# Patient Record
Sex: FEMALE | Race: WHITE | Employment: UNEMPLOYED | URBAN - METROPOLITAN AREA
[De-identification: names, ages, dates, MRNs, and addresses within clinical notes are randomized per-mention and may not be internally consistent; named-entity substitution may affect disease eponyms.]

---

## 2020-01-22 PROBLEM — M50.223 HERNIATED NUCLEUS PULPOSUS, C6-7: Chronic | Status: ACTIVE | Noted: 2020-01-22

## 2020-01-22 PROBLEM — M50.222 HERNIATED NUCLEUS PULPOSUS, C5-6: Chronic | Status: ACTIVE | Noted: 2020-01-22

## 2020-01-22 PROBLEM — M51.34 DEGENERATION OF THORACIC INTERVERTEBRAL DISC: Chronic | Status: ACTIVE | Noted: 2020-01-22

## 2020-01-22 PROBLEM — M50.322 DEGENERATION OF INTERVERTEBRAL DISC AT C5-C6 LEVEL: Chronic | Status: ACTIVE | Noted: 2020-01-22

## 2021-06-15 ENCOUNTER — OFFICE VISIT (OUTPATIENT)
Dept: SPORTS MEDICINE | Age: 66
End: 2021-06-15
Payer: COMMERCIAL

## 2021-06-15 VITALS
HEIGHT: 63 IN | SYSTOLIC BLOOD PRESSURE: 136 MMHG | WEIGHT: 240 LBS | TEMPERATURE: 97.8 F | BODY MASS INDEX: 42.52 KG/M2 | DIASTOLIC BLOOD PRESSURE: 80 MMHG

## 2021-06-15 DIAGNOSIS — M51.34 DEGENERATION, INTERVERTEBRAL DISC, THORACIC: Primary | ICD-10-CM

## 2021-06-15 PROCEDURE — 99214 OFFICE O/P EST MOD 30 MIN: CPT | Performed by: FAMILY MEDICINE

## 2021-06-15 RX ORDER — LIDOCAINE 50 MG/G
1 PATCH TOPICAL DAILY
Qty: 30 PATCH | Refills: 0 | Status: SHIPPED | OUTPATIENT
Start: 2021-06-15 | End: 2021-07-15

## 2021-06-15 ASSESSMENT — ENCOUNTER SYMPTOMS
CONSTIPATION: 0
DIARRHEA: 0
SHORTNESS OF BREATH: 0
BACK PAIN: 0
NAUSEA: 0

## 2021-06-15 NOTE — PROGRESS NOTES
[facility]) Physicians  Neurosurgery and Pain [facility]  [address], [address] [facility] , [address] [facility], [place]: [phone]  F: [phone]      Enoch Carlos  ([birth year])    6/15/2021    Subjective:     Enoch Carlos is 63 y.o. female who complains today of:    Chief Complaint   Patient presents with    Follow-up     FOLLOW UP VISIT FOR NECK PAIN / [facility]    Neck Pain     DEGENERATION OF C5-C6 INTERVERTERBRAL [address]       HPI     Patient states that the injections helped only temporarily she still having significant pain in the right side not going over to the left and pains are getting worse  Allergies:  Latex; 2,4-d dimethylamine (amisol); Mcdonald-2 inhibitors; Iodides; Nalidixic acid; Nsaids;  Shellfish allergy; and Statins    Past Medical History:   Diagnosis Date    Cancer ([facility] [address]     SKIN    Chronic pain     COPD [facility]chronic obstructive [facility] disease) ([facility]     Depression     Depression     Diabetes mellitus ([facility])     Hypertension     Neuropathy     Osteoarthritis     Sleep apnea     Urinary incontinence      Past Surgical History:   Procedure Laterality Date    JOINT REPLACEMENT      SPINE SURGERY       Family History   Problem Relation Age of Onset    Arthritis Mother     Hypertension Mother     Arthritis Father      Social History     Socioeconomic History    Marital status:      Spouse name: Not on file    Number of children: Not on file    Years of education: Not on file    Highest education level: Not on file   Occupational History    Not on file   Tobacco Use    Smoking status: Former Smoker     Packs/day: 1.00     Years: 15.00     Pack years: 15.00     Types: Cigarettes     Quit date: 6/15/2011     Years since quitting: 10.0    Smokeless tobacco: Never Used   Substance and Sexual Activity    Alcohol use: Never    Drug use: Never    Sexual activity: Not on file   Other Topics Concern    Not on file   Social History Narrative    Not on file     Social Determinants of Health     Financial Resource Strain:     Difficulty of Paying Living Expenses:    Food Insecurity:     Worried About Running Out of Food in the Last Year:     [address] in the Last Year:    Transportation Needs:     Lack of Transportation (Medical):  Lack of Transportation (Non-Medical):    Physical Activity:     Days of Exercise per Week:     Minutes of Exercise per Session:    Stress:     Feeling of Stress :    Social Connections:     Frequency of Communication with Friends and Family:     Frequency of Social Gatherings with Friends and Family:     Attends [employer] Services:     Active Member of Clubs or Organizations:     Attends Club or Organization Meetings:     Marital Status:    Intimate Partner Violence:     Fear of Current or Ex-Partner:     Emotionally Abused:     Physically Abused:     Sexually Abused:        Current Outpatient Medications on File Prior to Visit   Medication Sig Dispense Refill    tiZANidine (ZANAFLEX) 4 MG tablet Take 1 tablet by mouth every 6 hours as needed (pain) 60 tablet 0    etodolac (LODINE) 400 MG tablet Take 1 tablet by mouth 2 times daily 60 tablet 2    venlafaxine (EFFEXOR XR) 150 MG extended release capsule Take 1 capsule by mouth daily 30 capsule 2    bumetanide (BUMEX) 1 MG tablet Take 1 mg by mouth daily      dilTIAZem (CARDIZEM CD) 240 MG extended release capsule       gabapentin (NEURONTIN) 300 MG capsule Take by mouth.       HYDROcodone-acetaminophen (VICODIN) 5-300 MG TABS       insulin glargine (LANTUS) 100 UNIT/ML injection vial Inject 33 Units into the skin nightly      insulin glargine (LANTUS;BASAGLAR) 100 UNIT/ML injection pen Inject into the skin      insulin lispro, 1 Unit Dial, 100 UNIT/ML SOPN Inject into the skin      losartan (COZAAR) 50 MG tablet Take 50 mg by mouth daily      losartan (COZAAR) 50 MG tablet       mupirocin (BACTROBAN) 2 % cream APPLY 1 APPLICATION TO AFFECTED AREA

## 2021-06-22 ENCOUNTER — TELEPHONE (OUTPATIENT)
Dept: SPORTS MEDICINE | Age: 66
End: 2021-06-22

## 2021-06-23 ENCOUNTER — TELEPHONE (OUTPATIENT)
Dept: SPORTS MEDICINE | Age: 66
End: 2021-06-23

## 2021-06-23 NOTE — TELEPHONE ENCOUNTER
Received order from [facility] in [place] today. Per [facility], patient does not want to use [address] [place] [address] for MRI Thoracic, she wants to use insurance. Auth request submitted online [email or web address]), auth pending.      Case #[id number]

## 2021-06-24 NOTE — TELEPHONE ENCOUNTER
MRI Thoracic w/out contrast order along w/auth letter faxed to [facility] ([phone]). They will call patient to schedule.        No precert required,  case #[id number]

## 2021-06-29 ENCOUNTER — TELEPHONE (OUTPATIENT)
Dept: PAIN MANAGEMENT | Age: 66
End: 2021-06-29

## 2021-07-02 ENCOUNTER — OFFICE VISIT (OUTPATIENT)
Dept: SPORTS MEDICINE | Age: 66
End: 2021-07-02
Payer: COMMERCIAL

## 2021-07-02 VITALS
SYSTOLIC BLOOD PRESSURE: 136 MMHG | BODY MASS INDEX: 42.52 KG/M2 | HEIGHT: 63 IN | DIASTOLIC BLOOD PRESSURE: 80 MMHG | WEIGHT: 240 LBS | TEMPERATURE: 98.1 F

## 2021-07-02 DIAGNOSIS — M51.34 DEGENERATION OF THORACIC INTERVERTEBRAL DISC: Primary | ICD-10-CM

## 2021-07-02 PROCEDURE — 99213 OFFICE O/P EST LOW 20 MIN: CPT | Performed by: FAMILY MEDICINE

## 2021-07-02 ASSESSMENT — ENCOUNTER SYMPTOMS
BACK PAIN: 0
SHORTNESS OF BREATH: 0
CONSTIPATION: 0
NAUSEA: 0
DIARRHEA: 0

## 2021-07-02 NOTE — PROGRESS NOTES
[facility]) Physicians  Neurosurgery and Pain [facility]  [address] [facility], [address] [facility] , [address] [facility], [place]: [phone]  F: [phone]      Nicole Santillan  ([birth year])    7/2/2021    Subjective:     Nicole Santillan is 63 y.o. female who complains today of:    Chief Complaint   Patient presents with    Follow-up     FOLLOW UP FOR UPPER AND MIDDLE BACK PAIN / COULD NOT GET MRI    Back Pain     THORACIC PAIN       HPI     Patient comes in stating that she was unable to complete the MRI because they are putting a heavy object on top of her stomach and she could not breathe well she is having issues with Covid syndrome where she is a difficulty breathing and needs oxygen and they can provide that for her  Allergies:  Latex; 2,4-d dimethylamine (amisol); Mcdonald-2 inhibitors; Iodides; Nalidixic acid; Nsaids;  Shellfish allergy; and Statins    Past Medical History:   Diagnosis Date    Cancer ([facility] [address]     SKIN    Chronic pain     COPD (chronic obstructive pulmonary disease) (HCC)     Depression     Depression     Diabetes mellitus (HCC)     Hypertension     Neuropathy     Osteoarthritis     Sleep apnea     Urinary incontinence      Past Surgical History:   Procedure Laterality Date    JOINT REPLACEMENT      SPINE SURGERY       Family History   Problem Relation Age of Onset    Arthritis Mother     Hypertension Mother     Arthritis Father      Social History     Socioeconomic History    Marital status:      Spouse name: Not on file    Number of children: Not on file    Years of education: Not on file    Highest education level: Not on file   Occupational History    Not on file   Tobacco Use    Smoking status: Former Smoker     Packs/day: 1.00     Years: 15.00     Pack years: 15.00     Types: Cigarettes     Quit date: 6/15/2011     Years since quitting: 10.0    Smokeless tobacco: Never Used   Substance and Sexual Activity    Alcohol use: Never    Drug use: Never    Sexual activity: Not on file   Other Topics Concern    Not on file   Social History Narrative    Not on file     Social Determinants of Health     Financial Resource Strain:     Difficulty of Paying Living Expenses:    Food Insecurity:     Worried About Running Out of Food in the Last Year:     [address] in the Last Year:    Transportation Needs:     Lack of Transportation (Medical):  Lack of Transportation (Non-Medical):    Physical Activity:     Days of Exercise per Week:     Minutes of Exercise per Session:    Stress:     Feeling of Stress :    Social Connections:     Frequency of Communication with Friends and Family:     Frequency of Social Gatherings with Friends and Family:     Attends Quaker Services:     Active Member of Clubs or Organizations:     Attends Club or Organization Meetings:     Marital Status:    Intimate Partner Violence:     Fear of Current or Ex-Partner:     Emotionally Abused:     Physically Abused:     Sexually Abused:        Current Outpatient Medications on File Prior to Visit   Medication Sig Dispense Refill    lidocaine (LIDODERM) 5 % Place 1 patch onto the skin daily 12 hours on, 12 hours off. 30 patch 0    tiZANidine (ZANAFLEX) 4 MG tablet Take 1 tablet by mouth every 6 hours as needed (pain) 60 tablet 0    etodolac (LODINE) 400 MG tablet Take 1 tablet by mouth 2 times daily 60 tablet 2    venlafaxine (EFFEXOR XR) 150 MG extended release capsule Take 1 capsule by mouth daily 30 capsule 2    bumetanide (BUMEX) 1 MG tablet Take 1 mg by mouth daily      dilTIAZem (CARDIZEM CD) 240 MG extended release capsule       gabapentin (NEURONTIN) 300 MG capsule Take by mouth.       HYDROcodone-acetaminophen (VICODIN) 5-300 MG TABS       insulin glargine (LANTUS) 100 UNIT/ML injection vial Inject 33 Units into the skin nightly      insulin glargine (LANTUS;BASAGLAR) 100 UNIT/ML injection pen Inject into the skin      insulin lispro, 1 Unit Dial, 100 UNIT/ML SOPN Inject into the skin      losartan (COZAAR) 50 MG tablet Take 50 mg by mouth daily      losartan (COZAAR) 50 MG tablet       mupirocin (BACTROBAN) 2 % cream APPLY 1 APPLICATION TO AFFECTED AREA THREE TIMES A DAY FOR 10 DAYS      SITagliptin (JANUVIA) 100 MG tablet       lidocaine (LIDODERM) 5 % Place 1 patch onto the skin daily 30 patch 1    omeprazole (PRILOSEC) 40 MG delayed release capsule Take 1 capsule by mouth daily 30 capsule 1     No current facility-administered medications on file prior to visit. Review of Systems   Constitutional: Negative for fever. HENT: Negative for hearing loss. Respiratory: Negative for shortness of breath. Gastrointestinal: Negative for constipation, diarrhea and nausea. Genitourinary: Negative for difficulty urinating. Musculoskeletal: Negative for back pain and neck pain. Skin: Negative for rash. Neurological: Negative for headaches. Hematological: Does not bruise/bleed easily. Psychiatric/Behavioral: Negative for sleep disturbance. Objective:     Vitals:  /80 (Position: Sitting)   Temp 98.1 °F (36.7 °C) (Temporal)   Ht 5' 3\" (1.6 m)   Wt 240 lb (108.9 kg)   BMI 42.51 kg/m² Pain Score:   5      Physical Exam    Ortho Exam   Examination of the thoracic spine reveals the neurovascular muscular status to be intact patient is tenderness again in the mid thoracic region with radiation bilaterally more so along the right lower ribs    Assessment:      Diagnosis Orders   1. Degeneration of thoracic intervertebral disc         Plan:   Patient states that she is located a possible MRI which is getting more comfortable and allow her to breathe better so she can have it done however and have her get the MRI done there if for some reason she has an issue there to we will probably request a CT scan and drop the MRI for now       No orders of the defined types were placed in this encounter.       No orders of the defined types were placed in this encounter. Follow up:  Return for mri results.     JAVI SEYMOUR, DO

## 2021-07-13 NOTE — TELEPHONE ENCOUNTER
I was not aware of messages below as I received the order with notation of \"BWC\". I submitted the C9 request for auth to [address] [place] [address] and MRI was approved. I faxed the order to [facility] for scheduling. I will scan the auth into [employer] in case patient ends up using [facility] to pay for MRI.

## 2021-08-03 ENCOUNTER — PATIENT MESSAGE (OUTPATIENT)
Dept: SPORTS MEDICINE | Age: 66
End: 2021-08-03

## 2021-08-04 NOTE — TELEPHONE ENCOUNTER
From: Priyanka Head  To: Priti Atkinson DO  Sent: 8/3/2021 6:25 PM EDT  Subject: Non-Urgent Medical Question    DR Cali Mckeon to look at time for tomorrows appointment and found out it was today- As you know in 20 plus years I have not missed an appointment with you. I was coming to get the MRI of my back reviewed. .. I will remake my appointment -Very sorry for the mistake. Machelle Gates .(Not convinced I was the mistake maker. I remember stating that was my mothers bday- [birth year] 4th).      Priyanka Head

## 2021-08-10 ENCOUNTER — OFFICE VISIT (OUTPATIENT)
Dept: SPORTS MEDICINE | Age: 66
End: 2021-08-10
Payer: COMMERCIAL

## 2021-08-10 VITALS — TEMPERATURE: 97 F | WEIGHT: 245 LBS | HEIGHT: 63 IN | BODY MASS INDEX: 43.41 KG/M2

## 2021-08-10 DIAGNOSIS — M51.34 DEGENERATION OF THORACIC INTERVERTEBRAL DISC: Primary | ICD-10-CM

## 2021-08-10 PROCEDURE — 99213 OFFICE O/P EST LOW 20 MIN: CPT | Performed by: FAMILY MEDICINE

## 2021-08-10 ASSESSMENT — ENCOUNTER SYMPTOMS
CONSTIPATION: 0
BACK PAIN: 0
SHORTNESS OF BREATH: 0
DIARRHEA: 0
NAUSEA: 0

## 2021-08-10 NOTE — PROGRESS NOTES
[facility]) Physicians  Neurosurgery and Pain [facility]  [address], [address] [facility] , [address] [facility], [place]: [phone]  F: [phone]      Ricarda Castro  ([birth year])    8/10/2021    Subjective:     Ricarda Castro is 63 y.o. female who complains today of:    Chief Complaint   Patient presents with    Follow-up    Back Pain       HPI     Patient states that her back is still bothering her having pain going around her ribs she did get her MRI done  Allergies:  Latex; 2,4-d dimethylamine (amisol); Mcdonald-2 inhibitors; Iodides; Nalidixic acid; Nsaids;  Shellfish allergy; and Statins    Past Medical History:   Diagnosis Date    Cancer ([facility] [address]     SKIN    Chronic pain     COPD (chronic obstructive pulmonary disease) (HCC)     Depression     Depression     Diabetes mellitus (HCC)     Hypertension     Neuropathy     Osteoarthritis     Sleep apnea     Urinary incontinence      Past Surgical History:   Procedure Laterality Date    JOINT REPLACEMENT      SPINE SURGERY       Family History   Problem Relation Age of Onset    Arthritis Mother     Hypertension Mother     Arthritis Father      Social History     Socioeconomic History    Marital status:      Spouse name: Not on file    Number of children: Not on file    Years of education: Not on file    Highest education level: Not on file   Occupational History    Not on file   Tobacco Use    Smoking status: Former Smoker     Packs/day: 1.00     Years: 15.00     Pack years: 15.00     Types: Cigarettes     Quit date: 6/15/2011     Years since quitting: 10.1    Smokeless tobacco: Never Used   Substance and Sexual Activity    Alcohol use: Never    Drug use: Never    Sexual activity: Not on file   Other Topics Concern    Not on file   Social History Narrative    Not on file     Social Determinants of Health     Financial Resource Strain:     Difficulty of Paying Living Expenses:    Food Insecurity:     Worried About [address] in the Last Year:    [address] in the Last Year:    Transportation Needs:     Lack of Transportation (Medical):  Lack of Transportation (Non-Medical):    Physical Activity:     Days of Exercise per Week:     Minutes of Exercise per Session:    Stress:     Feeling of Stress :    Social Connections:     Frequency of Communication with Friends and Family:     Frequency of Social Gatherings with Friends and Family:     Attends Protestant Services:     Active Member of Clubs or Organizations:     Attends Club or Organization Meetings:     Marital Status:    Intimate Partner Violence:     Fear of Current or Ex-Partner:     Emotionally Abused:     Physically Abused:     Sexually Abused:        Current Outpatient Medications on File Prior to Visit   Medication Sig Dispense Refill    tiZANidine (ZANAFLEX) 4 MG tablet Take 1 tablet by mouth every 6 hours as needed (pain) 60 tablet 0    etodolac (LODINE) 400 MG tablet Take 1 tablet by mouth 2 times daily 60 tablet 2    venlafaxine (EFFEXOR XR) 150 MG extended release capsule Take 1 capsule by mouth daily 30 capsule 2    bumetanide (BUMEX) 1 MG tablet Take 1 mg by mouth daily      dilTIAZem (CARDIZEM CD) 240 MG extended release capsule       gabapentin (NEURONTIN) 300 MG capsule Take by mouth.       HYDROcodone-acetaminophen (VICODIN) 5-300 MG TABS       insulin glargine (LANTUS) 100 UNIT/ML injection vial Inject 33 Units into the skin nightly      insulin glargine (LANTUS;BASAGLAR) 100 UNIT/ML injection pen Inject into the skin      insulin lispro, 1 Unit Dial, 100 UNIT/ML SOPN Inject into the skin      losartan (COZAAR) 50 MG tablet Take 50 mg by mouth daily      losartan (COZAAR) 50 MG tablet       mupirocin (BACTROBAN) 2 % cream APPLY 1 APPLICATION TO AFFECTED AREA THREE TIMES A DAY FOR 10 DAYS      SITagliptin (JANUVIA) 100 MG tablet       lidocaine (LIDODERM) 5 % Place 1 patch onto the skin daily 30 patch 1    omeprazole (PRILOSEC) 40 MG delayed release capsule Take 1 capsule by mouth daily 30 capsule 1     No current facility-administered medications on file prior to visit. Review of Systems   Constitutional: Negative for fever. HENT: Negative for hearing loss. Respiratory: Negative for shortness of breath. Gastrointestinal: Negative for constipation, diarrhea and nausea. Genitourinary: Negative for difficulty urinating. Musculoskeletal: Negative for back pain and neck pain. Skin: Negative for rash. Neurological: Negative for headaches. Hematological: Does not bruise/bleed easily. Psychiatric/Behavioral: Negative for sleep disturbance. Objective:     Vitals:  Temp 97 °F (36.1 °C) (Infrared)   Ht 5' 3\" (1.6 m)   Wt 245 lb (111.1 kg)   BMI 43.40 kg/m²        Physical Exam  Vitals reviewed. Constitutional:       Appearance: Normal appearance. Skin:     General: Skin is warm and dry. Neurological:      Mental Status: She is alert. Ortho Exam   Examination of the thoracic spine reveals tenderness mostly in the lower thoracic region there is no radiating pain with rotation tenderness is noted mostly in the area on rotation the neurovascular muscular status is intact    Reviewed the recent MRI of thoracic spine  Assessment:      Diagnosis Orders   1. Degeneration of thoracic intervertebral disc  Samantha Mccartney MD, Pain Management, [place]       Plan: At this point I want to send the patient to Dr. Duncan Bautista for pain management to include blocks depending on his evaluation and consideration she is to return to me as needed       No orders of the defined types were placed in this encounter.       Orders Placed This Encounter   Procedures   Samantha Mccartney MD, Pain Management, [place]     Referral Priority:   Routine     Referral Type:   Eval and Treat     Referral Reason:   Specialty Services Required     Referred to Provider:   Priyanka Roca MD     Requested Specialty:   Physical Medicine and Rehab     Number of Visits Requested:   1         Follow up:  Return if symptoms worsen or fail to improve or after blocks.     JAVI SEYMOUR, DO

## 2021-08-18 DIAGNOSIS — M51.34 DEGENERATION, INTERVERTEBRAL DISC, THORACIC: ICD-10-CM

## 2021-09-08 ENCOUNTER — TELEPHONE (OUTPATIENT)
Dept: SPORTS MEDICINE | Age: 66
End: 2021-09-08

## 2021-09-13 DIAGNOSIS — M51.34 DEGENERATION OF THORACIC INTERVERTEBRAL DISC: Primary | ICD-10-CM

## 2021-09-13 RX ORDER — ETODOLAC 400 MG/1
400 TABLET, FILM COATED ORAL 2 TIMES DAILY
Qty: 60 TABLET | Refills: 2 | Status: SHIPPED | OUTPATIENT
Start: 2021-09-13 | End: 2021-12-02 | Stop reason: SDUPTHER

## 2021-09-16 ENCOUNTER — PATIENT MESSAGE (OUTPATIENT)
Dept: SPORTS MEDICINE | Age: 66
End: 2021-09-16

## 2021-09-17 NOTE — TELEPHONE ENCOUNTER
There are muscles that extend from the neck to the back and it may be a reflexive action  Use ice if it does not ever go away probably need to be seen

## 2021-09-17 NOTE — TELEPHONE ENCOUNTER
From: Wendy Peña  To: Fatoumata DO Dionte  Sent: 9/16/2021 8:04 PM EDT  Subject: Non-Urgent Medical Question    DR MOISE,     I checked with my [facility]  about the claim for lumbar spine. He stated it has been too long to try to get any treatment under that claim. To utilize insurance for any x rays etc. for the lumbar spine. Meanwhile had a very very painful massage at [facility] -1st could not lay flat on the table-was in agony to relax enough to be flat. 2. When the massager was using his elbow in the crook of my neck , the shooting pain radiated from neck to low left side of back(where spasms happen and pain is now)  3. The massage moved down back, to areas my pain has focused, it didn't stimulate the pain. A pulling forearm motion down my back stretching my back helped me feel straight. 2days later I felt good. That's over though. Heddy  Heddy  Thoughts?

## 2021-09-30 ENCOUNTER — OFFICE VISIT (OUTPATIENT)
Dept: SPORTS MEDICINE | Age: 66
End: 2021-09-30
Payer: COMMERCIAL

## 2021-09-30 VITALS — WEIGHT: 249 LBS | BODY MASS INDEX: 44.12 KG/M2 | HEIGHT: 63 IN | TEMPERATURE: 97.6 F

## 2021-09-30 DIAGNOSIS — M50.222 HERNIATED NUCLEUS PULPOSUS, C5-6: ICD-10-CM

## 2021-09-30 DIAGNOSIS — M50.322 DEGENERATION OF INTERVERTEBRAL DISC AT C5-C6 LEVEL: Primary | ICD-10-CM

## 2021-09-30 PROCEDURE — 99214 OFFICE O/P EST MOD 30 MIN: CPT | Performed by: FAMILY MEDICINE

## 2021-09-30 ASSESSMENT — ENCOUNTER SYMPTOMS
CONSTIPATION: 0
DIARRHEA: 0
BACK PAIN: 0
NAUSEA: 0
SHORTNESS OF BREATH: 0

## 2021-09-30 NOTE — PROGRESS NOTES
[facility]) Physicians  Neurosurgery and Pain [facility]  [address] [facility], [address] [facility] , [address] [facility], [place]: [phone]  F: [phone]      Jono Paulson  ([birth year])    9/30/2021    Subjective:     Jono Paulson is 63 y.o. female who complains today of:    Chief Complaint   Patient presents with    Other     FELL ON 9/25/21    Back Pain    Arm Pain     BOTH     Hand Pain     BOTH        HPI     Patient comes in complaining of pain and off-and-on tingling of her arms and neck after slipping and hitting her upper torso and possibly into the neck region this happened about a week ago she continues to have these issues is here today for further evaluation and treatment she does have a history of fusion in the cervical spine  Allergies:  Latex; 2,4-d dimethylamine (amisol); Mcdonald-2 inhibitors; Iodides; Nalidixic acid; Nsaids;  Shellfish allergy; and Statins    Past Medical History:   Diagnosis Date    Cancer ([address]     SKIN    Chronic pain     COPD (chronic obstructive pulmonary disease) (HCC)     Depression     Depression     Diabetes mellitus (HCC)     Hypertension     Neuropathy     Osteoarthritis     Sleep apnea     Urinary incontinence      Past Surgical History:   Procedure Laterality Date    JOINT REPLACEMENT      SPINE SURGERY       Family History   Problem Relation Age of Onset    Arthritis Mother     Hypertension Mother     Arthritis Father      Social History     Socioeconomic History    Marital status:      Spouse name: Not on file    Number of children: Not on file    Years of education: Not on file    Highest education level: Not on file   Occupational History    Not on file   Tobacco Use    Smoking status: Former Smoker     Packs/day: 1.00     Years: 15.00     Pack years: 15.00     Types: Cigarettes     Quit date: 6/15/2011     Years since quitting: 10.3    Smokeless tobacco: Never Used   Substance and Sexual Activity    Alcohol use: Never    Drug use: Never    Sexual activity: Not on file   Other Topics Concern    Not on file   Social History Narrative    Not on file     Social Determinants of Health     Financial Resource Strain:     Difficulty of Paying Living Expenses:    Food Insecurity:     Worried About Running Out of Food in the Last Year:     [address] in the Last Year:    Transportation Needs:     Lack of Transportation (Medical):  Lack of Transportation (Non-Medical):    Physical Activity:     Days of Exercise per Week:     Minutes of Exercise per Session:    Stress:     Feeling of Stress :    Social Connections:     Frequency of Communication with Friends and Family:     Frequency of Social Gatherings with Friends and Family:     Attends Anabaptism Services:     Active Member of Clubs or Organizations:     Attends Club or Organization Meetings:     Marital Status:    Intimate Partner Violence:     Fear of Current or Ex-Partner:     Emotionally Abused:     Physically Abused:     Sexually Abused:        Current Outpatient Medications on File Prior to Visit   Medication Sig Dispense Refill    etodolac (LODINE) 400 MG tablet Take 1 tablet by mouth 2 times daily 60 tablet 2    tiZANidine (ZANAFLEX) 4 MG tablet Take 1 tablet by mouth every 6 hours as needed (pain) 60 tablet 0    venlafaxine (EFFEXOR XR) 150 MG extended release capsule Take 1 capsule by mouth daily 30 capsule 2    bumetanide (BUMEX) 1 MG tablet Take 1 mg by mouth daily      dilTIAZem (CARDIZEM CD) 240 MG extended release capsule       gabapentin (NEURONTIN) 300 MG capsule Take by mouth.       HYDROcodone-acetaminophen (VICODIN) 5-300 MG TABS       insulin glargine (LANTUS) 100 UNIT/ML injection vial Inject 33 Units into the skin nightly      insulin glargine (LANTUS;BASAGLAR) 100 UNIT/ML injection pen Inject into the skin      insulin lispro, 1 Unit Dial, 100 UNIT/ML SOPN Inject into the skin      losartan (COZAAR) 50 MG tablet (4-5 VIEWS)       Plan: At this point patient is already on loading as well as gabapentin understand those and monitor the arm symptoms that she is having when I see her in 3 weeks and told her if there is any escalation of symptoms to call me sooner and will get further testing       No orders of the defined types were placed in this encounter. Orders Placed This Encounter   Procedures    XR CERVICAL SPINE (4-5 VIEWS)     Standing Status:   Future     Number of Occurrences:   1     Standing Expiration Date:   12/29/2021     Scheduling Instructions:      XR Cervical Spine AP Lat Flex and Extension         Follow up:  Return in about 3 weeks (around 10/21/2021).     JAVI SEYMOUR, DO

## 2021-10-14 ENCOUNTER — HOSPITAL ENCOUNTER (OUTPATIENT)
Dept: CT IMAGING | Age: 66
Discharge: HOME | End: 2021-10-16
Payer: COMMERCIAL

## 2021-10-14 DIAGNOSIS — R91.1 LUNG NODULE: ICD-10-CM

## 2021-10-26 ENCOUNTER — OFFICE VISIT (OUTPATIENT)
Dept: SPORTS MEDICINE | Age: 66
End: 2021-10-26
Payer: COMMERCIAL

## 2021-10-26 VITALS
BODY MASS INDEX: 43.94 KG/M2 | DIASTOLIC BLOOD PRESSURE: 78 MMHG | SYSTOLIC BLOOD PRESSURE: 138 MMHG | TEMPERATURE: 96.7 F | WEIGHT: 248 LBS | HEIGHT: 63 IN

## 2021-10-26 DIAGNOSIS — M50.223 HERNIATED NUCLEUS PULPOSUS, C6-7: ICD-10-CM

## 2021-10-26 DIAGNOSIS — M50.222 HERNIATED NUCLEUS PULPOSUS, C5-6: Primary | ICD-10-CM

## 2021-10-26 PROCEDURE — 99213 OFFICE O/P EST LOW 20 MIN: CPT | Performed by: FAMILY MEDICINE

## 2021-10-26 ASSESSMENT — ENCOUNTER SYMPTOMS
SHORTNESS OF BREATH: 0
NAUSEA: 0
DIARRHEA: 0
CONSTIPATION: 0
BACK PAIN: 0

## 2021-10-26 NOTE — PROGRESS NOTES
[facility]) Physicians  Neurosurgery and Pain [facility]  [address], [address] [facility] , [address] [facility], [place]: [phone]  F: [phone]      Marybel Moctezuma  ([id number])    10/26/2021    Subjective:     Marybel Moctezuma is 61 y.o. female who complains today of:    Chief Complaint   Patient presents with    Follow-up     Cervical & Lumbar Spine       HPI     Patient comes in stating that she feels a little better but she still getting significant pain and tingling down both arms feel somewhat better when she leans her neck forward but when she puts in a normal position he starts up again  Allergies:  Latex; 2,4-d dimethylamine (amisol); Mcdonald-2 inhibitors; Iodides; Nalidixic acid; Nsaids;  Shellfish allergy; and Statins    Past Medical History:   Diagnosis Date    Cancer ([facility] [address]     SKIN    Chronic pain     COPD (chronic obstructive pulmonary disease) ([facility])     Depression     Depression     Diabetes mellitus (HCC)     Hypertension     Neuropathy     Osteoarthritis     Sleep apnea     Urinary incontinence      Past Surgical History:   Procedure Laterality Date    JOINT REPLACEMENT      SPINE SURGERY       Family History   Problem Relation Age of Onset    Arthritis Mother     Hypertension Mother     Arthritis Father      Social History     Socioeconomic History    Marital status:      Spouse name: Not on file    Number of children: Not on file    Years of education: Not on file    Highest education level: Not on file   Occupational History    Not on file   Tobacco Use    Smoking status: Former Smoker     Packs/day: 1.00     Years: 15.00     Pack years: 15.00     Types: Cigarettes     Quit date: 6/15/2011     Years since quitting: 10.3    Smokeless tobacco: Never Used   Substance and Sexual Activity    Alcohol use: Never    Drug use: Never    Sexual activity: Not on file   Other Topics Concern    Not on file   Social History Narrative    Not on file     Social Determinants of Health     Financial Resource Strain:     Difficulty of Paying Living Expenses:    Food Insecurity:     Worried About Running Out of Food in the Last Year:     [address] in the Last Year:    Transportation Needs:     Lack of Transportation (Medical):  Lack of Transportation (Non-Medical):    Physical Activity:     Days of Exercise per Week:     Minutes of Exercise per Session:    Stress:     Feeling of Stress :    Social Connections:     Frequency of Communication with Friends and Family:     Frequency of Social Gatherings with Friends and Family:     Attends Oriental orthodox Services:     Active Member of Clubs or Organizations:     Attends Club or Organization Meetings:     Marital Status:    Intimate Partner Violence:     Fear of Current or Ex-Partner:     Emotionally Abused:     Physically Abused:     Sexually Abused:        Current Outpatient Medications on File Prior to Visit   Medication Sig Dispense Refill    etodolac (LODINE) 400 MG tablet Take 1 tablet by mouth 2 times daily 60 tablet 2    tiZANidine (ZANAFLEX) 4 MG tablet Take 1 tablet by mouth every 6 hours as needed (pain) 60 tablet 0    venlafaxine (EFFEXOR XR) 150 MG extended release capsule Take 1 capsule by mouth daily 30 capsule 2    bumetanide (BUMEX) 1 MG tablet Take 1 mg by mouth daily      dilTIAZem (CARDIZEM CD) 240 MG extended release capsule       gabapentin (NEURONTIN) 300 MG capsule Take by mouth.       HYDROcodone-acetaminophen (VICODIN) 5-300 MG TABS       insulin glargine (LANTUS) 100 UNIT/ML injection vial Inject 33 Units into the skin nightly      insulin glargine (LANTUS;BASAGLAR) 100 UNIT/ML injection pen Inject into the skin      insulin lispro, 1 Unit Dial, 100 UNIT/ML SOPN Inject into the skin      losartan (COZAAR) 50 MG tablet Take 50 mg by mouth daily      losartan (COZAAR) 50 MG tablet       mupirocin (BACTROBAN) 2 % cream APPLY 1 APPLICATION TO AFFECTED AREA THREE TIMES A DAY FOR 10 DAYS      SITagliptin (JANUVIA) 100 MG tablet       lidocaine (LIDODERM) 5 % Place 1 patch onto the skin daily 30 patch 1    omeprazole (PRILOSEC) 40 MG delayed release capsule Take 1 capsule by mouth daily 30 capsule 1     No current facility-administered medications on file prior to visit. Review of Systems   Constitutional: Negative for fever. HENT: Negative for hearing loss. Respiratory: Negative for shortness of breath. Gastrointestinal: Negative for constipation, diarrhea and nausea. Genitourinary: Negative for difficulty urinating. Musculoskeletal: Negative for back pain and neck pain. Skin: Negative for rash. Neurological: Negative for headaches. Hematological: Does not bruise/bleed easily. Psychiatric/Behavioral: Negative for sleep disturbance. Objective:     Vitals:  /78 (Site: Right Upper Arm)   Temp 96.7 °F (35.9 °C) (Infrared)   Ht 5' 3\" (1.6 m)   Wt 248 lb (112.5 kg)   BMI 43.93 kg/m² Pain Score:   4      Physical Exam  Vitals reviewed. Constitutional:       Appearance: Normal appearance. Skin:     General: Skin is warm and dry. Neurological:      Mental Status: She is alert. Ortho Exam   Examination cervical spine with the neurovascular muscular status to be grossly intact positive Spurling sign bilaterally patient had a for posturing cervical spine with mediocre range of motion benign surgical scar was noted    Assessment:      Diagnosis Orders   1. Herniated disc C5-6  NM NJX DX/THER SBST INTRLMNR CRV/THRC W/IMG GDN   2. Herniated disc C6-7  NM NJX DX/THER SBST INTRLMNR CRV/THRC W/IMG GDN       Plan: This patient is failed conservative care when a request from [employer]. for epidural blocks and will have Dr. Melanie Valenzuela do them once they are approved she is to return to me as needed       No orders of the defined types were placed in this encounter.       Orders Placed This Encounter   Procedures    NM ULG DX/THER SBST INTRLMNR CRV/THRC W/IMG GDN     Standing Status:   Future     Standing Expiration Date:   1/24/2022         Follow up:  Return if symptoms worsen or fail to improve.     JAVI SEYMOUR, DO

## 2021-11-02 DIAGNOSIS — M51.34 DEGENERATION OF THORACIC INTERVERTEBRAL DISC: ICD-10-CM

## 2021-11-02 DIAGNOSIS — M50.322 DEGENERATION OF C5-C6 INTERVERTEBRAL DISC: ICD-10-CM

## 2021-11-02 NOTE — TELEPHONE ENCOUNTER
Requested Prescriptions     Pending Prescriptions Disp Refills    tiZANidine (ZANAFLEX) 4 MG tablet 60 tablet 0     Sig: Take 1 tablet by mouth every 6 hours as needed (pain)       Patient last seen on:  10/26  Date of last surgery:  n/a  Date of last refill: 5/11   Pain level:  n/a  Patient complaining of:  Pt asking for refill.    Future appts: none

## 2021-11-04 ENCOUNTER — TELEPHONE (OUTPATIENT)
Dept: PAIN MANAGEMENT | Age: 66
End: 2021-11-04

## 2021-11-04 DIAGNOSIS — M51.34 DEGENERATION OF THORACIC INTERVERTEBRAL DISC: ICD-10-CM

## 2021-11-04 RX ORDER — TIZANIDINE 4 MG/1
4 TABLET ORAL EVERY 6 HOURS PRN
Qty: 60 TABLET | Refills: 0 | Status: SHIPPED | OUTPATIENT
Start: 2021-11-04 | End: 2021-12-02 | Stop reason: SDUPTHER

## 2021-11-04 RX ORDER — ETODOLAC 400 MG/1
400 TABLET, FILM COATED ORAL 2 TIMES DAILY
Qty: 60 TABLET | Refills: 2 | OUTPATIENT
Start: 2021-11-04

## 2021-11-04 RX ORDER — VENLAFAXINE HYDROCHLORIDE 150 MG/1
150 CAPSULE, EXTENDED RELEASE ORAL DAILY
Qty: 30 CAPSULE | Refills: 2 | Status: SHIPPED | OUTPATIENT
Start: 2021-11-04 | End: 2021-12-02 | Stop reason: SDUPTHER

## 2021-11-04 NOTE — TELEPHONE ENCOUNTER
ORDER PLACED:    Date: 10/26/21  Description: C5-6, C6-C7  Order Number: [id number]  Ordering Provider: DR Angelika Sánchez   Performing Provider: DR Tommy Tinsley   CPT Codes: 95062  ICD10 Codes: Raegan Nava

## 2021-11-04 NOTE — TELEPHONE ENCOUNTER
Requested Prescriptions     Pending Prescriptions Disp Refills    etodolac (LODINE) 400 MG tablet 60 tablet 2     Sig: Take 1 tablet by mouth 2 times daily    venlafaxine (EFFEXOR XR) 150 MG extended release capsule 30 capsule 2     Sig: Take 1 capsule by mouth daily       Patient last seen on:  10/26  Date of last surgery:  n/a  Date of last refill:  9/13  Pain level:  n/a  Patient complaining of:  Patient requests refills be sent to pharmacy.    Future appts: none

## 2021-11-05 NOTE — TELEPHONE ENCOUNTER
C5-6, C6-C7, LADY     C-9 REQ C5-6, C6-C7, LADY FAXED TO [employer]--SUCCESSFUL FAX.       C-9 W MEDICAL/ORDER SCANNED

## 2021-11-11 NOTE — TELEPHONE ENCOUNTER
C5-6, C6-C7, LADY       C-9 REQ RETURNED. DENIED BY [employer]     DENIAL STATES:  INJECTIONS ABOVE C6-C7 NOT RECOMMENDED AND INJECTION AT C5-C6 NOT APPROPRIATE PER [facility] GUIDELINES. CALLED PATIENT--SHE DOESN'T WANT TO PUT THIS ON HER INSURANCE. SHE IS GOING TO CALL HER  TO HAVE THEM ADDRESS THIS C-9 DENIAL.  [phone] PT#

## 2021-11-22 ENCOUNTER — TELEPHONE (OUTPATIENT)
Dept: SPORTS MEDICINE | Age: 66
End: 2021-11-22

## 2021-11-24 NOTE — TELEPHONE ENCOUNTER
C5-6, C6-C7, LADY      CHRISTOPHER FROM [employer] CALLED ME TO LET ME KNOW THAT THE PATIENT'S  HAD FILED AN APPEAL TO THE DENIAL OF THE CERVICAL INJECTIONS. [facility] IS SENDING THE C-9 FOR PHYSICIAN REVIEW.       APPEAL FILED-C-9 PENDING PHYSICIAN REVIEW FROM [facility]

## 2021-12-02 ENCOUNTER — TELEPHONE (OUTPATIENT)
Dept: PAIN MANAGEMENT | Age: 66
End: 2021-12-02

## 2021-12-02 DIAGNOSIS — M50.322 DEGENERATION OF C5-C6 INTERVERTEBRAL DISC: ICD-10-CM

## 2021-12-02 DIAGNOSIS — M51.34 DEGENERATION OF THORACIC INTERVERTEBRAL DISC: ICD-10-CM

## 2021-12-02 DIAGNOSIS — M50.322 DEGENERATION OF C5-C6 INTERVERTEBRAL DISC: Primary | ICD-10-CM

## 2021-12-02 NOTE — TELEPHONE ENCOUNTER
Patient called stating that the pain from her neck is so significant that it's causing numbness/pain in her hands. She claims that they go numb constantly while driving. She wants to know if she should try taking more neurontin? Patient is just unsure of what to do and is asking Dr Jason Nguyen' advice.  She has a follow up scheduled for Tuesday 12/7/21

## 2021-12-02 NOTE — TELEPHONE ENCOUNTER
Requested Prescriptions     Pending Prescriptions Disp Refills    omeprazole (PRILOSEC) 40 MG delayed release capsule 30 capsule 1     Sig: Take 1 capsule by mouth daily    gabapentin (NEURONTIN) 300 MG capsule 90 capsule      Sig: Take by mouth.     tiZANidine (ZANAFLEX) 4 MG tablet 60 tablet 0     Sig: Take 1 tablet by mouth every 6 hours as needed (pain)    venlafaxine (EFFEXOR XR) 150 MG extended release capsule 30 capsule 2     Sig: Take 1 capsule by mouth daily    etodolac (LODINE) 400 MG tablet 60 tablet 2     Sig: Take 1 tablet by mouth 2 times daily       Patient last seen on:  10/26/21  Date of last surgery:  n/a  Date of last refill:  11/4/21  Pain level:  n/a  Patient complaining of:  Pt requesting refills and to get all RX's same day of the month/couldn't find valid gabapentin RX  Future appts: 12/7/21

## 2021-12-03 RX ORDER — OMEPRAZOLE 40 MG/1
40 CAPSULE, DELAYED RELEASE ORAL DAILY
Qty: 30 CAPSULE | Refills: 1 | Status: SHIPPED | OUTPATIENT
Start: 2021-12-03 | End: 2022-02-03 | Stop reason: SDUPTHER

## 2021-12-03 RX ORDER — ETODOLAC 400 MG/1
400 TABLET, FILM COATED ORAL 2 TIMES DAILY
Qty: 60 TABLET | Refills: 2 | Status: SHIPPED | OUTPATIENT
Start: 2021-12-03 | End: 2022-03-03 | Stop reason: SDUPTHER

## 2021-12-03 RX ORDER — VENLAFAXINE HYDROCHLORIDE 150 MG/1
150 CAPSULE, EXTENDED RELEASE ORAL DAILY
Qty: 30 CAPSULE | Refills: 2 | Status: SHIPPED | OUTPATIENT
Start: 2021-12-04 | End: 2022-03-03 | Stop reason: SDUPTHER

## 2021-12-03 RX ORDER — GABAPENTIN 300 MG/1
300 CAPSULE ORAL 2 TIMES DAILY
Qty: 60 CAPSULE | Refills: 0 | Status: SHIPPED | OUTPATIENT
Start: 2021-12-03 | End: 2022-03-03

## 2021-12-03 RX ORDER — TIZANIDINE 4 MG/1
4 TABLET ORAL EVERY 6 HOURS PRN
Qty: 60 TABLET | Refills: 0 | Status: SHIPPED | OUTPATIENT
Start: 2021-12-04 | End: 2022-02-03 | Stop reason: SDUPTHER

## 2021-12-07 ENCOUNTER — OFFICE VISIT (OUTPATIENT)
Dept: SPORTS MEDICINE | Age: 66
End: 2021-12-07
Payer: COMMERCIAL

## 2021-12-07 VITALS
HEIGHT: 63 IN | BODY MASS INDEX: 43.94 KG/M2 | DIASTOLIC BLOOD PRESSURE: 78 MMHG | SYSTOLIC BLOOD PRESSURE: 162 MMHG | TEMPERATURE: 96.8 F | WEIGHT: 248 LBS

## 2021-12-07 DIAGNOSIS — M50.322 DEGENERATION OF C5-C6 INTERVERTEBRAL DISC: Primary | ICD-10-CM

## 2021-12-07 PROCEDURE — 99214 OFFICE O/P EST MOD 30 MIN: CPT | Performed by: FAMILY MEDICINE

## 2021-12-07 RX ORDER — GABAPENTIN 600 MG/1
600 TABLET ORAL 3 TIMES DAILY
Qty: 90 TABLET | Refills: 0 | Status: SHIPPED | OUTPATIENT
Start: 2021-12-07 | End: 2022-03-03 | Stop reason: SDUPTHER

## 2021-12-07 RX ORDER — HYDROCODONE BITARTRATE AND ACETAMINOPHEN 5; 325 MG/1; MG/1
1 TABLET ORAL EVERY 6 HOURS PRN
Qty: 20 TABLET | Refills: 0 | Status: SHIPPED | OUTPATIENT
Start: 2021-12-07 | End: 2021-12-12

## 2021-12-07 ASSESSMENT — ENCOUNTER SYMPTOMS
BACK PAIN: 0
SHORTNESS OF BREATH: 0
DIARRHEA: 0
CONSTIPATION: 0
NAUSEA: 0

## 2021-12-07 NOTE — PROGRESS NOTES
[address]  Physicians  Neurosurgery and Pain [place] [address] [facility], [address] [facility] , [address] [facility], [place]: [phone]  F: [phone]      Frank Andrews  (1955)    12/7/2021    Subjective:     Frank Andrews is 61 y.o. female who complains today of:    Chief Complaint   Patient presents with    Follow-up     Herniated disc C5-6, Cervcical disc degeneration C5-C6       HPI     Patient comes in stating that her arm pain and tingling is getting worse for still waiting for permission for epidural blocks she is wondering what else we can do  Allergies:  Latex; 2,4-d dimethylamine (amisol); Mcdonald-2 inhibitors; Iodides; Nalidixic acid; Nsaids;  Shellfish allergy; and Statins    Past Medical History:   Diagnosis Date    Cancer ([facility] [address]     SKIN    Chronic pain     COPD (chronic obstructive pulmonary disease) (HCC)     Depression     Depression     Diabetes mellitus (HCC)     Hypertension     Neuropathy     Osteoarthritis     Sleep apnea     Urinary incontinence      Past Surgical History:   Procedure Laterality Date    JOINT REPLACEMENT      SPINE SURGERY       Family History   Problem Relation Age of Onset    Arthritis Mother     Hypertension Mother     Arthritis Father      Social History     Socioeconomic History    Marital status:      Spouse name: Not on file    Number of children: Not on file    Years of education: Not on file    Highest education level: Not on file   Occupational History    Not on file   Tobacco Use    Smoking status: Former Smoker     Packs/day: 1.00     Years: 15.00     Pack years: 15.00     Types: Cigarettes     Quit date: 6/15/2011     Years since quitting: 10.4    Smokeless tobacco: Never Used   Substance and Sexual Activity    Alcohol use: Never    Drug use: Never    Sexual activity: Not on file   Other Topics Concern    Not on file   Social History Narrative    Not on file     Social Determinants of Health Financial Resource Strain:     Difficulty of Paying Living Expenses: Not on file   Food Insecurity:     Worried About Running Out of Food in the Last Year: Not on file    Dev of Food in the Last Year: Not on file   Transportation Needs:     Lack of Transportation (Medical): Not on file    Lack of Transportation (Non-Medical): Not on file   Physical Activity:     Days of Exercise per Week: Not on file    Minutes of Exercise per Session: Not on file   Stress:     Feeling of Stress : Not on file   Social Connections:     Frequency of Communication with Friends and Family: Not on file    Frequency of Social Gatherings with Friends and Family: Not on file    Attends Nondenominational Services: Not on file    Active Member of [address] [place] [address] [employer] or Organizations: Not on file    Attends Club or Organization Meetings: Not on file    Marital Status: Not on file   Intimate Partner Violence:     Fear of Current or Ex-Partner: Not on file    Emotionally Abused: Not on file    Physically Abused: Not on file    Sexually Abused: Not on file   Housing Stability:     Unable to Pay for Housing in the Last Year: Not on file    Number of [place] in the Last Year: Not on file    Unstable Housing in the Last Year: Not on file       Current Outpatient Medications on File Prior to Visit   Medication Sig Dispense Refill    omeprazole (PRILOSEC) 40 MG delayed release capsule Take 1 capsule by mouth daily 30 capsule 1    gabapentin (NEURONTIN) 300 MG capsule Take 1 capsule by mouth 2 times daily for 30 days.  60 capsule 0    tiZANidine (ZANAFLEX) 4 MG tablet Take 1 tablet by mouth every 6 hours as needed (pain) 60 tablet 0    venlafaxine (EFFEXOR XR) 150 MG extended release capsule Take 1 capsule by mouth daily 30 capsule 2    etodolac (LODINE) 400 MG tablet Take 1 tablet by mouth 2 times daily 60 tablet 2    bumetanide (BUMEX) 1 MG tablet Take 1 mg by mouth daily      dilTIAZem (CARDIZEM CD) 240 MG extended release capsule  HYDROcodone-acetaminophen (VICODIN) 5-300 MG TABS       insulin glargine (LANTUS) 100 UNIT/ML injection vial Inject 33 Units into the skin nightly      insulin glargine (LANTUS;BASAGLAR) 100 UNIT/ML injection pen Inject into the skin      insulin lispro, 1 Unit Dial, 100 UNIT/ML SOPN Inject into the skin      losartan (COZAAR) 50 MG tablet Take 50 mg by mouth daily      losartan (COZAAR) 50 MG tablet       mupirocin (BACTROBAN) 2 % cream APPLY 1 APPLICATION TO AFFECTED AREA THREE TIMES A DAY FOR 10 DAYS      SITagliptin (JANUVIA) 100 MG tablet       lidocaine (LIDODERM) 5 % Place 1 patch onto the skin daily 30 patch 1     No current facility-administered medications on file prior to visit. Review of Systems   Constitutional: Negative for fever. HENT: Negative for hearing loss. Respiratory: Negative for shortness of breath. Gastrointestinal: Negative for constipation, diarrhea and nausea. Genitourinary: Negative for difficulty urinating. Musculoskeletal: Negative for back pain and neck pain. Skin: Negative for rash. Neurological: Negative for headaches. Hematological: Does not bruise/bleed easily. Psychiatric/Behavioral: Negative for sleep disturbance. Objective:     Vitals:  BP (!) 162/78 (Site: Right Lower Arm)   Temp 96.8 °F (36 °C) (Infrared)   Ht 5' 3\" (1.6 m)   Wt 248 lb (112.5 kg)   BMI 43.93 kg/m² Pain Score:   7      Physical Exam  Vitals reviewed. Constitutional:       Appearance: Normal appearance. Skin:     General: Skin is warm and dry. Neurological:      Mental Status: She is alert. Ortho Exam   Examination of the cervical spine with the neurovascular muscular status to be within normal limits other than the paresthesias down both arms patient had limited range of motion in the cervical spine with positive Spurling sign bilaterally    Assessment:      Diagnosis Orders   1.  Degeneration of C5-C6 intervertebral disc  gabapentin (NEURONTIN) 600 MG tablet    HYDROcodone-acetaminophen (NORCO) 5-325 MG per tablet       Plan:   And increasing the patient's Neurontin to 900 mg a day I also gave her some Norco for pain since she had not had any in a year still waiting for permission for blocks we will try to get her to those as soon as possible       Orders Placed This Encounter   Medications    gabapentin (NEURONTIN) 600 MG tablet     Sig: Take 1 tablet by mouth 3 times daily for 30 days. Dispense:  90 tablet     Refill:  0    HYDROcodone-acetaminophen (NORCO) 5-325 MG per tablet     Sig: Take 1 tablet by mouth every 6 hours as needed for Pain for up to 5 days. Intended supply: 5 days. Take lowest dose possible to manage pain     Dispense:  20 tablet     Refill:  0     Reduce doses taken as pain becomes manageable       No orders of the defined types were placed in this encounter. Follow up:  Return in about 3 weeks (around 12/28/2021).     JAVI SEYMOUR DO

## 2022-01-25 NOTE — TELEPHONE ENCOUNTER
C5-6, C6-C7, LADY     AUTHORIZATION:    INSURANCE: [facility] HEARING ORDER   AUTH [address] VIA: FAX FROM [facility]RIG[facility]    [id number] #: NO NUMBERS    DATE RANGE: APPROVED     TELEPHONE CALL ROUTED TO MA TO SCHEDULE. TO BE SCHEDULED WITH DR Diantha Nageotte OK to schedule procedure approved as above. Please note sides/levels approved and date range.    (If applicable, sides/levels approved may differ from those ordered)    [address] ENDED AS OF TODAY 1/25/22

## 2022-01-25 NOTE — TELEPHONE ENCOUNTER
SPOKE TO DR Vernell Pena ABOUT SCHEDULING PATIENT, HE IS GOING TO TALK TO DR ODOM'S BEFORE SCHEDULING.

## 2022-01-28 ENCOUNTER — TELEPHONE (OUTPATIENT)
Dept: SPORTS MEDICINE | Age: 67
End: 2022-01-28

## 2022-02-01 ENCOUNTER — OFFICE VISIT (OUTPATIENT)
Dept: SPORTS MEDICINE | Age: 67
End: 2022-02-01
Payer: COMMERCIAL

## 2022-02-01 VITALS — BODY MASS INDEX: 43.94 KG/M2 | TEMPERATURE: 97.3 F | HEIGHT: 63 IN | WEIGHT: 248 LBS

## 2022-02-01 DIAGNOSIS — M50.222 HERNIATED NUCLEUS PULPOSUS, C5-6: Primary | ICD-10-CM

## 2022-02-01 DIAGNOSIS — M50.223 HERNIATED NUCLEUS PULPOSUS, C6-7: ICD-10-CM

## 2022-02-01 PROCEDURE — 99214 OFFICE O/P EST MOD 30 MIN: CPT | Performed by: FAMILY MEDICINE

## 2022-02-01 ASSESSMENT — ENCOUNTER SYMPTOMS
SHORTNESS OF BREATH: 0
NAUSEA: 0
BACK PAIN: 0
DIARRHEA: 0
CONSTIPATION: 0

## 2022-02-01 NOTE — PROGRESS NOTES
[facility]) Physicians  Neurosurgery and Pain [facility]  [address] [facility] [facility] , [address] [facility], [place] [address]: [phone]  F: [phone]      Domitila Baron  (77/96/1851)    2/1/2022    Subjective:     Domitila Baron is 61 y.o. female who complains today of:    Chief Complaint   Patient presents with    Follow-up     Cervical MRI       HPI     Patient comes in stating that she is here because she was most recent for nerve blocks but they wanted an MRI done has been a long time she has had 1 and she did not have one after surgery she states that she still having significant problems in the neck going down both arms but mostly on the right  Allergies:  Latex; 2,4-d dimethylamine (amisol); Mcdonald-2 inhibitors; Iodides; Nalidixic acid; Nsaids;  Shellfish allergy; and Statins    Past Medical History:   Diagnosis Date    Cancer ([facility] [address]     SKIN    Chronic pain     COPD (chronic obstructive pulmonary disease) (HCC)     Depression     Depression     Diabetes mellitus (HCC)     Hypertension     Neuropathy     Osteoarthritis     Sleep apnea     Urinary incontinence      Past Surgical History:   Procedure Laterality Date    JOINT REPLACEMENT      SPINE SURGERY       Family History   Problem Relation Age of Onset    Arthritis Mother     Hypertension Mother     Arthritis Father      Social History     Socioeconomic History    Marital status:      Spouse name: Not on file    Number of children: Not on file    Years of education: Not on file    Highest education level: Not on file   Occupational History    Not on file   Tobacco Use    Smoking status: Former Smoker     Packs/day: 1.00     Years: 15.00     Pack years: 15.00     Types: Cigarettes     Quit date: 6/15/2011     Years since quitting: 10.6    Smokeless tobacco: Never Used   Substance and Sexual Activity    Alcohol use: Never    Drug use: Never    Sexual activity: Not on file   Other Topics Concern    Not on file   Social History Narrative    Not on file     Social Determinants of Health     Financial Resource Strain:     Difficulty of Paying Living Expenses: Not on file   Food Insecurity:     Worried About Running Out of Food in the Last Year: Not on file    Dev of Food in the Last Year: Not on file   Transportation Needs:     Lack of Transportation (Medical): Not on file    Lack of Transportation (Non-Medical): Not on file   Physical Activity:     Days of Exercise per Week: Not on file    Minutes of Exercise per Session: Not on file   Stress:     Feeling of Stress : Not on file   Social Connections:     Frequency of Communication with Friends and Family: Not on file    Frequency of Social Gatherings with Friends and Family: Not on file    Attends Catholic Services: Not on file    Active Member of [address] [place] [address] [employer] or Organizations: Not on file    Attends Club or Organization Meetings: Not on file    Marital Status: Not on file   Intimate Partner Violence:     Fear of Current or Ex-Partner: Not on file    Emotionally Abused: Not on file    Physically Abused: Not on file    Sexually Abused: Not on file   Housing Stability:     Unable to Pay for Housing in the Last Year: Not on file    Number of [place] in the Last Year: Not on file    Unstable Housing in the Last Year: Not on file       Current Outpatient Medications on File Prior to Visit   Medication Sig Dispense Refill    gabapentin (NEURONTIN) 600 MG tablet Take 1 tablet by mouth 3 times daily for 30 days. 90 tablet 0    omeprazole (PRILOSEC) 40 MG delayed release capsule Take 1 capsule by mouth daily 30 capsule 1    gabapentin (NEURONTIN) 300 MG capsule Take 1 capsule by mouth 2 times daily for 30 days.  60 capsule 0    tiZANidine (ZANAFLEX) 4 MG tablet Take 1 tablet by mouth every 6 hours as needed (pain) 60 tablet 0    venlafaxine (EFFEXOR XR) 150 MG extended release capsule Take 1 capsule by mouth daily 30 capsule 2    etodolac (LODINE) 400 MG tablet Take 1 tablet by mouth 2 times daily 60 tablet 2    bumetanide (BUMEX) 1 MG tablet Take 1 mg by mouth daily      dilTIAZem (CARDIZEM CD) 240 MG extended release capsule       HYDROcodone-acetaminophen (VICODIN) 5-300 MG TABS       insulin glargine (LANTUS) 100 UNIT/ML injection vial Inject 33 Units into the skin nightly      insulin glargine (LANTUS;BASAGLAR) 100 UNIT/ML injection pen Inject into the skin      insulin lispro, 1 Unit Dial, 100 UNIT/ML SOPN Inject into the skin      losartan (COZAAR) 50 MG tablet Take 50 mg by mouth daily      losartan (COZAAR) 50 MG tablet       mupirocin (BACTROBAN) 2 % cream APPLY 1 APPLICATION TO AFFECTED AREA THREE TIMES A DAY FOR 10 DAYS      SITagliptin (JANUVIA) 100 MG tablet       lidocaine (LIDODERM) 5 % Place 1 patch onto the skin daily 30 patch 1     No current facility-administered medications on file prior to visit. Review of Systems   Constitutional: Negative for fever. HENT: Negative for hearing loss. Respiratory: Negative for shortness of breath. Gastrointestinal: Negative for constipation, diarrhea and nausea. Genitourinary: Negative for difficulty urinating. Musculoskeletal: Negative for back pain and neck pain. Skin: Negative for rash. Neurological: Negative for headaches. Hematological: Does not bruise/bleed easily. Psychiatric/Behavioral: Negative for sleep disturbance. Objective:     Vitals:  Temp 97.3 °F (36.3 °C) (Infrared)   Ht 5' 3\" (1.6 m)   Wt 248 lb (112.5 kg)   BMI 43.93 kg/m² Pain Score:   5      Physical Exam  Vitals reviewed. Constitutional:       Appearance: Normal appearance. Skin:     General: Skin is warm and dry. Neurological:      Mental Status: She is alert.          Ortho Exam   Examination of the cervical spine revealed the neurovascular muscular status to be within normal limits deep tendon reflexes are 1-4 in the upper extremities equivocal Spurling sign bilaterally patient had limited range of motion only about 30 degrees of rotation bilaterally 10 degrees of sidebending 15 degrees of flexion 10 degrees extension palpable tenderness cervicothoracic junction    Assessment:      Diagnosis Orders   1. Herniated disc C5-6  MRI CERVICAL SPINE WO CONTRAST   2. Herniated disc C6-7  MRI CERVICAL SPINE WO CONTRAST       Plan:   Talk to the patient about reasoning for the MRI and explained that the MRI is needed to better delineate the anatomy in the area so that we can properly decide in place the epidural blocks and also to give an idea of any changes that have happened surgically we also talked about the need for Xanax because she does have claustrophobia we'll give that when we get permission for the exam we'll see her back with MRI results       No orders of the defined types were placed in this encounter. Orders Placed This Encounter   Procedures    MRI CERVICAL SPINE WO CONTRAST     Standing Status:   Future     Standing Expiration Date:   5/2/2022         Follow up:  Return for mri results.     JAVI SEYMOUR, DO

## 2022-02-02 DIAGNOSIS — M51.34 DEGENERATION OF THORACIC INTERVERTEBRAL DISC: ICD-10-CM

## 2022-02-02 DIAGNOSIS — M50.322 DEGENERATION OF C5-C6 INTERVERTEBRAL DISC: ICD-10-CM

## 2022-02-02 NOTE — TELEPHONE ENCOUNTER
Patient stated that the Prilosec is a part of her [address] [facility] claim and therefore it comes from Dr. Myriam Castano.

## 2022-02-02 NOTE — TELEPHONE ENCOUNTER
Reviewed Z[facility] notes last two months. Do not see any documentation regarding why Prilosec is being sent? I see Prilosec and Tizanidine were sent two months ago. Please ask patient if this request for prilosec is correct and why she is taking this from our office. Typically comes from PCP.  thanks

## 2022-02-02 NOTE — TELEPHONE ENCOUNTER
Requested Prescriptions     Pending Prescriptions Disp Refills    omeprazole (PRILOSEC) 40 MG delayed release capsule 30 capsule 1     Sig: Take 1 capsule by mouth daily    tiZANidine (ZANAFLEX) 4 MG tablet 60 tablet 0     Sig: Take 1 tablet by mouth every 6 hours as needed (pain)       Patient last seen on:  2/1  Date of last surgery:  n/a  Date of last refill:  12/4  Pain level:  n/a  Patient complaining of:  PT was seen yesterday but medication was not sent.   Future appts: none

## 2022-02-03 RX ORDER — TIZANIDINE 4 MG/1
4 TABLET ORAL EVERY 12 HOURS
Qty: 60 TABLET | Refills: 0 | Status: SHIPPED | OUTPATIENT
Start: 2022-02-03 | End: 2022-02-04 | Stop reason: SDUPTHER

## 2022-02-03 RX ORDER — OMEPRAZOLE 40 MG/1
40 CAPSULE, DELAYED RELEASE ORAL DAILY
Qty: 30 CAPSULE | Refills: 0 | Status: SHIPPED | OUTPATIENT
Start: 2022-02-03 | End: 2022-02-04 | Stop reason: SDUPTHER

## 2022-02-04 ENCOUNTER — TELEPHONE (OUTPATIENT)
Dept: PAIN MANAGEMENT | Age: 67
End: 2022-02-04

## 2022-02-04 RX ORDER — OMEPRAZOLE 40 MG/1
40 CAPSULE, DELAYED RELEASE ORAL DAILY
Qty: 30 CAPSULE | Refills: 0 | Status: SHIPPED | OUTPATIENT
Start: 2022-02-04 | End: 2022-03-03

## 2022-02-04 RX ORDER — TIZANIDINE 4 MG/1
4 TABLET ORAL EVERY 12 HOURS
Qty: 60 TABLET | Refills: 0 | Status: SHIPPED | OUTPATIENT
Start: 2022-02-04 | End: 2022-03-03 | Stop reason: SDUPTHER

## 2022-02-04 NOTE — TELEPHONE ENCOUNTER
Requested Prescriptions     Signed Prescriptions Disp Refills    omeprazole (PRILOSEC) 40 MG delayed release capsule 30 capsule 0     Sig: Take 1 capsule by mouth daily     Authorizing Provider: ERI GHOTRA    tiZANidine (ZANAFLEX) 4 MG tablet 60 tablet 0     Sig: Take 1 tablet by mouth every 12 hours     Authorizing Provider: Adalgisa CHAHAL       Patient last seen on: 2/1/22  Date of last refill: 12/2/21    Future appointment: Awaiting MRI results

## 2022-02-04 NOTE — TELEPHONE ENCOUNTER
MRI CERVICAL SPINE W/O CONTRAST    C-9 REQ MRI CERV SPINE W/O CONTRAST FAXED TO [facility] FOR REVIEW. SCANNED.

## 2022-02-04 NOTE — TELEPHONE ENCOUNTER
ORDER PLACED:    Date: 02/02/22  Description: MRI CERVICAL SPINE W/O CONTRAST  Order Number: [id number]  Ordering Provider: Todd Mantilla   Performing Provider: ZOHREH POSEY  CPT Codes: 37799  ICD10 Codes: X90.572

## 2022-02-08 NOTE — TELEPHONE ENCOUNTER
AUTHORIZATION:    INSURANCE: [facility] Martha Sanchez #: [id number]    DATE RANGE: 22 TO 22    TELEPHONE CALL ROUTED TO [place] TO SCHEDULE. FAXED TO   [facility] [phone].   CALLED [phone] WHICH WAS THEIR ANY LOCATION SCHEDULING # AND THEY GAVE ME THE ABOVE FAX#      CALLED PT TO LET HER KNOW THAT THE C-9 WAS APPROVED AND TO FOLLOW UP ON THE APPT AS THE C-9 DOES  ON 22

## 2022-02-26 DIAGNOSIS — M51.34 DEGENERATION OF THORACIC INTERVERTEBRAL DISC: ICD-10-CM

## 2022-02-26 DIAGNOSIS — M50.322 DEGENERATION OF C5-C6 INTERVERTEBRAL DISC: ICD-10-CM

## 2022-02-28 RX ORDER — OMEPRAZOLE 40 MG/1
40 CAPSULE, DELAYED RELEASE ORAL DAILY
Qty: 30 CAPSULE | Refills: 0 | OUTPATIENT
Start: 2022-02-28

## 2022-02-28 NOTE — TELEPHONE ENCOUNTER
MRI CERVICAL SPINE W/O CONTRAST    CALLED [facility] AND [facility] C-9 EXTENDED TO 3/11/22.  MRI APPT IS ON 3/4/212    THEY WILL FAXED OVER ONCE IT'S COMPLETED AND I WILL SCAN ONCE RECEIVED BUT THIS HAS BEEN VERBALLY EXTENDED TO 3/11/22

## 2022-03-02 ENCOUNTER — TELEPHONE (OUTPATIENT)
Dept: SPORTS MEDICINE | Age: 67
End: 2022-03-02

## 2022-03-02 DIAGNOSIS — M50.322 DEGENERATION OF C5-C6 INTERVERTEBRAL DISC: ICD-10-CM

## 2022-03-02 DIAGNOSIS — M51.34 DEGENERATION OF THORACIC INTERVERTEBRAL DISC: ICD-10-CM

## 2022-03-02 RX ORDER — ETODOLAC 400 MG/1
TABLET, FILM COATED ORAL
Qty: 60 TABLET | Refills: 2 | OUTPATIENT
Start: 2022-03-02

## 2022-03-02 NOTE — TELEPHONE ENCOUNTER
Requested Prescriptions     Pending Prescriptions Disp Refills    tiZANidine (ZANAFLEX) 4 MG tablet 60 tablet 0     Sig: Take 1 tablet by mouth every 12 hours    omeprazole (PRILOSEC) 40 MG delayed release capsule 30 capsule 0     Sig: Take 1 capsule by mouth daily    gabapentin (NEURONTIN) 600 MG tablet 90 tablet 0     Sig: Take 1 tablet by mouth 3 times daily for 30 days.  venlafaxine (EFFEXOR XR) 150 MG extended release capsule 30 capsule 2     Sig: Take 1 capsule by mouth daily    etodolac (LODINE) 400 MG tablet 60 tablet 2     Sig: Take 1 tablet by mouth 2 times daily       Patient last seen on:  2/1/22  Date of last surgery:  n/a  Date of last refill:  2/4/22  Pain level:  n/a  Patient complaining of:  Pt asking for refill on medications.    Future appts: none

## 2022-03-02 NOTE — TELEPHONE ENCOUNTER
Patient called asking if she could be prescribed xanax for her MRI on Friday? She is asking for it to be sent to [facility] in [facility].

## 2022-03-03 DIAGNOSIS — M50.322 DEGENERATION OF C5-C6 INTERVERTEBRAL DISC: Primary | ICD-10-CM

## 2022-03-03 DIAGNOSIS — M51.34 DEGENERATION OF THORACIC INTERVERTEBRAL DISC: ICD-10-CM

## 2022-03-03 DIAGNOSIS — M50.322 DEGENERATION OF C5-C6 INTERVERTEBRAL DISC: ICD-10-CM

## 2022-03-03 RX ORDER — VENLAFAXINE HYDROCHLORIDE 150 MG/1
150 CAPSULE, EXTENDED RELEASE ORAL DAILY
Qty: 30 CAPSULE | Refills: 2 | Status: SHIPPED | OUTPATIENT
Start: 2022-03-03 | End: 2022-08-04 | Stop reason: SDUPTHER

## 2022-03-03 RX ORDER — GABAPENTIN 600 MG/1
600 TABLET ORAL 3 TIMES DAILY
Qty: 90 TABLET | Refills: 0 | Status: SHIPPED | OUTPATIENT
Start: 2022-03-03 | End: 2022-03-03 | Stop reason: SDUPTHER

## 2022-03-03 RX ORDER — VENLAFAXINE HYDROCHLORIDE 150 MG/1
150 CAPSULE, EXTENDED RELEASE ORAL DAILY
Qty: 30 CAPSULE | Refills: 2 | Status: SHIPPED | OUTPATIENT
Start: 2022-03-03 | End: 2022-03-03 | Stop reason: SDUPTHER

## 2022-03-03 RX ORDER — OMEPRAZOLE 40 MG/1
40 CAPSULE, DELAYED RELEASE ORAL DAILY
Qty: 30 CAPSULE | Refills: 0 | Status: SHIPPED | OUTPATIENT
Start: 2022-03-03 | End: 2022-04-04

## 2022-03-03 RX ORDER — OMEPRAZOLE 40 MG/1
40 CAPSULE, DELAYED RELEASE ORAL DAILY
Qty: 30 CAPSULE | Refills: 0 | Status: SHIPPED | OUTPATIENT
Start: 2022-03-03 | End: 2022-03-03 | Stop reason: SDUPTHER

## 2022-03-03 RX ORDER — DIAZEPAM 5 MG/1
5 TABLET ORAL EVERY 8 HOURS PRN
Qty: 2 TABLET | Refills: 0 | Status: SHIPPED | OUTPATIENT
Start: 2022-03-03 | End: 2022-03-05

## 2022-03-03 RX ORDER — TIZANIDINE 4 MG/1
4 TABLET ORAL EVERY 12 HOURS
Qty: 60 TABLET | Refills: 0 | Status: SHIPPED | OUTPATIENT
Start: 2022-03-03 | End: 2022-05-03

## 2022-03-03 RX ORDER — ETODOLAC 400 MG/1
400 TABLET, FILM COATED ORAL 2 TIMES DAILY
Qty: 60 TABLET | Refills: 2 | Status: SHIPPED | OUTPATIENT
Start: 2022-03-03 | End: 2022-03-03 | Stop reason: SDUPTHER

## 2022-03-03 RX ORDER — ETODOLAC 400 MG/1
400 TABLET, FILM COATED ORAL 2 TIMES DAILY
Qty: 60 TABLET | Refills: 2 | Status: SHIPPED | OUTPATIENT
Start: 2022-03-03 | End: 2022-06-02 | Stop reason: SDUPTHER

## 2022-03-03 RX ORDER — GABAPENTIN 600 MG/1
600 TABLET ORAL 3 TIMES DAILY
Qty: 90 TABLET | Refills: 0 | Status: SHIPPED | OUTPATIENT
Start: 2022-03-03 | End: 2022-05-03

## 2022-03-03 RX ORDER — TIZANIDINE 4 MG/1
4 TABLET ORAL EVERY 12 HOURS
Qty: 60 TABLET | Refills: 0 | Status: SHIPPED | OUTPATIENT
Start: 2022-03-03 | End: 2022-03-03 | Stop reason: SDUPTHER

## 2022-03-03 RX ORDER — DIAZEPAM 5 MG/1
5 TABLET ORAL EVERY 8 HOURS PRN
Qty: 2 TABLET | Refills: 0 | Status: SHIPPED | OUTPATIENT
Start: 2022-03-03 | End: 2022-03-03 | Stop reason: SDUPTHER

## 2022-03-10 ENCOUNTER — OFFICE VISIT (OUTPATIENT)
Dept: SPORTS MEDICINE | Age: 67
End: 2022-03-10
Payer: COMMERCIAL

## 2022-03-10 VITALS
BODY MASS INDEX: 43.94 KG/M2 | TEMPERATURE: 96.7 F | HEIGHT: 63 IN | SYSTOLIC BLOOD PRESSURE: 134 MMHG | DIASTOLIC BLOOD PRESSURE: 68 MMHG | WEIGHT: 248 LBS

## 2022-03-10 DIAGNOSIS — M50.222 HERNIATED NUCLEUS PULPOSUS, C5-6: Primary | ICD-10-CM

## 2022-03-10 PROCEDURE — 99213 OFFICE O/P EST LOW 20 MIN: CPT | Performed by: FAMILY MEDICINE

## 2022-03-10 ASSESSMENT — ENCOUNTER SYMPTOMS
NAUSEA: 0
SHORTNESS OF BREATH: 0
CONSTIPATION: 0
BACK PAIN: 0
DIARRHEA: 0

## 2022-03-10 NOTE — PROGRESS NOTES
[facility]) Physicians  Neurosurgery and Pain [facility]  [address] [facility] [facility] , [address] [facility]: [phone]  F: [phone]      Leonel Hays  ([birth year])    3/10/2022    Subjective:     Leonel Hays is 61 y.o. female who complains today of:    Chief Complaint   Patient presents with    Follow-up     Cervical MRI Results       HPI     Patient returns stating that she got her MRI results she is wondering what they are and if she could get her blocks done at this point  Allergies:  Latex; 2,4-d dimethylamine (amisol); Mcdonald-2 inhibitors; Iodides; Nalidixic acid; Nsaids;  Shellfish allergy; and Statins    Past Medical History:   Diagnosis Date    Cancer ([address]     SKIN    Chronic pain     COPD (chronic obstructive pulmonary disease) (HCC)     Depression     Depression     Diabetes mellitus (HCC)     Hypertension     Neuropathy     Osteoarthritis     Sleep apnea     Urinary incontinence      Past Surgical History:   Procedure Laterality Date    JOINT REPLACEMENT      SPINE SURGERY       Family History   Problem Relation Age of Onset    Arthritis Mother     Hypertension Mother     Arthritis Father      Social History     Socioeconomic History    Marital status:      Spouse name: Not on file    Number of children: Not on file    Years of education: Not on file    Highest education level: Not on file   Occupational History    Not on file   Tobacco Use    Smoking status: Former Smoker     Packs/day: 1.00     Years: 15.00     Pack years: 15.00     Types: Cigarettes     Quit date: 6/15/2011     Years since quitting: 10.7    Smokeless tobacco: Never Used   Substance and Sexual Activity    Alcohol use: Never    Drug use: Never    Sexual activity: Not on file   Other Topics Concern    Not on file   Social History Narrative    Not on file     Social Determinants of Health     Financial Resource Strain:     Difficulty of Paying Living Expenses: Not on file   Food Insecurity:     Worried About Running Out of Food in the Last Year: Not on file    Dev of Food in the Last Year: Not on file   Transportation Needs:     Lack of Transportation (Medical): Not on file    Lack of Transportation (Non-Medical): Not on file   Physical Activity:     Days of Exercise per Week: Not on file    Minutes of Exercise per Session: Not on file   Stress:     Feeling of Stress : Not on file   Social Connections:     Frequency of Communication with Friends and Family: Not on file    Frequency of Social Gatherings with Friends and Family: Not on file    Attends Sikh Services: Not on file    Active Member of [address] [place] [address] [employer] or Organizations: Not on file    Attends Club or Organization Meetings: Not on file    Marital Status: Not on file   Intimate Partner Violence:     Fear of Current or Ex-Partner: Not on file    Emotionally Abused: Not on file    Physically Abused: Not on file    Sexually Abused: Not on file   Housing Stability:     Unable to Pay for Housing in the Last Year: Not on file    Number of [place] in the Last Year: Not on file    Unstable Housing in the Last Year: Not on file       Current Outpatient Medications on File Prior to Visit   Medication Sig Dispense Refill    tiZANidine (ZANAFLEX) 4 MG tablet Take 1 tablet by mouth every 12 hours 60 tablet 0    omeprazole (PRILOSEC) 40 MG delayed release capsule Take 1 capsule by mouth daily 30 capsule 0    gabapentin (NEURONTIN) 600 MG tablet Take 1 tablet by mouth 3 times daily for 30 days.  90 tablet 0    venlafaxine (EFFEXOR XR) 150 MG extended release capsule Take 1 capsule by mouth daily 30 capsule 2    etodolac (LODINE) 400 MG tablet Take 1 tablet by mouth 2 times daily 60 tablet 2    bumetanide (BUMEX) 1 MG tablet Take 1 mg by mouth daily      dilTIAZem (CARDIZEM CD) 240 MG extended release capsule       HYDROcodone-acetaminophen (VICODIN) 5-300 MG TABS       insulin glargine (LANTUS) 100 UNIT/ML injection vial Inject 33 Units into the skin nightly      insulin glargine (LANTUS;BASAGLAR) 100 UNIT/ML injection pen Inject into the skin      insulin lispro, 1 Unit Dial, 100 UNIT/ML SOPN Inject into the skin      losartan (COZAAR) 50 MG tablet Take 50 mg by mouth daily      SITagliptin (JANUVIA) 100 MG tablet       mupirocin (BACTROBAN) 2 % cream APPLY 1 APPLICATION TO AFFECTED AREA THREE TIMES A DAY FOR 10 DAYS (Patient not taking: Reported on 3/10/2022)      lidocaine (LIDODERM) 5 % Place 1 patch onto the skin daily (Patient not taking: Reported on 3/10/2022) 30 patch 1     No current facility-administered medications on file prior to visit. Review of Systems   Constitutional: Negative for fever. HENT: Negative for hearing loss. Respiratory: Negative for shortness of breath. Gastrointestinal: Negative for constipation, diarrhea and nausea. Genitourinary: Negative for difficulty urinating. Musculoskeletal: Negative for back pain and neck pain. Skin: Negative for rash. Neurological: Negative for headaches. Hematological: Does not bruise/bleed easily. Psychiatric/Behavioral: Negative for sleep disturbance. Objective:     Vitals:  /68 (Site: Left Lower Arm)   Temp 96.7 °F (35.9 °C) (Infrared)   Ht 5' 3\" (1.6 m)   Wt 248 lb (112.5 kg)   BMI 43.93 kg/m² Pain Score:   7      Physical Exam  Vitals reviewed. Constitutional:       Appearance: Normal appearance. Skin:     General: Skin is warm and dry. Neurological:      Mental Status: She is alert. Ortho Exam   Emanation cervical spine revealed the neurovascular muscular status to be intact with equivocal tension signs bilaterally very poor range of motion tenderness cervicothoracic region    I reviewed the patient's recent MRI of the cervical spine    Assessment:      Diagnosis Orders   1.  Herniated disc C5-6         Plan:   Patient is to be scheduled with Dr. Isela Hernandez for epidural blocks she is to return here as needed       No orders of the defined types were placed in this encounter. No orders of the defined types were placed in this encounter. Follow up:  Return if symptoms worsen or fail to improve.     JAVI SEYMOUR, DO

## 2022-03-29 ENCOUNTER — OFFICE VISIT (OUTPATIENT)
Dept: SPORTS MEDICINE | Age: 67
End: 2022-03-29
Payer: COMMERCIAL

## 2022-03-29 VITALS
TEMPERATURE: 97.4 F | BODY MASS INDEX: 43.94 KG/M2 | DIASTOLIC BLOOD PRESSURE: 82 MMHG | SYSTOLIC BLOOD PRESSURE: 138 MMHG | WEIGHT: 248 LBS | HEIGHT: 63 IN

## 2022-03-29 DIAGNOSIS — M19.031 PRIMARY OSTEOARTHRITIS OF RIGHT WRIST: ICD-10-CM

## 2022-03-29 DIAGNOSIS — M47.26 OSTEOARTHRITIS OF SPINE WITH RADICULOPATHY, LUMBAR REGION: ICD-10-CM

## 2022-03-29 DIAGNOSIS — M25.531 WRIST PAIN, ACUTE, RIGHT: Primary | ICD-10-CM

## 2022-03-29 PROCEDURE — G8427 DOCREV CUR MEDS BY ELIG CLIN: HCPCS | Performed by: FAMILY MEDICINE

## 2022-03-29 PROCEDURE — 99214 OFFICE O/P EST MOD 30 MIN: CPT | Performed by: FAMILY MEDICINE

## 2022-03-29 PROCEDURE — 3017F COLORECTAL CA SCREEN DOC REV: CPT | Performed by: FAMILY MEDICINE

## 2022-03-29 PROCEDURE — G8417 CALC BMI ABV UP PARAM F/U: HCPCS | Performed by: FAMILY MEDICINE

## 2022-03-29 PROCEDURE — 1036F TOBACCO NON-USER: CPT | Performed by: FAMILY MEDICINE

## 2022-03-29 PROCEDURE — G8484 FLU IMMUNIZE NO ADMIN: HCPCS | Performed by: FAMILY MEDICINE

## 2022-03-29 RX ORDER — LIDOCAINE 50 MG/G
1 PATCH TOPICAL DAILY
Qty: 90 PATCH | OUTPATIENT
Start: 2022-03-29 | End: 2022-04-28

## 2022-03-29 RX ORDER — LIDOCAINE 50 MG/G
1 PATCH TOPICAL DAILY
Qty: 30 PATCH | Refills: 0 | Status: SHIPPED | OUTPATIENT
Start: 2022-03-29 | End: 2022-04-28

## 2022-03-29 ASSESSMENT — ENCOUNTER SYMPTOMS
NAUSEA: 0
SHORTNESS OF BREATH: 0
CONSTIPATION: 0
BACK PAIN: 0
DIARRHEA: 0

## 2022-03-29 NOTE — PROGRESS NOTES
[facility]) Physicians  Neurosurgery and Pain [facility]  [address], [address] [facility] , [address] [facility], [place] [address]: [phone]  F: [phone]      Maddie Zavala  ([id number])    3/29/2022    Subjective:     Maddie Zavala is 61 y.o. female who complains today of:    Chief Complaint   Patient presents with    Follow-up     Increased Lower Back Pain    Wrist Pain     Right Wrist pain after fall about 2 weeks ago       HPI     Patient comes in complaining of chronic low back pain which has been increasing over the last few weeks she says she also had a fall 2 weeks ago where she irritated her back but also hurt her right wrist since she never had wrist pain before she tried to wrap it probably that would go away and the pain is somewhat subsided but still there when she making certain movements  Allergies:  Latex; 2,4-d dimethylamine (amisol); Mcdonald-2 inhibitors; Iodides; Nalidixic acid; Nsaids;  Shellfish allergy; and Statins    Past Medical History:   Diagnosis Date    Cancer ([facility] [address]     SKIN    Chronic pain     COPD (chronic obstructive pulmonary disease) (HCC)     Depression     Depression     Diabetes mellitus (HCC)     Hypertension     Neuropathy     Osteoarthritis     Sleep apnea     Urinary incontinence      Past Surgical History:   Procedure Laterality Date    JOINT REPLACEMENT      SPINE SURGERY       Family History   Problem Relation Age of Onset    Arthritis Mother     Hypertension Mother     Arthritis Father      Social History     Socioeconomic History    Marital status:      Spouse name: Not on file    Number of children: Not on file    Years of education: Not on file    Highest education level: Not on file   Occupational History    Not on file   Tobacco Use    Smoking status: Former Smoker     Packs/day: 1.00     Years: 15.00     Pack years: 15.00     Types: Cigarettes     Quit date: 6/15/2011     Years since quitting: 10.7    Smokeless tobacco: Never Used   Substance and Sexual Activity    Alcohol use: Never    Drug use: Never    Sexual activity: Not on file   Other Topics Concern    Not on file   Social History Narrative    Not on file     Social Determinants of Health     Financial Resource Strain:     Difficulty of Paying Living Expenses: Not on file   Food Insecurity:     Worried About Running Out of Food in the Last Year: Not on file    Dev of Food in the Last Year: Not on file   Transportation Needs:     Lack of Transportation (Medical): Not on file    Lack of Transportation (Non-Medical): Not on file   Physical Activity:     Days of Exercise per Week: Not on file    Minutes of Exercise per Session: Not on file   Stress:     Feeling of Stress : Not on file   Social Connections:     Frequency of Communication with Friends and Family: Not on file    Frequency of Social Gatherings with Friends and Family: Not on file    Attends Worship Services: Not on file    Active Member of [address] [place] [address] or Organizations: Not on file    Attends Club or Organization Meetings: Not on file    Marital Status: Not on file   Intimate Partner Violence:     Fear of Current or Ex-Partner: Not on file    Emotionally Abused: Not on file    Physically Abused: Not on file    Sexually Abused: Not on file   Housing Stability:     Unable to Pay for Housing in the Last Year: Not on file    Number of [place] in the Last Year: Not on file    Unstable Housing in the Last Year: Not on file       Current Outpatient Medications on File Prior to Visit   Medication Sig Dispense Refill    tiZANidine (ZANAFLEX) 4 MG tablet Take 1 tablet by mouth every 12 hours 60 tablet 0    omeprazole (PRILOSEC) 40 MG delayed release capsule Take 1 capsule by mouth daily 30 capsule 0    gabapentin (NEURONTIN) 600 MG tablet Take 1 tablet by mouth 3 times daily for 30 days.  90 tablet 0    venlafaxine (EFFEXOR XR) 150 MG extended release capsule Take 1 capsule by mouth daily 30 capsule 2    etodolac (LODINE) 400 MG tablet Take 1 tablet by mouth 2 times daily 60 tablet 2    bumetanide (BUMEX) 1 MG tablet Take 1 mg by mouth daily      dilTIAZem (CARDIZEM CD) 240 MG extended release capsule       HYDROcodone-acetaminophen (VICODIN) 5-300 MG TABS       insulin glargine (LANTUS) 100 UNIT/ML injection vial Inject 33 Units into the skin nightly      insulin glargine (LANTUS;BASAGLAR) 100 UNIT/ML injection pen Inject into the skin      insulin lispro, 1 Unit Dial, 100 UNIT/ML SOPN Inject into the skin      losartan (COZAAR) 50 MG tablet Take 50 mg by mouth daily      SITagliptin (JANUVIA) 100 MG tablet       mupirocin (BACTROBAN) 2 % cream APPLY 1 APPLICATION TO AFFECTED AREA THREE TIMES A DAY FOR 10 DAYS (Patient not taking: Reported on 3/10/2022)      lidocaine (LIDODERM) 5 % Place 1 patch onto the skin daily (Patient not taking: Reported on 3/10/2022) 30 patch 1     No current facility-administered medications on file prior to visit. Review of Systems   Constitutional: Negative for fever. HENT: Negative for hearing loss. Respiratory: Negative for shortness of breath. Gastrointestinal: Negative for constipation, diarrhea and nausea. Genitourinary: Negative for difficulty urinating. Musculoskeletal: Negative for back pain and neck pain. Skin: Negative for rash. Neurological: Negative for headaches. Hematological: Does not bruise/bleed easily. Psychiatric/Behavioral: Negative for sleep disturbance. Objective:     Vitals:  /82 (Site: Left Upper Arm)   Temp 97.4 °F (36.3 °C) (Infrared)   Ht 5' 3\" (1.6 m)   Wt 248 lb (112.5 kg)   BMI 43.93 kg/m² Pain Score:   6      Physical Exam  Vitals reviewed. Constitutional:       Appearance: Normal appearance. Skin:     General: Skin is warm and dry. Neurological:      Mental Status: She is alert.          Ortho Exam   Examination of the lumbar spine with the neurovascular muscular status to be intact patient flex 60 degrees extend 10 degrees without pain palpable tenderness L5-S1 region equivocal tension signs bilaterally  Examination of the right wrist revealed the neurovascular status to be intact there is mediocre range of motion pain at about 45 degrees of dorsi and palmar flexion exquisite tenderness in the snuffbox pain with both compression distraction in that area ligaments and tendons appear to be intact    I reviewed x-rays of the right wrist and lumbar spine done today        Assessment:      Diagnosis Orders   1. Wrist pain, acute, right  XR WRIST RIGHT (MIN 3 VIEWS)   2. Osteoarthritis of spine with radiculopathy, lumbar region  XR LUMBAR SPINE (2-3 VIEWS)    Amb External Referral To Physical Therapy    lidocaine (LIDODERM) 5 %   3. Primary osteoarthritis of right wrist         Plan: We will start the patient on physical therapy for her back and also will put her on some Lidoderm patches for it since she is already taking anti-inflammatories  Regarding the wrist I wonder in a thumb spica splint to guard against any occult fracture that was not seen in today's x-ray see her back in 2 weeks for that and reassess at that point       Orders Placed This Encounter   Medications    lidocaine (LIDODERM) 5 %     Sig: Place 1 patch onto the skin daily 12 hours on, 12 hours off.      Dispense:  30 patch     Refill:  0       Orders Placed This Encounter   Procedures    XR LUMBAR SPINE (2-3 VIEWS)     Standing Status:   Future     Number of Occurrences:   1     Standing Expiration Date:   3/29/2023    XR WRIST RIGHT (MIN 3 VIEWS)     AP LATERAL OBLIQUE AND SCAPHOID VIEWS     Standing Status:   Future     Number of Occurrences:   1     Standing Expiration Date:   3/29/2023    Amb External Referral To Physical Therapy     Referral Priority:   Routine     Referral Type:   Consult for Advice and Opinion     Referral Reason:   Patient Preference     Requested Specialty:   Physical Therapy     Number of Visits Requested:   1         Follow up:  Return in about 2 weeks (around 4/12/2022).     JAVI SEYMOUR, DO

## 2022-03-31 ENCOUNTER — TELEPHONE (OUTPATIENT)
Dept: SPORTS MEDICINE | Age: 67
End: 2022-03-31

## 2022-04-01 DIAGNOSIS — M51.34 DEGENERATION OF THORACIC INTERVERTEBRAL DISC: ICD-10-CM

## 2022-04-01 DIAGNOSIS — M50.322 DEGENERATION OF C5-C6 INTERVERTEBRAL DISC: ICD-10-CM

## 2022-04-01 NOTE — TELEPHONE ENCOUNTER
Requested Prescriptions     Pending Prescriptions Disp Refills    omeprazole (PRILOSEC) 40 MG delayed release capsule [Pharmacy Med Name: OMEPRAZOLE 40MG CAPSULES] 90 capsule      Sig: TAKE 1 CAPSULE BY MOUTH DAILY       Patient last seen on:  3/29/22  Date of last surgery:  n/a  Date of last refill:  3/03/22  Pain level:  n/a  Patient complaining of:  Pt asks for refill.    Future appts: 4/12/22

## 2022-04-04 RX ORDER — OMEPRAZOLE 40 MG/1
40 CAPSULE, DELAYED RELEASE ORAL DAILY
Qty: 90 CAPSULE | Refills: 0 | Status: SHIPPED | OUTPATIENT
Start: 2022-04-04 | End: 2022-07-19 | Stop reason: SDUPTHER

## 2022-04-05 ENCOUNTER — TELEPHONE (OUTPATIENT)
Dept: SPORTS MEDICINE | Age: 67
End: 2022-04-05

## 2022-04-05 NOTE — TELEPHONE ENCOUNTER
Received fax from [facility] that Tizanidine 4mg tablet is not covered by patient's plan. Patient uses [address] [place] [address] for appointments with us and medications. Faxed notification back to [facility] informing them the medication has been approved through [address] [place] [address].

## 2022-04-06 NOTE — TELEPHONE ENCOUNTER
TIZANIDINE (ZANAFLEX) 4MG TABLET AUTHORIZATION    APPROVED THROUGH [facility] FROM 4/1/22 TO 4/1/23    SCANNED

## 2022-04-08 ENCOUNTER — INITIAL CONSULT (OUTPATIENT)
Dept: PAIN MANAGEMENT | Age: 67
End: 2022-04-08
Payer: COMMERCIAL

## 2022-04-08 VITALS — WEIGHT: 246 LBS | TEMPERATURE: 97.8 F | HEIGHT: 62 IN | BODY MASS INDEX: 45.27 KG/M2

## 2022-04-08 DIAGNOSIS — M51.34 DEGENERATION, INTERVERTEBRAL DISC, THORACIC: ICD-10-CM

## 2022-04-08 DIAGNOSIS — M50.322 DEGENERATION OF C5-C6 INTERVERTEBRAL DISC: Primary | ICD-10-CM

## 2022-04-08 DIAGNOSIS — M51.34 DEGENERATION OF INTERVERTEBRAL DISC OF THORACIC REGION: ICD-10-CM

## 2022-04-08 DIAGNOSIS — M51.34 DEGENERATION OF THORACIC INTERVERTEBRAL DISC: ICD-10-CM

## 2022-04-08 DIAGNOSIS — M54.12 CERVICAL RADICULOPATHY: ICD-10-CM

## 2022-04-08 PROCEDURE — 99203 OFFICE O/P NEW LOW 30 MIN: CPT | Performed by: PHYSICAL MEDICINE & REHABILITATION

## 2022-04-08 ASSESSMENT — ENCOUNTER SYMPTOMS
DIARRHEA: 0
CONSTIPATION: 0
BACK PAIN: 0
NAUSEA: 0
SHORTNESS OF BREATH: 0
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
## 2022-04-11 ENCOUNTER — TELEPHONE (OUTPATIENT)
Dept: PAIN MANAGEMENT | Age: 67
End: 2022-04-11

## 2022-04-11 DIAGNOSIS — M50.322 DEGENERATION OF C5-C6 INTERVERTEBRAL DISC: Primary | ICD-10-CM

## 2022-04-11 NOTE — TELEPHONE ENCOUNTER
C7-T1 INTERLAMINAR ILESI UNDER XR-SM     C-9 REQ C7-T1 INTERLAMINAR ILESI UNDER XR-SM  FAXED TO [facility] [phone] FOR REVIEW

## 2022-04-12 ENCOUNTER — OFFICE VISIT (OUTPATIENT)
Dept: SPORTS MEDICINE | Age: 67
End: 2022-04-12
Payer: COMMERCIAL

## 2022-04-12 VITALS
HEIGHT: 62 IN | DIASTOLIC BLOOD PRESSURE: 84 MMHG | TEMPERATURE: 96.8 F | WEIGHT: 246 LBS | SYSTOLIC BLOOD PRESSURE: 134 MMHG | BODY MASS INDEX: 45.27 KG/M2

## 2022-04-12 DIAGNOSIS — M25.531 WRIST PAIN, ACUTE, RIGHT: Primary | ICD-10-CM

## 2022-04-12 PROCEDURE — G8427 DOCREV CUR MEDS BY ELIG CLIN: HCPCS | Performed by: FAMILY MEDICINE

## 2022-04-12 PROCEDURE — 3017F COLORECTAL CA SCREEN DOC REV: CPT | Performed by: FAMILY MEDICINE

## 2022-04-12 PROCEDURE — 99213 OFFICE O/P EST LOW 20 MIN: CPT | Performed by: FAMILY MEDICINE

## 2022-04-12 PROCEDURE — G8417 CALC BMI ABV UP PARAM F/U: HCPCS | Performed by: FAMILY MEDICINE

## 2022-04-12 PROCEDURE — 1036F TOBACCO NON-USER: CPT | Performed by: FAMILY MEDICINE

## 2022-04-12 ASSESSMENT — ENCOUNTER SYMPTOMS
BACK PAIN: 0
CONSTIPATION: 0
DIARRHEA: 0
NAUSEA: 0
SHORTNESS OF BREATH: 0

## 2022-04-12 NOTE — PROGRESS NOTES
[facility]) Physicians  Neurosurgery and Pain [facility]  [address] [facility] [address] [facility] Jose Luis Collado [address] Cat 84: [phone]  F: [phone]      Landy Greer  ([id number])    4/12/2022    Subjective:     Landy Greer is 61 y.o. female who complains today of:    Chief Complaint   Patient presents with    Follow-up     Right Wrist Pain - increased pain with finger flexion       HPI     Returns stating that her wrist is feeling better however she has a little bit more pain in her middle finger  Allergies:  Latex; 2,4-d dimethylamine (amisol); Mcdonald-2 inhibitors; Iodides; Nalidixic acid; Nsaids;  Shellfish allergy; and Statins    Past Medical History:   Diagnosis Date    Cancer ([address]     SKIN    Chronic pain     COPD (chronic obstructive pulmonary disease) (HCC)     Depression     Depression     Diabetes mellitus (HCC)     Hypertension     Neuropathy     Osteoarthritis     Sleep apnea     Urinary incontinence      Past Surgical History:   Procedure Laterality Date    JOINT REPLACEMENT      SPINE SURGERY       Family History   Problem Relation Age of Onset    Arthritis Mother     Hypertension Mother     Arthritis Father      Social History     Socioeconomic History    Marital status:      Spouse name: Not on file    Number of children: Not on file    Years of education: Not on file    Highest education level: Not on file   Occupational History    Not on file   Tobacco Use    Smoking status: Former Smoker     Packs/day: 1.00     Years: 15.00     Pack years: 15.00     Types: Cigarettes     Quit date: 6/15/2011     Years since quitting: 10.8    Smokeless tobacco: Never Used   Substance and Sexual Activity    Alcohol use: Never    Drug use: Never    Sexual activity: Not on file   Other Topics Concern    Not on file   Social History Narrative    Not on file     Social Determinants of Health     Financial Resource Strain:     Difficulty of Paying Living Expenses: Not on file   Food Insecurity:     Worried About [address] [facility] in the Last Year: Not on file    Dev of Food in the Last Year: Not on file   Transportation Needs:     Lack of Transportation (Medical): Not on file    Lack of Transportation (Non-Medical): Not on file   Physical Activity:     Days of Exercise per Week: Not on file    Minutes of Exercise per Session: Not on file   Stress:     Feeling of Stress : Not on file   Social Connections:     Frequency of Communication with Friends and Family: Not on file    Frequency of Social Gatherings with Friends and Family: Not on file    Attends Cheondo[employer] Services: Not on file    Active Member of [address] [place] [address] or Organizations: Not on file    Attends Club or Organization Meetings: Not on file    Marital Status: Not on file   Intimate Partner Violence:     Fear of Current or Ex-Partner: Not on file    Emotionally Abused: Not on file    Physically Abused: Not on file    Sexually Abused: Not on file   Housing Stability:     Unable to Pay for Housing in the Last Year: Not on file    Number of [place] in the Last Year: Not on file    Unstable Housing in the Last Year: Not on file       Current Outpatient Medications on File Prior to Visit   Medication Sig Dispense Refill    omeprazole (PRILOSEC) 40 MG delayed release capsule TAKE 1 CAPSULE BY MOUTH DAILY 90 capsule 0    lidocaine (LIDODERM) 5 % Place 1 patch onto the skin daily 12 hours on, 12 hours off.  30 patch 0    tiZANidine (ZANAFLEX) 4 MG tablet Take 1 tablet by mouth every 12 hours 60 tablet 0    venlafaxine (EFFEXOR XR) 150 MG extended release capsule Take 1 capsule by mouth daily 30 capsule 2    etodolac (LODINE) 400 MG tablet Take 1 tablet by mouth 2 times daily 60 tablet 2    bumetanide (BUMEX) 1 MG tablet Take 1 mg by mouth daily      dilTIAZem (CARDIZEM CD) 240 MG extended release capsule       HYDROcodone-acetaminophen (VICODIN) 5-300 MG TABS       insulin glargine (LANTUS;BASAGLAR) 100 UNIT/ML injection pen Inject into the skin      insulin lispro, 1 Unit Dial, 100 UNIT/ML SOPN Inject into the skin      losartan (COZAAR) 50 MG tablet Take 50 mg by mouth 2 times daily       SITagliptin (JANUVIA) 100 MG tablet       gabapentin (NEURONTIN) 600 MG tablet Take 1 tablet by mouth 3 times daily for 30 days. 90 tablet 0    insulin glargine (LANTUS) 100 UNIT/ML injection vial Inject 33 Units into the skin nightly      mupirocin (BACTROBAN) 2 % cream APPLY 1 APPLICATION TO AFFECTED AREA THREE TIMES A DAY FOR 10 DAYS (Patient not taking: Reported on 3/10/2022)      lidocaine (LIDODERM) 5 % Place 1 patch onto the skin daily (Patient not taking: Reported on 3/10/2022) 30 patch 1     No current facility-administered medications on file prior to visit. Review of Systems   Constitutional: Negative for fever. HENT: Negative for hearing loss. Respiratory: Negative for shortness of breath. Gastrointestinal: Negative for constipation, diarrhea and nausea. Genitourinary: Negative for difficulty urinating. Musculoskeletal: Negative for back pain and neck pain. Skin: Negative for rash. Neurological: Negative for headaches. Hematological: Does not bruise/bleed easily. Psychiatric/Behavioral: Negative for sleep disturbance. Objective:     Vitals:  /84 (Site: Left Upper Arm)   Temp 96.8 °F (36 °C) (Infrared)   Ht 5' 2\" (1.575 m)   Wt 246 lb (111.6 kg)   BMI 44.99 kg/m² Pain Score:   4      Physical Exam  Vitals reviewed. Constitutional:       Appearance: Normal appearance. Skin:     General: Skin is warm and dry. Neurological:      Mental Status: She is alert.          Ortho Exam   Examination of the right hand and wrist reveals the neurovascular muscular status to be intact there is still tenderness noted in the scaphoid in the snuffbox no pain with compression there is some pain there with distraction yoga range of motion in the wrist and hand show good range of motion no palpable tenderness    X-rays of the right wrist done today again did not show any evidence of any acute fracture      Assessment:      Diagnosis Orders   1. Wrist pain, acute, right  XR WRIST RIGHT (MIN 3 VIEWS)       Plan:   1 have the patient wean her self from the splint so as not to cause any new pain if she has any pain she is to go back to the splint let me know see her back in 2 weeks       No orders of the defined types were placed in this encounter. Orders Placed This Encounter   Procedures    XR WRIST RIGHT (MIN 3 VIEWS)     AP LATERAL OBLIQUE SCAPHOID     Standing Status:   Future     Number of Occurrences:   1     Standing Expiration Date:   4/12/2023         Follow up:  Return in about 2 weeks (around 4/26/2022).     JAVI SEYMOUR, DO

## 2022-04-15 ENCOUNTER — HOSPITAL ENCOUNTER (OUTPATIENT)
Dept: PHYSICAL THERAPY | Age: 67
Setting detail: THERAPIES SERIES
Discharge: HOME | End: 2022-04-15
Payer: COMMERCIAL

## 2022-04-15 PROCEDURE — 97162 PT EVAL MOD COMPLEX 30 MIN: CPT

## 2022-04-15 PROCEDURE — 97110 THERAPEUTIC EXERCISES: CPT

## 2022-04-15 ASSESSMENT — PAIN - FUNCTIONAL ASSESSMENT: PAIN_FUNCTIONAL_ASSESSMENT: PREVENTS OR INTERFERES WITH ALL ACTIVE AND SOME PASSIVE ACTIVITIES

## 2022-04-15 ASSESSMENT — PAIN DESCRIPTION - ORIENTATION: ORIENTATION: LOWER

## 2022-04-15 ASSESSMENT — PAIN DESCRIPTION - DESCRIPTORS: DESCRIPTORS: CRAMPING;SHARP

## 2022-04-15 ASSESSMENT — PAIN DESCRIPTION - ONSET: ONSET: AWAKENED FROM SLEEP

## 2022-04-15 ASSESSMENT — PAIN SCALES - GENERAL: PAINLEVEL_OUTOF10: 1

## 2022-04-15 ASSESSMENT — PAIN DESCRIPTION - LOCATION: LOCATION: BACK

## 2022-04-15 ASSESSMENT — PAIN DESCRIPTION - FREQUENCY: FREQUENCY: CONTINUOUS

## 2022-04-15 NOTE — PROGRESS NOTES
3 to 3+/5, Ext 4-/5,  Knee Quad 3/5, ham 3/5, ankle 3+/5    [] yes  [x] no       Body structures, Functions, Activity limitations: Decreased functional mobility ,Decreased ADL status,Decreased ROM,Decreased strength,Decreased posture,Increased pain  Assessment: The pt's impairments currently limit functional abilities by 58% including her abilities to reach and lift, walk, cook, sleep, perform recreational activities, and perform household/work related duties without pain or limitations. Skilled PT required to address about deficits to improve over function and return to prior level of function. Prognosis: Good  Discharge Recommendations: Continue to assess pending progress    Special Tests: SLUMP(-), SLR(-), CARLA(tightness), ELY's(-),  Scour(-), distraction (no change to min L low back ),  compression(-)       PT Education: Goals;PT Role;Plan of Care;Home Exercise Program    PLAN: [x] Evaluate and Treat  Frequency/Duration:  Plan  Times per week: 2-3  Plan weeks: 4-6  Current Treatment Recommendations: Strengthening,ROM,Neuromuscular Re-education,Home Exercise Program,Aquatics,Manual Therapy - Soft Tissue Mobilization,Balance Training,Stair training,Modalities,Gait Training     Precautions:         Spinal Precautions: No Lifting  Spinal Precautions: 10# restriction                  Patient Status:[x] Continue/ Initiate plan of Care    [] Discharge PT. Recommend pt continue with HEP. [] Additional visits requested, Please re-certify for additional visits:          Signature: Electronically signed by Dilip Sheth PT on 4/15/22 at 3:47 PM EDT      If you have any questions or concerns, please don't hesitate to call. Thank you for your referral.    I have reviewed this plan of care and certify a need for medically necessary rehabilitation services.     Physician Signature:__________________________________________________________  Date:  Please sign and return

## 2022-04-15 NOTE — PROGRESS NOTES
[facility] ([facility]) Physical Therapy-  [place]  PHYSICAL THERAPY EVALUATION    Date: 4/15/2022  Patient Name: Jose Maria Mccoy       MRN: [id number]   Account: [de-identified]   : 1955  (64 y.o.)   Gender: female   Referring Practitioner: Dr Christina Gregorio                 Diagnosis: Lumbar OA, Radiculopathy  Treatment Diagnosis: LBP with functional limitations  Additional Pertinent Hx: CA, COPD, HTN, Depression, OA, B TKR        Spinal Precautions: No Lifting  Spinal Precautions: 10# restriction    Past Medical History:  has a past medical history of Cancer ([facility] [address] Chronic pain, COPD (chronic obstructive pulmonary disease) ([facility] [address] Depression, Depression, Diabetes mellitus ([facility] [address] Hypertension, Neuropathy, Osteoarthritis, Sleep apnea, and Urinary incontinence. Past Surgical History:   has a past surgical history that includes Spine surgery and joint replacement. Vital Signs  Patient Currently in Pain: Yes   Pain Screening  Patient Currently in Pain: Yes  Pain Assessment  Pain Assessment: 0-10  Pain Level: 1 (6/10 with walking)  Pain Location: Back  Pain Orientation: Lower  Pain Radiating Towards: R>L presence with radicular pain R LE to lat thigh  Pain Descriptors: Cramping; Sharp  Pain Frequency: Continuous  Pain Onset: Awakened from sleep  Functional Pain Assessment: Prevents or interferes with all active and some passive activities (Pt reports <20% current function compared to 75% before 2021)  Non-Pharmaceutical Pain Intervention(s): Cold applied; Heat applied                Lives With: Alone  Type of Home: Apartment  Home Layout: One level  Home Access: Stairs to enter with rails  Entrance Stairs - Number of Steps: nonrecip  Bathroom Shower/Tub: Shower chair with back  Home Equipment: Rolling walker  ADL Assistance: [address]: Independent  Homemaking Responsibilities: Yes  Ambulation Assistance: Independent  Transfer Assistance: Independent  Active : Yes  Occupation:  (not working due to [employer] and restrictions. Was on [employer] until 2021)  Type of occupation: RN  Leisure & Hobbies: swimming, work out        Subjective:  Subjective: Pt reports back pain increased after a hospital stay for covid in 2021 and unresolving despite walking improvement. Uses a walker for longer distance. Pt had a recent fall in a collapsed patio chair,  Pt wearing a brace on R wrist and is under care of a physician. Reports difficulty with walking, cooking, sleeping and better with sitting/ice/heat. Tylenol and Gabapentin, xanflex for pain control. Pt has not had any injections to the back as of yet. Comments: RTD 22    Objective:   Sensation  Overall Sensation Status: WNL    Balance  Posture: Good  Sitting - Static: Good  Sitting - Dynamic: Good  Standing - Static: Good  Standing - Dynamic: Fair  Single Leg Stance R Le  Single Leg Stance L Le         Ambulation 1  Device: No Device  Assistance: Independent  Quality of Gait: lat trunk sway  Gait Deviations: Slow Julia  Distance: 40ft. Stairs  # Steps : 5  Stairs Height: 6\"  Rails: Bilateral  Device: No Device  Assistance: Independent  Comment: nonrecip down and slightly turned     Transfers  Sit to Stand: Independent  Stand to sit:  Independent    Strength RLE  Strength RLE: WFL  Comment: Hip flex 4-/5, Abd 4-/5, Ext 4/5, Knee Quad 4-/5, ham 3+/5, ankle 4/5  Strength LLE  Strength LLE: WFL  Comment: Hip flex 3 to 3+/5, Ext 4-/5,  Knee Quad 3/5, ham 3/5, ankle 3+/5        AROM RLE (degrees)  RLE General AROM: Hip flex 90, IR 40, ER 0     AROM LLE (degrees)  LLE General AROM: Hip flex 90, IR 35, ER 0     Spine  Lumbar: Flex 50%, Ext 25%, SB 25%  Special Tests: SLUMP(-), SLR(-), CARLA(tightness), ELY's(-),  Scour(-), distraction (no change to min L low back ),  compression(-)    Observation/Palpation  Posture: Fair  Palpation: min Lumbar Tightness  Observation: Fwd head, flexed trunkl lean, head flexed, Increased BMI  Bed mobility  Rolling to Right: Modified independent  Supine to Sit: Modified independent  Sit to Supine: Modified independent     Additional Measures  Flexibility: Hamstring flexibility -20°R, -23°L at 90/90 hip/knee position. Exercises:   Exercises  Exercise 1: May need to monitor pulse ox for shortness of breath during activity  Exercise 2: pirifromis stretch and hamstretch HEP 3 min demo  Exercise 3: sktc*  Exercise 4: pelvic tilt*  Exercise 5: abd bracing with march* walkout*  Exercise 6: midrow/lat pull*  Exercise 20: HEP: pirifromis stretch/ham stretch  Modalities:  Modalities  Moist heat: *  Cryotherapy (Minutes\Location): *  Manual:  Manual therapy  Soft Tissue Mobalization: *  *Indicates exercise,modality, or manual techniques to be initiated when appropriate  Assessment: Body structures, Functions, Activity limitations: Decreased functional mobility ,Decreased ADL status,Decreased ROM,Decreased strength,Decreased posture,Increased pain  Assessment: The pt's impairments currently limit functional abilities by 58% including her abilities to reach and lift, walk, cook, sleep, perform recreational activities, and perform household/work related duties without pain or limitations. Skilled PT required to address about deficits to improve over function and return to prior level of function.   Prognosis: Good  Discharge Recommendations: Continue to assess pending progress        Decision Making: Medium Complexity  History: CA, COPD, HTN, Depression, OA, B TKR, R RC repair, T10-11 discectomy fusion, C3-6 laminotomy 2012/ fusion 2018  Exam: Mod Oswestry 29/50=42%functional  Clinical Presentation: evolving        Plan  Frequency/Duration:  Plan  Times per week: 2-3  Plan weeks: 4-6  Current Treatment Recommendations: Strengthening,ROM,Neuromuscular Re-education,Home Exercise Program,Aquatics,Manual Therapy - Soft Tissue Mobilization,Balance Training,Stair training,Modalities,Gait Training         Patient Education  New Education Provided: PT Education: Goals;PT Role;Plan of Care;Home Exercise Program    POST-PAIN     Pain Rating (0-10 pain scale):   1/10  Location and pain description same as pre-treatment unless indicated. Action: [x] NA  [] Call Physician  [] Perform HEP  [] Meds as prescribed    Evaluation and patient rights have been reviewed and patient agrees with plan of care. Yes  [x]  No  []   Explain:       [place] Fall Risk Assessment  Risk Factor Scale  Score   History of Falls [] Yes  [x] No 25  0 0   Secondary Diagnosis [] Yes  [x] No 15  0 0   Ambulatory Aid [] Furniture  [] Crutches/cane/walker  [x] None/bedrest/wheelchair/nurse 30  15  0 0   IV/Heparin Lock [] Yes  [x] No 20  0 0   Gait/Transferring [] Impaired  [x] Weak  [] Normal/bedrest/immobile 20  10  0 10   Mental Status [] Forgets limitations  [x] Oriented to own ability 15  0 0      Total:10     Based on the Assessment score: check the appropriate box. [x]  No intervention needed   Low =   Score of 0-24  []  Use standard prevention interventions Moderate =  Score of 24-44   [] Discuss fall prevention strategies   [] Indicate moderate falls risk on eval  []  Use high risk prevention interventions High = Score of 45 and higher   [] Discuss fall prevention strategies   [] Provide supervision during treatment time    Goals  Short term goals  Time Frame for Short term goals: 2 weeks  Short term goal 1: Pt to perform SLS B LE 5 sec without UE assist to improve overall dynamic balance during gait. Short term goal 2: Decrease LBP pain 50% to assist with improved functional gains. Long term goals  Time Frame for Long term goals : 4-6 weeks  Long term goal 1: Indep/compliance HEP for symptom management  Long term goal 2: Pt demo improved overall function by reporting greater than 70% per functional survey score  Long term goal 3: Pain-free lumbar AROM to WNL allowing an increase in ADL tolerance.   Long term goal 4: Improve B LE strength 4-/5 to 4/5 to allow patient to improve functional tolerance to activities.          PT Individual Minutes  Time In: 1110  Time Out: 8939  Minutes: 55  Timed Code Treatment Minutes: 8 Minutes  Procedure Minutes: Eval 35 min     Timed Activity Minutes Units   Ther Ex 8 1       Electronically signed by Vincent Blanco, PT on 4/15/22 at 3:50 PM EDT

## 2022-04-15 NOTE — TELEPHONE ENCOUNTER
AUTHORIZATION:    INSURANCE: Henry ONEAL VIA: [employer] #: [id number]    DATE RANGE: 4/22/22 TO 5/30/22    TELEPHONE CALL ROUTED TO MA TO SCHEDULE. OK to schedule procedure approved as above. Please note sides/levels approved and date range.    (If applicable, sides/levels approved may differ from those ordered)    TO BE SCHEDULED WITH DR Donta Teague

## 2022-04-20 ENCOUNTER — HOSPITAL ENCOUNTER (OUTPATIENT)
Dept: PHYSICAL THERAPY | Age: 67
Setting detail: THERAPIES SERIES
Discharge: HOME | End: 2022-04-20
Payer: COMMERCIAL

## 2022-04-20 PROCEDURE — 97110 THERAPEUTIC EXERCISES: CPT

## 2022-04-20 PROCEDURE — 97140 MANUAL THERAPY 1/> REGIONS: CPT

## 2022-04-20 ASSESSMENT — PAIN DESCRIPTION - ORIENTATION: ORIENTATION: LOWER

## 2022-04-20 ASSESSMENT — PAIN SCALES - GENERAL: PAINLEVEL_OUTOF10: 7

## 2022-04-20 ASSESSMENT — PAIN DESCRIPTION - DESCRIPTORS: DESCRIPTORS: CRAMPING

## 2022-04-20 ASSESSMENT — PAIN DESCRIPTION - LOCATION: LOCATION: BACK

## 2022-04-20 NOTE — PROGRESS NOTES
[address] [place] [address]  [address]  WJWSX:[phone]        Date: 2022  Patient: Avi Montenegro  : 1955  ACCT #: [de-identified]  Referring Practitioner: Dr Magaly Lam  Diagnosis: Lumbar OA, Radiculopathy  Treatment Diagnosis: LBP with functional limitations    Visit Information:  PT Visit Information  PT Insurance Information: Medical [place]  Total # of Visits Approved: 40  Total # of Visits to Date: 1  No Show: 0  Canceled Appointment: 0  Progress Note Counter:     Subjective: Pt reports having increased hand pain over the weekend. Back pain increased this date also and contributes increases to the significant weather changes. Pt enters clinic using rollator this date. Comments: RTD 22  HEP Compliance:  [x] Good [] Fair [] Poor [] Reports not doing due to:        Pain Assessment  Pain Level: 7  Pain Location: Back  Pain Orientation: Lower  Pain Descriptors: Cramping    OBJECTIVE:   Exercises  Exercise 1: May need to monitor pulse ox for shortness of breath during activity  Exercise 2: pirifromis stretch and hamstretch 30 s x 3  Exercise 3: Sktc 10s x 5  Exercise 4: pelvic tilt 5s x 10  Exercise 5: Abd bracing with march x10,   walkout x 5  Exercise 6: midrow/lat pull seated RTB x 10  Exercise 7: Supine diagonal UE pull with RTB  to opposite hip x 5 B, UE pull RTB and  opp LE lift toward center x 5B  Exercise 20: HEP: pirifromis stretch/ham stretch  Strength: [x] NT  [] MMT completed:     ROM: [x] NT  [] ROM measurements:         Manual:   Manual Therapy  PROM: R knee flex stretch and ER  Stretch 3 min  Soft Tissue Mobilizaton: foam roller in prone low back and R piriformis 8 min    Modalities:  Modalities  Moist Heat: *  Cryotherapy (Minutes\Location): *  Ultrasound: * R piriformis next visit     *Indicates exercise, modality, or manual techniques to be initiated when appropriate    Assessment:       Body Structures, Functions, Activity Limitations Requiring Skilled Therapeutic Intervention: Decreased functional mobility ,Decreased ADL status,Decreased ROM,Decreased strength,Decreased posture,Increased pain  Assessment: Pt tolerated ther ex this date with encouragment and verbal/manual cues to complete. Min SOB noted with activity. Pt tolerated manual techniques in prone position to decrease mm tightness in back and hips. Treatment Diagnosis: LBP with functional limitations  Therapy Prognosis: Good       Goals:  Short Term Goals  Time Frame for Short term goals: 2 weeks  Short term goal 1: Pt to perform SLS B LE 5 sec without UE assist to improve overall dynamic balance during gait. Short term goal 2: Decrease LBP pain 50% to assist with improved functional gains. Long Term Goals  Time Frame for Long term goals : 4-6 weeks  Long term goal 1: Indep/compliance HEP for symptom management  Long term goal 2: Pt demo improved overall function by reporting greater than 70% per functional survey score  Long term goal 3: Pain-free lumbar AROM to WNL allowing an increase in ADL tolerance. Long term goal 4: Improve B LE strength 4-/5 to 4/5 to allow patient to improve functional tolerance to activities. Progress toward goals:ongoing, working on ROM and strengthening to improve overall stability    POST-PAIN       Pain Rating (0-10 pain scale):  6 /10   Location and pain description same as pre-treatment unless indicated. Action: [x] NA   [] Perform HEP  [] Meds as prescribed  [] Modalities as prescribed   [] Call Physician     Frequency/Duration:  Plan  Plan weeks: 4-6  Current Treatment Recommendations: Strengthening,ROM,Home exercise program,Manual Therapy - Soft Tissue Mobilization,Modalities,Stair training,Balance training,Functional mobility training,Gait training,Neuromuscular re-education     Pt to continue current HEP. See objective section for any therapeutic exercise changes, additions or modifications this date.     PT Individual Minutes  Time In: 1020  Time Out: 7611  Minutes: 45  Timed Code Treatment Minutes: 40 Minutes  Procedure Minutes:     Timed Activity Minutes Units   Ther Ex 29 2   Manual  11 1       Signature:  Electronically signed by Jose Page PT on 4/20/22 at 11:10 AM EDT

## 2022-04-20 NOTE — TELEPHONE ENCOUNTER
LEFT VOICEMAIL FOR THE PATIENT TO RETURN OUR CALL. WE NEED TO KNOW WHO HAS HER ON ASA SO WE CAN HAVE HER HOLD PRIOR TO PROCEDURE. Cervical Epidural C7/T1 Interlaminar ILESI under XR with Dr Giuliana Nunez. +Asa 81 mg MUST BE HELD request permission, no antibiotics, +diabetes mellitus, no osteoporosis, no bleeding or platelet dysfunction, IV Dye okay (she denies any IV dye allergies despite listed in her allergy list), allergies reviewed, 45 minute procedure.  Prone position

## 2022-04-22 RX ORDER — DIAZEPAM 5 MG/1
5 TABLET ORAL SEE ADMIN INSTRUCTIONS
Qty: 2 TABLET | Refills: 0 | Status: SHIPPED | OUTPATIENT
Start: 2022-04-22 | End: 2022-04-23

## 2022-04-22 NOTE — TELEPHONE ENCOUNTER
PT states that Dr. Sweta Nayak prescribes this. Fax number is [phone]    Patient is also asking if she could be prescribed a valium to help her relax for the procedure?

## 2022-04-25 ENCOUNTER — HOSPITAL ENCOUNTER (OUTPATIENT)
Dept: PHYSICAL THERAPY | Age: 67
Setting detail: THERAPIES SERIES
Discharge: HOME | End: 2022-04-25
Payer: COMMERCIAL

## 2022-04-25 ENCOUNTER — TELEPHONE (OUTPATIENT)
Dept: PAIN MANAGEMENT | Age: 67
End: 2022-04-25

## 2022-04-25 NOTE — TELEPHONE ENCOUNTER
Patient aware a Rx for Valium was sent to [facility]. She is also aware we are waiting on okay from Dr. Maximiliano Cuevas  to stop the ASA 7 days prior to procedure.

## 2022-04-25 NOTE — TELEPHONE ENCOUNTER
Patient called stating that she is having extreme pain in her neck and back. She says that when she turns her head to the left she gets pain in her hand. She has her procedure scheduled fr 5/12 but she does not think she can wait that long. She did get a steroid pack and pain medication from her PCP that did help a little bit so far, but she is wondering if she can be seen sooner or what else she can do?

## 2022-04-25 NOTE — PROGRESS NOTES
Therapy                            Cancellation/No-show Note    Date: 2022  Patient: Jorge Muller (89 y.o. female)  : 1955  MRN:  [id number]  Referring Physician: Ashwini Sheets*     Medical Diagnosis: Other spondylosis with radiculopathy, lumbar region [M47.26]      Visit Information:  Insurance: Payor: MEDICAL [employer] / Plan: MEDICAL [employer] [address] / Product Type: *No Product type* /   Visits to Date: 1   No Show/Cancelled Appts: 0 /       For today's appointment patient:  [x]  Cancelled  []  Rescheduled appointment  []  No-show   []  Called pt to remind of next appointment     Reason given by patient:  []  Patient ill  []  Conflicting appointment  []  No transportation    []  Conflict with work  []  No reason given  [x]  Other:  Pain in hands  [x] Pt has future appointments scheduled, no follow up needed  [] Pt requests to be on hold.     Reason:   If > 2 weeks please discuss with therapist.  [] Therapist to call pt for follow up     Comments:       Signature: Electronically signed by Laurita Espino PTA on 22 at 12:21 PM EDT

## 2022-04-26 ENCOUNTER — OFFICE VISIT (OUTPATIENT)
Dept: SPORTS MEDICINE | Age: 67
End: 2022-04-26
Payer: COMMERCIAL

## 2022-04-26 VITALS
WEIGHT: 246 LBS | DIASTOLIC BLOOD PRESSURE: 88 MMHG | TEMPERATURE: 97.1 F | BODY MASS INDEX: 45.27 KG/M2 | HEIGHT: 62 IN | SYSTOLIC BLOOD PRESSURE: 148 MMHG

## 2022-04-26 DIAGNOSIS — M19.031 PRIMARY OSTEOARTHRITIS OF RIGHT WRIST: Primary | ICD-10-CM

## 2022-04-26 PROCEDURE — 99213 OFFICE O/P EST LOW 20 MIN: CPT | Performed by: FAMILY MEDICINE

## 2022-04-26 ASSESSMENT — ENCOUNTER SYMPTOMS
BACK PAIN: 0
SHORTNESS OF BREATH: 0
NAUSEA: 0
CONSTIPATION: 0
DIARRHEA: 0

## 2022-04-26 NOTE — PROGRESS NOTES
[facility]icians  Neurosurgery and Pain [facility]  [address] [facility] [address], [address] [facility] , [address] [facility], [facility] [address]: [phone]  F: [phone]      Tatyana Akhtar  ([id number])    4/26/2022    Subjective:     Tatyana Akhtar is 61 y.o. female who complains today of:    Chief Complaint   Patient presents with    Follow-up       HPI     Returns stating that she is feeling a lot better in the wrists and still gets some pain with certain movements. But its not like it was   Allergies:  Latex; 2,4-d dimethylamine (amisol); Mcdonald-2 inhibitors; Iodides; Nalidixic acid; Nsaids;  Shellfish allergy; and Statins    Past Medical History:   Diagnosis Date    Cancer ([address]     SKIN    Chronic pain     COPD (chronic obstructive pulmonary disease) (HCC)     Depression     Depression     Diabetes mellitus (HCC)     Hypertension     Neuropathy     Osteoarthritis     Sleep apnea     Urinary incontinence      Past Surgical History:   Procedure Laterality Date    JOINT REPLACEMENT      SPINE SURGERY       Family History   Problem Relation Age of Onset    Arthritis Mother     Hypertension Mother     Arthritis Father      Social History     Socioeconomic History    Marital status:      Spouse name: Not on file    Number of children: Not on file    Years of education: Not on file    Highest education level: Not on file   Occupational History    Not on file   Tobacco Use    Smoking status: Former Smoker     Packs/day: 1.00     Years: 15.00     Pack years: 15.00     Types: Cigarettes     Quit date: 6/15/2011     Years since quitting: 10.8    Smokeless tobacco: Never Used   Substance and Sexual Activity    Alcohol use: Never    Drug use: Never    Sexual activity: Not on file   Other Topics Concern    Not on file   Social History Narrative    Not on file     Social Determinants of Health     Financial Resource Strain:     Difficulty of Paying Living Expenses: Not on file Food Insecurity:     Worried About Running Out of Food in the Last Year: Not on file    Dev of Food in the Last Year: Not on file   Transportation Needs:     Lack of Transportation (Medical): Not on file    Lack of Transportation (Non-Medical): Not on file   Physical Activity:     Days of Exercise per Week: Not on file    Minutes of Exercise per Session: Not on file   Stress:     Feeling of Stress : Not on file   Social Connections:     Frequency of Communication with Friends and Family: Not on file    Frequency of Social Gatherings with Friends and Family: Not on file    Attends Baptism Services: Not on file    Active Member of [address] [place] [address] [employer] or Organizations: Not on file    Attends Club or Organization Meetings: Not on file    Marital Status: Not on file   Intimate Partner Violence:     Fear of Current or Ex-Partner: Not on file    Emotionally Abused: Not on file    Physically Abused: Not on file    Sexually Abused: Not on file   Housing Stability:     Unable to Pay for Housing in the Last Year: Not on file    Number of [place] in the Last Year: Not on file    Unstable Housing in the Last Year: Not on file       Current Outpatient Medications on File Prior to Visit   Medication Sig Dispense Refill    omeprazole (PRILOSEC) 40 MG delayed release capsule TAKE 1 CAPSULE BY MOUTH DAILY 90 capsule 0    lidocaine (LIDODERM) 5 % Place 1 patch onto the skin daily 12 hours on, 12 hours off.  30 patch 0    tiZANidine (ZANAFLEX) 4 MG tablet Take 1 tablet by mouth every 12 hours 60 tablet 0    venlafaxine (EFFEXOR XR) 150 MG extended release capsule Take 1 capsule by mouth daily 30 capsule 2    etodolac (LODINE) 400 MG tablet Take 1 tablet by mouth 2 times daily 60 tablet 2    bumetanide (BUMEX) 1 MG tablet Take 1 mg by mouth daily      dilTIAZem (CARDIZEM CD) 240 MG extended release capsule       HYDROcodone-acetaminophen (VICODIN) 5-300 MG TABS       insulin glargine (LANTUS;BASAGLAR) 100 UNIT/ML injection pen Inject into the skin      insulin lispro, 1 Unit Dial, 100 UNIT/ML SOPN Inject into the skin      losartan (COZAAR) 50 MG tablet Take 50 mg by mouth 2 times daily       SITagliptin (JANUVIA) 100 MG tablet       gabapentin (NEURONTIN) 600 MG tablet Take 1 tablet by mouth 3 times daily for 30 days. 90 tablet 0    lidocaine (LIDODERM) 5 % Place 1 patch onto the skin daily (Patient not taking: Reported on 4/26/2022) 30 patch 1     No current facility-administered medications on file prior to visit. Review of Systems   Constitutional: Negative for fever. HENT: Negative for hearing loss. Respiratory: Negative for shortness of breath. Gastrointestinal: Negative for constipation, diarrhea and nausea. Genitourinary: Negative for difficulty urinating. Musculoskeletal: Negative for back pain and neck pain. Skin: Negative for rash. Neurological: Negative for headaches. Hematological: Does not bruise/bleed easily. Psychiatric/Behavioral: Negative for sleep disturbance. Objective:     Vitals:  BP (!) 152/90 (Site: Left Upper Arm, Position: Sitting, Cuff Size: Large Adult)   Temp 97.1 °F (36.2 °C) (Infrared)   Ht 5' 2\" (1.575 m)   Wt 246 lb (111.6 kg)   BMI 44.99 kg/m² Pain Score:   7      Physical Exam  Vitals reviewed. Constitutional:       Appearance: Normal appearance. Skin:     General: Skin is warm and dry. Neurological:      Mental Status: She is alert. Ortho Exam   Examination of the right wrist revealed the neurovascular muscular status to be intact near full range of motion of the wrist good range of motion in the thumb with some mild tenderness at the [address] 16 joint and some tendons are intact    Assessment:      Diagnosis Orders   1. Primary osteoarthritis of right wrist         Plan:    We will see the patient back in 2 weeks I want her to wean herself out of her brace she still having issues there we will consider injection of the [address] 16 joint No orders of the defined types were placed in this encounter. No orders of the defined types were placed in this encounter. Follow up:  Return in about 2 weeks (around 5/10/2022).     JAVI SEYMOUR DO

## 2022-04-27 ENCOUNTER — HOSPITAL ENCOUNTER (OUTPATIENT)
Dept: PHYSICAL THERAPY | Age: 67
Setting detail: THERAPIES SERIES
Discharge: HOME | End: 2022-04-27
Payer: COMMERCIAL

## 2022-04-27 NOTE — PROGRESS NOTES
Therapy                            Cancellation/No-show Note    Date: 2022  Patient: Micheal Ruiz (54 y.o. female)  : 1955  MRN:  [id number]  Referring Physician: Saray Jones*     Medical Diagnosis: Other spondylosis with radiculopathy, lumbar region [M47.26]      Visit Information:  Insurance: Payor: [employer] / Plan: [employer] [address] / Product Type: *No Product type* /   Visits to Date: 1   No Show/Cancelled Appts: 0 / 2      For today's appointment patient:  [x]  Cancelled  []  Rescheduled appointment  []  No-show   []  Called pt to remind of next appointment     Reason given by patient:  []  Patient ill  []  Conflicting appointment  []  No transportation    []  Conflict with work  []  No reason given  [x]  Other:      [] Pt has future appointments scheduled, no follow up needed  [x] Pt requests to be on hold. Reason: per pt would like to be placed on hold for 2 weeks waiting for injections.     If > 2 weeks please discuss with therapist.  [] Therapist to call pt for follow up     Comments:       Signature: Electronically signed by Matheus Chua PTA on 22 at 10:23 AM EDT

## 2022-04-27 NOTE — TELEPHONE ENCOUNTER
PERMISSION TO HOLD ASA FOR 7 DAYS WAS RECEIVED FROM DR MEYER [facility]. PLEASE CALL PATIENT TO SCHEDULE PROCEDURE.

## 2022-04-28 ENCOUNTER — TELEPHONE (OUTPATIENT)
Dept: PAIN MANAGEMENT | Age: 67
End: 2022-04-28

## 2022-04-28 DIAGNOSIS — M51.34 DEGENERATION OF INTERVERTEBRAL DISC OF THORACIC REGION: ICD-10-CM

## 2022-04-28 DIAGNOSIS — M51.34 DEGENERATION, INTERVERTEBRAL DISC, THORACIC: ICD-10-CM

## 2022-04-28 DIAGNOSIS — F11.90 CHRONIC, CONTINUOUS USE OF OPIOIDS: ICD-10-CM

## 2022-04-28 DIAGNOSIS — Z79.891 ENCOUNTER FOR MONITORING OPIOID MAINTENANCE THERAPY: ICD-10-CM

## 2022-04-28 DIAGNOSIS — M54.12 CERVICAL RADICULOPATHY: ICD-10-CM

## 2022-04-28 DIAGNOSIS — M51.34 DEGENERATION OF THORACIC INTERVERTEBRAL DISC: ICD-10-CM

## 2022-04-28 DIAGNOSIS — Z51.81 ENCOUNTER FOR MONITORING OPIOID MAINTENANCE THERAPY: ICD-10-CM

## 2022-04-28 DIAGNOSIS — M50.322 DEGENERATION OF C5-C6 INTERVERTEBRAL DISC: Primary | ICD-10-CM

## 2022-04-28 NOTE — TELEPHONE ENCOUNTER
Patient is requesting a medrol dose mayelin and possibly some pain medication to hopefully get her through the weekend without pain. She stated that her PCP prescribed those things to her last weekend because no one was available at our office during the weekend and they helped her quite a bit. They are now wearing off and she's anticipating the coming weekend. Patient is claiming significant pain. Will Dr Carla Knight please prescribe her a Medrol dose mayelin and some pain medication?

## 2022-04-29 ENCOUNTER — TELEPHONE (OUTPATIENT)
Dept: SPORTS MEDICINE | Age: 67
End: 2022-04-29

## 2022-04-29 NOTE — TELEPHONE ENCOUNTER
BENEFITS: RIGHT WRIST [address] INJ    Insurance: [employer]  Phone: [phone]  Contact Name: Kristin Tabares  Effective Date: 1.1.2022     Plan year: YES-CALENDAR  Deductible: 500.00      Deductible Met: 500.00  Allowed/benefits paid at: 70% AFTER DEDUCTIBLE  OOP: 2000.00 MET $1900.90  Freq Limits: 20610--BASED ON MEDICAL NECESSITY  Prior Auth Requirement: NO AUTH REQUIRED    Notes: NO PRE-EX CLAUSE    Call Reference #: [id number]    Time of call: 2:40PM

## 2022-04-30 RX ORDER — HYDROCODONE BITARTRATE AND ACETAMINOPHEN 5; 325 MG/1; MG/1
1 TABLET ORAL 3 TIMES DAILY PRN
Qty: 15 TABLET | Refills: 0 | Status: SHIPPED | OUTPATIENT
Start: 2022-04-30 | End: 2022-05-05

## 2022-04-30 NOTE — TELEPHONE ENCOUNTER
OARRS reviewed, shows Percocet 5/325 mg #21 filled on 4/24/22 as well as Norco 5/325 mg #20 on 12/7/21    May not be wise to resend Medrol dose pack, may make her sugars worse given the diabetes mellitus, sounds she has recently had a course of steroids.     -Norco 5/325 mg TID prn 5 days #15 no ref 4/30-5/5/22  -Keep injection appointment  -Keep follow up as scheduled        Controlled Substance Monitoring:    Acute and Chronic Pain Monitoring:   RX Monitoring 4/30/2022   Periodic Controlled Substance Monitoring Obtaining appropriate analgesic effect of treatment. none

## 2022-05-02 DIAGNOSIS — M51.34 DEGENERATION OF THORACIC INTERVERTEBRAL DISC: ICD-10-CM

## 2022-05-02 DIAGNOSIS — M50.322 DEGENERATION OF C5-C6 INTERVERTEBRAL DISC: ICD-10-CM

## 2022-05-03 RX ORDER — TIZANIDINE 4 MG/1
4 TABLET ORAL EVERY 12 HOURS
Qty: 60 TABLET | Refills: 0 | Status: SHIPPED | OUTPATIENT
Start: 2022-05-03 | End: 2022-06-02 | Stop reason: SDUPTHER

## 2022-05-03 RX ORDER — GABAPENTIN 600 MG/1
TABLET ORAL
Qty: 90 TABLET | Refills: 0 | Status: SHIPPED | OUTPATIENT
Start: 2022-05-03 | End: 2022-06-02 | Stop reason: SDUPTHER

## 2022-05-03 NOTE — TELEPHONE ENCOUNTER
Left message for the patient to please contact the office.  Dr Ofelia Connor sent patients rx to the pharmacy

## 2022-05-10 ENCOUNTER — PROCEDURE VISIT (OUTPATIENT)
Dept: SPORTS MEDICINE | Age: 67
End: 2022-05-10
Payer: COMMERCIAL

## 2022-05-10 VITALS — HEIGHT: 62 IN | TEMPERATURE: 96.6 F | BODY MASS INDEX: 45.27 KG/M2 | WEIGHT: 246 LBS

## 2022-05-10 DIAGNOSIS — M19.031 LOCALIZED PRIMARY OSTEOARTHRITIS OF CARPOMETACARPAL (CMC) JOINT OF RIGHT WRIST: Primary | ICD-10-CM

## 2022-05-10 PROCEDURE — 20604 DRAIN/INJ JOINT/BURSA W/US: CPT | Performed by: FAMILY MEDICINE

## 2022-05-10 NOTE — PROGRESS NOTES
[email or web address]  Spine Surgery  Advanced Pain Management           Provider: Gretchen Quintero DO          Patient Name: Chloé Sandy : 1955         Date: 5/10/22          PROCEDURE: US CMC joint Injection  Dx: djd right 1st [facility] joint    After informed consent patient was put in a seated position, the right CMC joint was exposed and draped. Using msk US the [facility] joint was identified and prepped with Betadine. Using US guidance a 22g needle was used to inject 1/2cc celestone, 1/2cc lidocaine, with relief of symptoms.   Pt tolerated procedure well, left stable, told to ice the wrist today and resume normal activity in 2 days  US images of procedure were saved

## 2022-05-12 ENCOUNTER — PROCEDURE VISIT (OUTPATIENT)
Dept: PAIN MANAGEMENT | Age: 67
End: 2022-05-12
Payer: COMMERCIAL

## 2022-05-12 DIAGNOSIS — M54.12 CERVICAL RADICULOPATHY: Primary | ICD-10-CM

## 2022-05-12 PROCEDURE — 62321 NJX INTERLAMINAR CRV/THRC: CPT | Performed by: PHYSICAL MEDICINE & REHABILITATION

## 2022-05-12 RX ORDER — SODIUM CHLORIDE 9 MG/ML
2 INJECTION INTRAVENOUS ONCE
Status: COMPLETED | OUTPATIENT
Start: 2022-05-12 | End: 2022-05-12

## 2022-05-12 RX ORDER — DEXAMETHASONE SODIUM PHOSPHATE 10 MG/ML
10 INJECTION, SOLUTION INTRAMUSCULAR; INTRAVENOUS ONCE
Status: COMPLETED | OUTPATIENT
Start: 2022-05-12 | End: 2022-05-12

## 2022-05-12 RX ORDER — LIDOCAINE HYDROCHLORIDE 10 MG/ML
3 INJECTION, SOLUTION INFILTRATION; PERINEURAL ONCE
Status: COMPLETED | OUTPATIENT
Start: 2022-05-12 | End: 2022-05-12

## 2022-05-12 RX ADMIN — SODIUM CHLORIDE 2 ML: 9 INJECTION INTRAVENOUS at 13:08

## 2022-05-12 RX ADMIN — LIDOCAINE HYDROCHLORIDE 3 ML: 10 INJECTION, SOLUTION INFILTRATION; PERINEURAL at 13:07

## 2022-05-12 RX ADMIN — DEXAMETHASONE SODIUM PHOSPHATE 10 MG: 10 INJECTION, SOLUTION INTRAMUSCULAR; INTRAVENOUS at 13:07

## 2022-05-12 RX ADMIN — Medication 0.5 MEQ: at 13:08

## 2022-05-12 NOTE — PROGRESS NOTES
This procedure was 80% more difficult and required 80% more work secondary to the patient's habitus. The patient has a BMI of 45. This required increased work for safe and proper positioning upon the fluoroscopy table, increased needle passes for safe and appropriate needle placement, and increased fluoroscopy time and radiation exposure for proper visualization.

## 2022-05-12 NOTE — PROGRESS NOTES
Cervical Interlaminar Epidural Corticosteroid Injection (ILESI) under Fluoroscopic Guidance        Patient Name: Morenita Parekh  : 1955  Date: 2022   Provider: Kin Brown MD    PROCEDURE:  Cervical/Thoracic interlaminar epidural corticosteroid injection (ILESI) at the T2/3 level under fluoroscopic guidance    INDICATIONS: Morenita Parekh is a 61 y.o. female who presents with symptoms and physical exam findings consistent with cervical radiculopathy. She has had persistent pain that limits her activities of daily living such as upper body dressing. The pain is persistent despite conservative measures including home exercise program. Given her symptoms, physical exam findings, impairment in activities of daily living, and lack of response to conservative measures, consideration for C7/T1 Cervical interlaminar epidural corticosteroid injection under fluoroscopic guidance was given. Discussed the risks including but not limited to bleeding, infection, worsened pain, damage to surrounding structures, side effects, toxicity, allergic reactions to medications used, need for surgery, headache, vision changes, difficulty with chewing and/or swallowing, pneumothorax, immune and stress-response dysfunction, fat necrosis, avascular necrosis, skin pigmentation changes, blood sugar elevation, as well as catastrophic injury such as vision loss, paralysis, spinal cord or plexus injury, cerebral brainstem or spinal cord infarction, intrathecal injection, spinal cord puncture, persistent dural leak, arachnoiditis, stroke, bowel or bladder incontinence, ventilator dependence, loss of use of the arms and/or legs, and death. Discussed her elevated risk given anticoagulation status and the formation of hematomas, uncontrolled bleeding, hypotension, and death. Discussed her elevated risk given her diabetic status, and the risk of hyperglycemia, uncontrolled blood sugars, sepsis, and death.  Discussed off-label use of corticosteroids and how the Food and Drug Administration (FDA) has not approved corticosteroids for epidural use. Discussed her allergy to Iodides and shellfish, the patient insists that she has had IV dye in the past without incident and has no allergies to contrast dye. Discussed how she should call 911 for any throat discomfort, tongue or lip swelling, chest pain, shortness of breath, or any other concerning symptoms. Discussed the risks, benefits, alternative procedures, and alternatives to the procedure including no procedure at all. Discussed that we cannot undo any permanent neurologic or orthopaedic damage or change the course of any underlying disease. After thorough discussion, patient expressed understanding and willingness to proceed. Written consent was obtained and is in the chart. Verbal consent to proceed was obtained. DESCRIPTION OF PROCEDURE:  The site was marked. A time out was performed. Fluoroscopy was used to identify the pertinent landmarks. The patient was placed prone on the procedure table. The site was prepped with betadine three times, draped, and maintained in a sterile manner throughout the procedure. Under fluoroscopic guidance the C7/T1 interlaminar space was identified. Due to hardware and vertebral anatomy, the T2/3 interspace appeared to have a greater interlaminar opening for the procedure, and thus this interspace was targeted. A 27 gauge 1.5 inch needle was used to anesthetize the skin and subcutaneous tissue with 2 mL of 1% preservative free lidocaine. Then a 18 gauge 3.5 inch long Tuohy needle was advanced in a Left paramedian fashion under intermittent fluoroscopic guidance. A loss of resistance syringe was attached and the needle was advanced slowly and carefully under multiple fluroscopic views. At the depth of 8.5 cm from the skin there was limited loss of resistance. Aspiration was negative for blood or cerebrospinal fluid.  Injection of 0.5 mL of contrast dye demonstrated limited epidural spread and was free of any intravascular spread or any other aberrant uptake. Live fluoroscopy was used. Then a 3 mL injectate containing 1 mL of 10 mg of Dexamethasone and 2 mL of 0.9% preservative-free normal saline was injected slowly and without difficulty. The needle was flushed and removed. The site was hemostatic. The site was cleaned and appropriately dressed. The patient tolerated the procedure well. There were no immediate post procedure complications. She was monitored in the recovery room post procedure. She was at her baseline neuromuscular examination prior to being discharged home in stable condition. Post procedure instructions were given. The patient will follow-up as previously instructed. She will resume anticoagulation tomorrow. She was advised that her blood sugars may increase after today's procedure.              [address] [facility] [address]  Phone [phone]/[facility] [phone]

## 2022-05-17 RX ORDER — BETAMETHASONE SODIUM PHOSPHATE AND BETAMETHASONE ACETATE 3; 3 MG/ML; MG/ML
3 INJECTION, SUSPENSION INTRA-ARTICULAR; INTRALESIONAL; INTRAMUSCULAR; SOFT TISSUE ONCE
Status: COMPLETED | OUTPATIENT
Start: 2022-05-17 | End: 2022-05-17

## 2022-05-17 RX ADMIN — BETAMETHASONE SODIUM PHOSPHATE AND BETAMETHASONE ACETATE 3 MG: 3; 3 INJECTION, SUSPENSION INTRA-ARTICULAR; INTRALESIONAL; INTRAMUSCULAR; SOFT TISSUE at 16:07

## 2022-05-18 ENCOUNTER — TELEPHONE (OUTPATIENT)
Dept: SPORTS MEDICINE | Age: 67
End: 2022-05-18

## 2022-05-18 NOTE — TELEPHONE ENCOUNTER
Spoke with patient today in regards to scheduling additional PT appts. Patient's last appt in PT was 4/20/22  Patient is unsure if she should continue with therapy and would like to know what Dr. Ray Prince thinks. Patient has been evaluated and treated for back. Unsure if she should be d/c or should continue poc.  Please review and advise    Andrés Castano on 5/18/22 at 11:21 AM EDT

## 2022-05-24 NOTE — TELEPHONE ENCOUNTER
Patient will d/c physical therapy at the moment but reports that she will start self aquatic therapy. Patient having increased pain in lower back. Julia Lima will discuss with you at next follow up.

## 2022-05-28 DIAGNOSIS — M50.322 DEGENERATION OF C5-C6 INTERVERTEBRAL DISC: ICD-10-CM

## 2022-05-28 DIAGNOSIS — M51.34 DEGENERATION OF THORACIC INTERVERTEBRAL DISC: ICD-10-CM

## 2022-05-30 DIAGNOSIS — M51.34 DEGENERATION OF THORACIC INTERVERTEBRAL DISC: ICD-10-CM

## 2022-05-30 DIAGNOSIS — M50.322 DEGENERATION OF C5-C6 INTERVERTEBRAL DISC: ICD-10-CM

## 2022-05-31 ENCOUNTER — OFFICE VISIT (OUTPATIENT)
Dept: SPORTS MEDICINE | Age: 67
End: 2022-05-31
Payer: COMMERCIAL

## 2022-05-31 VITALS
WEIGHT: 246 LBS | HEIGHT: 62 IN | TEMPERATURE: 97 F | DIASTOLIC BLOOD PRESSURE: 78 MMHG | BODY MASS INDEX: 45.27 KG/M2 | SYSTOLIC BLOOD PRESSURE: 134 MMHG

## 2022-05-31 DIAGNOSIS — M19.031 PRIMARY OSTEOARTHRITIS OF RIGHT WRIST: Primary | ICD-10-CM

## 2022-05-31 DIAGNOSIS — M65.4 DE QUERVAIN'S TENOSYNOVITIS, RIGHT: ICD-10-CM

## 2022-05-31 PROCEDURE — 3017F COLORECTAL CA SCREEN DOC REV: CPT | Performed by: FAMILY MEDICINE

## 2022-05-31 PROCEDURE — 1036F TOBACCO NON-USER: CPT | Performed by: FAMILY MEDICINE

## 2022-05-31 PROCEDURE — 99213 OFFICE O/P EST LOW 20 MIN: CPT | Performed by: FAMILY MEDICINE

## 2022-05-31 PROCEDURE — G8417 CALC BMI ABV UP PARAM F/U: HCPCS | Performed by: FAMILY MEDICINE

## 2022-05-31 PROCEDURE — G8427 DOCREV CUR MEDS BY ELIG CLIN: HCPCS | Performed by: FAMILY MEDICINE

## 2022-05-31 ASSESSMENT — ENCOUNTER SYMPTOMS
SHORTNESS OF BREATH: 0
DIARRHEA: 0
CONSTIPATION: 0
BACK PAIN: 0
NAUSEA: 0

## 2022-05-31 NOTE — PROGRESS NOTES
[facility]) Physicians  Neurosurgery and Pain [facility]  [address] [facility] [address], [address] [facility] , [address] [facility], [place] [address]: [phone]  F: [phone]      Paddy Campos  ([birth year])    5/31/2022    Subjective:     Paddy Campos is 61 y.o. female who complains today of:    Chief Complaint   Patient presents with    Follow-up     Post R CMC Injection - has had pain relief from injection until today. HPI     Returns stating that her wrist feels better but she started swelling and seems she irritated something in the wrist again  Allergies:  Latex; 2,4-d dimethylamine (amisol); Mcdonald-2 inhibitors; Iodides; Nalidixic acid; Nsaids;  Shellfish allergy; and Statins    Past Medical History:   Diagnosis Date    Cancer ([address]     SKIN    Chronic pain     COPD (chronic obstructive pulmonary disease) (HCC)     Depression     Depression     Diabetes mellitus (HCC)     Hypertension     Neuropathy     Osteoarthritis     Sleep apnea     Urinary incontinence      Past Surgical History:   Procedure Laterality Date    JOINT REPLACEMENT      SPINE SURGERY       Family History   Problem Relation Age of Onset    Arthritis Mother     Hypertension Mother     Arthritis Father      Social History     Socioeconomic History    Marital status:      Spouse name: Not on file    Number of children: Not on file    Years of education: Not on file    Highest education level: Not on file   Occupational History    Not on file   Tobacco Use    Smoking status: Former Smoker     Packs/day: 1.00     Years: 15.00     Pack years: 15.00     Types: Cigarettes     Quit date: 6/15/2011     Years since quitting: 10.9    Smokeless tobacco: Never Used   Substance and Sexual Activity    Alcohol use: Never    Drug use: Never    Sexual activity: Not on file   Other Topics Concern    Not on file   Social History Narrative    Not on file     Social Determinants of Health     Financial Resource Strain:     Difficulty of Paying Living Expenses: Not on file   Food Insecurity:     Worried About Running Out of Food in the Last Year: Not on file    Dev of Food in the Last Year: Not on file   Transportation Needs:     Lack of Transportation (Medical): Not on file    Lack of Transportation (Non-Medical):  Not on file   Physical Activity:     Days of Exercise per Week: Not on file    Minutes of Exercise per Session: Not on file   Stress:     Feeling of Stress : Not on file   Social Connections:     Frequency of Communication with Friends and Family: Not on file    Frequency of Social Gatherings with Friends and Family: Not on file    Attends [employer] Services: Not on file    Active Member of [address] [place] [address] [employer] or Organizations: Not on file    Attends Club or Organization Meetings: Not on file    Marital Status: Not on file   Intimate Partner Violence:     Fear of Current or Ex-Partner: Not on file    Emotionally Abused: Not on file    Physically Abused: Not on file    Sexually Abused: Not on file   Housing Stability:     Unable to Pay for Housing in the Last Year: Not on file    Number of [place] in the Last Year: Not on file    Unstable Housing in the Last Year: Not on file       Current Outpatient Medications on File Prior to Visit   Medication Sig Dispense Refill    tiZANidine (ZANAFLEX) 4 MG tablet TAKE 1 TABLET BY MOUTH EVERY 12 HOURS 60 tablet 0    gabapentin (NEURONTIN) 600 MG tablet TAKE 1 TABLET BY MOUTH THREE TIMES DAILY 90 tablet 0    omeprazole (PRILOSEC) 40 MG delayed release capsule TAKE 1 CAPSULE BY MOUTH DAILY 90 capsule 0    venlafaxine (EFFEXOR XR) 150 MG extended release capsule Take 1 capsule by mouth daily 30 capsule 2    etodolac (LODINE) 400 MG tablet Take 1 tablet by mouth 2 times daily 60 tablet 2    bumetanide (BUMEX) 1 MG tablet Take 1 mg by mouth daily      dilTIAZem (CARDIZEM CD) 240 MG extended release capsule       HYDROcodone-acetaminophen (VICODIN) 5-300 MG TABS       insulin glargine (LANTUS;BASAGLAR) 100 UNIT/ML injection pen Inject into the skin      insulin lispro, 1 Unit Dial, 100 UNIT/ML SOPN Inject into the skin      losartan (COZAAR) 50 MG tablet Take 50 mg by mouth 2 times daily       SITagliptin (JANUVIA) 100 MG tablet       lidocaine (LIDODERM) 5 % Place 1 patch onto the skin daily (Patient not taking: Reported on 4/26/2022) 30 patch 1     Current Facility-Administered Medications on File Prior to Visit   Medication Dose Route Frequency Provider Last Rate Last Admin    iopamidol (ISOVUE-300) 61 % injection 0.5 mL  0.5 mL Other ONCE PRN Narendra Randolph MD             Review of Systems   Constitutional: Negative for fever. HENT: Negative for hearing loss. Respiratory: Negative for shortness of breath. Gastrointestinal: Negative for constipation, diarrhea and nausea. Genitourinary: Negative for difficulty urinating. Musculoskeletal: Negative for back pain and neck pain. Skin: Negative for rash. Neurological: Negative for headaches. Hematological: Does not bruise/bleed easily. Psychiatric/Behavioral: Negative for sleep disturbance. Objective:     Vitals:  /78 (Site: Left Upper Arm, Position: Sitting, Cuff Size: Large Adult)   Temp 97 °F (36.1 °C) (Infrared)   Ht 5' 2\" (1.575 m)   Wt 246 lb (111.6 kg)   BMI 44.99 kg/m² Pain Score:   1      Physical Exam  Vitals reviewed. Constitutional:       Appearance: Normal appearance. Skin:     General: Skin is warm and dry. Neurological:      Mental Status: She is alert. Ortho Exam   Examination of the right wrist reveals the neurovascular muscular status to be intact no tenderness in the [facility] joint there is tenderness along the first dorsal compartment with positive Finkelstein's    Assessment:      Diagnosis Orders   1. Primary osteoarthritis of right wrist     2.  De Quervain's tenosynovitis, right         Plan:   I think that the patient swelling started tendinitis I told her to put her brace on ice do her stretches and also wear her brace when swimming if she is not better in the next few weeks we will inject the first dorsal compartment       No orders of the defined types were placed in this encounter. No orders of the defined types were placed in this encounter. Follow up:  Return in about 3 weeks (around 6/21/2022) for injection.     JAVI SEYMOUR, DO

## 2022-06-01 DIAGNOSIS — M51.34 DEGENERATION OF THORACIC INTERVERTEBRAL DISC: ICD-10-CM

## 2022-06-01 DIAGNOSIS — M50.322 DEGENERATION OF C5-C6 INTERVERTEBRAL DISC: ICD-10-CM

## 2022-06-01 RX ORDER — ETODOLAC 400 MG/1
TABLET, FILM COATED ORAL
Qty: 60 TABLET | Refills: 2 | OUTPATIENT
Start: 2022-06-01

## 2022-06-02 DIAGNOSIS — M51.34 DEGENERATION OF THORACIC INTERVERTEBRAL DISC: ICD-10-CM

## 2022-06-02 DIAGNOSIS — M50.322 DEGENERATION OF C5-C6 INTERVERTEBRAL DISC: ICD-10-CM

## 2022-06-02 RX ORDER — TIZANIDINE 4 MG/1
4 TABLET ORAL EVERY 12 HOURS
Qty: 60 TABLET | Refills: 0 | OUTPATIENT
Start: 2022-06-02

## 2022-06-02 RX ORDER — GABAPENTIN 600 MG/1
TABLET ORAL
Qty: 90 TABLET | Refills: 0 | OUTPATIENT
Start: 2022-06-02 | End: 2022-07-02

## 2022-06-02 RX ORDER — TIZANIDINE 4 MG/1
4 TABLET ORAL EVERY 12 HOURS
Qty: 60 TABLET | Refills: 0 | Status: SHIPPED | OUTPATIENT
Start: 2022-06-02 | End: 2022-07-07 | Stop reason: SDUPTHER

## 2022-06-02 RX ORDER — ETODOLAC 400 MG/1
TABLET, FILM COATED ORAL
Qty: 60 TABLET | Refills: 2 | OUTPATIENT
Start: 2022-06-02

## 2022-06-02 RX ORDER — ETODOLAC 400 MG/1
400 TABLET, FILM COATED ORAL 2 TIMES DAILY
Qty: 60 TABLET | Refills: 2 | Status: SHIPPED | OUTPATIENT
Start: 2022-06-02 | End: 2022-08-29 | Stop reason: SDUPTHER

## 2022-06-02 RX ORDER — GABAPENTIN 600 MG/1
TABLET ORAL
Qty: 90 TABLET | Refills: 0 | Status: SHIPPED | OUTPATIENT
Start: 2022-06-02 | End: 2022-07-08 | Stop reason: SDUPTHER

## 2022-06-02 NOTE — TELEPHONE ENCOUNTER
Requested Prescriptions     Pending Prescriptions Disp Refills    tiZANidine (ZANAFLEX) 4 MG tablet 60 tablet 0     Sig: Take 1 tablet by mouth every 12 hours    gabapentin (NEURONTIN) 600 MG tablet 90 tablet 0    etodolac (LODINE) 400 MG tablet 60 tablet 2     Sig: Take 1 tablet by mouth 2 times daily       Patient last seen on:  5/31  Date of last surgery:  n/a  Date of last refill:  5/3, 3/3  Pain level:  n/a  Patient complaining of:  PT is asking for refill  Future appts: none

## 2022-06-06 NOTE — PROGRESS NOTES
[address]  [address]:[phone]     []? Certification  []? Recertification     []? Plan of Care  []? Progress Note [x]? Discharge                            To:  Dr Miko Law             From:  [facility], PT  Patient: Yvan Marcus     : 1955  Diagnosis: Lumbar OA, Radiculopathy     Date: 22  Treatment Diagnosis: LBP with functional limitations     Progress Report Period from:  4/15/2022  to 2022     Total # of Visits to Date:2   No Show: 0    Canceled Appointment: 2      OBJECTIVE: evaluative findings  Short Term Goals - Time Frame for Short term goals: 2 weeks    Goals Current/Discharge status  Met   Short term goal 1: Pt to perform SLS B LE 5 sec without UE assist to improve overall dynamic balance during gait. Single Leg Stance R Le  Single Leg Stance L Le    []? yes  [x]? no   Short term goal 2: Decrease LBP pain 50% to assist with improved functional gains. Pain Location: Back    Pain Level: 1 (6/10 with walking)    Pain Descriptors: Cramping,Sharp []? yes  [x]? no      Long Term Goals - Time Frame for Long term goals : 4-6 weeks  Goals Current/ Discharge status Met   Long term goal 1: Indep/compliance HEP for symptom management Written HEP initiated  for symptom management  Needs progression for comprehensive program development. []? yes  [x]? no   Long term goal 2: Pt demo improved overall function by reporting greater than 70% per functional survey score Exam: Mod Oswestry 29/50=42%functional    []? yes  [x]? no   Long term goal 3: Pain-free lumbar AROM to WNL allowing an increase in ADL tolerance. Lumbar: Flex 50%, Ext 25%, SB 25%    []? yes  [x]? no   Long term goal 4: Improve B LE strength 4-/5 to 4/5 to allow patient to improve functional tolerance to activities.  Strength RLE  Strength RLE: WFL  Comment: Hip flex 4-/5, Abd 4-/5, Ext 4/5, Knee Quad 4-/5, ham 3+/5, ankle 4/5  Strength LLE  Strength LLE: WFL  Comment: Hip flex 3 to 3+/5, Ext 4-/5,  Knee Quad 3/5, ham 3/5, ankle 3+/5   []? yes  [x]? no         Body structures, Functions, Activity limitations: Decreased functional mobility ,Decreased ADL status,Decreased ROM,Decreased strength,Decreased posture,Increased pain  Assessment: Decreased tolerance to therapy. Cancelled due to hand pain. On hold to receive injections. No response to continue. Pt did not return to PT after 4/25/22 unable to determine functional outcomes d/t unexpected D/C. Prognosis: Good     Special Tests: SLUMP(-), SLR(-), CARLA(tightness), ELY's(-),  Scour(-), distraction (no change to min L low back ),  compression(-)     PT Education: Goals;PT Role;Plan of Care;Home Exercise Program     PLAN: [x]? DC PT  Precautions:         Spinal Precautions: No Lifting  Spinal Precautions: 10# restriction                                        Patient Status:[]? Continue/ Initiate plan of Care                          [x]? Discharge PT. Recommend pt continue with HEP. []? Additional visits requested, Please re-certify for additional visits:                                                       Signature: Electronically signed by Vincent Blanco PT on 6/6/2022 at 5:11 PM          If you have any questions or concerns, please don't hesitate to call.   Thank you for your referral.     I have reviewed this plan of care and certify a need for medically necessary rehabilitation services.     Physician Signature:__________________________________________________________  Date:  Please sign and return

## 2022-06-23 ENCOUNTER — OFFICE VISIT (OUTPATIENT)
Dept: SPORTS MEDICINE | Age: 67
End: 2022-06-23
Payer: COMMERCIAL

## 2022-06-23 VITALS — WEIGHT: 246 LBS | BODY MASS INDEX: 45.27 KG/M2 | TEMPERATURE: 97.1 F | HEIGHT: 62 IN

## 2022-06-23 DIAGNOSIS — M51.16 HERNIATION OF LUMBAR INTERVERTEBRAL DISC WITH RADICULOPATHY: Primary | ICD-10-CM

## 2022-06-23 DIAGNOSIS — M65.4 DE QUERVAIN'S TENOSYNOVITIS, RIGHT: ICD-10-CM

## 2022-06-23 PROCEDURE — 1036F TOBACCO NON-USER: CPT | Performed by: FAMILY MEDICINE

## 2022-06-23 PROCEDURE — G8417 CALC BMI ABV UP PARAM F/U: HCPCS | Performed by: FAMILY MEDICINE

## 2022-06-23 PROCEDURE — 76942 ECHO GUIDE FOR BIOPSY: CPT | Performed by: FAMILY MEDICINE

## 2022-06-23 PROCEDURE — 20551 NJX 1 TENDON ORIGIN/INSJ: CPT | Performed by: FAMILY MEDICINE

## 2022-06-23 PROCEDURE — 99213 OFFICE O/P EST LOW 20 MIN: CPT | Performed by: FAMILY MEDICINE

## 2022-06-23 PROCEDURE — G8427 DOCREV CUR MEDS BY ELIG CLIN: HCPCS | Performed by: FAMILY MEDICINE

## 2022-06-23 PROCEDURE — 3017F COLORECTAL CA SCREEN DOC REV: CPT | Performed by: FAMILY MEDICINE

## 2022-06-23 RX ORDER — BETAMETHASONE SODIUM PHOSPHATE AND BETAMETHASONE ACETATE 3; 3 MG/ML; MG/ML
6 INJECTION, SUSPENSION INTRA-ARTICULAR; INTRALESIONAL; INTRAMUSCULAR; SOFT TISSUE ONCE
Status: COMPLETED | OUTPATIENT
Start: 2022-06-23 | End: 2022-06-23

## 2022-06-23 RX ORDER — LIDOCAINE HYDROCHLORIDE 10 MG/ML
2 INJECTION, SOLUTION INFILTRATION; PERINEURAL ONCE
Status: COMPLETED | OUTPATIENT
Start: 2022-06-23 | End: 2022-06-23

## 2022-06-23 RX ADMIN — BETAMETHASONE SODIUM PHOSPHATE AND BETAMETHASONE ACETATE 6 MG: 3; 3 INJECTION, SUSPENSION INTRA-ARTICULAR; INTRALESIONAL; INTRAMUSCULAR; SOFT TISSUE at 14:49

## 2022-06-23 RX ADMIN — LIDOCAINE HYDROCHLORIDE 2 ML: 10 INJECTION, SOLUTION INFILTRATION; PERINEURAL at 14:50

## 2022-06-23 ASSESSMENT — ENCOUNTER SYMPTOMS
BACK PAIN: 0
SHORTNESS OF BREATH: 0
NAUSEA: 0
CONSTIPATION: 0
DIARRHEA: 0

## 2022-06-23 NOTE — PROGRESS NOTES
[facility]) Physicians  Neurosurgery and Pain [facility] [place]  [address], [address] [facility] , [address] [facility], [place] [address]: [phone]  F: [phone]      Alejandra Sousa  ()    2022    Subjective:     Alejandra Sousa is 61 y.o. female who complains today of:    Chief Complaint   Patient presents with    Follow-up     Wrist/Hip Pain    Lower Back Pain       HPI     Patient comes in stating that her wrist is still bothering her especially when she tries to grab things she is also continue to have low back pain despite physical therapy and has noticed numbness and tingling down both her legs the point where she had to sit down because she was worried she is going to fall  Allergies:  Latex; 2,4-d dimethylamine (amisol); Mcdonald-2 inhibitors; Iodides; Nalidixic acid; Nsaids;  Shellfish allergy; and Statins    Past Medical History:   Diagnosis Date    Cancer ([address]     SKIN    Chronic pain     COPD (chronic obstructive pulmonary disease) (HCC)     Depression     Depression     Diabetes mellitus (HCC)     Hypertension     Neuropathy     Osteoarthritis     Sleep apnea     Urinary incontinence      Past Surgical History:   Procedure Laterality Date    JOINT REPLACEMENT      SPINE SURGERY       Family History   Problem Relation Age of Onset    Arthritis Mother     Hypertension Mother     Arthritis Father      Social History     Socioeconomic History    Marital status:      Spouse name: Not on file    Number of children: Not on file    Years of education: Not on file    Highest education level: Not on file   Occupational History    Not on file   Tobacco Use    Smoking status: Former Smoker     Packs/day: 1.00     Years: 15.00     Pack years: 15.00     Types: Cigarettes     Quit date: 6/15/2011     Years since quittin.0    Smokeless tobacco: Never Used   Substance and Sexual Activity    Alcohol use: Never    Drug use: Never    Sexual activity: Not on file   Other Topics Concern    Not on file   Social History Narrative    Not on file     Social Determinants of Health     Financial Resource Strain:     Difficulty of Paying Living Expenses: Not on file   Food Insecurity:     Worried About [address] in the Last Year: Not on file    Dev of Food in the Last Year: Not on file   Transportation Needs:     Lack of Transportation (Medical): Not on file    Lack of Transportation (Non-Medical):  Not on file   Physical Activity:     Days of Exercise per Week: Not on file    Minutes of Exercise per Session: Not on file   Stress:     Feeling of Stress : Not on file   Social Connections:     Frequency of Communication with Friends and Family: Not on file    Frequency of Social Gatherings with Friends and Family: Not on file    Attends Yazidi Services: Not on file    Active Member of [address] [place] [address] or Organizations: Not on file    Attends Club or Organization Meetings: Not on file    Marital Status: Not on file   Intimate Partner Violence:     Fear of Current or Ex-Partner: Not on file    Emotionally Abused: Not on file    Physically Abused: Not on file    Sexually Abused: Not on file   Housing Stability:     Unable to Pay for Housing in the Last Year: Not on file    Number of [place] in the Last Year: Not on file    Unstable Housing in the Last Year: Not on file       Current Outpatient Medications on File Prior to Visit   Medication Sig Dispense Refill    tiZANidine (ZANAFLEX) 4 MG tablet Take 1 tablet by mouth every 12 hours 60 tablet 0    gabapentin (NEURONTIN) 600 MG tablet TAKE 1 TABLET BY MOUTH THREE TIMES DAILY 90 tablet 0    omeprazole (PRILOSEC) 40 MG delayed release capsule TAKE 1 CAPSULE BY MOUTH DAILY 90 capsule 0    venlafaxine (EFFEXOR XR) 150 MG extended release capsule Take 1 capsule by mouth daily 30 capsule 2    HYDROcodone-acetaminophen (VICODIN) 5-300 MG TABS       insulin glargine (LANTUS;BASAGLAR) 100 UNIT/ML injection pen Inject into the skin      insulin lispro, 1 Unit Dial, 100 UNIT/ML SOPN Inject into the skin      losartan (COZAAR) 50 MG tablet Take 50 mg by mouth 2 times daily       SITagliptin (JANUVIA) 100 MG tablet       etodolac (LODINE) 400 MG tablet Take 1 tablet by mouth 2 times daily 60 tablet 2    bumetanide (BUMEX) 1 MG tablet Take 1 mg by mouth daily      dilTIAZem (CARDIZEM CD) 240 MG extended release capsule       lidocaine (LIDODERM) 5 % Place 1 patch onto the skin daily (Patient not taking: Reported on 4/26/2022) 30 patch 1     Current Facility-Administered Medications on File Prior to Visit   Medication Dose Route Frequency Provider Last Rate Last Admin    iopamidol (ISOVUE-300) 61 % injection 0.5 mL  0.5 mL Other ONCE PRN Reagan Deal MD             Review of Systems   Constitutional: Negative for fever. HENT: Negative for hearing loss. Respiratory: Negative for shortness of breath. Gastrointestinal: Negative for constipation, diarrhea and nausea. Genitourinary: Negative for difficulty urinating. Musculoskeletal: Negative for back pain and neck pain. Skin: Negative for rash. Neurological: Negative for headaches. Hematological: Does not bruise/bleed easily. Psychiatric/Behavioral: Negative for sleep disturbance. Objective:     Vitals:  Temp 97.1 °F (36.2 °C) (Infrared)   Ht 5' 2\" (1.575 m)   Wt 246 lb (111.6 kg)   BMI 44.99 kg/m²        Physical Exam  Vitals reviewed. Constitutional:       Appearance: Normal appearance. Skin:     General: Skin is warm and dry. Neurological:      Mental Status: She is alert.          Ortho Exam   Examination lumbar spine revealed the neurovascular muscular status to be within normal limits positive tension signs on the left patient flexed 30 degrees extended 10 degrees without pain palpable tenderness over the left PSIS and left sciatic notch  Examination of the right wrist revealed neurovascular muscular status to be intact tenderness mostly over the first dorsal compartment with a positive Finkelstein's tenderness over the carpal bones no pain with range of motion in the wrist    Assessment:      Diagnosis Orders   1. Herniation of lumbar intervertebral disc with radiculopathy     2. De Quervain's tenosynovitis, right         Plan:   Regarding the patient's back she is failed conservative care so we will get an MRI of the lumbar spine to better delineate the pathology see her back with MRI results  Regarding the wrist we did inject it today with relief of symptoms see the patient back with MRI results    US TENDON INJECTION  Dx: de quervains right wrist    Before injection risks/benefits of a cortisone injection including infection, local skin irritation, skin atrophy, calcification, continued pain/discomfort, elevated blood sugar, burning, failure to relieve pain, possible late infection were discussed with patient. Post-procedure discomfort can be alleviated with additional medications/ice/elevation/rest over the first 24 hours as recommended. Patient verbalized understanding and wanted to proceed with planned procedure. After informed consent was provided and allergies verified, the patient was positioned appropriately on hospital bed. The tendons of the 1st dorsal comprtment was identified using US. The area was prepped and draped with betadine to provide sterile environment. US was used to guide the needle peritendonous, and then was injected with 1cc celestone and 2cc lidocaine with relief of symptoms. Pt left stable, is to ice and rest for 2 days then slowly return to activities. US guided images of procedure were saved  . No orders of the defined types were placed in this encounter. No orders of the defined types were placed in this encounter. Follow up:  Return for mri results.     JAVI SEYMOUR DO

## 2022-06-27 ENCOUNTER — TELEPHONE (OUTPATIENT)
Dept: SPORTS MEDICINE | Age: 67
End: 2022-06-27

## 2022-06-27 NOTE — TELEPHONE ENCOUNTER
MRI Lumbar w/out contrast order along w/auth letter faxed to [facility] ([phone]). They will call patient to schedule. Authorization done for magnetU, they can schedule patient at [employer] (same Tax ID).       Sheri Coronado #[id number]  6- to 8-

## 2022-06-28 DIAGNOSIS — M51.34 DEGENERATION OF THORACIC INTERVERTEBRAL DISC: ICD-10-CM

## 2022-06-28 DIAGNOSIS — M50.322 DEGENERATION OF C5-C6 INTERVERTEBRAL DISC: ICD-10-CM

## 2022-06-28 DIAGNOSIS — M50.322 DEGENERATION OF C5-C6 INTERVERTEBRAL DISC: Primary | ICD-10-CM

## 2022-06-28 RX ORDER — TIZANIDINE 4 MG/1
4 TABLET ORAL EVERY 12 HOURS
Qty: 60 TABLET | Refills: 0 | OUTPATIENT
Start: 2022-06-28

## 2022-06-28 RX ORDER — HYDROCODONE BITARTRATE AND ACETAMINOPHEN 5; 300 MG/1; MG/1
5 TABLET ORAL 3 TIMES DAILY PRN
Qty: 15 TABLET | Refills: 0 | Status: SHIPPED | OUTPATIENT
Start: 2022-06-28 | End: 2022-07-03

## 2022-06-28 NOTE — TELEPHONE ENCOUNTER
Requested Prescriptions     Pending Prescriptions Disp Refills    HYDROcodone-acetaminophen (VICODIN) 5-300 MG TABS 112 tablet        Patient last seen on: 5/12/22  Date of last surgery:  N/A  Pain level:  N/A  Patient complaining of:  N/A  Future appts: None    Patient wanted a partial refill for after injection.

## 2022-07-02 DIAGNOSIS — M50.322 DEGENERATION OF C5-C6 INTERVERTEBRAL DISC: ICD-10-CM

## 2022-07-02 DIAGNOSIS — M51.34 DEGENERATION OF THORACIC INTERVERTEBRAL DISC: ICD-10-CM

## 2022-07-05 DIAGNOSIS — M50.322 DEGENERATION OF C5-C6 INTERVERTEBRAL DISC: ICD-10-CM

## 2022-07-05 DIAGNOSIS — M51.34 DEGENERATION OF THORACIC INTERVERTEBRAL DISC: ICD-10-CM

## 2022-07-05 RX ORDER — OMEPRAZOLE 40 MG/1
40 CAPSULE, DELAYED RELEASE ORAL DAILY
Qty: 90 CAPSULE | Refills: 0 | OUTPATIENT
Start: 2022-07-05

## 2022-07-05 RX ORDER — GABAPENTIN 600 MG/1
TABLET ORAL
Qty: 90 TABLET | Refills: 0 | OUTPATIENT
Start: 2022-07-05 | End: 2022-08-05

## 2022-07-05 NOTE — TELEPHONE ENCOUNTER
Requested Prescriptions     Pending Prescriptions Disp Refills    gabapentin (NEURONTIN) 600 MG tablet 90 tablet 0     Sig: TAKE 1 TABLET BY MOUTH THREE TIMES DAILY       Patient last seen on:  6/23/22  Date of last surgery:  n/a  Date of last refill:  6/2/22  Pain level:  n/a  Patient complaining of:  Pt requesting refill   Future appts: none

## 2022-07-06 NOTE — TELEPHONE ENCOUNTER
Last script was sent in on 6/02/22 for 90 tablets and patient takes one tablet three times a day. Patient does not have any refills. Will Dr. Ana Castro send in a refill of gabapentin?

## 2022-07-07 ENCOUNTER — TELEPHONE (OUTPATIENT)
Dept: SPORTS MEDICINE | Age: 67
End: 2022-07-07

## 2022-07-07 DIAGNOSIS — M50.223 HERNIATED NUCLEUS PULPOSUS, C6-7: ICD-10-CM

## 2022-07-07 DIAGNOSIS — M50.222 HERNIATED NUCLEUS PULPOSUS, C5-6: Primary | ICD-10-CM

## 2022-07-07 RX ORDER — TIZANIDINE 4 MG/1
4 TABLET ORAL EVERY 12 HOURS
Qty: 60 TABLET | Refills: 0 | Status: SHIPPED | OUTPATIENT
Start: 2022-07-07 | End: 2022-08-05 | Stop reason: SDUPTHER

## 2022-07-07 RX ORDER — GABAPENTIN 600 MG/1
TABLET ORAL
Qty: 90 TABLET | Refills: 0 | OUTPATIENT
Start: 2022-07-07 | End: 2022-10-06

## 2022-07-07 NOTE — TELEPHONE ENCOUNTER
I would suggest calling the surgeon because this seems to be getting worse and I seen her for this before and at all of the time to do anything different

## 2022-07-07 NOTE — TELEPHONE ENCOUNTER
Patient states she is experiencing \"excrutiating numbness\" down both arms. Patient states she experiences loss of her ability to  while driving frequently. The pain in her neck is significant. Patient asks Dr. Moon Mullen to advise if she should come in to see him or if she should call the surgeon that did her neck surgery previously.

## 2022-07-07 NOTE — TELEPHONE ENCOUNTER
Patient states since it is workers comp Dr. Ricky Billingsley would need to make a new referral. Neurosurgeon is Dr. Dorys Ferrer at [facility].

## 2022-07-08 DIAGNOSIS — M50.322 DEGENERATION OF C5-C6 INTERVERTEBRAL DISC: ICD-10-CM

## 2022-07-08 RX ORDER — GABAPENTIN 600 MG/1
TABLET ORAL
Qty: 90 TABLET | Refills: 2 | Status: SHIPPED | OUTPATIENT
Start: 2022-07-08 | End: 2022-10-03 | Stop reason: SDUPTHER

## 2022-07-08 NOTE — TELEPHONE ENCOUNTER
Requested Prescriptions     Pending Prescriptions Disp Refills    gabapentin (NEURONTIN) 600 MG tablet 90 tablet 0     Sig: TAKE 1 TABLET BY MOUTH THREE TIMES DAILY       Patient last seen on:  6/23/22  Date of last surgery:  N/A  Date of last refill:  6/2/22  Pain level:  N/A  Patient complaining of:  N/A  Future appts: NONE

## 2022-07-19 DIAGNOSIS — M51.34 DEGENERATION OF THORACIC INTERVERTEBRAL DISC: ICD-10-CM

## 2022-07-19 DIAGNOSIS — M50.322 DEGENERATION OF C5-C6 INTERVERTEBRAL DISC: ICD-10-CM

## 2022-07-19 RX ORDER — OMEPRAZOLE 40 MG/1
40 CAPSULE, DELAYED RELEASE ORAL DAILY
Qty: 90 CAPSULE | Refills: 0 | Status: SHIPPED | OUTPATIENT
Start: 2022-07-19

## 2022-07-19 NOTE — TELEPHONE ENCOUNTER
Requested Prescriptions     Pending Prescriptions Disp Refills    omeprazole (PRILOSEC) 40 MG delayed release capsule 90 capsule 0     Sig: Take 1 capsule by mouth in the morning.        Patient last seen on:  6/23/22  Date of last surgery:  n/a  Date of last refill:  4/04/22  Pain level:  n/a  Patient complaining of:  Pt asks for rx   Future appts: none

## 2022-07-25 ENCOUNTER — TELEPHONE (OUTPATIENT)
Dept: SPORTS MEDICINE | Age: 67
End: 2022-07-25

## 2022-07-26 ENCOUNTER — TELEPHONE (OUTPATIENT)
Dept: PAIN MANAGEMENT | Age: 67
End: 2022-07-26

## 2022-07-26 DIAGNOSIS — M51.16 HERNIATION OF LUMBAR INTERVERTEBRAL DISC WITH RADICULOPATHY: ICD-10-CM

## 2022-07-26 NOTE — TELEPHONE ENCOUNTER
The patient has multiple levels of pinched nerves due to disks and arthritic processes  She definitely needs to be seen in the office

## 2022-07-28 ENCOUNTER — OFFICE VISIT (OUTPATIENT)
Dept: SPORTS MEDICINE | Age: 67
End: 2022-07-28
Payer: COMMERCIAL

## 2022-07-28 VITALS
DIASTOLIC BLOOD PRESSURE: 82 MMHG | HEART RATE: 89 BPM | SYSTOLIC BLOOD PRESSURE: 139 MMHG | TEMPERATURE: 96.4 F | HEIGHT: 62 IN | WEIGHT: 246 LBS | BODY MASS INDEX: 45.27 KG/M2

## 2022-07-28 DIAGNOSIS — M47.816 FACET ARTHRITIS OF LUMBAR REGION: ICD-10-CM

## 2022-07-28 DIAGNOSIS — M51.16 HERNIATION OF LUMBAR INTERVERTEBRAL DISC WITH RADICULOPATHY: Primary | ICD-10-CM

## 2022-07-28 PROCEDURE — 99213 OFFICE O/P EST LOW 20 MIN: CPT | Performed by: FAMILY MEDICINE

## 2022-07-28 PROCEDURE — G8427 DOCREV CUR MEDS BY ELIG CLIN: HCPCS | Performed by: FAMILY MEDICINE

## 2022-07-28 PROCEDURE — 3017F COLORECTAL CA SCREEN DOC REV: CPT | Performed by: FAMILY MEDICINE

## 2022-07-28 PROCEDURE — G8417 CALC BMI ABV UP PARAM F/U: HCPCS | Performed by: FAMILY MEDICINE

## 2022-07-28 PROCEDURE — 1036F TOBACCO NON-USER: CPT | Performed by: FAMILY MEDICINE

## 2022-07-28 ASSESSMENT — ENCOUNTER SYMPTOMS
CONSTIPATION: 0
NAUSEA: 0
DIARRHEA: 0
SHORTNESS OF BREATH: 0
BACK PAIN: 0

## 2022-07-28 NOTE — PROGRESS NOTES
[facility]) Physicians  Neurosurgery and Pain [facility]  [address], [address] [facility] [address] [address]: [phone]  F: [phone]      Romina Mckeon  ()    2022    Subjective:     Romina Mckeon is 61 y.o. female who complains today of:    Chief Complaint   Patient presents with    Follow-up     Cervical MRI Results       HPI     Patient returns that her back is still bothering her a lot so she can stand on it for too long for starts having pain  Allergies:  Latex; 2,4-d dimethylamine (amisol); Mcdonald-2 inhibitors; Iodides; Nalidixic acid; Nsaids;  Shellfish allergy; and Statins    Past Medical History:   Diagnosis Date    Cancer ([address]     SKIN    Chronic pain     COPD (chronic obstructive pulmonary disease) (HCC)     Depression     Depression     Diabetes mellitus (HCC)     Hypertension     Neuropathy     Osteoarthritis     Sleep apnea     Urinary incontinence      Past Surgical History:   Procedure Laterality Date    JOINT REPLACEMENT      SPINE SURGERY       Family History   Problem Relation Age of Onset    Arthritis Mother     Hypertension Mother     Arthritis Father      Social History     Socioeconomic History    Marital status:      Spouse name: Not on file    Number of children: Not on file    Years of education: Not on file    Highest education level: Not on file   Occupational History    Not on file   Tobacco Use    Smoking status: Former     Packs/day: 1.00     Years: 15.00     Pack years: 15.00     Types: Cigarettes     Quit date: 6/15/2011     Years since quittin.1    Smokeless tobacco: Never   Substance and Sexual Activity    Alcohol use: Never    Drug use: Never    Sexual activity: Not on file   Other Topics Concern    Not on file   Social History Narrative    Not on file     Social Determinants of Health     Financial Resource Strain: Not on file   Food Insecurity: Not on file   Transportation Needs: Not on file   Physical Activity: Not on file   Stress: Not on file   Social Connections: Not on file   Intimate Partner Violence: Not on file   Housing Stability: Not on file       Current Outpatient Medications on File Prior to Visit   Medication Sig Dispense Refill    omeprazole (PRILOSEC) 40 MG delayed release capsule Take 1 capsule by mouth in the morning. 90 capsule 0    gabapentin (NEURONTIN) 600 MG tablet TAKE 1 TABLET BY MOUTH THREE TIMES DAILY 90 tablet 2    tiZANidine (ZANAFLEX) 4 MG tablet Take 1 tablet by mouth every 12 hours 60 tablet 0    etodolac (LODINE) 400 MG tablet Take 1 tablet by mouth 2 times daily 60 tablet 2    venlafaxine (EFFEXOR XR) 150 MG extended release capsule Take 1 capsule by mouth daily 30 capsule 2    bumetanide (BUMEX) 1 MG tablet Take 1 mg by mouth daily      dilTIAZem (CARDIZEM CD) 240 MG extended release capsule       insulin glargine (LANTUS;BASAGLAR) 100 UNIT/ML injection pen Inject into the skin      insulin lispro, 1 Unit Dial, 100 UNIT/ML SOPN Inject into the skin      losartan (COZAAR) 50 MG tablet Take 50 mg by mouth 2 times daily       SITagliptin (JANUVIA) 100 MG tablet       lidocaine (LIDODERM) 5 % Place 1 patch onto the skin daily (Patient not taking: No sig reported) 30 patch 1     Current Facility-Administered Medications on File Prior to Visit   Medication Dose Route Frequency Provider Last Rate Last Admin    iopamidol (ISOVUE-300) 61 % injection 0.5 mL  0.5 mL Other ONCE PRN Peggy Gonzáles MD             Review of Systems   Constitutional:  Negative for fever. HENT:  Negative for hearing loss. Respiratory:  Negative for shortness of breath. Gastrointestinal:  Negative for constipation, diarrhea and nausea. Genitourinary:  Negative for difficulty urinating. Musculoskeletal:  Negative for back pain and neck pain. Skin:  Negative for rash. Neurological:  Negative for headaches. Hematological:  Does not bruise/bleed easily.    Psychiatric/Behavioral:  Negative for sleep disturbance. Objective:     Vitals:  /82 (Site: Left Upper Arm, Position: Sitting, Cuff Size: Medium Adult)   Pulse 89   Temp (!) 96.4 °F (35.8 °C) (Infrared)   Ht 5' 2\" (1.575 m)   Wt 246 lb (111.6 kg)   BMI 44.99 kg/m² Pain Score:   7      Physical Exam  Vitals reviewed. Constitutional:       Appearance: Normal appearance. Skin:     General: Skin is warm and dry. Neurological:      Mental Status: She is alert. Ortho Exam   Examination of the lumbar spine reveals the neurovascular muscular status to be intact equivocal tension signs bilaterally patient has near full flexion extension limited to 10 degrees palpable tenderness around L5-S1    I reviewed the recent MRI of the lumbar spine    Assessment:      Diagnosis Orders   1. Herniation of lumbar intervertebral disc with radiculopathy        2. Facet arthritis of lumbar region            Plan:   Patient's MRI shows multiple issues however in today's exam the facets in the lower lumbar spine seem to be the most issue therefore I want to send her for evaluation consideration for blocks  To return as needed       No orders of the defined types were placed in this encounter. No orders of the defined types were placed in this encounter. Follow up:  Return if symptoms worsen or fail to improve.     JAVI SEYMOUR, DO

## 2022-07-29 DIAGNOSIS — M51.34 DEGENERATION OF THORACIC INTERVERTEBRAL DISC: ICD-10-CM

## 2022-07-29 DIAGNOSIS — M50.322 DEGENERATION OF C5-C6 INTERVERTEBRAL DISC: ICD-10-CM

## 2022-07-29 RX ORDER — VENLAFAXINE HYDROCHLORIDE 150 MG/1
150 CAPSULE, EXTENDED RELEASE ORAL DAILY
Qty: 30 CAPSULE | Refills: 2 | OUTPATIENT
Start: 2022-07-29

## 2022-08-01 ENCOUNTER — OFFICE VISIT (OUTPATIENT)
Dept: PAIN MANAGEMENT | Age: 67
End: 2022-08-01
Payer: COMMERCIAL

## 2022-08-01 VITALS
TEMPERATURE: 96.9 F | WEIGHT: 244 LBS | BODY MASS INDEX: 44.9 KG/M2 | DIASTOLIC BLOOD PRESSURE: 76 MMHG | SYSTOLIC BLOOD PRESSURE: 136 MMHG | HEIGHT: 62 IN

## 2022-08-01 DIAGNOSIS — M48.062 SPINAL STENOSIS OF LUMBAR REGION WITH NEUROGENIC CLAUDICATION: ICD-10-CM

## 2022-08-01 DIAGNOSIS — M79.604 BILATERAL LEG PAIN: ICD-10-CM

## 2022-08-01 DIAGNOSIS — M43.10 RETROLISTHESIS OF VERTEBRAE: ICD-10-CM

## 2022-08-01 DIAGNOSIS — M47.812 CERVICAL SPONDYLOSIS WITHOUT MYELOPATHY: ICD-10-CM

## 2022-08-01 DIAGNOSIS — M47.817 LUMBOSACRAL SPONDYLOSIS WITHOUT MYELOPATHY: Primary | ICD-10-CM

## 2022-08-01 DIAGNOSIS — G56.03 BILATERAL CARPAL TUNNEL SYNDROME: ICD-10-CM

## 2022-08-01 DIAGNOSIS — M79.605 BILATERAL LEG PAIN: ICD-10-CM

## 2022-08-01 PROCEDURE — G8427 DOCREV CUR MEDS BY ELIG CLIN: HCPCS | Performed by: PHYSICAL MEDICINE & REHABILITATION

## 2022-08-01 PROCEDURE — 3017F COLORECTAL CA SCREEN DOC REV: CPT | Performed by: PHYSICAL MEDICINE & REHABILITATION

## 2022-08-01 PROCEDURE — G8417 CALC BMI ABV UP PARAM F/U: HCPCS | Performed by: PHYSICAL MEDICINE & REHABILITATION

## 2022-08-01 PROCEDURE — 99214 OFFICE O/P EST MOD 30 MIN: CPT | Performed by: PHYSICAL MEDICINE & REHABILITATION

## 2022-08-01 PROCEDURE — 1036F TOBACCO NON-USER: CPT | Performed by: PHYSICAL MEDICINE & REHABILITATION

## 2022-08-01 ASSESSMENT — ENCOUNTER SYMPTOMS
BACK PAIN: 1
SHORTNESS OF BREATH: 0
DIARRHEA: 0
NAUSEA: 0
CONSTIPATION: 0

## 2022-08-01 NOTE — PROGRESS NOTES
dysfunction, or falls. Allergies:  Latex; 2,4-d dimethylamine (amisol); Mcdonald-2 inhibitors; Iodides; Nalidixic acid; Nsaids; Shellfish allergy; and Statins    Past Medical History:   Diagnosis Date    Cancer ([facility] [address]     SKIN    Chronic pain     COPD (chronic obstructive pulmonary disease) (HCC)     Depression     Depression     Diabetes mellitus (HCC)     Hypertension     Neuropathy     Osteoarthritis     Sleep apnea     Urinary incontinence      Past Surgical History:   Procedure Laterality Date    JOINT REPLACEMENT      SPINE SURGERY       Family History   Problem Relation Age of Onset    Arthritis Mother     Hypertension Mother     Arthritis Father      Social History     Socioeconomic History    Marital status:      Spouse name: Not on file    Number of children: Not on file    Years of education: Not on file    Highest education level: Not on file   Occupational History    Not on file   Tobacco Use    Smoking status: Former     Packs/day: 1.00     Years: 15.00     Pack years: 15.00     Types: Cigarettes     Quit date: 6/15/2011     Years since quittin.1    Smokeless tobacco: Never   Substance and Sexual Activity    Alcohol use: Never    Drug use: Never    Sexual activity: Not on file   Other Topics Concern    Not on file   Social History Narrative    Not on file     Social Determinants of Health     Financial Resource Strain: Not on file   Food Insecurity: Not on file   Transportation Needs: Not on file   Physical Activity: Not on file   Stress: Not on file   Social Connections: Not on file   Intimate Partner Violence: Not on file   Housing Stability: Not on file       Current Outpatient Medications on File Prior to Visit   Medication Sig Dispense Refill    omeprazole (PRILOSEC) 40 MG delayed release capsule Take 1 capsule by mouth in the morning.  90 capsule 0    gabapentin (NEURONTIN) 600 MG tablet TAKE 1 TABLET BY MOUTH THREE TIMES DAILY 90 tablet 2    tiZANidine (ZANAFLEX) 4 MG tablet Take 1 tablet by mouth every 12 hours 60 tablet 0    etodolac (LODINE) 400 MG tablet Take 1 tablet by mouth 2 times daily 60 tablet 2    venlafaxine (EFFEXOR XR) 150 MG extended release capsule Take 1 capsule by mouth daily 30 capsule 2    bumetanide (BUMEX) 1 MG tablet Take 1 mg by mouth daily      dilTIAZem (CARDIZEM CD) 240 MG extended release capsule       insulin glargine (LANTUS;BASAGLAR) 100 UNIT/ML injection pen Inject into the skin      insulin lispro, 1 Unit Dial, 100 UNIT/ML SOPN Inject into the skin      losartan (COZAAR) 50 MG tablet Take 50 mg by mouth 2 times daily       SITagliptin (JANUVIA) 100 MG tablet       lidocaine (LIDODERM) 5 % Place 1 patch onto the skin daily (Patient not taking: No sig reported) 30 patch 1     Current Facility-Administered Medications on File Prior to Visit   Medication Dose Route Frequency Provider Last Rate Last Admin    iopamidol (ISOVUE-300) 61 % injection 0.5 mL  0.5 mL Other ONCE PRN Bhargav Armstrong MD           Review of Systems   Constitutional:  Negative for fever. HENT:  Negative for hearing loss. Respiratory:  Negative for shortness of breath. Gastrointestinal:  Negative for constipation, diarrhea and nausea. Genitourinary:  Negative for difficulty urinating. Musculoskeletal:  Positive for back pain and neck pain. Skin:  Negative for rash. Neurological:  Negative for headaches. Hematological:  Does not bruise/bleed easily. Psychiatric/Behavioral:  Negative for sleep disturbance. Objective:     Vitals:  /76   Temp 96.9 °F (36.1 °C)   Ht 5' 2\" (1.575 m)   Wt 244 lb (110.7 kg)   BMI 44.63 kg/m² Pain Score:   5      Exam performed under Coronavirus precautions  Gen: No acute distress  Neck: Grossly symmetric without any significant thyromegaly or masses appreciated. Eyes: No scleral icterus or lid lag appreciated bilaterally. Irises without gross defects bilaterally. HEENT: Hearing impaired bilaterally.  Normocephalic, external ears and narrowing. L3/4 up to severe canal stenosis, up to moderate bilateral foramen narrowing. L4/5 up to moderate canal stenosis, moderate right and up to moderate left foramen narrowing. L5/S1 normal canal stenosis, severe bilateral foramen narrowing. XR LS Spine 3/29/22: no fracture, degenerative disc disease and facet arthropathy. MRI C Spine 3/4/22: extensive postoperative changes as detailed above. canal stenosis is worst C3/4 moderate, moderate foraminal stenosis Bilateral C3/4 and left C5/6. C5/6 normal canal due to discectomy and fusion, moderate left up to moderate right foramen stenosis. C6/7 mild canal stenosis, fused endplates, mild bilateral foramen narrow. C7/T1 normal canal and foramen. XR C Spine 9/30/21: anterior fusion C5-7. Anterolisthesis C2/3, degenerative disc disease and C3/4, vertebral degenerative changes multiple levels. EMG B UE 1/31/20: This study is abnormal. There is current electrodiagnostic evidence for bilateral median mononeuropathy at the wrist consistent with a clinical diagnosis of Bilateral carpal tunnel syndrome. This is moderate in severity by electrical criteria bilaterally. The left is slightly worse than the right. There is sensory and motor nerve involvement bilaterally. There is no active denervation on electromyography bilaterally. There is no current evidence for an active cervical motor radiculopathy. There are mild chronic changes in a C7 distribution bilaterally that are likely related to her cervical spine pathology prior to cervical spine surgery. There is no current evidence for a generalized large fiber sensorimotor peripheral polyneuropathy. Labs 4/8/21:   Platelet 902 normal  Creatinine 0.67 normal     Opioid risk tool score 4, moderate risk  Assessment:      Diagnosis Orders   1. Lumbosacral spondylosis without myelopathy  XR LUMBAR SPINE FLEXION AND EXTENSION ONLY    MD INJ DX/THER AGNT PARAVERT FACET JOINT, LUMBAR/SAC, 1ST LEVEL      2.  Cervical spondylosis without myelopathy        3. Bilateral carpal tunnel syndrome        4. Retrolisthesis of vertebrae  XR LUMBAR SPINE FLEXION AND EXTENSION ONLY    [facility]      5. Bilateral leg pain  EMG      6. Spinal stenosis of lumbar region with neurogenic claudication  [facility]        +squamous cell carcinoma of skin, diabetes mellitus, fatty liver, tumor base of right lung, Depression   Plan:          No orders of the defined types were placed in this encounter. Orders Placed This Encounter   Procedures    XR LUMBAR SPINE FLEXION AND EXTENSION ONLY     Standing Status:   Future     Standing Expiration Date:   10/30/2022     Order Specific Question:   Reason for exam:     Answer:   Please evaluate for the presence of lumbar facet arthropathy, please evaluate for instability    [facility]     Referral Priority:   Routine     Referral Type:   Eval and Treat     Referral Reason:   Specialty Services Required     Requested Specialty:   Physical Therapist     Number of Visits Requested:   1    EMG     Standing Status:   Future     Standing Expiration Date:   8/1/2023     Order Specific Question:   Which body part? Answer:   Bilateral lower extremities    MD INJ DX/THER AGNT PARAVERT FACET JOINT, LUMBAR/SAC, 1ST LEVEL     Bilateral Lumbar L3/4 L4/5 L5/S1 facet corticosteroid joint inj under XR with Dr Melanie Valenzuela. +Asa 81 mg ok, no antibiotics, +diabetes mellitus, no osteoporosis, no bleeding or platelet dysfunction, IV Dye okay (she denies any IV dye allergies despite listed in her allergy list), allergies reviewed, 15 minute procedure.  Prone position     Standing Status:   Future     Standing Expiration Date:   10/30/2022       -PT for spinal stenosis lumbar  -Reviewed XR and MRI LS Spine above, all questions answered  -XR LS Spine flex ext eval instability   -EMG B LE eval bilateral leg pain  -Continue Tizanidine 4 mg BID, Gabapentin 600 mg TID, Venalafxine 150 mg, Etodolac 400 mg BID from other providers  -Consider Cervical MBB/Facet  -Bilateral Lumbar L3/4 L4/5 L5/S1 facet corticosteroid joint inj under XR with Dr Isra Ramon. +Asa 81 mg ok, no antibiotics, +diabetes mellitus, no osteoporosis, no bleeding or platelet dysfunction, IV Dye okay (she denies any IV dye allergies despite listed in her allergy list), allergies reviewed, 15 minute procedure. Prone position Consider 6 mg betamethasone. (B L L2/3/4/5 MBB)   -Discussed her spinal canal narrowing. Advised her to avoid trauma, accidents, and sudden movements of the spine and to protect the spine at all times. Advised against contact sports, risky behavior, extreme activities such as bungee jumping and skydiving, and activities which may predispose her to falls or other accidents. Advised her that if she develops any new numbness, tingling, weakness, bowel or bladder dysfunction, or any other concerning symptoms, she should call 911 or proceed to the nearest emergency department for emergent physician evaluation. Advised her of the risks of spinal canal stenosis and narrowing including worsening numbness, tingling, pain, the development of weakness and paralysis, risks of loss of use of the arms/legs, loss of control of bowel and bladder function, loss of sexual function, skin sores, sepsis, temporary and/or permanent disability, and even death. She expressed complete understanding and agreement.  -Refer to Neurosurgery for up to multilevel severe spinal canal stenosis      Controlled Substance Monitoring:    Acute and Chronic Pain Monitoring:   RX Monitoring 4/30/2022   Periodic Controlled Substance Monitoring Obtaining appropriate analgesic effect of treatment. Discussed the risks, side effects, and symptoms that would warrant urgent or emergent physician evaluation of all medications prescribed today.       Discussed the risks of the above recommended procedures including but not limited to bleeding, infection, worsened pain, damage to surrounding structures, side effects, toxicity, allergic reactions to medications used, immune and stress-response dysfunction, fat necrosis, skin pigmentation changes, blood sugar elevation, headache, vision changes, need for surgery, as well as catastrophic injury such as vision loss, paralysis, stroke, spinal cord and/or plexus infarction or injury, intrathecal injection, spinal cord puncture, arachnoiditis, discitis, bowel or bladder incontinence, ventilator dependence, loss of use of the arms and/or legs, and death. Discussed off-label use of corticosteroids and how the Food and Drug Administration (FDA) has not approved corticosteroids for epidural use. Discussed the risks, benefits, alternative procedures, and alternatives to the procedure including no procedure at all. Discussed that we cannot undo any permanent neurologic damage or change the course of any underlying disease. The patient appears to be a good candidate for the above recommended procedures, but no guarantees expressed or implied are given regarding the outcome of any procedure. After thorough discussion, patient expressed understanding and willingness to proceed. Provided education and counseling regarding the diagnosis, prognosis, and treatment options. All questions were answered. Encouraged her to follow-up with her primary care physician and/or specialists as required for her overall health and management of her comorbidities as well as any new positive symptoms mentioned in review of systems above. Care was provided within the definitions and limitations of our specialty practice. Encouraged lifestyle interventions including healthy habits, lifestyle changes, regular aerobic exercise and appropriate weight maintenance as advised by their primary care physician or cardiovascular health provider. Discussed well care and disease prevention/maintenance.  Anatomic spine model was used to illustrate pathology. All recommendations for therapy are provided to improve function with activities of daily living, decrease pain, and help develop an exercise program. All recommendations for therapeutic injections are meant to help decrease pain, improve function with activities of daily living, maintain compliance with home exercise program, improve quality of life, and decrease reliance upon oral medications. Encouraged compliance with her home exercise program. Recommended compliance with physical therapy program as outlined above. Discussed the elevated risks of excessive sedation while on pain medications. Advised her against driving or operating heavy machinery or performing any activities where she may harm herself or others while on pain medications. Particular caution was emphasized especially during dose adjustments and medication changes. Discussed the risks of temporary disability, permanent disability, morbidity, and mortality with poorly-managed or undiagnosed medical conditions and comorbidities. Emphasized the importance of timely medical evaluation and treatment as previously recommended by us or other medical professionals. Risks of not pursing these recommendations were emphasized. The patient was offered a treatment at our facility. The physician and patient have discussed in detail the risk of exposure to and/or potential harm posed by the COVID-19 virus with having office visits and procedures at this time versus the risk of delaying the visits and procedures. It is not possible to know either the risk of delaying the visits or procedure or chance of getting an infection with perfect accuracy, but a joint decision was made between the patient and the physician to proceed at this time with the scheduled visits and procedures.     Advised her that any lab testing, imaging, or other diagnostic test results are best discussed in person in the office so that we can provide a clear explanation of their significance and best treatment based upon these results. It is her responsibility to make and keep a follow up appointment to discuss these test results in person to discuss the significance of the findings and appropriate follow-up steps. She expressed complete understanding and agreement with the entire plan as outlined above. Portions of this note may have been typed, auto-populated, dictated or transcribed by voice recognition resulting in errors, omissions, or close substitutions which may be missed despite careful proofreading. Please contact the author for any questions or concerns. Follow up:  Return in about 1 month (around 9/1/2022) for reassessment of pain and symptoms, Marlene sneed/Dr Ulrich Shoulder, EMG Internal, P.T. Internal Ref.     Judy Kingston MD

## 2022-08-02 ENCOUNTER — TELEPHONE (OUTPATIENT)
Dept: PAIN MANAGEMENT | Age: 67
End: 2022-08-02

## 2022-08-02 DIAGNOSIS — M47.817 LUMBOSACRAL SPONDYLOSIS WITHOUT MYELOPATHY: ICD-10-CM

## 2022-08-02 DIAGNOSIS — M43.10 RETROLISTHESIS OF VERTEBRAE: ICD-10-CM

## 2022-08-02 NOTE — TELEPHONE ENCOUNTER
ORDER PLACED:    Date: 8/1/22  Description: BILAT L3-4,L4-5,L5-S1 FACET  Order Number: [id number]  Ordering Provider: [facility]  Performing Provider: [facility]  CPT Codes: 20765,80778,92161  ICD10 Codes: D97.904

## 2022-08-02 NOTE — TELEPHONE ENCOUNTER
LEFT MESSAGE FOR THE PATIENT TO PLEASE CONTACT THE OFFICE, WE NEED TO RESCHEDULE THE PATIENT, DR Ceasar Osei WILL BE OUT OF THE OFFICE.

## 2022-08-03 DIAGNOSIS — M51.34 DEGENERATION OF THORACIC INTERVERTEBRAL DISC: ICD-10-CM

## 2022-08-03 DIAGNOSIS — M50.322 DEGENERATION OF C5-C6 INTERVERTEBRAL DISC: ICD-10-CM

## 2022-08-03 NOTE — TELEPHONE ENCOUNTER
Requested Prescriptions     Pending Prescriptions Disp Refills    venlafaxine (EFFEXOR XR) 150 MG extended release capsule 30 capsule 2     Sig: Take 1 capsule by mouth in the morning.        Patient last seen on:  7/28/22  Date of last surgery:  n/a  Date of last refill:  3/3/22  Pain level:  n/a  Patient complaining of:  pt requesting refill  Future appts: 8/24/22 (EMG )

## 2022-08-04 DIAGNOSIS — M51.34 DEGENERATION OF THORACIC INTERVERTEBRAL DISC: ICD-10-CM

## 2022-08-04 DIAGNOSIS — M50.322 DEGENERATION OF C5-C6 INTERVERTEBRAL DISC: ICD-10-CM

## 2022-08-04 RX ORDER — TIZANIDINE 4 MG/1
4 TABLET ORAL EVERY 12 HOURS
Qty: 60 TABLET | Refills: 0 | OUTPATIENT
Start: 2022-08-04

## 2022-08-04 RX ORDER — VENLAFAXINE HYDROCHLORIDE 150 MG/1
150 CAPSULE, EXTENDED RELEASE ORAL DAILY
Qty: 30 CAPSULE | Refills: 2 | Status: SHIPPED | OUTPATIENT
Start: 2022-08-04 | End: 2022-10-27

## 2022-08-04 NOTE — TELEPHONE ENCOUNTER
Requested Prescriptions     Pending Prescriptions Disp Refills    tiZANidine (ZANAFLEX) 4 MG tablet 60 tablet 0     Sig: Take 1 tablet by mouth in the morning and 1 tablet before bedtime.        Patient last seen on:  8/1  Date of last surgery:  n/a  Date of last refill:  7/7  Pain level:  n/a  Patient complaining of:  pt is asking for refill  Future appts: 8/24 pain management

## 2022-08-05 RX ORDER — TIZANIDINE 4 MG/1
4 TABLET ORAL EVERY 12 HOURS
Qty: 60 TABLET | Refills: 0 | Status: SHIPPED | OUTPATIENT
Start: 2022-08-06 | End: 2022-08-29 | Stop reason: SDUPTHER

## 2022-08-05 NOTE — TELEPHONE ENCOUNTER
1st attempt- Called pt lvm to call back and schedule procedure      Bilateral Lumbar L3/4 L4/5 L5/S1 facet corticosteroid joint inj under XR with Dr Stewart Channel. +Asa 81 mg ok, no antibiotics, +diabetes mellitus, no osteoporosis, no bleeding or platelet dysfunction, IV Dye okay (she denies any IV dye allergies despite listed in her allergy list), allergies reviewed, 15 minute procedure.  Prone position

## 2022-08-15 ENCOUNTER — TELEPHONE (OUTPATIENT)
Dept: PAIN MANAGEMENT | Age: 67
End: 2022-08-15

## 2022-08-15 ENCOUNTER — OFFICE VISIT (OUTPATIENT)
Dept: SPORTS MEDICINE | Age: 67
End: 2022-08-15
Payer: COMMERCIAL

## 2022-08-15 VITALS
WEIGHT: 242 LBS | BODY MASS INDEX: 44.53 KG/M2 | SYSTOLIC BLOOD PRESSURE: 132 MMHG | TEMPERATURE: 97.7 F | DIASTOLIC BLOOD PRESSURE: 74 MMHG | HEIGHT: 62 IN

## 2022-08-15 DIAGNOSIS — M51.16 HERNIATION OF LUMBAR INTERVERTEBRAL DISC WITH RADICULOPATHY: Primary | ICD-10-CM

## 2022-08-15 DIAGNOSIS — M65.4 DE QUERVAIN'S TENOSYNOVITIS, RIGHT: ICD-10-CM

## 2022-08-15 DIAGNOSIS — M65.4 DE QUERVAIN'S TENOSYNOVITIS, LEFT: ICD-10-CM

## 2022-08-15 PROCEDURE — 1036F TOBACCO NON-USER: CPT | Performed by: FAMILY MEDICINE

## 2022-08-15 PROCEDURE — G8417 CALC BMI ABV UP PARAM F/U: HCPCS | Performed by: FAMILY MEDICINE

## 2022-08-15 PROCEDURE — 99213 OFFICE O/P EST LOW 20 MIN: CPT | Performed by: FAMILY MEDICINE

## 2022-08-15 PROCEDURE — G8427 DOCREV CUR MEDS BY ELIG CLIN: HCPCS | Performed by: FAMILY MEDICINE

## 2022-08-15 PROCEDURE — 3017F COLORECTAL CA SCREEN DOC REV: CPT | Performed by: FAMILY MEDICINE

## 2022-08-15 RX ORDER — PREDNISONE 10 MG/1
10 TABLET ORAL DAILY
Qty: 18 TABLET | Refills: 0 | Status: SHIPPED | OUTPATIENT
Start: 2022-08-15

## 2022-08-15 ASSESSMENT — ENCOUNTER SYMPTOMS
BACK PAIN: 0
SHORTNESS OF BREATH: 0
DIARRHEA: 0
NAUSEA: 0
CONSTIPATION: 0

## 2022-08-15 NOTE — TELEPHONE ENCOUNTER
BENEFITS: BILATERAL LOWER EMG    Insurance: [employer]  Phone: [phone]  Contact Name: Lea Urena  Effective Date: 1.1.2022     Plan year: YES-CALENDAR  Deductible: 500.00      Deductible Met: 500.00  Allowed/benefits paid at: 70% AFTER DEDUCTIBLE  OOP: 2000.00 MET $2000.00  Freq Limits: [id number] & [id number]--BASED ON MEDICAL NECESSITY  Prior Auth Requirement: NO AUTH REQUIRED    Notes: NO PRE-EX CLAUSE    Call Reference #: [id number]    Time of call: 11:25AM

## 2022-08-23 ENCOUNTER — PROCEDURE VISIT (OUTPATIENT)
Dept: SPORTS MEDICINE | Age: 67
End: 2022-08-23
Payer: COMMERCIAL

## 2022-08-23 VITALS — BODY MASS INDEX: 44.53 KG/M2 | WEIGHT: 242 LBS | HEIGHT: 62 IN | TEMPERATURE: 97.2 F

## 2022-08-23 DIAGNOSIS — M65.4 DE QUERVAIN'S TENOSYNOVITIS, LEFT: Primary | ICD-10-CM

## 2022-08-23 PROCEDURE — 76942 ECHO GUIDE FOR BIOPSY: CPT | Performed by: FAMILY MEDICINE

## 2022-08-23 PROCEDURE — 20551 NJX 1 TENDON ORIGIN/INSJ: CPT | Performed by: FAMILY MEDICINE

## 2022-08-23 PROCEDURE — 99999 PR OFFICE/OUTPT VISIT,PROCEDURE ONLY: CPT | Performed by: FAMILY MEDICINE

## 2022-08-23 RX ORDER — LIDOCAINE HYDROCHLORIDE 10 MG/ML
2 INJECTION, SOLUTION INFILTRATION; PERINEURAL ONCE
Status: COMPLETED | OUTPATIENT
Start: 2022-08-23 | End: 2022-08-23

## 2022-08-23 RX ORDER — BETAMETHASONE SODIUM PHOSPHATE AND BETAMETHASONE ACETATE 3; 3 MG/ML; MG/ML
6 INJECTION, SUSPENSION INTRA-ARTICULAR; INTRALESIONAL; INTRAMUSCULAR; SOFT TISSUE ONCE
Status: COMPLETED | OUTPATIENT
Start: 2022-08-23 | End: 2022-08-23

## 2022-08-23 RX ADMIN — LIDOCAINE HYDROCHLORIDE 2 ML: 10 INJECTION, SOLUTION INFILTRATION; PERINEURAL at 11:32

## 2022-08-23 RX ADMIN — BETAMETHASONE SODIUM PHOSPHATE AND BETAMETHASONE ACETATE 6 MG: 3; 3 INJECTION, SUSPENSION INTRA-ARTICULAR; INTRALESIONAL; INTRAMUSCULAR; SOFT TISSUE at 11:33

## 2022-08-23 NOTE — PROGRESS NOTES
US TENDON INJECTION  Dx; left De Quervain's tenosynovitis    Before injection risks/benefits of a cortisone injection including infection, local skin irritation, skin atrophy, calcification, continued pain/discomfort, elevated blood sugar, burning, failure to relieve pain, possible late infection were discussed with patient. Post-procedure discomfort can be alleviated with additional medications/ice/elevation/rest over the first 24 hours as recommended. Patient verbalized understanding and wanted to proceed with planned procedure. After informed consent was provided and allergies verified, the patient was positioned appropriately on hospital bed. The first dorsal compartment was identified using US. The area was prepped and draped with betadine to provide sterile environment. US was used to guide the needle peritendonous, and then was injected with 1cc celestone and 2cc lidocaine with relief of symptoms. Pt left stable, is to ice and rest for 2 days then slowly return to activities. US guided images of procedure were saved  .

## 2022-08-24 ENCOUNTER — OFFICE VISIT (OUTPATIENT)
Dept: PAIN MANAGEMENT | Age: 67
End: 2022-08-24
Payer: COMMERCIAL

## 2022-08-24 DIAGNOSIS — M79.604 BILATERAL LEG PAIN: ICD-10-CM

## 2022-08-24 DIAGNOSIS — M79.605 BILATERAL LEG PAIN: ICD-10-CM

## 2022-08-24 PROCEDURE — 95911 NRV CNDJ TEST 9-10 STUDIES: CPT | Performed by: PHYSICAL MEDICINE & REHABILITATION

## 2022-08-24 PROCEDURE — 95886 MUSC TEST DONE W/N TEST COMP: CPT | Performed by: PHYSICAL MEDICINE & REHABILITATION

## 2022-08-24 NOTE — PROGRESS NOTES
Electromyography (EMG)/Nerve conduction studies (NCS) Report: Lower Extremity    Name: Keyla Gonsalves   YOB: 1955  Date of Service: 8/24/2022   Provider: Felisa Watson MD        INDICATIONS:  Keyla Gonsalves is a 61 y.o. female who presents for electrodiagnostic evaluation for bilateral leg pain. She confirms a history of diabetes mellitus. Both limbs are necessary to examine in order to evaluate for any evidence of systemic disease as well as establish normal baseline values from which to compare any abnormal unilateral findings. The study is explained and verbal consent to proceed is obtained. NERVE CONDUCTION STUDIES:    Sensory nerve conduction studies: Bilateral sural and superficial peroneal sensory nerve conduction studies demonstrate normal peak latencies and amplitudes. Waveforms are limited in the right sural study and bilateral superficial peroneal studies. Bilateral lower limb temperatures are normal.     Motor nerve conduction studies: Bilateral peroneal motor nerve conduction studies with pickup over the extensor digitorum brevis demonstrate normal distal latencies and amplitudes. Conduction velocities in the left leg and across the left fibular head are normal. Bilateral tibial motor nerve conduction studies with pickup over the abductor hallucis demonstrate normal distal latencies and amplitudes. H reflex: Bilateral H reflexes are difficult to elicit. ELECTROMYOGRAPHY: A disposable monopolar needle is used to evaluate bilateral vastus medialis, tibialis anterior, extensor hallucis longus, flexor digitorum longus, peroneus longus, medial gastrocnemius, and lateral gastrocnemius. All of the muscles sampled are free of any increased insertional activity or any abnormal spontaneous activity. Motor unit recruitment is unremarkable. Bilateral low lumbar paraspinal muscle sampling is free of any increased insertional activity or any abnormal spontaneous activity.     SUMMARY: This study is limited, but normal. There is no current electrodiagnostic evidence for an active bilateral lumbosacral motor radiculopathy. Although limited, there is no clear evidence for a generalized large fiber sensorimotor peripheral polyneuropathy. RECOMMENDATIONS: Today's study does not explain the patient's bilateral leg pain. The patient should follow up as previously instructed. If her symptoms persist or worsen, further electrodiagnostic evaluation may be considered if the patient is agreeable. Clinical correlation is recommended.

## 2022-08-26 ENCOUNTER — TELEPHONE (OUTPATIENT)
Dept: PAIN MANAGEMENT | Age: 67
End: 2022-08-26

## 2022-08-26 NOTE — TELEPHONE ENCOUNTER
Patient was here yesterday for EMG with Dr Julio Hopkins and as she went to discuss pain medication with him, she states he was gone so the appt must've been over. She's requesting percocet as she knows that's worked for her in the past. The type of pain she's having now, she doesn't believe norco will help her very much. Will Dr Julio Hopkins please send in medication for patient to [address] [place] [address]?

## 2022-08-28 DIAGNOSIS — M50.322 DEGENERATION OF C5-C6 INTERVERTEBRAL DISC: ICD-10-CM

## 2022-08-28 DIAGNOSIS — M51.34 DEGENERATION OF THORACIC INTERVERTEBRAL DISC: ICD-10-CM

## 2022-08-29 ENCOUNTER — TELEPHONE (OUTPATIENT)
Dept: PAIN MANAGEMENT | Age: 67
End: 2022-08-29

## 2022-08-29 DIAGNOSIS — M51.34 DEGENERATION OF THORACIC INTERVERTEBRAL DISC: ICD-10-CM

## 2022-08-29 DIAGNOSIS — M50.322 DEGENERATION OF C5-C6 INTERVERTEBRAL DISC: ICD-10-CM

## 2022-08-29 RX ORDER — TIZANIDINE 4 MG/1
TABLET ORAL
Qty: 60 TABLET | Refills: 0 | OUTPATIENT
Start: 2022-08-29

## 2022-08-29 RX ORDER — TIZANIDINE 4 MG/1
4 TABLET ORAL EVERY 12 HOURS
Qty: 60 TABLET | Refills: 0 | Status: SHIPPED | OUTPATIENT
Start: 2022-08-29 | End: 2022-09-28

## 2022-08-29 RX ORDER — ETODOLAC 400 MG/1
TABLET, FILM COATED ORAL
Qty: 60 TABLET | Refills: 2 | OUTPATIENT
Start: 2022-08-29

## 2022-08-29 RX ORDER — ETODOLAC 400 MG/1
400 TABLET, FILM COATED ORAL 2 TIMES DAILY
Qty: 60 TABLET | Refills: 2 | Status: SHIPPED | OUTPATIENT
Start: 2022-08-29

## 2022-08-29 NOTE — TELEPHONE ENCOUNTER
BENEFITS: BILATERAL L3-4, L4-5, L5-S1 FACET      Insurance: M[employer]  Phone: [phone]  Contact Name: Alice Michel  Effective Date: 1.1.2022     Plan year: YES-CALENDAR  Deductible: 500.00      Deductible Met: 500.00  Allowed/benefits paid at: 70% AFTER DEDUCTIBLE  OOP: 2000.00 MET $2000.00  Freq Limits: [id number], [id number] & [id number]  DAYS  Prior Auth Requirement: NO AUTH REQUIRED    Notes: NO PRE-EX CLAUSE    Call Reference #: [id number]    Time of call: 10:50AM

## 2022-08-29 NOTE — TELEPHONE ENCOUNTER
Requested Prescriptions     Pending Prescriptions Disp Refills    tiZANidine (ZANAFLEX) 4 MG tablet 60 tablet 0     Sig: Take 1 tablet by mouth every 12 hours    etodolac (LODINE) 400 MG tablet 60 tablet 2     Sig: Take 1 tablet by mouth 2 times daily       Patient last seen on:  8/23  Date of last surgery:  n/a  Date of last refill:  8/6, 8/23  Pain level:  n/a  Patient complaining of:  PT is asking for refill  Future appts: 9/13

## 2022-09-01 ENCOUNTER — PROCEDURE VISIT (OUTPATIENT)
Dept: PAIN MANAGEMENT | Age: 67
End: 2022-09-01
Payer: COMMERCIAL

## 2022-09-01 DIAGNOSIS — M48.062 SPINAL STENOSIS OF LUMBAR REGION WITH NEUROGENIC CLAUDICATION: ICD-10-CM

## 2022-09-01 DIAGNOSIS — M47.812 CERVICAL SPONDYLOSIS WITHOUT MYELOPATHY: ICD-10-CM

## 2022-09-01 DIAGNOSIS — Z51.81 ENCOUNTER FOR MONITORING OPIOID MAINTENANCE THERAPY: ICD-10-CM

## 2022-09-01 DIAGNOSIS — M43.10 RETROLISTHESIS OF VERTEBRAE: ICD-10-CM

## 2022-09-01 DIAGNOSIS — M47.817 LUMBOSACRAL SPONDYLOSIS WITHOUT MYELOPATHY: Primary | ICD-10-CM

## 2022-09-01 DIAGNOSIS — Z79.891 ENCOUNTER FOR MONITORING OPIOID MAINTENANCE THERAPY: ICD-10-CM

## 2022-09-01 DIAGNOSIS — F11.90 CHRONIC, CONTINUOUS USE OF OPIOIDS: ICD-10-CM

## 2022-09-01 LAB
6-ACETYLMORPHINE, UR: NORMAL
AMPHETAMINE SCREEN, URINE: NORMAL
BARBITURATE SCREEN, URINE: NORMAL
BENZODIAZEPINE SCREEN, URINE: NORMAL
CANNABINOID SCREEN URINE: NORMAL
COCAINE METABOLITE, URINE: NORMAL
CREATININE URINE: NORMAL
EDDP, URINE: NORMAL
ETHANOL URINE: NORMAL
MDMA URINE: NORMAL
METHADONE SCREEN, URINE: NORMAL
METHAMPHETAMINE, URINE: NORMAL
OPIATES, URINE: NORMAL
OXYCODONE: NORMAL
PCP: NORMAL
PH, URINE: NORMAL
PHENCYCLIDINE, URINE: NORMAL
PROPOXYPHENE, URINE: NORMAL
TRICYCLIC ANTIDEPRESSANTS, UR: NORMAL

## 2022-09-01 PROCEDURE — 64494 INJ PARAVERT F JNT L/S 2 LEV: CPT | Performed by: PHYSICAL MEDICINE & REHABILITATION

## 2022-09-01 PROCEDURE — 64495 INJ PARAVERT F JNT L/S 3 LEV: CPT | Performed by: PHYSICAL MEDICINE & REHABILITATION

## 2022-09-01 PROCEDURE — 64493 INJ PARAVERT F JNT L/S 1 LEV: CPT | Performed by: PHYSICAL MEDICINE & REHABILITATION

## 2022-09-01 RX ORDER — LIDOCAINE HYDROCHLORIDE 10 MG/ML
5 INJECTION, SOLUTION INFILTRATION; PERINEURAL ONCE
Status: COMPLETED | OUTPATIENT
Start: 2022-09-01 | End: 2022-09-01

## 2022-09-01 RX ORDER — BETAMETHASONE SODIUM PHOSPHATE AND BETAMETHASONE ACETATE 3; 3 MG/ML; MG/ML
6 INJECTION, SUSPENSION INTRA-ARTICULAR; INTRALESIONAL; INTRAMUSCULAR; SOFT TISSUE ONCE
Status: COMPLETED | OUTPATIENT
Start: 2022-09-01 | End: 2022-09-01

## 2022-09-01 RX ORDER — OXYCODONE HYDROCHLORIDE AND ACETAMINOPHEN 5; 325 MG/1; MG/1
1 TABLET ORAL 2 TIMES DAILY PRN
Qty: 14 TABLET | Refills: 0 | Status: SHIPPED | OUTPATIENT
Start: 2022-09-01 | End: 2022-09-30 | Stop reason: SDUPTHER

## 2022-09-01 RX ADMIN — LIDOCAINE HYDROCHLORIDE 5 ML: 10 INJECTION, SOLUTION INFILTRATION; PERINEURAL at 17:00

## 2022-09-01 RX ADMIN — BETAMETHASONE SODIUM PHOSPHATE AND BETAMETHASONE ACETATE 6 MG: 3; 3 INJECTION, SUSPENSION INTRA-ARTICULAR; INTRALESIONAL; INTRAMUSCULAR; SOFT TISSUE at 17:01

## 2022-09-01 RX ADMIN — Medication 0.5 MEQ: at 17:00
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
## 2022-09-01 NOTE — PROGRESS NOTES
LUMBAR FACET ZYGAPOPHYSEAL JOINT INJECTIONS             Patient Name: Fuad Sandoval   : 1955  Date: 2022     Provider: Jatin Gannon MD      Fuad Sandoval is here today for interventional pain management. Preoperatively, the patient presents with symptoms and physical exam findings consistent with lumbar facet zygapophyseal joint mediated pain. She has had persistent pain that limits her function and activities of daily living. The pain is persistent despite conservative measures. She has significant functional and psychological impairment due to this condition. Given her symptoms, physical exam findings, impairment in activities of daily living, and lack of response to conservative measures, consideration for lumbar facet zygapophyseal joint corticosteroid injections was given. Discussed the risks of the procedure including, but not limited to, bleeding, infection, worsened pain, damage to surrounding structures, side effects, toxicity, allergic reactions to medications used, immune and stress-response dysfunction, fat necrosis, avascular necrosis, skin pigmentation changes, blood sugar elevation, headache, vision changes, need for surgery, as well as catastrophic injury such as vision loss, paralysis, stroke, spinal cord infarction or injury, intrathecal injection, spinal cord puncture, arachnoiditis, bowel or bladder incontinence, loss of use of the legs, ventilator dependence, and death. Discussed the risks, benefits, alternative procedures, and alternatives to the procedure including no procedure at all. Discussed that we cannot undo any permanent neurologic damage or change the course of any underlying disease. After thorough discussion, patient expressed understanding and willingness to proceed. Written consent was obtained and is in the chart. Verbal consent to proceed was obtained. Standard ASI guidelines were followed and sterile technique used.   Area was cleaned with Betadine three times. Fluoroscopic guidance was used for this procedure. The L5 vertebral body was taken as the first lumbar-appearing vertebral body directly above the sacrum on a lateral view. Appropriate oblique views were used to open up the facet joints and three 25 gauge 5 inch spinal needles were used and each needle was advanced to each facet joint. Length was appropriate. Negative aspiration was achieved. Oblique and lateral views were obtained. A 3 mL injectate containing 1 mL of 6 mg of betamethasone and 2 mL of 1% preservative free Lidocaine was equally divided and injected into the joints without difficulty. She tolerated the procedure well, no obvious complications occurred during the procedure. She was appropriately monitored and discharged home in stable condition with her usual motor strength. Postoperative instructions were provided. She will continue anticoagulation uninterrupted. She was advised that her blood sugars may increase after today's procedure.         [x] Bilateral [] T12-L1    [] L1-2   [] Right [] L2-3    [x] L3-4   [] Left [x] L4-5      [x] L5-S1              [address] [facility] [address]  Phone [phone]

## 2022-09-01 NOTE — TELEPHONE ENCOUNTER
I am sorry that we were unable to discuss all of your concerns at the time of the EMG appointment. Usually we have to carefully weigh the risks and benefits of any narcotic opioid therapy such as starting treatment with Percocet. At the time of the follow-up appointment on 8/1/2022, no opioids were recommended at that time. I would like to discuss with you the risks and benefits of opioid pain medications in light of where the pain is located given your prior surgical history. If you are interested, I recommend scheduling a follow-up appointment to discuss this matter in person at your convenience.

## 2022-09-01 NOTE — TELEPHONE ENCOUNTER
Left voicemail for the patient, per Dr. Ciara Cantrell patient should schedule a follow up to discuss medication change.   Patient has a follow up on 9/8/22 with NP

## 2022-09-08 ENCOUNTER — OFFICE VISIT (OUTPATIENT)
Dept: PAIN MANAGEMENT | Age: 67
End: 2022-09-08
Payer: COMMERCIAL

## 2022-09-08 VITALS
SYSTOLIC BLOOD PRESSURE: 138 MMHG | WEIGHT: 248 LBS | HEIGHT: 62 IN | DIASTOLIC BLOOD PRESSURE: 78 MMHG | BODY MASS INDEX: 45.64 KG/M2 | TEMPERATURE: 96 F

## 2022-09-08 DIAGNOSIS — M48.062 SPINAL STENOSIS OF LUMBAR REGION WITH NEUROGENIC CLAUDICATION: ICD-10-CM

## 2022-09-08 DIAGNOSIS — M47.817 LUMBOSACRAL SPONDYLOSIS WITHOUT MYELOPATHY: Primary | ICD-10-CM

## 2022-09-08 DIAGNOSIS — M47.812 CERVICAL SPONDYLOSIS WITHOUT MYELOPATHY: ICD-10-CM

## 2022-09-08 DIAGNOSIS — M43.10 RETROLISTHESIS OF VERTEBRAE: ICD-10-CM

## 2022-09-08 PROCEDURE — 1036F TOBACCO NON-USER: CPT | Performed by: NURSE PRACTITIONER

## 2022-09-08 PROCEDURE — G8417 CALC BMI ABV UP PARAM F/U: HCPCS | Performed by: NURSE PRACTITIONER

## 2022-09-08 PROCEDURE — 99215 OFFICE O/P EST HI 40 MIN: CPT | Performed by: NURSE PRACTITIONER

## 2022-09-08 PROCEDURE — 3017F COLORECTAL CA SCREEN DOC REV: CPT | Performed by: NURSE PRACTITIONER

## 2022-09-08 PROCEDURE — G8427 DOCREV CUR MEDS BY ELIG CLIN: HCPCS | Performed by: NURSE PRACTITIONER

## 2022-09-08 ASSESSMENT — ENCOUNTER SYMPTOMS
DIARRHEA: 0
CONSTIPATION: 0
RESPIRATORY NEGATIVE: 1
BACK PAIN: 1

## 2022-09-08 NOTE — PROGRESS NOTES
Patient: Kris Walden  YOB: 1955  Date: 22        Subjective:     Kris Walden is a 61 y.o. female who complains today of:    Chief Complaint   Patient presents with    Back Pain     Lower          Allergies:  Latex; 2,4-d dimethylamine (amisol); Mcdonald-2 inhibitors; Iodides; Nalidixic acid; Nsaids;  Shellfish allergy; and Statins    Past Medical History:   Diagnosis Date    Cancer ([facility] [address]     SKIN    Chronic pain     COPD (chronic obstructive pulmonary disease) ([facility])     Depression     Depression     Diabetes mellitus ([facility])     Hypertension     Neuropathy     Osteoarthritis     Sleep apnea     Urinary incontinence      Past Surgical History:   Procedure Laterality Date    JOINT REPLACEMENT      SPINE SURGERY       Family History   Problem Relation Age of Onset    Arthritis Mother     Hypertension Mother     Arthritis Father      Social History     Socioeconomic History    Marital status:      Spouse name: Not on file    Number of children: Not on file    Years of education: Not on file    Highest education level: Not on file   Occupational History    Not on file   Tobacco Use    Smoking status: Former     Packs/day: 1.00     Years: 15.00     Pack years: 15.00     Types: Cigarettes     Quit date: 6/15/2011     Years since quittin.2    Smokeless tobacco: Never   Substance and Sexual Activity    Alcohol use: Never    Drug use: Never    Sexual activity: Not on file   Other Topics Concern    Not on file   Social History Narrative    Not on file     Social Determinants of Health     Financial Resource Strain: Not on file   Food Insecurity: Not on file   Transportation Needs: Not on file   Physical Activity: Not on file   Stress: Not on file   Social Connections: Not on file   Intimate Partner Violence: Not on file   Housing Stability: Not on file       Current Outpatient Medications on File Prior to Visit   Medication Sig Dispense Refill    oxyCODONE-acetaminophen (PERCOCET) 5-325 MG per tablet Take 1 tablet by mouth 2 times daily as needed for Pain for up to 7 days. 14 tablet 0    tiZANidine (ZANAFLEX) 4 MG tablet Take 1 tablet by mouth every 12 hours 60 tablet 0    etodolac (LODINE) 400 MG tablet Take 1 tablet by mouth 2 times daily 60 tablet 2    predniSONE (DELTASONE) 10 MG tablet Take 1 tablet by mouth in the morning. 1 tab TID x 3 days, 1 tab BID x 3 days, 1 tab QD x 3 days. 18 tablet 0    venlafaxine (EFFEXOR XR) 150 MG extended release capsule Take 1 capsule by mouth in the morning. 30 capsule 2    omeprazole (PRILOSEC) 40 MG delayed release capsule Take 1 capsule by mouth in the morning. 90 capsule 0    gabapentin (NEURONTIN) 600 MG tablet TAKE 1 TABLET BY MOUTH THREE TIMES DAILY 90 tablet 2    bumetanide (BUMEX) 1 MG tablet Take 1 mg by mouth daily      dilTIAZem (CARDIZEM CD) 240 MG extended release capsule       insulin glargine (LANTUS;BASAGLAR) 100 UNIT/ML injection pen Inject into the skin      insulin lispro, 1 Unit Dial, 100 UNIT/ML SOPN Inject into the skin      losartan (COZAAR) 50 MG tablet Take 50 mg by mouth 2 times daily       SITagliptin (JANUVIA) 100 MG tablet       lidocaine (LIDODERM) 5 % Place 1 patch onto the skin daily 30 patch 1     Current Facility-Administered Medications on File Prior to Visit   Medication Dose Route Frequency Provider Last Rate Last Admin    iopamidol (ISOVUE-300) 61 % injection 0.5 mL  0.5 mL Other ONCE PRN Jatin Gannon MD             9/1/2022 bilateral lumbar facet injections L3-S1 with no relief of pain. 70-year-old female follows up for chronic back , neck,  and wrist pain. Work comp injury as RN. Follows with Dr Jason Nguyen. Hx ACDF with Dr Marek Martínez at [facility]. She can't get in touch with his office to schedule appt. EMG done 8/24/2022 by Dr. Ottoniel Naik  Her neck and back are her worst pain. Pain increases with any activity. Difficult for her to look up, stand, and walk.   Left side is worse both neck and low back however the right hurts as well. 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 She takes tizanidine, gabapentin, Effexor and etodolac from other providers. Back Pain  Pertinent negatives include no headaches, numbness or weakness. Review of Systems   Constitutional: Negative. Negative for activity change and unexpected weight change. HENT: Negative. Negative for hearing loss. Respiratory: Negative. Cardiovascular:  Negative for leg swelling. Gastrointestinal:  Negative for constipation and diarrhea. Genitourinary: Negative. Musculoskeletal:  Positive for back pain and neck pain. Negative for gait problem and joint swelling. Skin:  Negative for rash. Neurological:  Negative for dizziness, weakness, numbness and headaches. Psychiatric/Behavioral: Negative. Negative for sleep disturbance. Objective:     Vitals:  /78 (Site: Left Upper Arm, Position: Sitting)   Temp (!) 96 °F (35.6 °C)   Ht 5' 2\" (1.575 m)   Wt 248 lb (112.5 kg)   BMI 45.36 kg/m² Pain Score:   5    Physical Exam  Vitals reviewed. Constitutional:       Appearance: She is well-developed. HENT:      Head: Normocephalic. Nose: Nose normal.   Pulmonary:      Effort: Pulmonary effort is normal.   Musculoskeletal:      Cervical back: Neck supple. Tenderness present. Pain with movement present. Decreased range of motion. Lumbar back: Tenderness present. Decreased range of motion. Negative right straight leg raise test and negative left straight leg raise test.        Back:    Lymphadenopathy:      Cervical: No cervical adenopathy. Skin:     General: Skin is warm and dry. Findings: No erythema or rash. Neurological:      Mental Status: She is alert and oriented to person, place, and time. Cranial Nerves: No cranial nerve deficit. Deep Tendon Reflexes: Reflexes are normal and symmetric.  Reflexes normal.   Psychiatric:         Mood and Affect: Mood normal.         Behavior: Behavior normal. Thought Content: Thought content normal.         Judgment: Judgment normal.        Outside record review:  MRI LS Spine 7/22/22: retrolisthesis L1/2, anterolisthesi L3/4, anterolisthesis L4/5. Insufficiency fracture L4 anteriorly. degenerative disc disease and facet arthropathy. T12/L1 normal canal and foramen. L1/2 up to severe canal stenosis, mild bilateral foramen narrowing. L2/3 up to severe canal stenosis, mild bilateral foramen narrowing. L3/4 up to severe canal stenosis, up to moderate bilateral foramen narrowing. L4/5 up to moderate canal stenosis, moderate right and up to moderate left foramen narrowing. L5/S1 normal canal stenosis, severe bilateral foramen narrowing. XR LS Spine 3/29/22: no fracture, degenerative disc disease and facet arthropathy. MRI C Spine 3/4/22: extensive postoperative changes as detailed above. canal stenosis is worst C3/4 moderate, moderate foraminal stenosis Bilateral C3/4 and left C5/6. C5/6 normal canal due to discectomy and fusion, moderate left up to moderate right foramen stenosis. C6/7 mild canal stenosis, fused endplates, mild bilateral foramen narrow. C7/T1 normal canal and foramen. XR C Spine 9/30/21: anterior fusion C5-7. Anterolisthesis C2/3, degenerative disc disease and C3/4, vertebral degenerative changes multiple levels. EMG B UE 1/31/20: This study is abnormal. There is current electrodiagnostic evidence for bilateral median mononeuropathy at the wrist consistent with a clinical diagnosis of Bilateral carpal tunnel syndrome. This is moderate in severity by electrical criteria bilaterally. The left is slightly worse than the right. There is sensory and motor nerve involvement bilaterally. There is no active denervation on electromyography bilaterally. There is no current evidence for an active cervical motor radiculopathy.  There are mild chronic changes in a C7 distribution bilaterally that are likely related to her cervical spine pathology prior to cervical spine surgery. There is no current evidence for a generalized large fiber sensorimotor peripheral polyneuropathy. Assessment:        Diagnosis Orders   1. Lumbosacral spondylosis without myelopathy  GA INJ DX/THER AGNT PARAVERT FACET JOINT, LUMBAR/SAC, 1ST LEVEL    GA INJ DX/THER AGNT PARAVERT FACET JOINT, LUMBAR/SAC, 2ND LEVEL    GA INJ DX/THER AGNT PARAVERT FACET JOINT, LUMBAR/SAC, ADD LEVEL      2. Retrolisthesis of vertebrae        3. Spinal stenosis of lumbar region with neurogenic claudication        4. Cervical spondylosis without myelopathy  GA INJ DX/THER AGNT PARAVERT FACET JOINT, CERV/THORAC, 1ST LEVEL    GA INJ DX/THER AGNT PARAVERT FACET JOINT, CERV/THORAC, 2ND LEVEL    GA INJ DX/THER AGNT PARAVERT FACET JOINT, CERV/THORAC, ADD LEVEL          Plan:          No orders of the defined types were placed in this encounter. Orders Placed This Encounter   Procedures    GA INJ DX/THER AGNT PARAVERT FACET JOINT, LUMBAR/SAC, 1ST LEVEL     MBB #1 bilat  (345) with SM     Standing Status:   Future     Standing Expiration Date:   12/7/2022    GA INJ DX/THER AGNT PARAVERT FACET JOINT, LUMBAR/SAC, 2ND LEVEL     Standing Status:   Future     Standing Expiration Date:   12/7/2022    GA INJ DX/THER AGNT PARAVERT FACET JOINT, LUMBAR/SAC, ADD LEVEL     Standing Status:   Future     Standing Expiration Date:   12/7/2022    GA INJ DX/THER AGNT PARAVERT FACET JOINT, CERV/THORAC, 1ST LEVEL     MBB #1 bilateral C67T1 with SM    ACDF C5-7     Standing Status:   Future     Standing Expiration Date:   12/7/2022    GA INJ DX/THER AGNT PARAVERT FACET JOINT, CERV/THORAC, 2ND LEVEL     Standing Status:   Future     Standing Expiration Date:   12/7/2022    GA INJ DX/THER AGNT PARAVERT FACET JOINT, CERV/THORAC, ADD LEVEL     Standing Status:   Future     Standing Expiration Date:   12/7/2022     -She will continue to seek out a neurosurgeon for her neck and low back.     We will go ahead and order diagnostic bilateral

## 2022-09-13 ENCOUNTER — OFFICE VISIT (OUTPATIENT)
Dept: SPORTS MEDICINE | Age: 67
End: 2022-09-13
Payer: COMMERCIAL

## 2022-09-13 VITALS — HEIGHT: 62 IN | TEMPERATURE: 97.1 F | BODY MASS INDEX: 45.64 KG/M2 | WEIGHT: 248 LBS

## 2022-09-13 DIAGNOSIS — M65.4 DE QUERVAIN'S TENOSYNOVITIS, LEFT: Primary | ICD-10-CM

## 2022-09-13 PROCEDURE — G8427 DOCREV CUR MEDS BY ELIG CLIN: HCPCS | Performed by: FAMILY MEDICINE

## 2022-09-13 PROCEDURE — G8417 CALC BMI ABV UP PARAM F/U: HCPCS | Performed by: FAMILY MEDICINE

## 2022-09-13 PROCEDURE — 1036F TOBACCO NON-USER: CPT | Performed by: FAMILY MEDICINE

## 2022-09-13 PROCEDURE — 99213 OFFICE O/P EST LOW 20 MIN: CPT | Performed by: FAMILY MEDICINE

## 2022-09-13 PROCEDURE — 3017F COLORECTAL CA SCREEN DOC REV: CPT | Performed by: FAMILY MEDICINE

## 2022-09-13 ASSESSMENT — ENCOUNTER SYMPTOMS
BACK PAIN: 0
DIARRHEA: 0
SHORTNESS OF BREATH: 0
CONSTIPATION: 0
NAUSEA: 0

## 2022-09-13 NOTE — PROGRESS NOTES
[facility]) Physicians  Neurosurgery and Pain [facility]  [address] [facility] , [address], [facility]: [phone]  F: [phone]      Maribell Stahl  ()    2022    Subjective:     Maribell Stahl is 61 y.o. female who complains today of:    Chief Complaint   Patient presents with    Follow-up     L Wrist Pain       HPI     Patient returns stating that her wrist is feeling better since she has been taping it and being careful but she is pretty much back to full activity without issue  Allergies:  Latex; 2,4-d dimethylamine (amisol); Mcdonald-2 inhibitors; Iodides; Nalidixic acid; Nsaids;  Shellfish allergy; and Statins    Past Medical History:   Diagnosis Date    [facility]     SKIN    Chronic pain     COPD (chronic obstructive pulmonary disease) ([facility])     Depression     Depression     Diabetes mellitus ([facility])     Hypertension     Neuropathy     Osteoarthritis     Sleep apnea     Urinary incontinence      Past Surgical History:   Procedure Laterality Date    JOINT REPLACEMENT      SPINE SURGERY       Family History   Problem Relation Age of Onset    Arthritis Mother     Hypertension Mother     Arthritis Father      Social History     Socioeconomic History    Marital status:      Spouse name: Not on file    Number of children: Not on file    Years of education: Not on file    Highest education level: Not on file   Occupational History    Not on file   Tobacco Use    Smoking status: Former     Packs/day: 1.00     Years: 15.00     Pack years: 15.00     Types: Cigarettes     Quit date: 6/15/2011     Years since quittin.2    Smokeless tobacco: Never   Substance and Sexual Activity    Alcohol use: Never    Drug use: Never    Sexual activity: Not on file   Other Topics Concern    Not on file   Social History Narrative    Not on file     Social Determinants of Health     Financial Resource Strain: Not on file   Food Insecurity: Not on file Transportation Needs: Not on file   Physical Activity: Not on file   Stress: Not on file   Social Connections: Not on file   Intimate Partner Violence: Not on file   Housing Stability: Not on file       Current Outpatient Medications on File Prior to Visit   Medication Sig Dispense Refill    tiZANidine (ZANAFLEX) 4 MG tablet Take 1 tablet by mouth every 12 hours 60 tablet 0    etodolac (LODINE) 400 MG tablet Take 1 tablet by mouth 2 times daily 60 tablet 2    predniSONE (DELTASONE) 10 MG tablet Take 1 tablet by mouth in the morning. 1 tab TID x 3 days, 1 tab BID x 3 days, 1 tab QD x 3 days. 18 tablet 0    omeprazole (PRILOSEC) 40 MG delayed release capsule Take 1 capsule by mouth in the morning. 90 capsule 0    gabapentin (NEURONTIN) 600 MG tablet TAKE 1 TABLET BY MOUTH THREE TIMES DAILY 90 tablet 2    bumetanide (BUMEX) 1 MG tablet Take 1 mg by mouth daily      dilTIAZem (CARDIZEM CD) 240 MG extended release capsule       insulin glargine (LANTUS;BASAGLAR) 100 UNIT/ML injection pen Inject into the skin      insulin lispro, 1 Unit Dial, 100 UNIT/ML SOPN Inject into the skin      losartan (COZAAR) 50 MG tablet Take 50 mg by mouth 2 times daily       SITagliptin (JANUVIA) 100 MG tablet       lidocaine (LIDODERM) 5 % Place 1 patch onto the skin daily 30 patch 1    venlafaxine (EFFEXOR XR) 150 MG extended release capsule Take 1 capsule by mouth in the morning. 30 capsule 2     Current Facility-Administered Medications on File Prior to Visit   Medication Dose Route Frequency Provider Last Rate Last Admin    iopamidol (ISOVUE-300) 61 % injection 0.5 mL  0.5 mL Other ONCE PRN Peggy Gonzáles MD             Review of Systems   Constitutional:  Negative for fever. HENT:  Negative for hearing loss. Respiratory:  Negative for shortness of breath. Gastrointestinal:  Negative for constipation, diarrhea and nausea. Genitourinary:  Negative for difficulty urinating.    Musculoskeletal:  Negative for back pain and neck pain.   Skin:  Negative for rash. Neurological:  Negative for headaches. Hematological:  Does not bruise/bleed easily. Psychiatric/Behavioral:  Negative for sleep disturbance. Objective:     Vitals:  Temp 97.1 °F (36.2 °C) (Infrared)   Ht 5' 2\" (1.575 m)   Wt 248 lb (112.5 kg)   BMI 45.36 kg/m² Pain Score:   3      Physical Exam  Vitals reviewed. Constitutional:       Appearance: Normal appearance. Skin:     General: Skin is warm and dry. Neurological:      Mental Status: She is alert. Ortho Exam   Examination of the left wrist reveals the neurovascular muscle status to be intact full range of motion no edema or effusion no palpable tenderness negative Finkelstein's    Assessment:      Diagnosis Orders   1. De Quervain's tenosynovitis, left            Plan:   Patient is doing well at this point I encouraged her to continue to brace and continue her exercises so we will get a recurrence if she does she may be a candidate for surgical intervention       No orders of the defined types were placed in this encounter. No orders of the defined types were placed in this encounter. Follow up:  Return if symptoms worsen or fail to improve.     JAVI SEYMOUR,

## 2022-09-27 DIAGNOSIS — M50.322 DEGENERATION OF C5-C6 INTERVERTEBRAL DISC: ICD-10-CM

## 2022-09-27 DIAGNOSIS — M51.34 DEGENERATION OF THORACIC INTERVERTEBRAL DISC: ICD-10-CM

## 2022-09-27 NOTE — TELEPHONE ENCOUNTER
Requested Prescriptions     Pending Prescriptions Disp Refills    tiZANidine (ZANAFLEX) 4 MG tablet [Pharmacy Med Name: TIZANIDINE 4MG TABLETS] 60 tablet 0     Sig: TAKE 1 TABLET BY MOUTH EVERY 12 HOURS       PATIENT WAS LAST PRESCRIBED ZANAFLEX BY DR Zohaib Bethea , PATIENT HAS NO UPCOMING APPOINTMENTS WITH [facility]. PATIENT NOW SEE'S PAIN MANAGEMENT IS THIS SOMETHING DR WHITE WOULD LIKE TO CONTINUE.

## 2022-09-28 RX ORDER — TIZANIDINE 4 MG/1
4 TABLET ORAL EVERY 12 HOURS
Qty: 60 TABLET | Refills: 0 | Status: SHIPPED | OUTPATIENT
Start: 2022-09-28 | End: 2022-10-31 | Stop reason: SDUPTHER

## 2022-09-28 RX ORDER — GABAPENTIN 600 MG/1
TABLET ORAL
Qty: 90 TABLET | Refills: 2 | OUTPATIENT
Start: 2022-09-28

## 2022-09-30 ENCOUNTER — OFFICE VISIT (OUTPATIENT)
Dept: PAIN MANAGEMENT | Age: 67
End: 2022-09-30
Payer: COMMERCIAL

## 2022-09-30 VITALS
WEIGHT: 248 LBS | DIASTOLIC BLOOD PRESSURE: 80 MMHG | BODY MASS INDEX: 45.64 KG/M2 | HEIGHT: 62 IN | SYSTOLIC BLOOD PRESSURE: 138 MMHG | TEMPERATURE: 97.3 F

## 2022-09-30 DIAGNOSIS — M48.062 SPINAL STENOSIS OF LUMBAR REGION WITH NEUROGENIC CLAUDICATION: ICD-10-CM

## 2022-09-30 DIAGNOSIS — M47.812 CERVICAL SPONDYLOSIS WITHOUT MYELOPATHY: ICD-10-CM

## 2022-09-30 DIAGNOSIS — F11.90 CHRONIC, CONTINUOUS USE OF OPIOIDS: ICD-10-CM

## 2022-09-30 DIAGNOSIS — M43.10 RETROLISTHESIS OF VERTEBRAE: ICD-10-CM

## 2022-09-30 DIAGNOSIS — Z51.81 ENCOUNTER FOR MONITORING OPIOID MAINTENANCE THERAPY: ICD-10-CM

## 2022-09-30 DIAGNOSIS — M47.817 LUMBOSACRAL SPONDYLOSIS WITHOUT MYELOPATHY: ICD-10-CM

## 2022-09-30 DIAGNOSIS — Z79.891 ENCOUNTER FOR MONITORING OPIOID MAINTENANCE THERAPY: ICD-10-CM

## 2022-09-30 DIAGNOSIS — M47.817 LUMBOSACRAL SPONDYLOSIS WITHOUT MYELOPATHY: Primary | ICD-10-CM

## 2022-09-30 PROCEDURE — 3017F COLORECTAL CA SCREEN DOC REV: CPT | Performed by: NURSE PRACTITIONER

## 2022-09-30 PROCEDURE — G8417 CALC BMI ABV UP PARAM F/U: HCPCS | Performed by: NURSE PRACTITIONER

## 2022-09-30 PROCEDURE — 99214 OFFICE O/P EST MOD 30 MIN: CPT | Performed by: NURSE PRACTITIONER

## 2022-09-30 PROCEDURE — 1036F TOBACCO NON-USER: CPT | Performed by: NURSE PRACTITIONER

## 2022-09-30 PROCEDURE — G8427 DOCREV CUR MEDS BY ELIG CLIN: HCPCS | Performed by: NURSE PRACTITIONER

## 2022-09-30 RX ORDER — OXYCODONE HYDROCHLORIDE AND ACETAMINOPHEN 5; 325 MG/1; MG/1
1 TABLET ORAL 2 TIMES DAILY PRN
Qty: 14 TABLET | Refills: 0 | Status: SHIPPED | OUTPATIENT
Start: 2022-09-30 | End: 2022-10-21 | Stop reason: SDUPTHER

## 2022-09-30 ASSESSMENT — ENCOUNTER SYMPTOMS
SORE THROAT: 0
ABDOMINAL PAIN: 0
BACK PAIN: 1
SHORTNESS OF BREATH: 0

## 2022-09-30 NOTE — TELEPHONE ENCOUNTER
Continue Percocet 5/325 twice daily as needed, 7 days, #14 no refills, 9/30/2022 - 10/7/2022      Controlled Substance Monitoring:    Acute and Chronic Pain Monitoring:   RX Monitoring 9/30/2022   Periodic Controlled Substance Monitoring Obtaining appropriate analgesic effect of treatment.

## 2022-09-30 NOTE — PROGRESS NOTES
Meliza Cordon  ()    2022    Subjective:     Meliza Cordon is 61 y.o. female who complains today of:    Chief Complaint   Patient presents with    Leg Pain    Back Pain     Lower- Sciatica    Wrist Pain         Allergies:  Latex; 2,4-d dimethylamine (amisol); Mcdonald-2 inhibitors; Iodides; Nalidixic acid; Nsaids;  Shellfish allergy; and Statins    Past Medical History:   Diagnosis Date    Cancer ([facility] [address]     SKIN    Chronic pain     COPD (chronic obstructive pulmonary disease) ([facility])     Depression     Depression     Diabetes mellitus ([facility])     Hypertension     Neuropathy     Osteoarthritis     Sleep apnea     Urinary incontinence      Past Surgical History:   Procedure Laterality Date    JOINT REPLACEMENT      SPINE SURGERY       Family History   Problem Relation Age of Onset    Arthritis Mother     Hypertension Mother     Arthritis Father      Social History     Socioeconomic History    Marital status:      Spouse name: Not on file    Number of children: Not on file    Years of education: Not on file    Highest education level: Not on file   Occupational History    Not on file   Tobacco Use    Smoking status: Former     Packs/day: 1.00     Years: 15.00     Pack years: 15.00     Types: Cigarettes     Quit date: 6/15/2011     Years since quittin.3    Smokeless tobacco: Never   Substance and Sexual Activity    Alcohol use: Never    Drug use: Never    Sexual activity: Not on file   Other Topics Concern    Not on file   Social History Narrative    Not on file     Social Determinants of Health     Financial Resource Strain: Not on file   Food Insecurity: Not on file   Transportation Needs: Not on file   Physical Activity: Not on file   Stress: Not on file   Social Connections: Not on file   Intimate Partner Violence: Not on file   Housing Stability: Not on file       Current Outpatient Medications on File Prior to Visit   Medication Sig Dispense Refill    tiZANidine (ZANAFLEX) 4 MG tablet TAKE 1 TABLET BY MOUTH EVERY 12 HOURS 60 tablet 0    etodolac (LODINE) 400 MG tablet Take 1 tablet by mouth 2 times daily 60 tablet 2    predniSONE (DELTASONE) 10 MG tablet Take 1 tablet by mouth in the morning. 1 tab TID x 3 days, 1 tab BID x 3 days, 1 tab QD x 3 days. 18 tablet 0    omeprazole (PRILOSEC) 40 MG delayed release capsule Take 1 capsule by mouth in the morning. 90 capsule 0    gabapentin (NEURONTIN) 600 MG tablet TAKE 1 TABLET BY MOUTH THREE TIMES DAILY 90 tablet 2    bumetanide (BUMEX) 1 MG tablet Take 1 mg by mouth daily      dilTIAZem (CARDIZEM CD) 240 MG extended release capsule       insulin glargine (LANTUS;BASAGLAR) 100 UNIT/ML injection pen Inject into the skin      insulin lispro, 1 Unit Dial, 100 UNIT/ML SOPN Inject into the skin      losartan (COZAAR) 50 MG tablet Take 50 mg by mouth 2 times daily       SITagliptin (JANUVIA) 100 MG tablet       lidocaine (LIDODERM) 5 % Place 1 patch onto the skin daily 30 patch 1    venlafaxine (EFFEXOR XR) 150 MG extended release capsule Take 1 capsule by mouth in the morning. 30 capsule 2     Current Facility-Administered Medications on File Prior to Visit   Medication Dose Route Frequency Provider Last Rate Last Admin    iopamidol (ISOVUE-300) 61 % injection 0.5 mL  0.5 mL Other ONCE PRN Hannah Trujillo MD               Pt presents today for a f/u of chronic low back and neck pain. She is changing insurance October 1. She was just seen 9/8 and is back today due to right buttock pain that radiates down the leg. She says she also feels sick and feels this is a cycle that happens every 4 weeks and is in relation to oral steroids. Pt feels that overactivity aggravates the pain. Work comp injury as RN. Follows with Dr Nicci Savage for Noni Ast Femi's tenosynovitis, left . Hx ACDF with Dr Lenore Concepcion at [facility]. EMG done 8/24/2022 by Dr. 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 She takes tizanidine, gabapentin, Effexor and etodolac from other providers. 9/1/2022 bilateral lumbar facet injections L3-S1 with no relief of pain. Back Pain  Pertinent negatives include no abdominal pain, fever, headaches, numbness or weakness. Review of Systems   Constitutional:  Negative for fever. HENT:  Negative for congestion and sore throat. Respiratory:  Negative for shortness of breath. Gastrointestinal:  Negative for abdominal pain. Genitourinary:  Negative for difficulty urinating. Musculoskeletal:  Positive for back pain. Neurological:  Negative for speech difficulty, weakness, numbness and headaches. Hematological:  Negative for adenopathy. Psychiatric/Behavioral:  Negative for agitation. All other systems reviewed and are negative. MRI LS Spine 7/22/22: retrolisthesis L1/2, anterolisthesi L3/4, anterolisthesis L4/5. Insufficiency fracture L4 anteriorly. degenerative disc disease and facet arthropathy. T12/L1 normal canal and foramen. L1/2 up to severe canal stenosis, mild bilateral foramen narrowing. L2/3 up to severe canal stenosis, mild bilateral foramen narrowing. L3/4 up to severe canal stenosis, up to moderate bilateral foramen narrowing. L4/5 up to moderate canal stenosis, moderate right and up to moderate left foramen narrowing. L5/S1 normal canal stenosis, severe bilateral foramen narrowing. XR LS Spine 3/29/22: no fracture, degenerative disc disease and facet arthropathy. MRI C Spine 3/4/22: extensive postoperative changes as detailed above. canal stenosis is worst C3/4 moderate, moderate foraminal stenosis Bilateral C3/4 and left C5/6. C5/6 normal canal due to discectomy and fusion, moderate left up to moderate right foramen stenosis. C6/7 mild canal stenosis, fused endplates, mild bilateral foramen narrow. C7/T1 normal canal and foramen. XR C Spine 9/30/21: anterior fusion C5-7. Anterolisthesis C2/3, degenerative disc disease and C3/4, vertebral degenerative changes multiple levels.    EMG B UE 1/31/20: This study is abnormal. There is current electrodiagnostic evidence for bilateral median mononeuropathy at the wrist consistent with a clinical diagnosis of Bilateral carpal tunnel syndrome. This is moderate in severity by electrical criteria bilaterally. The left is slightly worse than the right. There is sensory and motor nerve involvement bilaterally. There is no active denervation on electromyography bilaterally. There is no current evidence for an active cervical motor radiculopathy. There are mild chronic changes in a C7 distribution bilaterally that are likely related to her cervical spine pathology prior to cervical spine surgery. There is no current evidence for a generalized large fiber sensorimotor peripheral polyneuropathy. Objective:     Vitals:  /80 (Site: Left Upper Arm, Position: Sitting)   Temp 97.3 °F (36.3 °C)   Ht 5' 2\" (1.575 m)   Wt 248 lb (112.5 kg)   BMI 45.36 kg/m² Pain Score:   7      Physical Exam  Vitals and nursing note reviewed. Pt is alert and oriented x 3. Recent and remote memory is intact. Mood, judgement and affect are normal.  No signs of distress or SOB noted. Visualized skin intact. Sensation intact to light touch. Decreased ROM with flexion and extension of low back. Tender with palpation to bilateral lumbar spine with positive provacative maneuvers noted. Negative SLR. Strength, balance, and coordination are functional for ambulation. Positive BL compression test, Patricks test, and thigh thrust test.Able to stand with minimal difficulty. Using a wheelchair as well today. Assessment:      Diagnosis Orders   1. Lumbosacral spondylosis without myelopathy  Amb External Referral To Rheumatology      2. Spinal stenosis of lumbar region with neurogenic claudication  Amb External Referral To Rheumatology          Plan:          No orders of the defined types were placed in this encounter.       Orders Placed This Encounter Procedures    Amb External Referral To Rheumatology     Referral Priority:   Routine     Referral Type:   Eval and Treat     Referral Reason:   Specialty Services Required     Referred to Provider:   Nirmal Zarate MD     Requested Specialty:   Rheumatology     Number of Visits Requested:   1       Discussed options with the patient today. She will need her injections reordered after 10/1. Will order BL SI injections at that time. Referral given to rheumatology, Dr. Abraham Downey. All questions were answered. Pt verbalized understanding and agrees with above plan. This NP saw pt under direct supervision of Dr. Sarah Rosa. Follow up:  Return in about 4 weeks (around 10/28/2022) for F/U post procedure and reassess pain/medications.     Eitan Hook, APRN - CNP

## 2022-10-03 DIAGNOSIS — M50.322 DEGENERATION OF C5-C6 INTERVERTEBRAL DISC: ICD-10-CM

## 2022-10-03 RX ORDER — GABAPENTIN 600 MG/1
TABLET ORAL
Qty: 90 TABLET | Refills: 2 | Status: SHIPPED | OUTPATIENT
Start: 2022-10-03 | End: 2022-11-03

## 2022-10-03 NOTE — TELEPHONE ENCOUNTER
Requested Prescriptions     Pending Prescriptions Disp Refills    gabapentin (NEURONTIN) 600 MG tablet 90 tablet 2     Sig: TAKE 1 TABLET BY MOUTH THREE TIMES DAILY       Patient last seen on:  9/13/22 ([address] [place] [address])  Date of last surgery:  n/a  Date of last refill:  7/8/22  Pain level:  n/a  Patient complaining of:  pt requesting refill  Future appts: 10/12/22 (pain management)    Patient gets this RX through [address] [place] [address] with [facility].

## 2022-10-04 DIAGNOSIS — M46.1 SACROILIAC INFLAMMATION (HCC): Primary | ICD-10-CM

## 2022-10-07 ENCOUNTER — TELEPHONE (OUTPATIENT)
Dept: PAIN MANAGEMENT | Age: 67
End: 2022-10-07

## 2022-10-21 ENCOUNTER — TELEPHONE (OUTPATIENT)
Dept: PAIN MANAGEMENT | Age: 67
End: 2022-10-21

## 2022-10-21 DIAGNOSIS — M47.817 LUMBOSACRAL SPONDYLOSIS WITHOUT MYELOPATHY: ICD-10-CM

## 2022-10-21 DIAGNOSIS — F11.90 CHRONIC, CONTINUOUS USE OF OPIOIDS: ICD-10-CM

## 2022-10-21 DIAGNOSIS — M43.10 RETROLISTHESIS OF VERTEBRAE: ICD-10-CM

## 2022-10-21 DIAGNOSIS — Z79.891 ENCOUNTER FOR MONITORING OPIOID MAINTENANCE THERAPY: ICD-10-CM

## 2022-10-21 DIAGNOSIS — Z51.81 ENCOUNTER FOR MONITORING OPIOID MAINTENANCE THERAPY: ICD-10-CM

## 2022-10-21 DIAGNOSIS — M47.812 CERVICAL SPONDYLOSIS WITHOUT MYELOPATHY: ICD-10-CM

## 2022-10-21 DIAGNOSIS — M48.062 SPINAL STENOSIS OF LUMBAR REGION WITH NEUROGENIC CLAUDICATION: ICD-10-CM

## 2022-10-21 RX ORDER — OXYCODONE HYDROCHLORIDE AND ACETAMINOPHEN 5; 325 MG/1; MG/1
1 TABLET ORAL 2 TIMES DAILY PRN
Qty: 14 TABLET | Refills: 0 | Status: SHIPPED | OUTPATIENT
Start: 2022-10-21 | End: 2022-10-28

## 2022-10-21 NOTE — TELEPHONE ENCOUNTER
Requested Prescriptions     Pending Prescriptions Disp Refills    oxyCODONE-acetaminophen (PERCOCET) 5-325 MG per tablet 14 tablet 0     Sig: Take 1 tablet by mouth 2 times daily as needed for Pain for up to 7 days.        Patient last seen on:  9/30/22  Date of last surgery:  n/a  Date of last refill:  9/30/22  Pain level:  n/a  Patient complaining of:  Pt asks for a rx as she only has two pills left and is in significant pain  Future appts: 11/3/22

## 2022-10-24 ENCOUNTER — TELEPHONE (OUTPATIENT)
Dept: PAIN MANAGEMENT | Age: 67
End: 2022-10-24

## 2022-10-24 RX ORDER — METHYLPREDNISOLONE 4 MG/1
TABLET ORAL
Qty: 1 KIT | Refills: 0 | Status: SHIPPED | OUTPATIENT
Start: 2022-10-24 | End: 2022-10-30

## 2022-10-24 NOTE — TELEPHONE ENCOUNTER
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 She says in 1987 is was confirmed that she had demyelination of the brain.  I asked her if she had been seeing a neurologist and she insisted to me that Dr. Kristie Angel was a neurologist.

## 2022-10-24 NOTE — TELEPHONE ENCOUNTER
Patient is requesting a steroid RX. She states that she's in pain everywhere but her ankles. Will Dr Benedicto Mckeon send RX?   Pharmacy    [address] [address]   Phone:  [phone]  Fax:  [phone]

## 2022-10-26 ENCOUNTER — TELEPHONE (OUTPATIENT)
Dept: PAIN MANAGEMENT | Age: 67
End: 2022-10-26

## 2022-10-26 NOTE — TELEPHONE ENCOUNTER
BENEFITS:  BILATERAL L3,4,5 MBB      Insurance: MMO-HMO  Phone: [phone]  Contact Name: Norberto Aiken  Effective Date: 1.1.2022     Plan year: YES-CALENDAR  Deductible: N/A      Deductible Met: N/A  Allowed/benefits paid at: 100%  OOP: NO OOP LIMITS  Freq Limits: [id number] & [id number]  Prior Auth Requirement: NO AUTH REQUIRED    Notes: NO PRE-EX CLAUSE    Call Reference #: [id number]    Time of call: 11:45AM

## 2022-10-27 DIAGNOSIS — M51.34 DEGENERATION OF THORACIC INTERVERTEBRAL DISC: ICD-10-CM

## 2022-10-27 DIAGNOSIS — M50.322 DEGENERATION OF C5-C6 INTERVERTEBRAL DISC: ICD-10-CM

## 2022-10-27 RX ORDER — VENLAFAXINE HYDROCHLORIDE 150 MG/1
150 CAPSULE, EXTENDED RELEASE ORAL DAILY
Qty: 30 CAPSULE | Refills: 2 | Status: SHIPPED | OUTPATIENT
Start: 2022-10-27 | End: 2022-11-26

## 2022-10-27 RX ORDER — TIZANIDINE 4 MG/1
4 TABLET ORAL EVERY 12 HOURS
Qty: 60 TABLET | Refills: 0 | OUTPATIENT
Start: 2022-10-27

## 2022-10-31 DIAGNOSIS — M51.34 DEGENERATION OF THORACIC INTERVERTEBRAL DISC: ICD-10-CM

## 2022-10-31 DIAGNOSIS — M50.322 DEGENERATION OF C5-C6 INTERVERTEBRAL DISC: ICD-10-CM

## 2022-10-31 RX ORDER — TIZANIDINE 4 MG/1
4 TABLET ORAL EVERY 12 HOURS
Qty: 60 TABLET | Refills: 0 | Status: SHIPPED | OUTPATIENT
Start: 2022-10-31 | End: 2022-11-22 | Stop reason: SDUPTHER

## 2022-10-31 NOTE — TELEPHONE ENCOUNTER
Requested Prescriptions     Pending Prescriptions Disp Refills    tiZANidine (ZANAFLEX) 4 MG tablet 60 tablet 0     Sig: Take 1 tablet by mouth every 12 hours       Patient last seen on:  9/13/22  Date of last surgery:  n/a  Date of last refill:  9/28/22  Pain level:  n/a  Patient complaining of:  Pt asks for a rx   Future appts: none

## 2022-11-03 ENCOUNTER — PROCEDURE VISIT (OUTPATIENT)
Dept: PAIN MANAGEMENT | Age: 67
End: 2022-11-03
Payer: MEDICARE

## 2022-11-03 DIAGNOSIS — M47.817 LUMBOSACRAL SPONDYLOSIS WITHOUT MYELOPATHY: Primary | ICD-10-CM

## 2022-11-03 PROCEDURE — 64494 INJ PARAVERT F JNT L/S 2 LEV: CPT | Performed by: PHYSICAL MEDICINE & REHABILITATION

## 2022-11-03 PROCEDURE — 64493 INJ PARAVERT F JNT L/S 1 LEV: CPT | Performed by: PHYSICAL MEDICINE & REHABILITATION

## 2022-11-03 RX ORDER — BETAMETHASONE SODIUM PHOSPHATE AND BETAMETHASONE ACETATE 3; 3 MG/ML; MG/ML
3 INJECTION, SUSPENSION INTRA-ARTICULAR; INTRALESIONAL; INTRAMUSCULAR; SOFT TISSUE ONCE
Status: COMPLETED | OUTPATIENT
Start: 2022-11-03 | End: 2022-11-03

## 2022-11-03 RX ORDER — LIDOCAINE HYDROCHLORIDE 10 MG/ML
5 INJECTION, SOLUTION INFILTRATION; PERINEURAL ONCE
Status: COMPLETED | OUTPATIENT
Start: 2022-11-03 | End: 2022-11-03

## 2022-11-03 RX ADMIN — LIDOCAINE HYDROCHLORIDE 5 ML: 10 INJECTION, SOLUTION INFILTRATION; PERINEURAL at 13:01

## 2022-11-03 RX ADMIN — Medication 0.5 MEQ: at 13:01

## 2022-11-03 RX ADMIN — BETAMETHASONE SODIUM PHOSPHATE AND BETAMETHASONE ACETATE 3 MG: 3; 3 INJECTION, SUSPENSION INTRA-ARTICULAR; INTRALESIONAL; INTRAMUSCULAR; SOFT TISSUE at 13:01

## 2022-11-03 NOTE — PROGRESS NOTES
Lumbar Medial Branch Blocks       Patient Name: Tyson Ormond   : 1955  Date: 11/3/2022     Provider: Shirleyann Gowers, MD        Tyson Ormond is here today for interventional pain management. Preoperatively, the patient presents with symptoms and physical exam findings consistent with lumbar facet zygapophyseal joint mediated pain. She has had persistent pain that limits her function and activities of daily living. The pain is persistent despite conservative measures. She has significant functional and psychological impairment due to this condition. Given her symptoms, physical exam findings, impairment in activities of daily living, and lack of response to conservative measures, consideration for lumbar medial branch blocks was given. Discussed the risks of the procedure including, but not limited to, bleeding, infection, worsened pain, damage to surrounding structures, side effects, toxicity, allergic reactions to medications used, immune and stress-response dysfunction, fat necrosis, avascular necrosis, skin pigmentation changes, blood sugar elevation, headache, vision changes, need for surgery, as well as catastrophic injury such as vision loss, paralysis, stroke, spinal cord infarction or injury, intrathecal injection, spinal cord puncture, arachnoiditis, bowel or bladder incontinence, loss of use of the legs, ventilator dependence, and death. Discussed the risks, benefits, alternative procedures, and alternatives to the procedure including no procedure at all. Discussed that we cannot undo any permanent neurologic damage or change the course of any underlying disease. After thorough discussion, patient expressed understanding and willingness to proceed. Written consent was obtained and is in the chart. Verbal consent to proceed was obtained. Standard ASI guidelines were followed and sterile technique used. Area was cleaned with Betadine three times.  Fluoroscopic guidance was used for this procedure. The L5 vertebral body was taken as the first lumbar-appearing vertebral body directly above the sacrum on a lateral view. Junction of superior articular process and transverse process was identified. For L5 dorsal primary ramus groove of sacral ala and superior articular process of S1 was identified. Then two 25 gauge 5 inch spinal needles were used and each needle was advanced to each medial branch and/or dorsal ramus. Aspiration was negative throughout the procedure. Oblique and declined views were obtained. Each medial branch and/or dorsal ramus was treated with approximately 0.5 mL of 1% preservative-free Lidocaine. A total of 0.5 mL of 3 mg of Betamethasone was used for today's procedure. She tolerated the procedure well, no obvious complications occurred during the procedure. She was appropriately monitored and discharged home in stable condition with her usual motor strength. She will keep close track of pain over the next several hours and call our office tomorrow and let us know what percentage of pain relief is experienced. Postoperative instructions were provided. She will continue anticoagulation uninterrupted. She was advised that her blood sugars may increase after today's procedure.             [x] Bilateral [] T12    [] L1   [] Right [] L2    [x] L3   [] Left [x] L4      [x] L5            [address]  Phone [phone]

## 2022-11-03 NOTE — PROGRESS NOTES
This procedure was 50% more difficult and required 50% more work secondary to the patient's habitus. The patient has a BMI of 45.4. This required increased work for safe and proper positioning upon the fluoroscopy table, increased needle passes for safe and appropriate needle placement, and increased fluoroscopy time and radiation exposure for proper visualization.

## 2022-11-09 ENCOUNTER — TELEPHONE (OUTPATIENT)
Dept: PAIN MANAGEMENT | Age: 67
End: 2022-11-09

## 2022-11-09 DIAGNOSIS — Z51.81 ENCOUNTER FOR MONITORING OPIOID MAINTENANCE THERAPY: ICD-10-CM

## 2022-11-09 DIAGNOSIS — Z79.891 ENCOUNTER FOR MONITORING OPIOID MAINTENANCE THERAPY: ICD-10-CM

## 2022-11-09 DIAGNOSIS — M47.812 CERVICAL SPONDYLOSIS WITHOUT MYELOPATHY: ICD-10-CM

## 2022-11-09 DIAGNOSIS — M43.10 RETROLISTHESIS OF VERTEBRAE: ICD-10-CM

## 2022-11-09 DIAGNOSIS — M47.817 LUMBOSACRAL SPONDYLOSIS WITHOUT MYELOPATHY: ICD-10-CM

## 2022-11-09 DIAGNOSIS — M48.062 SPINAL STENOSIS OF LUMBAR REGION WITH NEUROGENIC CLAUDICATION: ICD-10-CM

## 2022-11-09 DIAGNOSIS — F11.90 CHRONIC, CONTINUOUS USE OF OPIOIDS: ICD-10-CM

## 2022-11-09 RX ORDER — OXYCODONE HYDROCHLORIDE AND ACETAMINOPHEN 5; 325 MG/1; MG/1
1 TABLET ORAL 2 TIMES DAILY PRN
Qty: 14 TABLET | Refills: 0 | Status: SHIPPED | OUTPATIENT
Start: 2022-11-09 | End: 2022-11-22 | Stop reason: SDUPTHER

## 2022-11-09 NOTE — TELEPHONE ENCOUNTER
Patient called stating that she got 100% relief from the MBB last week, she is asking if she can schedule her next procedure?

## 2022-11-09 NOTE — TELEPHONE ENCOUNTER
Requested Prescriptions     Pending Prescriptions Disp Refills    oxyCODONE-acetaminophen (PERCOCET) 5-325 MG per tablet 14 tablet 0     Sig: Take 1 tablet by mouth 2 times daily as needed for Pain for up to 7 days. Patient last seen on:  11/3  Date of last surgery:  n/a  Date of last refill:  10/21  Pain level:  n/a  Patient complaining of:  PT is asking for refill, she is waiting to schedule her next procedure.   Future appts: 12/5

## 2022-11-09 NOTE — TELEPHONE ENCOUNTER
Patient scheduled for BL L345 RFA 11/22/22. Does she need to stop taking 81 mg aspirin?  If so, the Dr that prescribed it is Madhu Weeks [phone]

## 2022-11-12 DIAGNOSIS — M50.322 DEGENERATION OF C5-C6 INTERVERTEBRAL DISC: ICD-10-CM

## 2022-11-12 DIAGNOSIS — M51.34 DEGENERATION OF THORACIC INTERVERTEBRAL DISC: ICD-10-CM

## 2022-11-16 RX ORDER — OMEPRAZOLE 40 MG/1
CAPSULE, DELAYED RELEASE ORAL
Qty: 90 CAPSULE | Refills: 0 | OUTPATIENT
Start: 2022-11-16

## 2022-11-22 ENCOUNTER — OFFICE VISIT (OUTPATIENT)
Dept: PAIN MANAGEMENT | Age: 67
End: 2022-11-22

## 2022-11-22 VITALS — HEIGHT: 62 IN | WEIGHT: 248 LBS | TEMPERATURE: 98.6 F | BODY MASS INDEX: 45.64 KG/M2

## 2022-11-22 DIAGNOSIS — M48.062 SPINAL STENOSIS OF LUMBAR REGION WITH NEUROGENIC CLAUDICATION: ICD-10-CM

## 2022-11-22 DIAGNOSIS — F11.99 OPIOID USE, UNSPECIFIED WITH UNSPECIFIED OPIOID-INDUCED DISORDER (HCC): ICD-10-CM

## 2022-11-22 DIAGNOSIS — F11.90 CHRONIC, CONTINUOUS USE OF OPIOIDS: ICD-10-CM

## 2022-11-22 DIAGNOSIS — Z51.81 ENCOUNTER FOR MONITORING OPIOID MAINTENANCE THERAPY: ICD-10-CM

## 2022-11-22 DIAGNOSIS — M47.817 LUMBOSACRAL SPONDYLOSIS WITHOUT MYELOPATHY: Primary | ICD-10-CM

## 2022-11-22 DIAGNOSIS — M47.812 CERVICAL SPONDYLOSIS WITHOUT MYELOPATHY: ICD-10-CM

## 2022-11-22 DIAGNOSIS — M43.10 RETROLISTHESIS OF VERTEBRAE: ICD-10-CM

## 2022-11-22 DIAGNOSIS — Z79.891 ENCOUNTER FOR MONITORING OPIOID MAINTENANCE THERAPY: ICD-10-CM

## 2022-11-22 DIAGNOSIS — G56.03 BILATERAL CARPAL TUNNEL SYNDROME: ICD-10-CM

## 2022-11-22 RX ORDER — OXYCODONE HYDROCHLORIDE AND ACETAMINOPHEN 5; 325 MG/1; MG/1
1 TABLET ORAL DAILY PRN
Qty: 30 TABLET | Refills: 0 | Status: SHIPPED | OUTPATIENT
Start: 2022-11-22 | End: 2022-12-22

## 2022-11-22 RX ORDER — NALOXONE HYDROCHLORIDE 4 MG/.1ML
1 SPRAY NASAL PRN
Qty: 1 EACH | Refills: 0 | Status: SHIPPED | OUTPATIENT
Start: 2022-11-22

## 2022-11-22 RX ORDER — TIZANIDINE 4 MG/1
4 TABLET ORAL 2 TIMES DAILY PRN
Qty: 60 TABLET | Refills: 0 | Status: SHIPPED | OUTPATIENT
Start: 2022-11-22 | End: 2022-12-22

## 2022-11-22 ASSESSMENT — ENCOUNTER SYMPTOMS
BACK PAIN: 1
SHORTNESS OF BREATH: 0
DIARRHEA: 0
CONSTIPATION: 0
NAUSEA: 0

## 2022-11-22 NOTE — PROGRESS NOTES
This procedure was 75% more difficult and required 75% more work secondary to the patient's habitus. The patient has a BMI of 45.4. This required increased work for safe and proper positioning upon the fluoroscopy table, increased needle passes for safe and appropriate needle placement, and increased fluoroscopy time and radiation exposure for proper visualization.

## 2022-11-22 NOTE — PROGRESS NOTES
Tyson Ormond  ([id number])    11/22/2022    Subjective:     Tyson Ormond is 74 y.o. female who complains today of:    Chief Complaint   Patient presents with    Back Pain     No other tests therapy or updates from other physicians, no ER visits. Percocet 5/325 mg daily, Gabapentin 600 mg QID, and Tizanidine 4 mg BID help with remaining functional with chores, personal hygiene, remaining compliant with home exercise program, maintaining her quality of life, and performing activities of daily living. She obtains greater than 50% pain relief without any significant side effects. She is clear to avoid driving or operating heavy machinery or to perform any activities where she may harm herself or others while on pain medications. Neck pain is a 5/10. Gets to a 8/10. Located in both sides of the neck, left worse than right, she has pain into her left arm. It has been a constant ache for over 22 years. Neck pain is from a work-related injury. Worse with turning her neck. Better with rest and pain medicine. history of anterior cervical discectomy and fusion C5-7. There are no other associated symptoms or contextual factors. She denies any new weakness, saddle anesthesia, bowel or bladder dysfunction, or falls. Low back pain is a 3/10. Gets to a 9/10. The pain is located in both sides of her low back. Her back pain is worse than her leg pain. It is worse with walking and bending. Her pain is better with rest sitting and pain medicine. It has been a constant ache for over 1 year. There are no other associated symptoms or contextual factors. She denies any classic radicular symptoms, new weakness, saddle anesthesia, bowel or bladder dysfunction, or falls. Allergies:  Latex; 2,4-d dimethylamine (amisol); Mcdonald-2 inhibitors; Iodides; Nalidixic acid; Nsaids;  Shellfish allergy; and Statins    Past Medical History:   Diagnosis Date    Cancer ([facility] [address]     SKIN    Chronic pain     COPD (chronic obstructive pulmonary disease) ([facility]     Depression     Depression     Diabetes mellitus ([facility]     Hypertension     Neuropathy     Osteoarthritis     Sleep apnea     Urinary incontinence      Past Surgical History:   Procedure Laterality Date    JOINT REPLACEMENT      SPINE SURGERY       Family History   Problem Relation Age of Onset    Arthritis Mother     Hypertension Mother     Arthritis Father      Social History     Socioeconomic History    Marital status:      Spouse name: Not on file    Number of children: Not on file    Years of education: Not on file    Highest education level: Not on file   Occupational History    Not on file   Tobacco Use    Smoking status: Former     Packs/day: 1.00     Years: 15.00     Pack years: 15.00     Types: Cigarettes     Quit date: 6/15/2011     Years since quittin.4    Smokeless tobacco: Never   Substance and Sexual Activity    Alcohol use: Never    Drug use: Never    Sexual activity: Not on file   Other Topics Concern    Not on file   Social History Narrative    Not on file     Social Determinants of Health     Financial Resource Strain: Not on file   Food Insecurity: Not on file   Transportation Needs: Not on file   Physical Activity: Not on file   Stress: Not on file   Social Connections: Not on file   Intimate Partner Violence: Not on file   Housing Stability: Not on file       Current Outpatient Medications on File Prior to Visit   Medication Sig Dispense Refill    venlafaxine (EFFEXOR XR) 150 MG extended release capsule TAKE 1 CAPSULE BY MOUTH IN THE MORNING 30 capsule 2    gabapentin (NEURONTIN) 600 MG tablet TAKE 1 TABLET BY MOUTH THREE TIMES DAILY 90 tablet 2    etodolac (LODINE) 400 MG tablet Take 1 tablet by mouth 2 times daily 60 tablet 2    omeprazole (PRILOSEC) 40 MG delayed release capsule Take 1 capsule by mouth in the morning.  90 capsule 0    bumetanide (BUMEX) 1 MG tablet Take 1 mg by mouth daily      dilTIAZem (CARDIZEM CD) 240 MG extended release capsule insulin glargine (LANTUS;BASAGLAR) 100 UNIT/ML injection pen Inject into the skin      insulin lispro, 1 Unit Dial, 100 UNIT/ML SOPN Inject into the skin      losartan (COZAAR) 50 MG tablet Take 50 mg by mouth 2 times daily       SITagliptin (JANUVIA) 100 MG tablet       lidocaine (LIDODERM) 5 % Place 1 patch onto the skin daily 30 patch 1     Current Facility-Administered Medications on File Prior to Visit   Medication Dose Route Frequency Provider Last Rate Last Admin    iopamidol (ISOVUE-300) 61 % injection 0.5 mL  0.5 mL Other ONCE PRN Kim Liang MD           Review of Systems   Constitutional:  Negative for fever. HENT:  Negative for hearing loss. Respiratory:  Negative for shortness of breath. Gastrointestinal:  Negative for constipation, diarrhea and nausea. Genitourinary:  Negative for difficulty urinating. Musculoskeletal:  Positive for back pain and neck pain. Skin:  Negative for rash. Neurological:  Negative for headaches. Hematological:  Does not bruise/bleed easily. Psychiatric/Behavioral:  Negative for sleep disturbance. Objective:     Vitals:  Temp 98.6 °F (37 °C)   Ht 5' 2\" (1.575 m)   Wt 248 lb (112.5 kg)   BMI 45.36 kg/m² Pain Score:   1      Exam performed under coronavirus precautions  General: No acute distress  Eyes: No scleral icterus or lid lag appreciated bilaterally  ENMT: Moist mucous membranes. Hearing grossly intact bilaterally. No masses or lesions on ears or nose  Neck: Symmetric without any overt masses or lesions, trachea midline  Respiratory: Respirations are non-labored  Skin: Visualized skin is intact  Psych: Mood normal. Affect normal. Recent and remote memory intact. Judgment and insight normal.  Neurologic: Gait antalgic, +rollator  Pt is alert and appropriately interactive. Pleasant and cooperative with history and exam. No signs of excessive sedation. Responds promptly and appropriately to questions asked.  No aberrant behaviors appreciated. +rollator    Sensation intact in both arms except for bilateral C6 paresthesias left worse than right  Reflexes and strength functional for arm use, no abnormal reflexes appreciated on exam today  Strength greater than 3/5 in both arms  Spurling's negative on exam today    +median nerve paresthesias bilaterally with positive Tinel's bilaterally    Sensation grossly intact in both legs. Reflexes and strength functional for ambulation, no abnormal reflexes appreciated on exam today  Strength greater than 3/5 bilateral legs  Straight leg raise negative bilaterally. There is tenderness to palpation over the lower lumbar spinous processes from L2 down to sacrum with bilateral paraspinal muscle tenderness. Rotation and extension reproduces axial low back pain. Other facet provocative maneuvers are positive. Outside record review:  MRI LS Spine 7/22/22: retrolisthesis L1/2, anterolisthesis L3/4, anterolisthesis L4/5. Insufficiency fracture L4 anteriorly. degenerative disc disease and facet arthropathy. T12/L1 normal canal and foramen. L1/2 up to severe canal stenosis, mild bilateral foramen narrowing. L2/3 up to severe canal stenosis, mild bilateral foramen narrowing. L3/4 up to severe canal stenosis, up to moderate bilateral foramen narrowing. L4/5 up to moderate canal stenosis, moderate right and up to moderate left foramen narrowing. L5/S1 normal canal stenosis, severe bilateral foramen narrowing. XR LS Spine 3/29/22: no fracture, degenerative disc disease and facet arthropathy. MRI C Spine 3/4/22: extensive postoperative changes as detailed above. canal stenosis is worst C3/4 moderate, moderate foraminal stenosis Bilateral C3/4 and left C5/6. C5/6 normal canal due to discectomy and fusion, moderate left up to moderate right foramen stenosis. C6/7 mild canal stenosis, fused endplates, mild bilateral foramen narrow. C7/T1 normal canal and foramen.   XR C Spine 9/30/21: anterior fusion C5-7. Anterolisthesis C2/3, degenerative disc disease and C3/4, vertebral degenerative changes multiple levels. EMG B UE 1/31/20: This study is abnormal. There is current electrodiagnostic evidence for bilateral median mononeuropathy at the wrist consistent with a clinical diagnosis of Bilateral carpal tunnel syndrome. This is moderate in severity by electrical criteria bilaterally. The left is slightly worse than the right. There is sensory and motor nerve involvement bilaterally. There is no active denervation on electromyography bilaterally. There is no current evidence for an active cervical motor radiculopathy. There are mild chronic changes in a C7 distribution bilaterally that are likely related to her cervical spine pathology prior to cervical spine surgery. There is no current evidence for a generalized large fiber sensorimotor peripheral polyneuropathy. Labs 4/8/21:   Platelet 036 normal  Creatinine 0.67 normal     UDS 4/7/4064: Free of illicits, consistent with Percocet and gabapentin  Opioid risk tool score 4, moderate risk  Assessment:      Diagnosis Orders   1. Lumbosacral spondylosis without myelopathy  tiZANidine (ZANAFLEX) 4 MG tablet    oxyCODONE-acetaminophen (PERCOCET) 5-325 MG per tablet      2. Cervical spondylosis without myelopathy  tiZANidine (ZANAFLEX) 4 MG tablet    oxyCODONE-acetaminophen (PERCOCET) 5-325 MG per tablet      3. Retrolisthesis of vertebrae  oxyCODONE-acetaminophen (PERCOCET) 5-325 MG per tablet      4. Spinal stenosis of lumbar region with neurogenic claudication  oxyCODONE-acetaminophen (PERCOCET) 5-325 MG per tablet      5. Chronic, continuous use of opioids  oxyCODONE-acetaminophen (PERCOCET) 5-325 MG per tablet    naloxone 4 MG/0.1ML LIQD nasal spray      6. Encounter for monitoring opioid maintenance therapy  oxyCODONE-acetaminophen (PERCOCET) 5-325 MG per tablet      7. Opioid use, unspecified with unspecified opioid-induced disorder        8.  Bilateral carpal tunnel syndrome  oxyCODONE-acetaminophen (PERCOCET) 5-325 MG per tablet        +squamous cell carcinoma of skin, diabetes mellitus, fatty liver, tumor base of right lung, Depression   Plan:     Periodic Controlled Substance Monitoring: Possible medication side effects, risk of tolerance/dependence & alternative treatments discussed., No signs of potential drug abuse or diversion identified. , Assessed functional status., Obtaining appropriate analgesic effect of treatment. Moustapha Reyes MD)    Orders Placed This Encounter   Medications    tiZANidine (ZANAFLEX) 4 MG tablet     Sig: Take 1 tablet by mouth 2 times daily as needed (pain spasms)     Dispense:  60 tablet     Refill:  0    oxyCODONE-acetaminophen (PERCOCET) 5-325 MG per tablet     Sig: Take 1 tablet by mouth daily as needed for Pain for up to 30 days. Dispense:  30 tablet     Refill:  0     Reduce doses taken as pain becomes manageable    naloxone 4 MG/0.1ML LIQD nasal spray     Si spray by Nasal route as needed for Opioid Reversal     Dispense:  1 each     Refill:  0         No orders of the defined types were placed in this encounter.      -Encourage PT for spinal stenosis lumbar  -Reviewed XR and MRI LS Spine above, all questions answered  -Encourage XR LS Spine flex ext eval instability   -Encourage EMG B LE eval bilateral leg pain  -Continue Tizanidine 4 mg BID #60 no refills start 2021. Avoid all other sedating medications and muscle relaxers  - Continue Gabapentin 600 mg TID, no refills, last sent October through 2023  -Continue Venalafxine 150 mg from outside provider  -Continue Percocet 5/325 daily as needed #30 no refills 2022 - 2022. [place] reviewed on 2022   -MME 7. Naloxone sent on 2022  -Consider Cervical MBB/Facet  -Bilateral Lumbar L3/4 L4/5 L5/S1 facet corticosteroid joint inj under XR with Dr Galo Davila.  +Asa 81 mg ok, no antibiotics, +diabetes mellitus, no osteoporosis, no bleeding or platelet dysfunction, IV Dye okay (she denies any IV dye allergies despite listed in her allergy list), allergies reviewed, 15 minute procedure. Prone position Consider 6 mg betamethasone. (B L L2/3/4/5 MBB)       -Discussed her spinal canal narrowing. Advised her to avoid trauma, accidents, and sudden movements of the spine and to protect the spine at all times. Advised against contact sports, risky behavior, extreme activities such as bungee jumping and skydiving, and activities which may predispose her to falls or other accidents. Advised her that if she develops any new numbness, tingling, weakness, bowel or bladder dysfunction, or any other concerning symptoms, she should call 911 or proceed to the nearest emergency department for emergent physician evaluation. Advised her of the risks of spinal canal stenosis and narrowing including worsening numbness, tingling, pain, the development of weakness and paralysis, risks of loss of use of the arms/legs, loss of control of bowel and bladder function, loss of sexual function, skin sores, sepsis, temporary and/or permanent disability, and even death. She expressed complete understanding and agreement. Encourage Neurosurgery evaluation for multilevel severe spinal canal stenosis      Controlled Substance Monitoring:    Acute and Chronic Pain Monitoring:   RX Monitoring 11/22/2022   Periodic Controlled Substance Monitoring Possible medication side effects, risk of tolerance/dependence & alternative treatments discussed. ;No signs of potential drug abuse or diversion identified. ;Assessed functional status. ;Obtaining appropriate analgesic effect of treatment. Discussed the risks, side effects, and symptoms that would warrant urgent or emergent physician evaluation of all medications prescribed today.       Discussed the risks of the above recommended procedures including but not limited to bleeding, infection, worsened pain, damage to surrounding structures, side effects, toxicity, allergic reactions to medications used, immune and stress-response dysfunction, fat necrosis, skin pigmentation changes, blood sugar elevation, headache, vision changes, need for surgery, as well as catastrophic injury such as vision loss, paralysis, stroke, spinal cord and/or plexus infarction or injury, intrathecal injection, spinal cord puncture, arachnoiditis, discitis, bowel or bladder incontinence, ventilator dependence, loss of use of the arms and/or legs, and death. Discussed off-label use of corticosteroids and how the Food and Drug Administration (FDA) has not approved corticosteroids for epidural use. Discussed the risks, benefits, alternative procedures, and alternatives to the procedure including no procedure at all. Discussed that we cannot undo any permanent neurologic damage or change the course of any underlying disease. The patient appears to be a good candidate for the above recommended procedures, but no guarantees expressed or implied are given regarding the outcome of any procedure. After thorough discussion, patient expressed understanding and willingness to proceed. Provided education and counseling regarding the diagnosis, prognosis, and treatment options. All questions were answered. Encouraged her to follow-up with her primary care physician and/or specialists as required for her overall health and management of her comorbidities as well as any new positive symptoms mentioned in review of systems above. Care was provided within the definitions and limitations of our specialty practice. Encouraged lifestyle interventions including healthy habits, lifestyle changes, regular aerobic exercise and appropriate weight maintenance as advised by their primary care physician or cardiovascular health provider. Discussed well care and disease prevention/maintenance. Anatomic spine model was used to illustrate pathology.      All recommendations for therapy are provided to improve function with activities of daily living, decrease pain, and help develop an exercise program. All recommendations for therapeutic injections are meant to help decrease pain, improve function with activities of daily living, maintain compliance with home exercise program, improve quality of life, and decrease reliance upon oral medications. Encouraged compliance with her home exercise program. Recommended compliance with physical therapy program as outlined above. Discussed the elevated risks of excessive sedation while on pain medications. Advised her against driving or operating heavy machinery or performing any activities where she may harm herself or others while on pain medications. Particular caution was emphasized especially during dose adjustments and medication changes. Discussed the risks of temporary disability, permanent disability, morbidity, and mortality with poorly-managed or undiagnosed medical conditions and comorbidities. Emphasized the importance of timely medical evaluation and treatment as previously recommended by us or other medical professionals. Risks of not pursing these recommendations were emphasized. The patient was offered a treatment at our facility. The physician and patient have discussed in detail the risk of exposure to and/or potential harm posed by the COVID-19 virus with having office visits and procedures at this time versus the risk of delaying the visits and procedures. It is not possible to know either the risk of delaying the visits or procedure or chance of getting an infection with perfect accuracy, but a joint decision was made between the patient and the physician to proceed at this time with the scheduled visits and procedures. Advised her that any lab testing, imaging, or other diagnostic test results are best discussed in person in the office so that we can provide a clear explanation of their significance and best treatment based upon these results.  It is her responsibility to make and keep a follow up appointment to discuss these test results in person to discuss the significance of the findings and appropriate follow-up steps. She expressed complete understanding and agreement with the entire plan as outlined above. Portions of this note may have been typed, auto-populated, dictated or transcribed by voice recognition resulting in errors, omissions, or close substitutions which may be missed despite careful proofreading. Please contact the author for any questions or concerns. Follow up:  Return in about 1 month (around 12/22/2022) for reassessment of pain and symptoms.     Juan M Oden MD

## 2022-11-22 NOTE — PROGRESS NOTES
Lumbar Radiofrequency Ablation/Neurotomy          Patient Name: June Engel   : 1955  Date: 2022      Provider: Pauline Amaya MD        June Engel is here today for interventional pain management. Preoperatively, the patient presents with symptoms and physical exam findings consistent with lumbar facet zygapophyseal joint mediated pain. She has had persistent pain that limits her function and activities of daily living. The pain is persistent despite conservative measures. She has significant functional and psychological impairment due to this condition. She has undergone diagnostic medial branch blocks with a positive diagnostic response. Given her symptoms, physical exam findings, impairment in activities of daily living, lack of response to conservative measures, and positive diagnostic response to medial branch blocks, consideration for lumbar facet/medial branch radiofrequency ablation/neurotomy was given. Discussed the risks of the procedure including, but not limited to, bleeding, infection, worsened pain, damage to surrounding structures, post-ablation neuritis, worsened paresthesias, side effects, toxicity, allergic reactions to medications used, immune and stress-response dysfunction, fat necrosis, avascular necrosis, skin pigmentation changes, blood sugar elevation, headache, vision changes, need for surgery, as well as catastrophic injury such as vision loss, hip and/or leg weakness, paralysis, stroke, spinal cord infarction or injury, spinal cord puncture, arachnoiditis, bowel injury, bowel or bladder incontinence, sexual dysfunction, loss of use of the legs, ventilator dependence, and death. Discussed her elevated risk given anticoagulation status and the formation of hematomas, uncontrolled bleeding, hypotension, and death. Discussed her elevated risk given her diabetic status, and the risk of hyperglycemia, uncontrolled blood sugars, sepsis, and death.  Discussed the [FreeTextEntry1] : Reviewed the images of the MRI scan.  Agree with temporal parietal and some frontal volume loss with mild degree of microvascular ischemic changes.\par \par Explained to him that the microvascular ischemic changes likely due to diabetes and hypertension are not likely to progress as long as his his diabetes and hypertension are under control.  Discussed the walking 2 miles a day to slow down the process of volume loss.  Investigate paraneoplastic and vasculitic syndromes causing headache and memory loss by spinal tap.  I will discuss possible early interventional trials for Alzheimer's once a diagnosis is definite.  Number puncture appointment has been secured. motor strength. Post-procedure instructions were given to the patient. She will continue anticoagulation uninterrupted. She was advised that her blood sugars may increase after today's procedure.          [x] Bilateral [] L1    [] L2   [] Right [x] L3    [x] L4   [] Left [x] L5                 [facility], [facility] [address]  Phone [phone]/[facility] [phone]

## 2022-11-25 DIAGNOSIS — M50.322 DEGENERATION OF C5-C6 INTERVERTEBRAL DISC: ICD-10-CM

## 2022-11-25 DIAGNOSIS — M51.34 DEGENERATION OF THORACIC INTERVERTEBRAL DISC: ICD-10-CM

## 2022-11-25 RX ORDER — OMEPRAZOLE 40 MG/1
40 CAPSULE, DELAYED RELEASE ORAL DAILY
Qty: 90 CAPSULE | Refills: 0 | Status: SHIPPED | OUTPATIENT
Start: 2022-11-25

## 2022-11-25 NOTE — TELEPHONE ENCOUNTER
Requested Prescriptions     Pending Prescriptions Disp Refills    omeprazole (PRILOSEC) 40 MG delayed release capsule 90 capsule 0     Sig: Take 1 capsule by mouth daily       Patient last seen on:  9/13/22  Date of last surgery:  n/a  Date of last refill:  7/19/22  Pain level:  n/a  Patient complaining of:  Pt asks for a rx  Future appts: none

## 2022-11-26 DIAGNOSIS — M51.34 DEGENERATION OF THORACIC INTERVERTEBRAL DISC: ICD-10-CM

## 2022-11-26 DIAGNOSIS — M50.322 DEGENERATION OF C5-C6 INTERVERTEBRAL DISC: ICD-10-CM

## 2022-11-28 DIAGNOSIS — M50.322 DEGENERATION OF C5-C6 INTERVERTEBRAL DISC: ICD-10-CM

## 2022-11-28 DIAGNOSIS — M51.34 DEGENERATION OF THORACIC INTERVERTEBRAL DISC: ICD-10-CM

## 2022-11-28 RX ORDER — ETODOLAC 400 MG/1
TABLET, FILM COATED ORAL
Qty: 60 TABLET | Refills: 2 | OUTPATIENT
Start: 2022-11-28

## 2022-11-29 ENCOUNTER — TELEPHONE (OUTPATIENT)
Dept: PAIN MANAGEMENT | Age: 67
End: 2022-11-29

## 2022-11-29 DIAGNOSIS — M51.34 DEGENERATION OF THORACIC INTERVERTEBRAL DISC: ICD-10-CM

## 2022-11-29 DIAGNOSIS — M50.322 DEGENERATION OF C5-C6 INTERVERTEBRAL DISC: ICD-10-CM

## 2022-11-29 RX ORDER — ETODOLAC 400 MG/1
400 TABLET, FILM COATED ORAL 2 TIMES DAILY
Qty: 60 TABLET | Refills: 2 | Status: SHIPPED | OUTPATIENT
Start: 2022-11-29

## 2022-11-29 RX ORDER — ETODOLAC 400 MG/1
TABLET, FILM COATED ORAL
Qty: 60 TABLET | Refills: 2 | OUTPATIENT
Start: 2022-11-29

## 2022-11-29 NOTE — TELEPHONE ENCOUNTER
Attempted to call number, office closed. Will reach out to Dr Rakesh Slater office regarding earlier surgical evaluation for the patient's multilevel up to severe lumbar spinal canal stenosis.

## 2022-11-29 NOTE — TELEPHONE ENCOUNTER
Pt states she is scheduled with Dr. Aniyah Amaral for a neurosurgery consultation on 1/24/22. Pt states that she was instructed that if Dr. John Adamson thought it was more urgent that he could call their office and her appt would be moved up.     Number given by pt: [phone]

## 2022-11-29 NOTE — TELEPHONE ENCOUNTER
Requested Prescriptions     Pending Prescriptions Disp Refills    etodolac (LODINE) 400 MG tablet 60 tablet 2     Sig: Take 1 tablet by mouth 2 times daily       Patient last seen on:  9/13/22  Date of last surgery:  n/a  Date of last refill:  6/02/22  Pain level:  n/a  Patient complaining of:  Pt asks for rx  Future appts: none

## 2022-11-29 NOTE — TELEPHONE ENCOUNTER
Patient is requesting a refill and wanted to know if you would like them to continue this medication.  Last refilled in August by Dr. Darin Aguirre
RX request below for a medication you prescribed previously. Thanks!
No
no

## 2022-12-01 RX ORDER — BETAMETHASONE SODIUM PHOSPHATE AND BETAMETHASONE ACETATE 3; 3 MG/ML; MG/ML
1 INJECTION, SUSPENSION INTRA-ARTICULAR; INTRALESIONAL; INTRAMUSCULAR; SOFT TISSUE ONCE
Status: COMPLETED | OUTPATIENT
Start: 2022-12-01 | End: 2022-12-01

## 2022-12-01 RX ADMIN — BETAMETHASONE SODIUM PHOSPHATE AND BETAMETHASONE ACETATE 1.02 MG: 3; 3 INJECTION, SUSPENSION INTRA-ARTICULAR; INTRALESIONAL; INTRAMUSCULAR; SOFT TISSUE at 08:04

## 2022-12-01 NOTE — TELEPHONE ENCOUNTER
Pt informed and is currently scheduled for Dec. 14 but states they will call if anything comes up sooner. Pt also states after the RFA on 11/22/22 she has been experiencing sciatic pain down both legs and butt cheeks along with numbness. Pt asks how long she should expect this to last?     Pt also asks if Dr. Marissa Gonsalves advises her to see a neurologist or if she should wait to see Dr. Dwight Grijalva?

## 2022-12-01 NOTE — TELEPHONE ENCOUNTER
Dr. Kenya Bray returned call and stated he will move pt's appt up earlier than previously scheduled of Jan. 24.

## 2022-12-01 NOTE — TELEPHONE ENCOUNTER
Glad to hear her appointment was moved from 1/24/23 up to 12/14/22. Please schedule follow up with me to discuss her sciatic pain, other treatment options may be possible. We can discuss her questions about seeing a Neurologist in person.

## 2022-12-01 NOTE — TELEPHONE ENCOUNTER
Called on 12/1/22 at 8:26AM [phone]   I was informed that nothing is available earlier to see Dr Sid Knight.   Message left with Dr Sid Knight office to call us back if he has any questions about the patient's diagnosis

## 2022-12-13 DIAGNOSIS — M47.812 CERVICAL SPONDYLOSIS WITHOUT MYELOPATHY: ICD-10-CM

## 2022-12-13 DIAGNOSIS — M47.817 LUMBOSACRAL SPONDYLOSIS WITHOUT MYELOPATHY: ICD-10-CM

## 2022-12-13 RX ORDER — TIZANIDINE 4 MG/1
TABLET ORAL
Qty: 60 TABLET | Refills: 0 | OUTPATIENT
Start: 2022-12-13

## 2022-12-14 DIAGNOSIS — M48.062 SPINAL STENOSIS OF LUMBAR REGION WITH NEUROGENIC CLAUDICATION: ICD-10-CM

## 2022-12-14 DIAGNOSIS — M47.817 LUMBOSACRAL SPONDYLOSIS WITHOUT MYELOPATHY: ICD-10-CM

## 2022-12-14 DIAGNOSIS — Z79.891 ENCOUNTER FOR MONITORING OPIOID MAINTENANCE THERAPY: ICD-10-CM

## 2022-12-14 DIAGNOSIS — M47.812 CERVICAL SPONDYLOSIS WITHOUT MYELOPATHY: ICD-10-CM

## 2022-12-14 DIAGNOSIS — G56.03 BILATERAL CARPAL TUNNEL SYNDROME: ICD-10-CM

## 2022-12-14 DIAGNOSIS — F11.90 CHRONIC, CONTINUOUS USE OF OPIOIDS: ICD-10-CM

## 2022-12-14 DIAGNOSIS — M43.10 RETROLISTHESIS OF VERTEBRAE: ICD-10-CM

## 2022-12-14 DIAGNOSIS — Z51.81 ENCOUNTER FOR MONITORING OPIOID MAINTENANCE THERAPY: ICD-10-CM

## 2022-12-14 RX ORDER — OXYCODONE HYDROCHLORIDE AND ACETAMINOPHEN 5; 325 MG/1; MG/1
1 TABLET ORAL DAILY PRN
Qty: 5 TABLET | Refills: 0 | Status: SHIPPED | OUTPATIENT
Start: 2022-12-17 | End: 2022-12-22

## 2022-12-14 NOTE — TELEPHONE ENCOUNTER
Requested Prescriptions     Pending Prescriptions Disp Refills    oxyCODONE-acetaminophen (PERCOCET) 5-325 MG per tablet 5 tablet 0     Sig: Take 1 tablet by mouth daily as needed for Pain for up to 5 days. Patient last seen on:  11/22  Date of last surgery:  n/a  Date of last refill:  11/22  Pain level:  n/a  Patient complaining of:  PT is asking for early refill, she states that she has had more pain since her RFA and has had to take 1.5 pills. She says she has about 3 left.   Future appts: 12/19

## 2022-12-19 ENCOUNTER — OFFICE VISIT (OUTPATIENT)
Dept: PAIN MANAGEMENT | Age: 67
End: 2022-12-19
Payer: MEDICARE

## 2022-12-19 VITALS
SYSTOLIC BLOOD PRESSURE: 138 MMHG | HEIGHT: 62 IN | WEIGHT: 248 LBS | BODY MASS INDEX: 45.64 KG/M2 | DIASTOLIC BLOOD PRESSURE: 86 MMHG | TEMPERATURE: 97.5 F

## 2022-12-19 DIAGNOSIS — Z51.81 ENCOUNTER FOR MONITORING OPIOID MAINTENANCE THERAPY: ICD-10-CM

## 2022-12-19 DIAGNOSIS — G56.03 BILATERAL CARPAL TUNNEL SYNDROME: ICD-10-CM

## 2022-12-19 DIAGNOSIS — M47.817 LUMBOSACRAL SPONDYLOSIS WITHOUT MYELOPATHY: ICD-10-CM

## 2022-12-19 DIAGNOSIS — Z79.891 ENCOUNTER FOR MONITORING OPIOID MAINTENANCE THERAPY: ICD-10-CM

## 2022-12-19 DIAGNOSIS — M43.10 RETROLISTHESIS OF VERTEBRAE: ICD-10-CM

## 2022-12-19 DIAGNOSIS — M47.812 CERVICAL SPONDYLOSIS WITHOUT MYELOPATHY: Primary | ICD-10-CM

## 2022-12-19 DIAGNOSIS — M99.04 SOMATIC DYSFUNCTION OF SACROILIAC JOINT: ICD-10-CM

## 2022-12-19 DIAGNOSIS — M46.1 SACROILIITIS (HCC): ICD-10-CM

## 2022-12-19 DIAGNOSIS — F11.90 CHRONIC, CONTINUOUS USE OF OPIOIDS: ICD-10-CM

## 2022-12-19 DIAGNOSIS — R20.0 BILATERAL HAND NUMBNESS: ICD-10-CM

## 2022-12-19 DIAGNOSIS — M48.062 SPINAL STENOSIS OF LUMBAR REGION WITH NEUROGENIC CLAUDICATION: ICD-10-CM

## 2022-12-19 PROCEDURE — 1123F ACP DISCUSS/DSCN MKR DOCD: CPT | Performed by: PHYSICAL MEDICINE & REHABILITATION

## 2022-12-19 PROCEDURE — G8417 CALC BMI ABV UP PARAM F/U: HCPCS | Performed by: PHYSICAL MEDICINE & REHABILITATION

## 2022-12-19 PROCEDURE — G8484 FLU IMMUNIZE NO ADMIN: HCPCS | Performed by: PHYSICAL MEDICINE & REHABILITATION

## 2022-12-19 PROCEDURE — 3017F COLORECTAL CA SCREEN DOC REV: CPT | Performed by: PHYSICAL MEDICINE & REHABILITATION

## 2022-12-19 PROCEDURE — 99214 OFFICE O/P EST MOD 30 MIN: CPT | Performed by: PHYSICAL MEDICINE & REHABILITATION

## 2022-12-19 PROCEDURE — G8400 PT W/DXA NO RESULTS DOC: HCPCS | Performed by: PHYSICAL MEDICINE & REHABILITATION

## 2022-12-19 PROCEDURE — 1090F PRES/ABSN URINE INCON ASSESS: CPT | Performed by: PHYSICAL MEDICINE & REHABILITATION

## 2022-12-19 PROCEDURE — 1036F TOBACCO NON-USER: CPT | Performed by: PHYSICAL MEDICINE & REHABILITATION

## 2022-12-19 PROCEDURE — G8427 DOCREV CUR MEDS BY ELIG CLIN: HCPCS | Performed by: PHYSICAL MEDICINE & REHABILITATION

## 2022-12-19 RX ORDER — OXYCODONE HYDROCHLORIDE AND ACETAMINOPHEN 5; 325 MG/1; MG/1
1 TABLET ORAL DAILY PRN
Qty: 30 TABLET | Refills: 0 | Status: SHIPPED | OUTPATIENT
Start: 2022-12-22 | End: 2023-01-21

## 2022-12-19 ASSESSMENT — ENCOUNTER SYMPTOMS
DIARRHEA: 0
BACK PAIN: 1
SHORTNESS OF BREATH: 0
CONSTIPATION: 0
NAUSEA: 0

## 2022-12-19 NOTE — PROGRESS NOTES
Luis Daniel Thayer  ([birth year])    12/19/2022    Subjective:     Luis Daniel Thayer is 74 y.o. female who complains today of:    Chief Complaint   Patient presents with    Back Pain    Neck Pain    Hand Pain     Last seen by me 8/1/22, last seen 9/30/22: She has a [facility] claim for her neck. EMG B LE and XR LS Spine done, reviewed below. Bilateral lumbar L3/4 L4/5 L5/S1 facet inj on 9/1/22 provided greater than 50% pain relief. (Second diagnostic) Bilateral Lumbar L3/4/5 medial branch blocks on 11/3/22 provided greater than 80% short term pain relief with a positive diagnostic response. Bilateral Lumbar L3/4/5 radiofrequency ablation on 11/22/22 provided greater than 50% pain relief. She has had a pain flare. Upon further questioning, she notes that the area of her lumbar spine ablation is feeling better, she has pain lower down. Saw Dr Daniel Arita at [address] [place] [address], didn't have most recent imaging to give recommendation, so waiting for imaging review. Sees ? Dr Britney Rosa. Has persistent neck pain, has fusion C5-7. Tried therapy, in excruciating pain after \"rolling pin thing. \" No new therapy done. She would like to see Neurology for evaluation for Charcot Evelyn Tooth. No other tests therapy or updates from other physicians, no ER visits. Gabapentin 2400 mg, Etodolac 400 mg BID, Tizanidine 4 mg BID, Venlafaxine 150 mg daily, and Percocet 5/325 mg help with remaining functional with chores, personal hygiene, remaining compliant with home exercise program, maintaining her quality of life, and performing activities of daily living. She obtains greater than 50% pain relief without any significant side effects. She is clear to avoid driving or operating heavy machinery or to perform any activities where she may harm herself or others while on pain medications. Neck pain is a 3/10. Gets to a 7/10. Her pain is located in both sides of the neck, left worse than right, she has pain into her left arm.  It has been a constant ache for over 22 years. Neck pain is from a work-related injury. Worse with turning her neck. Better with rest and pain medicine. She has a history of anterior cervical discectomy and fusion C5-7. There are no other associated symptoms or contextual factors. She denies any new weakness, saddle anesthesia, bowel or bladder dysfunction, or falls. Low back pain is a 4/10. Gets to a 8/10. It is located in both sides of her low back and buttock. Back pain is wore than leg pain. It has been a constant ache for over 1 year. Her pain is worse with bending and walking. Her pain is better with rest and sitting. There are no other associated symptoms or contextual factors. She denies any classic radicular symptoms, new weakness, saddle anesthesia, bowel or bladder dysfunction, or falls. Allergies:  Latex; 2,4-d dimethylamine (amisol); Mcdonald-2 inhibitors; Iodides; Nalidixic acid; Nsaids;  Shellfish allergy; and Statins    Past Medical History:   Diagnosis Date    Cancer ([facility] [address]     SKIN    Chronic pain     COPD (chronic obstructive pulmonary disease) ([facility])     Depression     Depression     Diabetes mellitus ([facility])     Hypertension     Neuropathy     Osteoarthritis     Sleep apnea     Urinary incontinence      Past Surgical History:   Procedure Laterality Date    JOINT REPLACEMENT      SPINE SURGERY       Family History   Problem Relation Age of Onset    Arthritis Mother     Hypertension Mother     Arthritis Father      Social History     Socioeconomic History    Marital status:      Spouse name: Not on file    Number of children: Not on file    Years of education: Not on file    Highest education level: Not on file   Occupational History    Not on file   Tobacco Use    Smoking status: Former     Packs/day: 1.00     Years: 15.00     Pack years: 15.00     Types: Cigarettes     Quit date: 6/15/2011     Years since quittin.5    Smokeless tobacco: Never   Substance and Sexual Activity    Alcohol use: Never    Drug use: Never    Sexual activity: Not on file   Other Topics Concern    Not on file   Social History Narrative    Not on file     Social Determinants of Health     Financial Resource Strain: Not on file   Food Insecurity: Not on file   Transportation Needs: Not on file   Physical Activity: Not on file   Stress: Not on file   Social Connections: Not on file   Intimate Partner Violence: Not on file   Housing Stability: Not on file       Current Outpatient Medications on File Prior to Visit   Medication Sig Dispense Refill    oxyCODONE-acetaminophen (PERCOCET) 5-325 MG per tablet Take 1 tablet by mouth daily as needed for Pain for up to 5 days. Ok to fill 12/17, patient had procedure done and had to take additional pills.  5 tablet 0    etodolac (LODINE) 400 MG tablet Take 1 tablet by mouth 2 times daily 60 tablet 2    omeprazole (PRILOSEC) 40 MG delayed release capsule Take 1 capsule by mouth daily 90 capsule 0    tiZANidine (ZANAFLEX) 4 MG tablet Take 1 tablet by mouth 2 times daily as needed (pain spasms) 60 tablet 0    naloxone 4 MG/0.1ML LIQD nasal spray 1 spray by Nasal route as needed for Opioid Reversal 1 each 0    bumetanide (BUMEX) 1 MG tablet Take 1 mg by mouth daily      dilTIAZem (CARDIZEM CD) 240 MG extended release capsule       insulin glargine (LANTUS;BASAGLAR) 100 UNIT/ML injection pen Inject into the skin      insulin lispro, 1 Unit Dial, 100 UNIT/ML SOPN Inject into the skin      losartan (COZAAR) 50 MG tablet Take 50 mg by mouth 2 times daily       SITagliptin (JANUVIA) 100 MG tablet       lidocaine (LIDODERM) 5 % Place 1 patch onto the skin daily 30 patch 1    venlafaxine (EFFEXOR XR) 150 MG extended release capsule TAKE 1 CAPSULE BY MOUTH IN THE MORNING 30 capsule 2    gabapentin (NEURONTIN) 600 MG tablet TAKE 1 TABLET BY MOUTH THREE TIMES DAILY 90 tablet 2     Current Facility-Administered Medications on File Prior to Visit   Medication Dose Route Frequency Provider Last Rate Last Admin    iopamidol (ISOVUE-300) 61 % injection 0.5 mL  0.5 mL Other ONCE PRN Daniel Adam MD           Review of Systems   Constitutional:  Negative for fever. HENT:  Negative for hearing loss. Respiratory:  Negative for shortness of breath. Gastrointestinal:  Negative for constipation, diarrhea and nausea. Genitourinary:  Negative for difficulty urinating. Musculoskeletal:  Positive for back pain and neck pain. Skin:  Negative for rash. Neurological:  Negative for headaches. Hematological:  Does not bruise/bleed easily. Psychiatric/Behavioral:  Negative for sleep disturbance. Objective:     Vitals:  /86   Temp 97.5 °F (36.4 °C)   Ht 5' 2\" (1.575 m)   Wt 248 lb (112.5 kg)   BMI 45.36 kg/m² Pain Score:   5      Exam performed under Coronavirus precautions  Gen: No acute distress  Neck: Grossly symmetric without any significant thyromegaly or masses appreciated. Eyes: No scleral icterus or lid lag appreciated bilaterally. Irises without gross defects bilaterally. HEENT: Hearing impaired bilaterally. Normocephalic, external ears and visible portions of nose and mouth atraumatic. Lymph: No gross neck or axillary lymphadenopathy  Cardio: No significant lower extremity edema, pulses intact without significant digit ischemia. Abd: No gross masses or large hernias appreciated. Skin: Visualized skin without any dermatomal rashes or sores. Palpation free of any tightening or subcutaneous nodules. MSK: Gait is antalgic. No significant upper limb digit ischemia appreciated. Psych: Pleasant and cooperative with the history and exam. Mood and Affect normal. Appropriately dressed with good eye contact. Judgement and insight normal. Recent and remote memory intact. Alert and Oriented x3. Neuro: Cranial nerves II-XII grossly intact. No significant pathologic reflexes appreciated.     +rollator    Sensation intact in both arms except for bilateral C6 paresthesias left worse than right  Reflexes and strength functional for arm use, no abnormal reflexes appreciated on exam today  Strength greater than 3/5 in both arms  Spurling's negative on exam today    +median nerve paresthesias bilaterally with positive Tinel's bilaterally    Sensation grossly intact in both legs. Reflexes and strength functional for ambulation, no abnormal reflexes appreciated on exam today  Strength greater than 3/5 bilateral legs  Straight leg raise negative bilaterally. Less tender bilateral lumbar paraspinal muscles    Tender bilateral PSIS and SI joints with positive Thigh Thrust, Yordy Finger test, Gaenslen's, and Compression tests bilaterally. There is tenderness to palpation over cervical spinous processes from C3 down to T2 with bilateral cervical paraspinal muscle tenderness. Rotation and extension reproduces axial neck pain. Other facet provocative maneuvers are positive. Outside record review:  XR LS Spine flex ext 8/1/22: L4/5 anterolisthesis without any instability, no change with flexion extension  EMG B LE 8/24/22: This study is limited, but normal. There is no current electrodiagnostic evidence for an active bilateral lumbosacral motor radiculopathy. Although limited, there is no clear evidence for a generalized large fiber sensorimotor peripheral polyneuropathy. MRI LS Spine 7/22/22: retrolisthesis L1/2, anterolisthesi L3/4, anterolisthesis L4/5. Insufficiency fracture L4 anteriorly. degenerative disc disease and facet arthropathy. T12/L1 normal canal and foramen. L1/2 up to severe canal stenosis, mild bilateral foramen narrowing. L2/3 up to severe canal stenosis, mild bilateral foramen narrowing. L3/4 up to severe canal stenosis, up to moderate bilateral foramen narrowing. L4/5 up to moderate canal stenosis, moderate right and up to moderate left foramen narrowing. L5/S1 normal canal stenosis, severe bilateral foramen narrowing.    XR LS Spine 3/29/22: no fracture, degenerative disc disease and facet arthropathy. MRI C Spine 3/4/22: extensive postoperative changes as detailed above. canal stenosis is worst C3/4 moderate, moderate foraminal stenosis Bilateral C3/4 and left C5/6. C5/6 normal canal due to discectomy and fusion, moderate left up to moderate right foramen stenosis. C6/7 mild canal stenosis, fused endplates, mild bilateral foramen narrow. C7/T1 normal canal and foramen. XR C Spine 9/30/21: anterior fusion C5-7. Anterolisthesis C2/3, degenerative disc disease and C3/4, vertebral degenerative changes multiple levels. EMG B UE 1/31/20: This study is abnormal. There is current electrodiagnostic evidence for bilateral median mononeuropathy at the wrist consistent with a clinical diagnosis of Bilateral carpal tunnel syndrome. This is moderate in severity by electrical criteria bilaterally. The left is slightly worse than the right. There is sensory and motor nerve involvement bilaterally. There is no active denervation on electromyography bilaterally. There is no current evidence for an active cervical motor radiculopathy. There are mild chronic changes in a C7 distribution bilaterally that are likely related to her cervical spine pathology prior to cervical spine surgery. There is no current evidence for a generalized large fiber sensorimotor peripheral polyneuropathy. Labs 4/8/21:   Platelet 615 normal  Creatinine 0.67 normal     UDS 6/0/00: free of illicits, consistent with Gabapentin and Percocet. Opioid risk tool score 4, moderate risk  Assessment:      Diagnosis Orders   1. Cervical spondylosis without myelopathy  CT INJ DX/THER AGNT PARAVERT FACET JOINT, CERV/THORAC, 1ST LEVEL    oxyCODONE-acetaminophen (PERCOCET) 5-325 MG per tablet      2. Lumbosacral spondylosis without myelopathy  oxyCODONE-acetaminophen (PERCOCET) 5-325 MG per tablet      3. Retrolisthesis of vertebrae  oxyCODONE-acetaminophen (PERCOCET) 5-325 MG per tablet      4.  Spinal stenosis of lumbar region with neurogenic claudication  oxyCODONE-acetaminophen (PERCOCET) 5-325 MG per tablet      5. Chronic, continuous use of opioids  oxyCODONE-acetaminophen (PERCOCET) 5-325 MG per tablet      6. Encounter for monitoring opioid maintenance therapy  oxyCODONE-acetaminophen (PERCOCET) 5-325 MG per tablet      7. Bilateral carpal tunnel syndrome  IA INJECT CARPAL TUNNEL    Ambulatory referral to Orthopedic Surgery    oxyCODONE-acetaminophen (PERCOCET) 5-325 MG per tablet      8. Body mass index (BMI) 45.0-49.9, adult ([facility] [address]        9. Somatic dysfunction of sacroiliac joint  IA INJECT SI JOINT ARTHRGRPHY&/ANES/STEROID W/IMAGE      10. Sacroiliitis (HCC)  IA INJECT SI JOINT ARTHRGRPHY&/ANES/STEROID W/IMAGE      11. Bilateral hand numbness  EMG        +squamous cell carcinoma of skin, diabetes mellitus, fatty liver, tumor base of right lung, Depression   Plan:     Periodic Controlled Substance Monitoring: Possible medication side effects, risk of tolerance/dependence & alternative treatments discussed., No signs of potential drug abuse or diversion identified. , Assessed functional status., Obtaining appropriate analgesic effect of treatment. Alberto Yip MD)    Orders Placed This Encounter   Medications    oxyCODONE-acetaminophen (PERCOCET) 5-325 MG per tablet     Sig: Take 1 tablet by mouth daily as needed for Pain for up to 30 days. Dispense:  30 tablet     Refill:  0     Reduce doses taken as pain becomes manageable         Orders Placed This Encounter   Procedures    Ambulatory referral to Orthopedic Surgery     Referral Priority:   Routine     Referral Type:   Consult for Advice and Opinion     Requested Specialty:   Orthopedic Surgery     Number of Visits Requested:   1    EMG     Standing Status:   Future     Standing Expiration Date:   12/19/2023     Order Specific Question:   Which body part?      Answer:   Bilateral Upper Extremities    IA INJECT CARPAL TUNNEL     Bilateral carpal tunnel syndrome inj under US with Dr Jeanie Butler. 15 minute procedure. Standing Status:   Future     Standing Expiration Date:   3/19/2023    WI INJECT SI JOINT ARTHRGRPHY&/ANES/STEROID W/IMAGE     Bilateral SI Joint inj under XR with Dr Jeanie Butler. 15 minute procedure     Standing Status:   Future     Standing Expiration Date:   3/19/2023    WI INJ DX/THER AGNT PARAVERT FACET JOINT, CERV/THORAC, 1ST LEVEL     Bilateral Cervical C4/5/7/T1 medial branch blocks under Xr with Dr Jeanie Butler. +Asa 81 mg ok, no antibiotics, +diabetes mellitus, no osteoporosis, no bleeding or platelet dysfunction, IV Dye okay (she denies any IV dye allergies despite listed in her allergy list), allergies reviewed, 30 minute procedure. Prone position. Standing Status:   Future     Standing Expiration Date:   3/19/2023         -Encourage PT for spinal stenosis lumbar  -Reviewed XR LS Spine flex and EMG B LE above, all questions answered  -Appreciate Dr Patricia Lebron CC Spine surgery evaluation  -EMG B UE eval bilateral hand numbness  -Refer to Dr Jennifer May for carpal tunnel syndrome   -Continue Tizanidine 4 mg BID, Gabapentin 600 mg TID, Venalafxine 150 mg, Etodolac 400 mg BID from other providers  -Continue Percocet 5/325 mg daily prn #30 no ref start 12/22-1/21/23. [place] reviewed on 12/19/2022   -Naloxone sent on 11/22/22. MME 8  -Bilateral Cervical C4/5/7/T1 medial branch blocks under Xr with Dr Jeanie Butler. +Asa 81 mg ok, no antibiotics, +diabetes mellitus, no osteoporosis, no bleeding or platelet dysfunction, IV Dye okay (she denies any IV dye allergies despite listed in her allergy list), allergies reviewed, 30 minute procedure. Prone position. Consider 5 mg dexamethasone. Fused C5-7  -Bilateral SI Joint inj under XR with Dr Jeanie Butler. 15 minute procedure. Consider 3 mg total betamethasone.   -Bilateral carpal tunnel syndrome inj under US with Dr Jeanie Butler. 15 minute procedure. Consider 3 mg total betamethasone.    -Discussed her spinal canal narrowing.  Advised her to avoid trauma, accidents, and sudden movements of the spine and to protect the spine at all times. Advised against contact sports, risky behavior, extreme activities such as bungee jumping and skydiving, and activities which may predispose her to falls or other accidents. Advised her that if she develops any new numbness, tingling, weakness, bowel or bladder dysfunction, or any other concerning symptoms, she should call 911 or proceed to the nearest emergency department for emergent physician evaluation. Advised her of the risks of spinal canal stenosis and narrowing including worsening numbness, tingling, pain, the development of weakness and paralysis, risks of loss of use of the arms/legs, loss of control of bowel and bladder function, loss of sexual function, skin sores, sepsis, temporary and/or permanent disability, and even death. She expressed complete understanding and agreement. Controlled Substance Monitoring:    Acute and Chronic Pain Monitoring:   RX Monitoring 12/19/2022   Periodic Controlled Substance Monitoring Possible medication side effects, risk of tolerance/dependence & alternative treatments discussed. ;No signs of potential drug abuse or diversion identified. ;Assessed functional status. ;Obtaining appropriate analgesic effect of treatment. Discussed the risks, side effects, and symptoms that would warrant urgent or emergent physician evaluation of all medications prescribed today.       Discussed the risks of the above recommended procedures including but not limited to bleeding, infection, worsened pain, damage to surrounding structures, side effects, toxicity, allergic reactions to medications used, immune and stress-response dysfunction, fat necrosis, skin pigmentation changes, blood sugar elevation, headache, vision changes, need for surgery, as well as catastrophic injury such as vision loss, paralysis, stroke, spinal cord and/or plexus infarction or injury, intrathecal injection, spinal cord puncture, arachnoiditis, discitis, bowel or bladder incontinence, ventilator dependence, loss of use of the arms and/or legs, and death. Discussed off-label use of corticosteroids and how the Food and Drug Administration (FDA) has not approved corticosteroids for epidural use. Discussed the risks, benefits, alternative procedures, and alternatives to the procedure including no procedure at all. Discussed that we cannot undo any permanent neurologic damage or change the course of any underlying disease. The patient appears to be a good candidate for the above recommended procedures, but no guarantees expressed or implied are given regarding the outcome of any procedure. After thorough discussion, patient expressed understanding and willingness to proceed. Provided education and counseling regarding the diagnosis, prognosis, and treatment options. All questions were answered. Encouraged her to follow-up with her primary care physician and/or specialists as required for her overall health and management of her comorbidities as well as any new positive symptoms mentioned in review of systems above. Care was provided within the definitions and limitations of our specialty practice. Encouraged lifestyle interventions including healthy habits, lifestyle changes, regular aerobic exercise and appropriate weight maintenance as advised by their primary care physician or cardiovascular health provider. Discussed well care and disease prevention/maintenance. Anatomic spine model was used to illustrate pathology. All recommendations for therapy are provided to improve function with activities of daily living, decrease pain, and help develop an exercise program. All recommendations for therapeutic injections are meant to help decrease pain, improve function with activities of daily living, maintain compliance with home exercise program, improve quality of life, and decrease reliance upon oral medications.      Encouraged compliance with her home exercise program. Recommended compliance with physical therapy program as outlined above. Discussed the elevated risks of excessive sedation while on pain medications. Advised her against driving or operating heavy machinery or performing any activities where she may harm herself or others while on pain medications. Particular caution was emphasized especially during dose adjustments and medication changes. Discussed the risks of temporary disability, permanent disability, morbidity, and mortality with poorly-managed or undiagnosed medical conditions and comorbidities. Emphasized the importance of timely medical evaluation and treatment as previously recommended by us or other medical professionals. Risks of not pursing these recommendations were emphasized. The patient was offered a treatment at our facility. The physician and patient have discussed in detail the risk of exposure to and/or potential harm posed by the COVID-19 virus with having office visits and procedures at this time versus the risk of delaying the visits and procedures. It is not possible to know either the risk of delaying the visits or procedure or chance of getting an infection with perfect accuracy, but a joint decision was made between the patient and the physician to proceed at this time with the scheduled visits and procedures. Advised her that any lab testing, imaging, or other diagnostic test results are best discussed in person in the office so that we can provide a clear explanation of their significance and best treatment based upon these results. It is her responsibility to make and keep a follow up appointment to discuss these test results in person to discuss the significance of the findings and appropriate follow-up steps. She expressed complete understanding and agreement with the entire plan as outlined above.  Portions of this note may have been typed, auto-populated, dictated or transcribed by voice recognition resulting in errors, omissions, or close substitutions which may be missed despite careful proofreading. Please contact the author for any questions or concerns.        Follow up:  Return in about 1 month (around 1/19/2023) for reassessment of pain and symptoms, EMG Internal, External Referral.    Stanley Moses MD

## 2022-12-20 ENCOUNTER — TELEPHONE (OUTPATIENT)
Dept: PAIN MANAGEMENT | Age: 67
End: 2022-12-20

## 2022-12-20 RX ORDER — METHYLPREDNISOLONE 4 MG/1
TABLET ORAL
Qty: 1 KIT | Refills: 0 | Status: SHIPPED | OUTPATIENT
Start: 2022-12-20 | End: 2022-12-26

## 2022-12-20 NOTE — TELEPHONE ENCOUNTER
Patient is asking if she can be prescribed a steroid to help her get through the holidays without too much pain. She says that she was having so much pain in numbness in her hands that she couldn't even click her mouse on her computer.

## 2022-12-20 NOTE — TELEPHONE ENCOUNTER
BILAT C4-5,C7-T1 MBB    NO AUTH REQUIRED    OK to schedule procedure approved as above. Please note sides/levels approved and date range. (If applicable, sides/levels approved may differ from those ordered)    TO BE SCHEDULED WITH DR. Lockett Chol

## 2022-12-20 NOTE — TELEPHONE ENCOUNTER
PT states that she believes this should have gone through [facility] because her neck surgery was under [facility]. She wanted to wait to schedule until this has been taken care of.

## 2022-12-22 ENCOUNTER — OFFICE VISIT (OUTPATIENT)
Dept: SPORTS MEDICINE | Age: 67
End: 2022-12-22
Payer: MEDICARE

## 2022-12-22 VITALS
DIASTOLIC BLOOD PRESSURE: 74 MMHG | WEIGHT: 248 LBS | HEIGHT: 63 IN | SYSTOLIC BLOOD PRESSURE: 134 MMHG | TEMPERATURE: 97 F | BODY MASS INDEX: 43.94 KG/M2

## 2022-12-22 DIAGNOSIS — M50.322 DEGENERATION OF C5-C6 INTERVERTEBRAL DISC: Primary | ICD-10-CM

## 2022-12-22 DIAGNOSIS — M50.222 HERNIATED NUCLEUS PULPOSUS, C5-6: ICD-10-CM

## 2022-12-22 PROCEDURE — 1036F TOBACCO NON-USER: CPT | Performed by: FAMILY MEDICINE

## 2022-12-22 PROCEDURE — 99213 OFFICE O/P EST LOW 20 MIN: CPT | Performed by: FAMILY MEDICINE

## 2022-12-22 PROCEDURE — 1090F PRES/ABSN URINE INCON ASSESS: CPT | Performed by: FAMILY MEDICINE

## 2022-12-22 PROCEDURE — G8417 CALC BMI ABV UP PARAM F/U: HCPCS | Performed by: FAMILY MEDICINE

## 2022-12-22 PROCEDURE — 3017F COLORECTAL CA SCREEN DOC REV: CPT | Performed by: FAMILY MEDICINE

## 2022-12-22 PROCEDURE — G8400 PT W/DXA NO RESULTS DOC: HCPCS | Performed by: FAMILY MEDICINE

## 2022-12-22 PROCEDURE — G8484 FLU IMMUNIZE NO ADMIN: HCPCS | Performed by: FAMILY MEDICINE

## 2022-12-22 PROCEDURE — 1123F ACP DISCUSS/DSCN MKR DOCD: CPT | Performed by: FAMILY MEDICINE

## 2022-12-22 PROCEDURE — G8427 DOCREV CUR MEDS BY ELIG CLIN: HCPCS | Performed by: FAMILY MEDICINE

## 2022-12-22 ASSESSMENT — ENCOUNTER SYMPTOMS
CONSTIPATION: 0
BACK PAIN: 0
NAUSEA: 0
DIARRHEA: 0
SHORTNESS OF BREATH: 0

## 2022-12-22 NOTE — PROGRESS NOTES
on file   Social History Narrative    Not on file     Social Determinants of Health     Financial Resource Strain: Not on file   Food Insecurity: Not on file   Transportation Needs: Not on file   Physical Activity: Not on file   Stress: Not on file   Social Connections: Not on file   Intimate Partner Violence: Not on file   Housing Stability: Not on file       Current Outpatient Medications on File Prior to Visit   Medication Sig Dispense Refill    methylPREDNISolone (MEDROL DOSEPACK) 4 MG tablet Take by mouth. 1 kit 0    oxyCODONE-acetaminophen (PERCOCET) 5-325 MG per tablet Take 1 tablet by mouth daily as needed for Pain for up to 30 days. 30 tablet 0    oxyCODONE-acetaminophen (PERCOCET) 5-325 MG per tablet Take 1 tablet by mouth daily as needed for Pain for up to 5 days. Ok to fill 12/17, patient had procedure done and had to take additional pills.  5 tablet 0    etodolac (LODINE) 400 MG tablet Take 1 tablet by mouth 2 times daily 60 tablet 2    omeprazole (PRILOSEC) 40 MG delayed release capsule Take 1 capsule by mouth daily 90 capsule 0    tiZANidine (ZANAFLEX) 4 MG tablet Take 1 tablet by mouth 2 times daily as needed (pain spasms) 60 tablet 0    naloxone 4 MG/0.1ML LIQD nasal spray 1 spray by Nasal route as needed for Opioid Reversal 1 each 0    bumetanide (BUMEX) 1 MG tablet Take 1 mg by mouth daily      dilTIAZem (CARDIZEM CD) 240 MG extended release capsule       insulin glargine (LANTUS;BASAGLAR) 100 UNIT/ML injection pen Inject into the skin      insulin lispro, 1 Unit Dial, 100 UNIT/ML SOPN Inject into the skin      losartan (COZAAR) 50 MG tablet Take 50 mg by mouth 2 times daily       SITagliptin (JANUVIA) 100 MG tablet       lidocaine (LIDODERM) 5 % Place 1 patch onto the skin daily 30 patch 1    venlafaxine (EFFEXOR XR) 150 MG extended release capsule TAKE 1 CAPSULE BY MOUTH IN THE MORNING 30 capsule 2    gabapentin (NEURONTIN) 600 MG tablet TAKE 1 TABLET BY MOUTH THREE TIMES DAILY 90 tablet 2 Current Facility-Administered Medications on File Prior to Visit   Medication Dose Route Frequency Provider Last Rate Last Admin    iopamidol (ISOVUE-300) 61 % injection 0.5 mL  0.5 mL Other ONCE PRN Live Waterman MD             Review of Systems   Constitutional:  Negative for fever. HENT:  Negative for hearing loss. Respiratory:  Negative for shortness of breath. Gastrointestinal:  Negative for constipation, diarrhea and nausea. Genitourinary:  Negative for difficulty urinating. Musculoskeletal:  Negative for back pain and neck pain. Skin:  Negative for rash. Neurological:  Negative for headaches. Hematological:  Does not bruise/bleed easily. Psychiatric/Behavioral:  Negative for sleep disturbance. Objective:     Vitals:  /74 (Site: Right Lower Arm)   Temp 97 °F (36.1 °C) (Temporal)   Ht 5' 3\" (1.6 m)   Wt 248 lb (112.5 kg)   BMI 43.93 kg/m² Pain Score:   7      Physical Exam  Vitals reviewed. Constitutional:       Appearance: Normal appearance. Skin:     General: Skin is warm and dry. Neurological:      Mental Status: She is alert. Ortho Exam   Examination of the cervical spine revealed the neurovascular status to be intact he has mediocre range of motion some tenderness in the lower cervical region    Assessment:      Diagnosis Orders   1. Degeneration of C5-C6 intervertebral disc        2. Herniated disc C5-6            Plan:   Patient is to continue with Dr. Nelly Sierra for injections and procedures as he sees fit is to make sure she follows up with an EMG that he has scheduled there was some consideration for carpal tunnel issues and we will see what the EMG says they may consider injections in the carpal tunnels  Also want to put in a C9 for psychological services through the stress this patient's had throughout her multiple [facility] injuries       No orders of the defined types were placed in this encounter.       No orders of the defined types were placed in this encounter. Follow up:  Return in about 3 months (around 3/22/2023).     JAVI SEYMOUR, DO

## 2023-01-06 ENCOUNTER — TELEPHONE (OUTPATIENT)
Dept: PAIN MANAGEMENT | Age: 68
End: 2023-01-06

## 2023-01-06 NOTE — TELEPHONE ENCOUNTER
Patient states she is not sure if she still needs the Bilateral SI Joint inj next week, she says she got some relief from the RFA and is wondering if she can get the injection high up her back? She is asking for a call back to discuss.

## 2023-01-10 ENCOUNTER — PROCEDURE VISIT (OUTPATIENT)
Dept: PAIN MANAGEMENT | Age: 68
End: 2023-01-10
Payer: MEDICARE

## 2023-01-10 DIAGNOSIS — M99.04 SOMATIC DYSFUNCTION OF SACROILIAC JOINT: Primary | ICD-10-CM

## 2023-01-10 DIAGNOSIS — E66.01 OBESITY, CLASS III, BMI 40-49.9 (MORBID OBESITY) (HCC): ICD-10-CM

## 2023-01-10 DIAGNOSIS — M46.1 SACROILIITIS (HCC): ICD-10-CM

## 2023-01-10 PROCEDURE — 27096 INJECT SACROILIAC JOINT: CPT | Performed by: PHYSICAL MEDICINE & REHABILITATION

## 2023-01-10 RX ORDER — BETAMETHASONE SODIUM PHOSPHATE AND BETAMETHASONE ACETATE 3; 3 MG/ML; MG/ML
3 INJECTION, SUSPENSION INTRA-ARTICULAR; INTRALESIONAL; INTRAMUSCULAR; SOFT TISSUE ONCE
Status: COMPLETED | OUTPATIENT
Start: 2023-01-10 | End: 2023-01-10

## 2023-01-10 RX ORDER — LIDOCAINE HYDROCHLORIDE 10 MG/ML
5 INJECTION, SOLUTION INFILTRATION; PERINEURAL ONCE
Status: COMPLETED | OUTPATIENT
Start: 2023-01-10 | End: 2023-01-10

## 2023-01-10 RX ADMIN — Medication 0.5 MEQ: at 13:53

## 2023-01-10 RX ADMIN — LIDOCAINE HYDROCHLORIDE 5 ML: 10 INJECTION, SOLUTION INFILTRATION; PERINEURAL at 13:53

## 2023-01-10 RX ADMIN — BETAMETHASONE SODIUM PHOSPHATE AND BETAMETHASONE ACETATE 3 MG: 3; 3 INJECTION, SUSPENSION INTRA-ARTICULAR; INTRALESIONAL; INTRAMUSCULAR; SOFT TISSUE at 13:53

## 2023-01-10 NOTE — PROGRESS NOTES
This procedure was 50% more difficult and required 50% more work secondary to the patient's habitus. The patient has a BMI of 43.9. This required increased work for safe and proper positioning upon the fluoroscopy table, increased needle passes for safe and appropriate needle placement, and increased fluoroscopy time and radiation exposure for proper visualization.

## 2023-01-10 NOTE — PROGRESS NOTES
SACROILIAC (SI) JOINT INJECTION      Patient Name: Juli Brand  : 1955     Date:  1/10/2023      Physician: Aleta Avalos MD     Juli Brand is here today for interventional pain management. Preoperatively, the patient presents with symptoms and physical exam findings consistent with sacroiliac (SI) joint-mediated pain. She has had persistent pain that limits her function and activities of daily living. The pain is persistent despite conservative measures. She has significant functional and psychological impairment due to this condition. Given her symptoms, physical exam findings, impairment in activities of daily living, and lack of response to conservative measures, consideration for SI joint corticosteroid injections was given. Discussed the risks of the procedure including, but not limited to, bleeding, infection, worsened pain, damage to surrounding structures, side effects, toxicity, allergic reactions to medications used, immune and stress-response dysfunction, fat necrosis, avascular necrosis, skin pigmentation changes, blood sugar elevation, headache, vision changes, need for surgery, as well as catastrophic injury such as vision loss, paralysis, stroke, bowel or bladder puncture, bowel or bladder incontinence, incontinence, loss of use of the legs, ventilator dependence, and death. Discussed the risks, benefits, alternative procedures, and alternatives to the procedure including no procedure at all. Discussed that we cannot undo any permanent neurologic damage or change the course of any underlying disease. After thorough discussion, patient expressed understanding and willingness to proceed. Written consent was obtained and is in the chart. Verbal consent to proceed was obtained.    Standard ASI guidelines were followed and sterile technique used.  Area was cleaned with Betadine three times. Informed consent was obtained.  Fluoroscopic guidance was used for this procedure.    S.I.  JOINT:  Bilateral  A 27 gauge 1.5 inch needle was used to anesthetize the skin and subcutaneous tissue with 1 mL of 1% preservative-free lidocaine and sodium bicarbonate. Then a 25 gauge 5 inch spinal needle was advanced to the sacroiliac joints. Length was appropriate. Oblique and lateral views were obtained. Negative aspiration was achieved. In total, approximately 0.5 mL of 3 mg of Betamethasone and 2.5 mL of 1% preservative free Lidocaine was injected without difficulty and divided equally between both sides. Patient tolerated the procedure well, no obvious complications occurred during the procedure. Patient was appropriately monitored and discharged home in stable condition with their usual motor strength. Postoperative instructions were provided. She will continue anticoagulation uninterrupted. She was advised that her blood sugars may increase after today's procedure.           [place], [address] [facility], [address]  Phone [phone]/[facility] [phone]

## 2023-01-13 ENCOUNTER — PROCEDURE VISIT (OUTPATIENT)
Dept: PAIN MANAGEMENT | Age: 68
End: 2023-01-13

## 2023-01-13 DIAGNOSIS — R20.0 BILATERAL HAND NUMBNESS: Primary | ICD-10-CM

## 2023-01-13 DIAGNOSIS — G56.03 BILATERAL CARPAL TUNNEL SYNDROME: ICD-10-CM

## 2023-01-13 NOTE — PROGRESS NOTES
-Recommend occupational therapy for bilateral carpal tunnel syndrome  -Recommend bilateral wrist splint(s) for the patient's carpal tunnel syndrome. The patient has weakness and instability of bilateral wrists causing intermittent median nerve compression. She requires stabilization from this rigid orthosis to improve her function and reduce pain.  -Consideration for bilateral carpal tunnel injections under ultrasound guidance may be given if her symptoms do not improve with conservative measures as outlined above or to help facilitate a formal physical therapy program. Discussed the risks including but not limited to bleeding, infection, worsened pain, damage to surrounding structures, side effects, toxicity, allergic reactions to medications used, need for surgery, premature damage or degeneration of the joint, nerve, tendon, or vascular injury, as well as catastrophic injury such as vision loss, paralysis, stroke, bowel or bladder incontinence, ventilator dependence, loss of use of the wrists joint and/or extremities, and death. Discussed the risks, benefits, alternative procedures, and alternatives to the procedure including no procedure at all. Discussed that we cannot undo any permanent neurologic or orthopaedic damage or change the course of any underlying disease. After thorough discussion, patient expressed understanding and willingness to proceed.

## 2023-01-16 ENCOUNTER — PROCEDURE VISIT (OUTPATIENT)
Dept: PAIN MANAGEMENT | Age: 68
End: 2023-01-16
Payer: MEDICARE

## 2023-01-16 DIAGNOSIS — G56.03 BILATERAL CARPAL TUNNEL SYNDROME: Primary | ICD-10-CM

## 2023-01-16 PROCEDURE — 20526 THER INJECTION CARP TUNNEL: CPT | Performed by: PHYSICAL MEDICINE & REHABILITATION

## 2023-01-16 PROCEDURE — 76942 ECHO GUIDE FOR BIOPSY: CPT | Performed by: PHYSICAL MEDICINE & REHABILITATION

## 2023-01-16 RX ORDER — LIDOCAINE HYDROCHLORIDE 10 MG/ML
5 INJECTION, SOLUTION INFILTRATION; PERINEURAL ONCE
Status: COMPLETED | OUTPATIENT
Start: 2023-01-16 | End: 2023-01-16

## 2023-01-16 RX ORDER — BETAMETHASONE SODIUM PHOSPHATE AND BETAMETHASONE ACETATE 3; 3 MG/ML; MG/ML
1.5 INJECTION, SUSPENSION INTRA-ARTICULAR; INTRALESIONAL; INTRAMUSCULAR; SOFT TISSUE ONCE
Status: COMPLETED | OUTPATIENT
Start: 2023-01-16 | End: 2023-01-16

## 2023-01-16 RX ADMIN — BETAMETHASONE SODIUM PHOSPHATE AND BETAMETHASONE ACETATE 1.5 MG: 3; 3 INJECTION, SUSPENSION INTRA-ARTICULAR; INTRALESIONAL; INTRAMUSCULAR; SOFT TISSUE at 12:39

## 2023-01-16 RX ADMIN — LIDOCAINE HYDROCHLORIDE 5 ML: 10 INJECTION, SOLUTION INFILTRATION; PERINEURAL at 12:40

## 2023-01-16 NOTE — PROGRESS NOTES
Carpal Tunnel Corticosteroid Injection under Ultrasound Guidance    Patient Name: Agustin Owen   : 1955  Date: 2023     Provider: Henry Bates MD        PROCEDURE: Bilateral carpal tunnel corticosteroid injection under ultrasound guidance    INDICATIONS: Discussed the risks of the steroid injection procedure includes but is not limited to bleeding, infection, local skin irritation, skin atrophy, calcification, skin color and pigmentation changes, worsened pain and irritation, burning, damage to surrounding structures, side effects, toxicity, allergic reactions to medications used, immune and stress-response dysfunction, fat necrosis, decreased bone mineralization, increased fracture risk, blood sugar elevation, need for surgery, premature damage or degeneration of the nearby joints, as well as catastrophic injury such as vision loss, paralysis, stroke, loss of use of the wrist and use of the hand, and death. Discussed the risks, benefits, alternative procedures, and alternatives to the procedure including no procedure at all. Discussed that we cannot undo any permanent neurologic or orthopaedic damage or change the course of any underlying disease. After thorough discussion, patient expressed complete understanding and willingness to proceed. Description of Procedure: The sites were marked. A time-out was performed. Ultrasound was used to visualize the pertinent anatomy and identify any critical neurovascular structures. The sites were then prepped in a sterile manner with Betadine three times and alcohol. Attention was turned to the right wrist. Then a 27-gauge 1.5 inch needle was advanced under direct ultrasound guidance up to the median nerve in an out of plane transverse approach. The needle was then advanced in a longitudinal approach in plane towards the transverse carpal ligament. Aspiration for blood was negative.  Then a 3 mL injectate containing 0.12 mL of 0.75 mg of betamethasone and 2.88 mL of 1% preservative-free lidocaine was injected without resistance or difficulty. Appropriate spread of the injectate was directly visualized under ultrasound. The needle was flushed and removed. The site was hemostatic. The site was cleaned and appropriately dressed. Attention was turned to the left wrist. Then a 27-gauge 1.5 inch needle was advanced under direct ultrasound guidance up to the median nerve in an out of plane transverse approach. The needle was then advanced in a longitudinal approach in plane towards the transverse carpal ligament. Aspiration for blood was negative. Then a 3 mL injectate containing 0.12 mL of 0.75 mg of betamethasone and 2.88 mL of 1% preservative-free lidocaine was injected without resistance or difficulty. A total of 1.5 mg of betamethasone was used for today's procedure. Fair spread of the injectate was directly visualized under ultrasound. The needle was flushed and removed. The site was hemostatic. The site was cleaned and appropriately dressed. The patient tolerated the procedure well. There were no immediate post procedure complications. Post procedure instructions were given. The patient will follow-up as previously instructed. She will continue anticoagulation uninterrupted. She was advised that her blood sugars may increase after today's procedure.     [address]  Phone [phone]/[facility] [phone]

## 2023-01-16 NOTE — PROGRESS NOTES
This procedure was 50% more difficult and required 50% more work secondary to the patient's habitus. The patient has a BMI of 43.9. This required increased work for safe and proper positioning upon the procedure table, increased needle passes for safe and appropriate needle placement, and increased ultrasound time for proper visualization.

## 2023-01-20 ENCOUNTER — TELEPHONE (OUTPATIENT)
Dept: PAIN MANAGEMENT | Age: 68
End: 2023-01-20

## 2023-01-20 NOTE — TELEPHONE ENCOUNTER
Patient states she is scheduled for a lumbar laminectomy with Dr. Kelton Malone on 2/6/23. Pt states she is nervous about it and asks Dr. Alexandria Vences opinion if he thinks this is the right surgery to proceed with.

## 2023-01-21 NOTE — PROGRESS NOTES
Electromyography (EMG)/Nerve conduction studies (NCS) Report: Upper Extremities    Name: Liz Hansen   : 1955  Date: 2023   Physician: Lasha Rivas MD        INDICATIONS: Liz Hansen is a 74 y.o. female who presents for electrodiagnostic evaluation for bilateral hand numbness. She is right-handed. She confirms a history of diabetes mellitus, posterior cervical spine surgery, and bilateral thumb trigger finger surgeries. Both limbs are necessary to examine in order to evaluate for any evidence of systemic disease as well as establish normal baseline values from which to compare any abnormal unilateral findings. The study is explained and verbal consent to proceed is obtained. NERVE CONDUCTION STUDIES:  Sensory nerve conduction studies: Left median sensory nerve conduction study to the second digit demonstrates no response. Right median sensory nerve conduction study to the second digit demonstrates prolonged peak latency and diminished amplitude. Bilateral ulnar sensory nerve conduction studies to the fifth digit demonstrate normal peak latencies and amplitudes. Bilateral radial sensory nerve conduction studies to the base of the thumb demonstrate normal peak latencies and amplitudes. Bilateral upper limb temperatures are normal.     Motor nerve conduction studies: Left median motor nerve conduction study with pickup over the abductor pollicis brevis demonstrates prolonged distal latency and severely diminished amplitude. Right median motor nerve conduction study with pickup over the abductor pollicis brevis demonstrates prolonged distal latency and normal amplitude. Bilateral ulnar motor nerve conduction studies with pickup over the abductor digiti minimi demonstrate normal distal latencies and amplitudes.     ELECTROMYOGRAPHY: A disposable monopolar needle is used to evaluate bilateral deltoid, biceps, triceps, brachioradialis, extensor indicis proprius, first dorsal interosseous, and opponens pollicis. All of the muscles sampled are free of any increased insertional activity or any abnormal spontaneous activity. Motor unit recruitment is unremarkable. Cervical paraspinal muscle sampling is deferred given history of posterior cervical spine surgery. SUMMARY:  This study is abnormal:  1. There is current electrodiagnostic evidence for a bilateral median mononeuropathy at the wrist consistent with a clinical diagnosis of Bilateral carpal tunnel syndrome. This is moderate in degree by electrical criteria bilaterally. The left side is worse than the right side. There is sensory and motor nerve involvement bilaterally. There is no clear active denervation on electromyography bilaterally. 2. There is no current electrodiagnostic evidence for an active bilateral cervical motor radiculopathy or generalized large fiber sensorimotor peripheral polyneuropathy. RECOMMENDATIONS: When compared to the NCS/EMG from 1/31/20, today's study is similar. The patient should follow up as previously instructed. If her symptoms persist or worsen, further electrodiagnostic evaluation may be considered if the patient is agreeable. Clinical correlation is recommended.

## 2023-01-23 DIAGNOSIS — M50.322 DEGENERATION OF C5-C6 INTERVERTEBRAL DISC: ICD-10-CM

## 2023-01-23 DIAGNOSIS — M47.817 LUMBOSACRAL SPONDYLOSIS WITHOUT MYELOPATHY: ICD-10-CM

## 2023-01-23 DIAGNOSIS — Z51.81 ENCOUNTER FOR MONITORING OPIOID MAINTENANCE THERAPY: ICD-10-CM

## 2023-01-23 DIAGNOSIS — M47.812 CERVICAL SPONDYLOSIS WITHOUT MYELOPATHY: ICD-10-CM

## 2023-01-23 DIAGNOSIS — M51.34 DEGENERATION OF THORACIC INTERVERTEBRAL DISC: ICD-10-CM

## 2023-01-23 DIAGNOSIS — G56.03 BILATERAL CARPAL TUNNEL SYNDROME: ICD-10-CM

## 2023-01-23 DIAGNOSIS — F11.90 CHRONIC, CONTINUOUS USE OF OPIOIDS: ICD-10-CM

## 2023-01-23 DIAGNOSIS — Z79.891 ENCOUNTER FOR MONITORING OPIOID MAINTENANCE THERAPY: ICD-10-CM

## 2023-01-23 DIAGNOSIS — M43.10 RETROLISTHESIS OF VERTEBRAE: ICD-10-CM

## 2023-01-23 DIAGNOSIS — M48.062 SPINAL STENOSIS OF LUMBAR REGION WITH NEUROGENIC CLAUDICATION: ICD-10-CM

## 2023-01-23 RX ORDER — VENLAFAXINE HYDROCHLORIDE 150 MG/1
150 CAPSULE, EXTENDED RELEASE ORAL DAILY
Qty: 30 CAPSULE | Refills: 2 | Status: SHIPPED | OUTPATIENT
Start: 2023-01-23 | End: 2023-02-22

## 2023-01-23 RX ORDER — VENLAFAXINE HYDROCHLORIDE 150 MG/1
150 CAPSULE, EXTENDED RELEASE ORAL DAILY
Qty: 30 CAPSULE | Refills: 2 | OUTPATIENT
Start: 2023-01-23 | End: 2023-02-22

## 2023-01-23 NOTE — TELEPHONE ENCOUNTER
Requested Prescriptions     Pending Prescriptions Disp Refills    oxyCODONE-acetaminophen (PERCOCET) 5-325 MG per tablet 30 tablet 0     Sig: Take 1 tablet by mouth daily as needed for Pain for up to 30 days.        Patient last seen on:  1/16/23  Date of last surgery:  n/a  Date of last refill:  12/22/22  Pain level:  n/a  Patient complaining of:  Pt asks for rx  Future appts: 2/16/23

## 2023-01-23 NOTE — TELEPHONE ENCOUNTER
Requested Prescriptions     Pending Prescriptions Disp Refills    venlafaxine (EFFEXOR XR) 150 MG extended release capsule 30 capsule 2     Sig: Take 1 capsule by mouth daily       Patient last seen on:  9/13/22  Date of last surgery:  n/a  Date of last refill:  10/27/22  Pain level:  n/a  Patient complaining of:  Pt requests rx  Future appts: none

## 2023-01-25 RX ORDER — OXYCODONE HYDROCHLORIDE AND ACETAMINOPHEN 5; 325 MG/1; MG/1
1 TABLET ORAL DAILY PRN
Qty: 30 TABLET | Refills: 0 | Status: SHIPPED | OUTPATIENT
Start: 2023-01-25 | End: 2023-02-24

## 2023-01-26 NOTE — TELEPHONE ENCOUNTER
Pt returned call and is informed of Dr. Miki Edwards response. Pt also wanted to make Dr. Dc Carey aware that since her SI joint injection on 1/16/23 she is frequently losing control of her bladder. Pt experienced this problem occasionally before but it has come more frequent. Pt states she will make surgeon aware as well.

## 2023-01-26 NOTE — TELEPHONE ENCOUNTER
The decision to undergo surgery is a very personal one. It is important to discuss your concerns with your surgery team as I am not in a position to give you a detailed risk assessment of what is involved with the proposed surgery. If it helps at all, I can go by the changes of the MRI LS Spine from 7/22/22 which mentions that there are multiple levels in the lumbar spine with up to severe lumbar spinal canal stenosis. This certainly is a condition that can be painful and cause leg weakness. Lumbar spine surgery is a way to open up the space for the spinal cord and nerves to relieve pressure in this area.

## 2023-01-28 NOTE — TELEPHONE ENCOUNTER
For any loss of bowel or bladder control, I strongly recommend that you call 911 or go to the nearest emergency room for evaluation. Especially given the severe lumbar spinal canal stenosis, this could be a sign of cauda equina syndrome and require emergent spinal surgery. Given the urgency of this message, the patient was called at [phone] on 1/28/23 at 10:46 am. She expressed complete understanding and agreement.

## 2023-02-15 DIAGNOSIS — M47.812 CERVICAL SPONDYLOSIS WITHOUT MYELOPATHY: ICD-10-CM

## 2023-02-15 DIAGNOSIS — M48.062 SPINAL STENOSIS OF LUMBAR REGION WITH NEUROGENIC CLAUDICATION: ICD-10-CM

## 2023-02-15 DIAGNOSIS — M47.817 LUMBOSACRAL SPONDYLOSIS WITHOUT MYELOPATHY: ICD-10-CM

## 2023-02-15 DIAGNOSIS — F11.90 CHRONIC, CONTINUOUS USE OF OPIOIDS: ICD-10-CM

## 2023-02-15 DIAGNOSIS — Z79.891 ENCOUNTER FOR MONITORING OPIOID MAINTENANCE THERAPY: ICD-10-CM

## 2023-02-15 DIAGNOSIS — M43.10 RETROLISTHESIS OF VERTEBRAE: ICD-10-CM

## 2023-02-15 DIAGNOSIS — Z51.81 ENCOUNTER FOR MONITORING OPIOID MAINTENANCE THERAPY: ICD-10-CM

## 2023-02-15 DIAGNOSIS — G56.03 BILATERAL CARPAL TUNNEL SYNDROME: ICD-10-CM

## 2023-02-15 NOTE — TELEPHONE ENCOUNTER
Please call patient and ask her to call the office Friday morning with an update of what she thinks will happen with discharge and how much medication she is using.  Thanks

## 2023-02-15 NOTE — TELEPHONE ENCOUNTER
Requested Prescriptions     Pending Prescriptions Disp Refills    oxyCODONE-acetaminophen (PERCOCET) 5-325 MG per tablet 30 tablet 0     Sig: Take 1 tablet by mouth daily as needed for Pain for up to 30 days. Patient last seen on:  1/16/23  Date of last surgery:  n/a  Date of last refill:  1/25/23  Pain level:  n/a  Patient complaining of:  Pt received laminectomy at [facility] last week and is currently admitted at [place]. Pt states she is having a difficult recovery and is a great deal of pain. Patient states she has \"IT band\" down left hip and into leg. Pt states she is receiving oxycodone 5mg every 4 hours while admitted and is still having difficult time sleeping. Pt states she believes she is being discharged this Sunday 2/19/23. Pt states she has three tablets left over from the script filled on 1/25/23 from this office. Pt asks if Dr. Kristie Angel will increase her frequency and send in a refill so she can have upon discharge? Pt states she will not receive any medication upon discharge.    Future appts: 3/13/23

## 2023-02-20 RX ORDER — OXYCODONE HYDROCHLORIDE AND ACETAMINOPHEN 5; 325 MG/1; MG/1
1 TABLET ORAL EVERY 6 HOURS PRN
Qty: 28 TABLET | Refills: 0 | OUTPATIENT
Start: 2023-02-20 | End: 2023-02-27

## 2023-02-22 DIAGNOSIS — M51.34 DEGENERATION OF THORACIC INTERVERTEBRAL DISC: ICD-10-CM

## 2023-02-22 DIAGNOSIS — M50.322 DEGENERATION OF C5-C6 INTERVERTEBRAL DISC: ICD-10-CM

## 2023-02-22 RX ORDER — OMEPRAZOLE 40 MG/1
40 CAPSULE, DELAYED RELEASE ORAL DAILY
Qty: 90 CAPSULE | Refills: 0 | OUTPATIENT
Start: 2023-02-22

## 2023-02-22 RX ORDER — ETODOLAC 400 MG/1
TABLET, FILM COATED ORAL
Qty: 60 TABLET | Refills: 2 | OUTPATIENT
Start: 2023-02-22

## 2023-02-23 RX ORDER — ETODOLAC 400 MG/1
TABLET, FILM COATED ORAL
Qty: 60 TABLET | Refills: 2 | OUTPATIENT
Start: 2023-02-23

## 2023-02-23 RX ORDER — OMEPRAZOLE 40 MG/1
40 CAPSULE, DELAYED RELEASE ORAL DAILY
Qty: 90 CAPSULE | Refills: 0 | OUTPATIENT
Start: 2023-02-23

## 2023-03-01 ENCOUNTER — TELEPHONE (OUTPATIENT)
Dept: PAIN MANAGEMENT | Age: 68
End: 2023-03-01

## 2023-03-01 NOTE — TELEPHONE ENCOUNTER
Patient called, she had surgery and rehab through [facility]. She wants to know if after she is discharged from rehab if she can come back to our office for pain meds and treatment. She states she has had nothing but trouble with [facility]. Please advise.

## 2023-03-06 ENCOUNTER — TELEPHONE (OUTPATIENT)
Dept: PAIN MANAGEMENT | Age: 68
End: 2023-03-06

## 2023-03-06 DIAGNOSIS — M47.817 LUMBOSACRAL SPONDYLOSIS WITHOUT MYELOPATHY: Primary | ICD-10-CM

## 2023-03-06 NOTE — TELEPHONE ENCOUNTER
PT is asking for refill of her medication. She says that she is taking oxycodone 5mg every 4-6 hours and oxycontin 10mg every 12 hours. She was recently released from rehab and will be out of the short acting 5mg soon. She is not sure how she should be continuting her medication. She does have an appointment coming up 3/13 with Dr Isra Ramon.

## 2023-03-07 RX ORDER — OXYCODONE HYDROCHLORIDE AND ACETAMINOPHEN 5; 325 MG/1; MG/1
1 TABLET ORAL
Qty: 35 TABLET | Refills: 0 | Status: SHIPPED | OUTPATIENT
Start: 2023-03-07 | End: 2023-03-14

## 2023-03-07 NOTE — TELEPHONE ENCOUNTER
Please call patient and inform that I sent a prescription to the pharmacy for Percocet 5 mg 5 times a day for the next 7 days. She can continue taking the OxyContin until it runs out and we will discuss pain meds further at her follow-up next week.

## 2023-03-13 ENCOUNTER — OFFICE VISIT (OUTPATIENT)
Dept: PAIN MANAGEMENT | Age: 68
End: 2023-03-13

## 2023-03-13 VITALS
DIASTOLIC BLOOD PRESSURE: 72 MMHG | WEIGHT: 240 LBS | BODY MASS INDEX: 47.12 KG/M2 | TEMPERATURE: 97.7 F | SYSTOLIC BLOOD PRESSURE: 112 MMHG | HEIGHT: 60 IN

## 2023-03-13 DIAGNOSIS — Z51.81 ENCOUNTER FOR MONITORING OPIOID MAINTENANCE THERAPY: ICD-10-CM

## 2023-03-13 DIAGNOSIS — M47.817 LUMBOSACRAL SPONDYLOSIS WITHOUT MYELOPATHY: Primary | ICD-10-CM

## 2023-03-13 DIAGNOSIS — Z98.890 HISTORY OF LUMBOSACRAL SPINE SURGERY: ICD-10-CM

## 2023-03-13 DIAGNOSIS — F11.90 CHRONIC, CONTINUOUS USE OF OPIOIDS: ICD-10-CM

## 2023-03-13 DIAGNOSIS — Z79.891 ENCOUNTER FOR MONITORING OPIOID MAINTENANCE THERAPY: ICD-10-CM

## 2023-03-13 RX ORDER — OXYCODONE HCL 10 MG/1
10 TABLET, FILM COATED, EXTENDED RELEASE ORAL 2 TIMES DAILY
Qty: 60 TABLET | Refills: 0 | Status: SHIPPED | OUTPATIENT
Start: 2023-03-13 | End: 2023-04-12

## 2023-03-13 RX ORDER — OXYCODONE HYDROCHLORIDE 5 MG/1
5 TABLET ORAL 3 TIMES DAILY PRN
Qty: 90 TABLET | Refills: 0 | Status: SHIPPED | OUTPATIENT
Start: 2023-03-13 | End: 2023-04-12

## 2023-03-13 RX ORDER — GABAPENTIN 600 MG/1
600 TABLET ORAL 3 TIMES DAILY
Qty: 90 TABLET | Refills: 0 | Status: SHIPPED | OUTPATIENT
Start: 2023-03-13 | End: 2023-04-12

## 2023-03-13 ASSESSMENT — ENCOUNTER SYMPTOMS
NAUSEA: 0
CONSTIPATION: 0
BACK PAIN: 1
DIARRHEA: 0
SHORTNESS OF BREATH: 0

## 2023-03-13 NOTE — PROGRESS NOTES
Jennifer Puga  ([id number])    3/13/2023    Subjective:     Jennifer Puga is 74 y.o. female who complains today of:    Chief Complaint   Patient presents with    Back Pain     Lower back pain      Last seen by me 12/19/22: She is accompanied by her caregiver Maddy Nevarez whom she permits to be present during the history and exam. Bilateral SI joint inj on 1/10/23 provided greater than 50% pain relief. EMG B UE done, reviewed below. Bilateral carpal tunnel inj on 1/16/23 provided greater than 50% pain relief. Had spine surgery with Dr Shannan Phelan surgery at [address] [place] [address] on 2/6/23 for L2-5 decompression complicated by seroma s/p I&D post op infection on 2/17/23 sp lumbar wound exploration, irrigation debridement, closure with general surgery, on oral Cipro. Didn't see ortho hand for carpal tunnel syndrome, she is not interested in carpal tunnel release, she is advised about permanent median nerve injury with permanent numbness/tingling and hand weakness paralysis. Didn't do cervical medial branch blocks. Sees ? Dr Casey Bingham. Has persistent neck pain, has fusion C5-7. She would like to see Neurology for evaluation for Charcot Evelyn Tooth. Follows with ID. No other tests therapy or updates from other physicians, no ER visits. OxyContin 10 mg BID, Oxycodone 5 mg TID prn, Gabapentin 600 mg TID, Tizanidine 4 mg BID, and Venlafaxine 150 mg daily help with remaining functional with chores, personal hygiene, remaining compliant with home exercise program, maintaining her quality of life, and performing activities of daily living. She obtains greater than 50% pain relief without any significant side effects. She is clear to avoid driving or operating heavy machinery or to perform any activities where she may harm herself or others while on pain medications. Neck pain is a 4/10. Gets to a 6/10. Her pain is located in both sides of the neck, left worse than right, she has pain into her left arm.  It has been a constant ache for over 22 years. Neck pain is from a work-related injury. Worse with turning her neck. Better with rest and pain medicine. She has a history of anterior cervical discectomy and fusion C5-7. There are no other associated symptoms or contextual factors. She denies any new weakness, saddle anesthesia, bowel or bladder dysfunction, or falls. Low back pain is a 6/10. Gets to a 9/10. It is located in both sides of her low back and buttock and into both legs. Legs hurt more than back. It has been a constant ache for over 1 year. Her pain is worse with bending and walking. Her pain is better with rest and sitting. There are no other associated symptoms or contextual factors. She denies any classic radicular symptoms, new weakness, saddle anesthesia, bowel or bladder dysfunction, or falls. Allergies:  Latex; 2,4-d dimethylamine (amisol); Mcdonald-2 inhibitors; Iodides; Nalidixic acid; Nsaids;  Shellfish allergy; and Statins    Past Medical History:   Diagnosis Date    Cancer ([facility] [address]     SKIN    Chronic pain     COPD (chronic obstructive pulmonary disease) ([facility])     Depression     Depression     Diabetes mellitus ([facility])     Hypertension     Neuropathy     Osteoarthritis     Sleep apnea     Urinary incontinence      Past Surgical History:   Procedure Laterality Date    JOINT REPLACEMENT      SPINE SURGERY       Family History   Problem Relation Age of Onset    Arthritis Mother     Hypertension Mother     Arthritis Father      Social History     Socioeconomic History    Marital status:      Spouse name: Not on file    Number of children: Not on file    Years of education: Not on file    Highest education level: Not on file   Occupational History    Not on file   Tobacco Use    Smoking status: Former     Packs/day: 1.00     Years: 15.00     Pack years: 15.00     Types: Cigarettes     Quit date: 6/15/2011     Years since quittin.7    Smokeless tobacco: Never   Substance and Sexual Activity    Alcohol use: Never    Drug use: Never    Sexual activity: Not on file   Other Topics Concern    Not on file   Social History Narrative    Not on file     Social Determinants of Health     Financial Resource Strain: Not on file   Food Insecurity: Not on file   Transportation Needs: Not on file   Physical Activity: Not on file   Stress: Not on file   Social Connections: Not on file   Intimate Partner Violence: Not on file   Housing Stability: Not on file       Current Outpatient Medications on File Prior to Visit   Medication Sig Dispense Refill    oxyCODONE-acetaminophen (PERCOCET) 5-325 MG per tablet Take 1 tablet by mouth 5 (five) times a day for 7 days. Max Daily Amount: 5 tablets 35 tablet 0    etodolac (LODINE) 400 MG tablet Take 1 tablet by mouth 2 times daily 60 tablet 2    omeprazole (PRILOSEC) 40 MG delayed release capsule Take 1 capsule by mouth daily 90 capsule 0    naloxone 4 MG/0.1ML LIQD nasal spray 1 spray by Nasal route as needed for Opioid Reversal 1 each 0    bumetanide (BUMEX) 1 MG tablet Take 1 mg by mouth daily      dilTIAZem (CARDIZEM CD) 240 MG extended release capsule       insulin glargine (LANTUS;BASAGLAR) 100 UNIT/ML injection pen Inject into the skin      insulin lispro, 1 Unit Dial, 100 UNIT/ML SOPN Inject into the skin      losartan (COZAAR) 50 MG tablet Take 50 mg by mouth 2 times daily       SITagliptin (JANUVIA) 100 MG tablet       lidocaine (LIDODERM) 5 % Place 1 patch onto the skin daily 30 patch 1    venlafaxine (EFFEXOR XR) 150 MG extended release capsule Take 1 capsule by mouth daily 30 capsule 2     Current Facility-Administered Medications on File Prior to Visit   Medication Dose Route Frequency Provider Last Rate Last Admin    iopamidol (ISOVUE-300) 61 % injection 0.5 mL  0.5 mL Other ONCE PRN Gaye Maldonado MD           Review of Systems   Constitutional:  Negative for fever. HENT:  Negative for hearing loss. Respiratory:  Negative for shortness of breath.     Gastrointestinal: Negative for constipation, diarrhea and nausea. Genitourinary:  Negative for difficulty urinating. Musculoskeletal:  Positive for back pain and neck pain. Skin:  Negative for rash. Neurological:  Negative for headaches. Hematological:  Does not bruise/bleed easily. Psychiatric/Behavioral:  Negative for sleep disturbance. Objective:     Vitals:  /72 (Site: Left Upper Arm)   Temp 97.7 °F (36.5 °C) (Temporal)   Ht 5' (1.524 m)   Wt 240 lb (108.9 kg)   BMI 46.87 kg/m² Pain Score:   6      Exam performed under Coronavirus precautions  Gen: No acute distress  Neck: Grossly symmetric without any significant thyromegaly or masses appreciated. Eyes: No scleral icterus or lid lag appreciated bilaterally. Irises without gross defects bilaterally. HEENT: Hearing impaired bilaterally. Normocephalic, external ears and visible portions of nose and mouth atraumatic. Lymph: No gross neck or axillary lymphadenopathy  Cardio: No significant lower extremity edema, pulses intact without significant digit ischemia. Abd: No gross masses or large hernias appreciated. Skin: Visualized skin without any dermatomal rashes or sores. Palpation free of any tightening or subcutaneous nodules. MSK: Gait deferred, in manual wheelchair. No significant upper limb digit ischemia appreciated. Psych: Pleasant and cooperative with the history and exam. Mood and Affect normal. Appropriately dressed with good eye contact. Judgement and insight normal. Recent and remote memory intact. Alert and Oriented x3. Neuro: Cranial nerves II-XII grossly intact. No significant pathologic reflexes appreciated.       Manual wheelchair    Sensation intact in both arms except for bilateral C6 paresthesias left worse than right  Reflexes and strength functional for arm use, no abnormal reflexes appreciated on exam today  Strength greater than 3/5 in both arms  Spurling's negative on exam today    +median nerve paresthesias bilaterally with positive Tinel's bilaterally    Sensation grossly intact in both legs except for non dermatomal bilateral leg paresthesias. Reflexes and strength functional for ambulation, no abnormal reflexes appreciated on exam today  Strength greater than 3/5 bilateral legs  Straight leg raise negative bilaterally. Less tender bilateral lumbar paraspinal muscles        Outside record review:  EMG B UE 1/13/23: This study is abnormal: There is current electrodiagnostic evidence for a bilateral median mononeuropathy at the wrist consistent with a clinical diagnosis of Bilateral carpal tunnel syndrome. This is moderate in degree by electrical criteria bilaterally. The left side is worse than the right side. There is sensory and motor nerve involvement bilaterally. There is no clear active denervation on electromyography bilaterally. There is no current electrodiagnostic evidence for an active bilateral cervical motor radiculopathy or generalized large fiber sensorimotor peripheral polyneuropathy. XR LS Spine flex ext 8/1/22: L4/5 anterolisthesis without any instability, no change with flexion extension  EMG B LE 8/24/22: This study is limited, but normal. There is no current electrodiagnostic evidence for an active bilateral lumbosacral motor radiculopathy. Although limited, there is no clear evidence for a generalized large fiber sensorimotor peripheral polyneuropathy. MRI LS Spine 7/22/22: retrolisthesis L1/2, anterolisthesi L3/4, anterolisthesis L4/5. Insufficiency fracture L4 anteriorly. degenerative disc disease and facet arthropathy. T12/L1 normal canal and foramen. L1/2 up to severe canal stenosis, mild bilateral foramen narrowing. L2/3 up to severe canal stenosis, mild bilateral foramen narrowing. L3/4 up to severe canal stenosis, up to moderate bilateral foramen narrowing. L4/5 up to moderate canal stenosis, moderate right and up to moderate left foramen narrowing.  L5/S1 normal canal stenosis, severe bilateral foramen narrowing. XR LS Spine 3/29/22: no fracture, degenerative disc disease and facet arthropathy. MRI C Spine 3/4/22: extensive postoperative changes as detailed above. canal stenosis is worst C3/4 moderate, moderate foraminal stenosis Bilateral C3/4 and left C5/6. C5/6 normal canal due to discectomy and fusion, moderate left up to moderate right foramen stenosis. C6/7 mild canal stenosis, fused endplates, mild bilateral foramen narrow. C7/T1 normal canal and foramen. XR C Spine 9/30/21: anterior fusion C5-7. Anterolisthesis C2/3, degenerative disc disease and C3/4, vertebral degenerative changes multiple levels. EMG B UE 1/31/20: This study is abnormal. There is current electrodiagnostic evidence for bilateral median mononeuropathy at the wrist consistent with a clinical diagnosis of Bilateral carpal tunnel syndrome. This is moderate in severity by electrical criteria bilaterally. The left is slightly worse than the right. There is sensory and motor nerve involvement bilaterally. There is no active denervation on electromyography bilaterally. There is no current evidence for an active cervical motor radiculopathy. There are mild chronic changes in a C7 distribution bilaterally that are likely related to her cervical spine pathology prior to cervical spine surgery. There is no current evidence for a generalized large fiber sensorimotor peripheral polyneuropathy. Labs 4/8/21:   Platelet 034 normal  Creatinine 0.67 normal     UDS 2/9/15: free of illicits, consistent with Gabapentin and Percocet. Opioid risk tool score 4, moderate risk  Assessment:      Diagnosis Orders   1. Lumbosacral spondylosis without myelopathy  Urine Drug Screen    oxyCODONE (OXYCONTIN) 10 MG extended release tablet    oxyCODONE (ROXICODONE) 5 MG immediate release tablet    gabapentin (NEURONTIN) 600 MG tablet      2.  History of lumbosacral spine surgery  oxyCODONE (OXYCONTIN) 10 MG extended release tablet    oxyCODONE (ROXICODONE) 5 MG immediate release tablet      3. Chronic, continuous use of opioids  oxyCODONE (OXYCONTIN) 10 MG extended release tablet    oxyCODONE (ROXICODONE) 5 MG immediate release tablet      4. Encounter for monitoring opioid maintenance therapy  oxyCODONE (OXYCONTIN) 10 MG extended release tablet    oxyCODONE (ROXICODONE) 5 MG immediate release tablet        +squamous cell carcinoma of skin, diabetes mellitus, fatty liver, tumor base of right lung, Depression   Plan:     Periodic Controlled Substance Monitoring: Possible medication side effects, risk of tolerance/dependence & alternative treatments discussed., No signs of potential drug abuse or diversion identified. , Assessed functional status., Obtaining appropriate analgesic effect of treatment. , Random urine drug screen sent today. Jennifer Benton MD)    Orders Placed This Encounter   Medications    oxyCODONE (OXYCONTIN) 10 MG extended release tablet     Sig: Take 1 tablet by mouth 2 times daily for 30 days. Max Daily Amount: 20 mg     Dispense:  60 tablet     Refill:  0     Reduce doses taken as pain becomes manageable    oxyCODONE (ROXICODONE) 5 MG immediate release tablet     Sig: Take 1 tablet by mouth 3 times daily as needed for Pain for up to 30 days. Max Daily Amount: 15 mg     Dispense:  90 tablet     Refill:  0     Reduce doses taken as pain becomes manageable    gabapentin (NEURONTIN) 600 MG tablet     Sig: Take 1 tablet by mouth 3 times daily for 30 days. Dispense:  90 tablet     Refill:  0           Orders Placed This Encounter   Procedures    Urine Drug Screen         -Continue therapy as recommended by  Neurosurgery  -Please call 911 or go to ER for any choking, difficulty chewing or swallowing, fever, chills, difficulty speaking, or any other concerning symptoms. She declines referral to ENT. -Advised to follow up with Dr Sid Knight for imaging of her lumbar spine as recommended.  Call 911 or go to ER for any leg weakness, difficulty going to bathroom, bowel bladder dysfunction, or any other concerning symptoms. She expresses complete understanding and agreement. -Reviewed EMG B UE above, all questions answered  -Encourage evaluation with Ortho hand. Dr Claudia Koroma for carpal tunnel syndrome. Discussed the  risks of permanent median nerve injury including paralysis permanent numbness and tingling and loss of use of hands, she expresses complete understanding and agreement.  -Advised to discontinue Tizanidine 4 mg BID due to elevated opioid dose  -Advised to discontinue Etodolac 400 mg BID due to swelling  -Continue Gabapentin 600 mg TID #90 no ref start 3/13/2023 Advised to watch for swelling.  -Continue Venalafxine 150 mg from other providers  -Increased pain after complicated multilevel lumbar spine operation and decompression  -Start OxyContin 10 mg BID #60 no ref start 3/13-4/12/23.   -Start Oxycodone 5 mg TID prn #90 no ref 3/13/-4/12/23. [place] reviewed on 3/13/2023   -Naloxone sent on 11/22/22. MME 60   -Will hold on interventions at this time    The patient is currently taking more than 50 mg of oral morphine equivalent (OME) opioid per day. She requires this elevated dose of opioid pain medication for her chronic pain condition in order to function and perform activities of daily living. Attempts to lower the dose of opioid pain medication below 50 mg OME continue to be unsuccessful.     Analgesia -Yes, the patient is obtaining pain relief from opioids  ADLs - Yes, the patient is able to perform activities of daily living by using opioids  Adverse side effects - No, the patient is not experiencing any intolerable side effects   Aberrant Behaviors - No use of opioids in ways other than intended or prescribed are noted today         Controlled Substance Monitoring:    Acute and Chronic Pain Monitoring:   RX Monitoring 3/13/2023   Periodic Controlled Substance Monitoring Possible medication side effects, risk of tolerance/dependence & alternative treatments discussed. ;No signs of potential drug abuse or diversion identified. ;Assessed functional status. ;Obtaining appropriate analgesic effect of treatment. ;Random urine drug screen sent today. Chronic Pain > 50 MEDD Re-evaluated the status of the patient's underlying condition causing pain. ;Considered consultation with a specialist.;Obtained or confirmed \"Consent for Opioid Use\" on file. Discussed the risks, side effects, and symptoms that would warrant urgent or emergent physician evaluation of all medications prescribed today. Discussed the risks of the above recommended procedures including but not limited to bleeding, infection, worsened pain, damage to surrounding structures, side effects, toxicity, allergic reactions to medications used, immune and stress-response dysfunction, fat necrosis, skin pigmentation changes, blood sugar elevation, headache, vision changes, need for surgery, as well as catastrophic injury such as vision loss, paralysis, stroke, spinal cord and/or plexus infarction or injury, intrathecal injection, spinal cord puncture, arachnoiditis, discitis, bowel or bladder incontinence, ventilator dependence, loss of use of the arms and/or legs, and death. Discussed off-label use of corticosteroids and how the Food and Drug Administration (FDA) has not approved corticosteroids for epidural use. Discussed the risks, benefits, alternative procedures, and alternatives to the procedure including no procedure at all. Discussed that we cannot undo any permanent neurologic damage or change the course of any underlying disease. The patient appears to be a good candidate for the above recommended procedures, but no guarantees expressed or implied are given regarding the outcome of any procedure. After thorough discussion, patient expressed understanding and willingness to proceed.     Provided education and counseling regarding the diagnosis, prognosis, and treatment options. All questions were answered. Encouraged her to follow-up with her primary care physician and/or specialists as required for her overall health and management of her comorbidities as well as any new positive symptoms mentioned in review of systems above. Care was provided within the definitions and limitations of our specialty practice. Encouraged lifestyle interventions including healthy habits, lifestyle changes, regular aerobic exercise and appropriate weight maintenance as advised by their primary care physician or cardiovascular health provider. Discussed well care and disease prevention/maintenance. Anatomic spine model was used to illustrate pathology.     All recommendations for therapy are provided to improve function with activities of daily living, decrease pain, and help develop an exercise program. All recommendations for therapeutic injections are meant to help decrease pain, improve function with activities of daily living, maintain compliance with home exercise program, improve quality of life, and decrease reliance upon oral medications.     Encouraged compliance with her home exercise program. Recommended compliance with physical therapy program as outlined above.     Discussed the elevated risks of excessive sedation while on pain medications. Advised her against driving or operating heavy machinery or performing any activities where she may harm herself or others while on pain medications. Particular caution was emphasized especially during dose adjustments and medication changes.     Discussed the risks of temporary disability, permanent disability, morbidity, and mortality with poorly-managed or undiagnosed medical conditions and comorbidities. Emphasized the importance of timely medical evaluation and treatment as previously recommended by us or other medical professionals. Risks of not pursing these recommendations were emphasized. The patient was offered a treatment at our facility.  The physician and patient have discussed in detail the risk of exposure to and/or potential harm posed by the COVID-19 virus with having office visits and procedures at this time versus the risk of delaying the visits and procedures. It is not possible to know either the risk of delaying the visits or procedure or chance of getting an infection with perfect accuracy, but a joint decision was made between the patient and the physician to proceed at this time with the scheduled visits and procedures. Advised her that any lab testing, imaging, or other diagnostic test results are best discussed in person in the office so that we can provide a clear explanation of their significance and best treatment based upon these results. It is her responsibility to make and keep a follow up appointment to discuss these test results in person to discuss the significance of the findings and appropriate follow-up steps. She expressed complete understanding and agreement with the entire plan as outlined above. Portions of this note may have been typed, auto-populated, dictated or transcribed by voice recognition resulting in errors, omissions, or close substitutions which may be missed despite careful proofreading. Please contact the author for any questions or concerns. Follow up:  Return in about 1 month (around 4/13/2023) for reassessment of pain and symptoms.     Juan M Oden MD

## 2023-03-13 NOTE — PATIENT INSTRUCTIONS
Please call 911 or go to ER for any choking, difficulty chewing or swallowing, fever, chills, difficulty speaking, or any other concerning symptoms.

## 2023-03-23 ENCOUNTER — TELEPHONE (OUTPATIENT)
Dept: PRIMARY CARE | Facility: CLINIC | Age: 68
End: 2023-03-23
Payer: COMMERCIAL

## 2023-03-23 NOTE — TELEPHONE ENCOUNTER
Herlinda with [facility] called  Requesting PCP to follow patient after discharge   Please assist

## 2023-03-28 ENCOUNTER — PATIENT OUTREACH (OUTPATIENT)
Dept: PRIMARY CARE | Facility: CLINIC | Age: 68
End: 2023-03-28
Payer: COMMERCIAL

## 2023-03-28 DIAGNOSIS — T81.31XS WOUND DEHISCENCE, SURGICAL, SEQUELA: ICD-10-CM

## 2023-03-28 RX ORDER — ACETAMINOPHEN 500 MG
TABLET ORAL
COMMUNITY
Start: 2022-07-13

## 2023-03-28 RX ORDER — POTASSIUM CHLORIDE 1500 MG/1
1 TABLET, EXTENDED RELEASE ORAL DAILY
COMMUNITY
End: 2023-06-01 | Stop reason: SDUPTHER

## 2023-03-28 RX ORDER — NALOXONE HYDROCHLORIDE 4 MG/.1ML
SPRAY NASAL
COMMUNITY
Start: 2022-11-22

## 2023-03-28 RX ORDER — GABAPENTIN 600 MG/1
TABLET ORAL
COMMUNITY

## 2023-03-28 RX ORDER — LOSARTAN POTASSIUM 50 MG/1
50 TABLET ORAL 2 TIMES DAILY
COMMUNITY
End: 2023-06-01 | Stop reason: SDUPTHER

## 2023-03-28 RX ORDER — DILTIAZEM HYDROCHLORIDE 240 MG/1
1 CAPSULE, COATED, EXTENDED RELEASE ORAL DAILY
COMMUNITY
Start: 2023-02-22 | End: 2023-06-01 | Stop reason: SDUPTHER

## 2023-03-28 RX ORDER — CYANOCOBALAMIN 1000 UG/ML
INJECTION, SOLUTION INTRAMUSCULAR; SUBCUTANEOUS
COMMUNITY
Start: 2020-11-25 | End: 2023-08-02 | Stop reason: ALTCHOICE

## 2023-03-28 RX ORDER — OXYCODONE HYDROCHLORIDE 10 MG/1
TABLET, FILM COATED, EXTENDED RELEASE ORAL
COMMUNITY
Start: 2023-03-17 | End: 2023-04-03 | Stop reason: ALTCHOICE

## 2023-03-28 RX ORDER — MOMETASONE FUROATE AND FORMOTEROL FUMARATE DIHYDRATE 200; 5 UG/1; UG/1
AEROSOL RESPIRATORY (INHALATION)
COMMUNITY
Start: 2023-02-01 | End: 2023-10-30 | Stop reason: ALTCHOICE

## 2023-03-28 RX ORDER — OMEPRAZOLE 40 MG/1
40 CAPSULE, DELAYED RELEASE ORAL DAILY
COMMUNITY
End: 2024-02-12 | Stop reason: SDUPTHER

## 2023-03-28 RX ORDER — ASPIRIN 81 MG/1
TABLET ORAL
COMMUNITY
End: 2023-06-01 | Stop reason: SDUPTHER

## 2023-03-28 RX ORDER — INSULIN LISPRO 100 [IU]/ML
INJECTION, SOLUTION INTRAVENOUS; SUBCUTANEOUS
COMMUNITY
End: 2023-06-01 | Stop reason: SDUPTHER

## 2023-03-28 RX ORDER — INSULIN GLARGINE 100 [IU]/ML
33 INJECTION, SOLUTION SUBCUTANEOUS NIGHTLY
COMMUNITY
End: 2023-06-01 | Stop reason: SDUPTHER

## 2023-03-28 RX ORDER — IBUPROFEN 100 MG/5ML
1 SUSPENSION, ORAL (FINAL DOSE FORM) ORAL DAILY
COMMUNITY
Start: 2022-01-10 | End: 2023-10-30 | Stop reason: ALTCHOICE

## 2023-03-28 RX ORDER — OXYCODONE HYDROCHLORIDE 5 MG/1
TABLET ORAL
COMMUNITY
Start: 2023-03-16

## 2023-03-28 RX ORDER — SITAGLIPTIN 100 MG/1
100 TABLET, FILM COATED ORAL DAILY
COMMUNITY
End: 2023-06-01 | Stop reason: SDUPTHER

## 2023-03-28 RX ORDER — VITAMIN E (DL,TOCOPHERYL ACET) 90 MG
CAPSULE ORAL
COMMUNITY
Start: 2022-01-10

## 2023-03-28 RX ORDER — CEFDINIR 300 MG/1
300 CAPSULE ORAL 2 TIMES DAILY
Qty: 60 CAPSULE | Refills: 0 | COMMUNITY
Start: 2023-03-27 | End: 2023-04-26

## 2023-03-28 RX ORDER — MONTELUKAST SODIUM 10 MG/1
10 TABLET ORAL DAILY
COMMUNITY
End: 2023-06-01 | Stop reason: SDUPTHER

## 2023-03-28 RX ORDER — VENLAFAXINE HYDROCHLORIDE 150 MG/1
CAPSULE, EXTENDED RELEASE ORAL
COMMUNITY
End: 2023-07-03 | Stop reason: SDUPTHER

## 2023-03-28 RX ORDER — CALCIUM CARBONATE/VITAMIN D3 500-10/5ML
1 LIQUID (ML) ORAL DAILY
COMMUNITY
Start: 2021-03-23

## 2023-03-28 RX ORDER — TIZANIDINE HYDROCHLORIDE 4 MG/1
1 CAPSULE, GELATIN COATED ORAL NIGHTLY
COMMUNITY

## 2023-03-28 RX ORDER — LIDOCAINE 50 MG/G
PATCH TOPICAL
COMMUNITY
Start: 2021-06-15 | End: 2024-03-28 | Stop reason: WASHOUT

## 2023-03-28 RX ORDER — OXYCODONE AND ACETAMINOPHEN 5; 325 MG/1; MG/1
TABLET ORAL
COMMUNITY
Start: 2023-03-07 | End: 2023-04-03 | Stop reason: ALTCHOICE

## 2023-03-28 NOTE — PROGRESS NOTES
Timeline:  2/6/23-2/8/23 L2-S1 laminectomy with bilateral foraminotomies   Dr. Bertrand (neurosurgeon)  2/8/23-2/16/23 [facility]  2/16/23-2/23/23 [facility] Elective Lumbar Wound Exploration  2/23/23-3/1/23 [facility] (left [facility]MA)  3/15/23-3/18/23 [facility]  3/18/23-3/19/23 [facility]  3/19/23-3/27/23 current admission     DISCHARGE FACILITY:  [facility]  DISCHARGE DIAGNOSIS:  ADMISSION DATE: 3/19/23  DISCHARGE DATE: 3/27/23    Hospital Course: The patient was electively admitted to the [facility]. The patient was then transferred up to 60 003/H060-03 hospital room for postoperative management. Patient was fitted with fitted with sequential compression devices and used Heparin for DVT prophylaxis. ID was consulted and recommended PO antibiotics upon discharge until 4/26/23. Patinet was hypoxic on and off desaturation study was performed. Patient didn't needed/qualify for home O2 therapy. Patient was voiding without any discomfort. LLE pain was much improved. Patient was ambulating. Patient's pain was well controlled with Oral Pain Medications and I.V. Pain Medications. Physical Therapy was started. Patient progressed satisfactorily through physical therapy until discharge. Based upon the appropriate milestones the patient met during the hospital course, Physical Therapy recommended patient be discharged to Home with Home PT The patient was discharged on 3/27/23 No surgery date entered in stable condition. Complete and comprehensive discharge instructions were provided to the patient as well as necessary prescriptions. The patient had no further questions and was advised to call with any questions, concerns, or problems.     Assessment: Lumbar wound dehiscence-POA     DIET: Resume pre-hospital diet Activity: -No heavy lifting greater than 8-10 pounds. -No vigorous activity. -Do not try to do too much too early. Use your common sense. Again, walking is the best  activity, and we encourage you to walk. -Ambulatory aides: Walker -Rehabilitation Precautions / Activity Restrictions: spine precautions. -Out of bed with assistance. Full weight bearing. -Out of bed for all meals. Ambulate patient 3-5 times daily. -PT/OT eval AND tx.     Wound Care: Keep area(s) clean and dry. Clean with betadine swab x 2 and dressed with sterile gauze daily for 7 days . Do not submerge wound(s) in standing water for 6-8 weeks after surgery (no tub bathing, swimming, or hot tubs). If you have sutures/staples, please cover the incision area with Saran Wrap or something similar, and tape when bathing/showering. Please visually inspect your wound(s) at least once daily. If the wound(s) are in a difficult to see location, please use a mirror or have someone else assist with visual inspection. Return sooner or notify Surgeon's office if wound(s) or surrounding area have increased swelling, pain, warmth, redness, or drainage that is thick, yellow and/or green. Do NOT apply ointment or lotion to the incision, such as Neosporin, Bacitracin, or any other healing ointments.    Date Time Provider Location Dept Phone   04/27/2023 9:00 AM MARILUZ TAFOYA [facility] [phone]   Medications  Medications reviewed with patient/caregiver?: Yes (3/28/2023  1:42 PM)  Is the patient having any side effects they believe may be caused by any medication additions or changes?: No (3/28/2023  1:42 PM)  Does the patient have all medications ordered at discharge?: Yes (3/28/2023  1:42 PM)  Care Management Interventions: No intervention needed (3/28/2023  1:42 PM)  Is the patient taking all medications as directed (includes completed medication regime)?: Yes (3/28/2023  1:42 PM)    Appointments  Does the patient have a primary care provider?: Yes (3/28/2023  1:42 PM)  Care Management Interventions: Verified appointment date/time/provider (3/28/2023  1:42 PM)  Has the patient kept scheduled appointments due by today?: Yes  (3/28/2023  1:42 PM)  Care Management Interventions: Advised patient to keep appointment (3/28/2023  1:42 PM)    Self Management  What is the home health agency?: Agency [facility] - [phone] (3/28/2023  1:42 PM)  Has home health visited the patient within 72 hours of discharge?: Yes (3/28/2023  1:42 PM)    Patient Teaching  Does the patient have access to their discharge instructions?: Yes (3/28/2023  1:42 PM)  Care Management Interventions: Reviewed instructions with patient (3/28/2023  1:42 PM)  What is the patient's perception of their health status since discharge?: Improving (3/28/2023  1:42 PM)  Is the patient/caregiver able to teach back the hierarchy of who to call/visit for symptoms/problems? PCP, Specialist, Home Health nurse, Urgent Care, ED, 911: Yes (3/28/2023  1:42 PM)

## 2023-04-03 ENCOUNTER — TELEMEDICINE (OUTPATIENT)
Dept: PRIMARY CARE | Facility: CLINIC | Age: 68
End: 2023-04-03
Payer: MEDICARE

## 2023-04-03 VITALS — TEMPERATURE: 97.7 F | OXYGEN SATURATION: 93 % | HEART RATE: 99 BPM

## 2023-04-03 DIAGNOSIS — K22.4 ESOPHAGEAL DYSMOTILITY: ICD-10-CM

## 2023-04-03 DIAGNOSIS — D64.9 ANEMIA, UNSPECIFIED TYPE: ICD-10-CM

## 2023-04-03 DIAGNOSIS — T81.31XS WOUND DEHISCENCE, SURGICAL, SEQUELA: ICD-10-CM

## 2023-04-03 DIAGNOSIS — Z98.890 S/P LAMINECTOMY: ICD-10-CM

## 2023-04-03 DIAGNOSIS — Z76.89 ENCOUNTER FOR SUPPORT AND COORDINATION OF TRANSITION OF CARE: ICD-10-CM

## 2023-04-03 DIAGNOSIS — R93.89 ABNORMAL CHEST X-RAY: Primary | ICD-10-CM

## 2023-04-03 PROBLEM — E87.6 LOW BLOOD POTASSIUM: Status: ACTIVE | Noted: 2023-04-03

## 2023-04-03 PROBLEM — R91.1 SOLITARY LUNG NODULE: Status: ACTIVE | Noted: 2023-04-03

## 2023-04-03 PROBLEM — R76.8 RHEUMATOID FACTOR POSITIVE: Status: ACTIVE | Noted: 2023-04-03

## 2023-04-03 PROBLEM — K76.0 FATTY LIVER: Status: ACTIVE | Noted: 2023-04-03

## 2023-04-03 PROBLEM — I89.0 LYMPHEDEMA OF BOTH LOWER EXTREMITIES: Status: ACTIVE | Noted: 2023-04-03

## 2023-04-03 PROBLEM — M76.61 CALCIFIC ACHILLES TENDINITIS OF RIGHT LOWER EXTREMITY: Status: ACTIVE | Noted: 2023-04-03

## 2023-04-03 PROBLEM — M54.9 BACK PAIN, CHRONIC: Status: ACTIVE | Noted: 2023-04-03

## 2023-04-03 PROBLEM — K59.09 OTHER CONSTIPATION: Status: ACTIVE | Noted: 2023-04-03

## 2023-04-03 PROBLEM — G62.9 NEUROPATHY: Status: ACTIVE | Noted: 2023-04-03

## 2023-04-03 PROBLEM — M48.061 LUMBAR STENOSIS: Status: ACTIVE | Noted: 2023-04-03

## 2023-04-03 PROBLEM — M25.50 JOINT PAIN: Status: ACTIVE | Noted: 2023-04-03

## 2023-04-03 PROBLEM — R41.89 BRAIN FOG: Status: ACTIVE | Noted: 2023-04-03

## 2023-04-03 PROBLEM — R60.9 EDEMA, PERIPHERAL: Status: ACTIVE | Noted: 2023-04-03

## 2023-04-03 PROBLEM — M25.561 RIGHT KNEE PAIN: Status: ACTIVE | Noted: 2023-04-03

## 2023-04-03 PROBLEM — F41.9 ANXIETY: Status: ACTIVE | Noted: 2023-04-03

## 2023-04-03 PROBLEM — R32 URINARY INCONTINENCE: Status: ACTIVE | Noted: 2023-04-03

## 2023-04-03 PROBLEM — R53.1 WEAKNESS: Status: ACTIVE | Noted: 2023-04-03

## 2023-04-03 PROBLEM — R94.31 ABNORMAL EKG: Status: ACTIVE | Noted: 2023-04-03

## 2023-04-03 PROBLEM — R06.02 SHORTNESS OF BREATH: Status: ACTIVE | Noted: 2023-04-03

## 2023-04-03 PROBLEM — R60.0 EDEMA, PERIPHERAL: Status: ACTIVE | Noted: 2023-04-03

## 2023-04-03 PROBLEM — E55.9 VITAMIN D DEFICIENCY: Status: ACTIVE | Noted: 2023-04-03

## 2023-04-03 PROBLEM — R20.0 NUMBNESS AND TINGLING IN BOTH HANDS: Status: ACTIVE | Noted: 2023-04-03

## 2023-04-03 PROBLEM — I51.9 MILD DIASTOLIC DYSFUNCTION: Status: ACTIVE | Noted: 2023-04-03

## 2023-04-03 PROBLEM — G89.29 BACK PAIN, CHRONIC: Status: ACTIVE | Noted: 2023-04-03

## 2023-04-03 PROBLEM — I10 HYPERTENSION: Status: ACTIVE | Noted: 2023-04-03

## 2023-04-03 PROBLEM — E11.9 DIABETES MELLITUS, TYPE 2 (MULTI): Status: ACTIVE | Noted: 2023-04-03

## 2023-04-03 PROBLEM — L82.1 SEBORRHEIC KERATOSES: Status: ACTIVE | Noted: 2023-04-03

## 2023-04-03 PROBLEM — I51.7 CARDIOMEGALY: Status: ACTIVE | Noted: 2023-04-03

## 2023-04-03 PROBLEM — U07.1 PNEUMONIA DUE TO COVID-19 VIRUS: Status: ACTIVE | Noted: 2023-04-03

## 2023-04-03 PROBLEM — U09.9 POST COVID-19 CONDITION, UNSPECIFIED: Status: ACTIVE | Noted: 2023-04-03

## 2023-04-03 PROBLEM — R51.9 HEADACHE: Status: ACTIVE | Noted: 2023-04-03

## 2023-04-03 PROBLEM — R20.2 NUMBNESS AND TINGLING IN BOTH HANDS: Status: ACTIVE | Noted: 2023-04-03

## 2023-04-03 PROBLEM — M94.0 COSTOCHONDRITIS: Status: ACTIVE | Noted: 2023-04-03

## 2023-04-03 PROBLEM — I20.89 ATYPICAL ANGINA (CMS-HCC): Status: ACTIVE | Noted: 2023-04-03

## 2023-04-03 PROBLEM — R06.09 DYSPNEA ON EXERTION: Status: ACTIVE | Noted: 2023-04-03

## 2023-04-03 PROBLEM — I25.10 ARTERIOSCLEROTIC CORONARY ARTERY DISEASE: Status: ACTIVE | Noted: 2023-04-03

## 2023-04-03 PROBLEM — R53.83 FATIGUE: Status: ACTIVE | Noted: 2023-04-03

## 2023-04-03 PROBLEM — K21.9 CHRONIC GERD: Status: ACTIVE | Noted: 2023-04-03

## 2023-04-03 PROBLEM — M65.28 CALCIFIC ACHILLES TENDINITIS OF RIGHT LOWER EXTREMITY: Status: ACTIVE | Noted: 2023-04-03

## 2023-04-03 PROBLEM — G44.52 NEW DAILY PERSISTENT HEADACHE: Status: ACTIVE | Noted: 2023-04-03

## 2023-04-03 PROBLEM — J34.2 DEVIATED NASAL SEPTUM: Status: ACTIVE | Noted: 2023-04-03

## 2023-04-03 PROBLEM — M19.90 OSTEOARTHRITIS: Status: ACTIVE | Noted: 2023-04-03

## 2023-04-03 PROBLEM — J32.9 CHRONIC SINUSITIS: Status: ACTIVE | Noted: 2023-04-03

## 2023-04-03 PROBLEM — R32 LOSS OF BLADDER CONTROL: Status: ACTIVE | Noted: 2023-04-03

## 2023-04-03 PROBLEM — E01.0 THYROMEGALY: Status: ACTIVE | Noted: 2023-04-03

## 2023-04-03 PROBLEM — B37.0 THRUSH, ORAL: Status: ACTIVE | Noted: 2023-04-03

## 2023-04-03 PROBLEM — E04.1 THYROID NODULE: Status: ACTIVE | Noted: 2023-04-03

## 2023-04-03 PROBLEM — E53.8 VITAMIN B12 DEFICIENCY: Status: ACTIVE | Noted: 2023-04-03

## 2023-04-03 PROBLEM — J45.909 ASTHMA, MODERATE (HHS-HCC): Status: ACTIVE | Noted: 2023-04-03

## 2023-04-03 PROBLEM — J86.9 EMPYEMA (MULTI): Status: ACTIVE | Noted: 2023-04-03

## 2023-04-03 PROBLEM — G47.33 OBSTRUCTIVE SLEEP APNEA: Status: ACTIVE | Noted: 2023-04-03

## 2023-04-03 PROBLEM — J30.9 ALLERGIC RHINITIS: Status: ACTIVE | Noted: 2023-04-03

## 2023-04-03 PROBLEM — M50.10 HERNIATION OF CERVICAL INTERVERTEBRAL DISC WITH RADICULOPATHY: Status: ACTIVE | Noted: 2023-04-03

## 2023-04-03 PROBLEM — R09.89 CHOKING IN ADULT: Status: ACTIVE | Noted: 2023-04-03

## 2023-04-03 PROBLEM — J12.82 PNEUMONIA DUE TO COVID-19 VIRUS: Status: ACTIVE | Noted: 2023-04-03

## 2023-04-03 PROCEDURE — 99496 TRANSJ CARE MGMT HIGH F2F 7D: CPT | Performed by: NURSE PRACTITIONER

## 2023-04-03 RX ORDER — CALCIUM CITRATE/VITAMIN D3 200MG-6.25
TABLET ORAL
COMMUNITY
End: 2023-06-30 | Stop reason: SDUPTHER

## 2023-04-03 ASSESSMENT — ENCOUNTER SYMPTOMS
DEPRESSION: 0
LOSS OF SENSATION IN FEET: 0
OCCASIONAL FEELINGS OF UNSTEADINESS: 1

## 2023-04-03 NOTE — PROGRESS NOTES
Problem List Items Addressed This Visit       Abnormal chest x-ray - Primary     3/20/23: There are symmetric bilateral perihilar heterogeneous   opacities, worse in the mid to lower lungs and most commonly secondary to   pulmonary edema.  There is a small right pleural effusion.  No   pneumothorax is identified.   6 week surveillance XR needed   Scheduled with pulm 23         Relevant Orders    XR chest 2 views    Encounter for support and coordination of transition of care     23-23 L2-S1 laminectomy with bilateral foraminotomies   Dr. Bertrand (neurosurgeon)  23-23 [facility]  23-23 [facility] Elective Lumbar Wound Exploration  23-3/1/23 [facility] (left [facility])  3/15/23-3/18/23 [facility]  3/18/23-3/19/23 [facility]  3/19/23-3/27/23 current admission     TCM fu with PCP within 90 day s         Esophageal dysmotility     23 MBS:  IMPRESSION:     1. NORMAL SWALLOWING WITHOUT EVIDENCE FOR ASPIRATION OR LARYNGEAL   PENETRATION.     2. ABSENCE OF PRIMARY AND SECONDARY PERISTALSIS RESULTING IN RETENTION   OF SEMISOLID MEDIA IN THE THORACIC ESOPHAGUS CONSISTENT WITH ESOPHAGEAL   DYSMOTILITY.  FURTHER EVALUATION MAY BE WARRANTED.     Fu with GI         Relevant Orders    Referral to Gastroenterology    Wound dehiscence, surgical, sequela     [facility]   Daughter changing dressing daily  Nurses 3 x weekly  Scheduled with ID 23          Other Visit Diagnoses       S/P laminectomy        Relevant Orders    Referral to Neurosurgery    Anemia, unspecified type        suspect dilutional  recheck CBCd            An interactive audio and video telecommunication system which permits real time communications between the patient (at the originating site) and provider (at the distant site) was utilized to provide this telehealth service.  Verbal consent was requested and obtained from the patient for this telehealth visit.     Patient: Kaylan MCKEON Amna  :  "1955  PCP: Radha Holt MD  MRN: [id number]  Program: No linked episodes     Kaylan Woo is a 67 y.o. female presenting today for follow-up after being discharged from the hospital 7 days ago. The main problem requiring admission was s/p L2-S1 laminectomy with bilateral foraminotomies with complication. The discharge summary and/or Transitional Care Management documentation was reviewed. Medication reconciliation was performed as indicated via the \"Agustin as Reviewed\" timestamp.     Kaylan Woo was contacted by Transitional Care Management services two days after her discharge. This encounter and supporting documentation was reviewed.    The complexity of medical decision making for this patient's transitional care is high.    She declined second debridement  Treated infection   Chest XR     Esoph dysmotility  Barium swallow     HHC [facility]   PT  OT  Skilled nurse  Wound care  - daughter daily   - nurses 3x weekly   No vac   No lines, drains     ID is following   Would like see  neurosurg Caleb Huber @ [place] for second opinion    Living home alone  Daughter lives close by  Son < 2 miles away  Not driving - homebound  Relies on assistance of others to leave home     Oxy IR 10 mg q4  5/10 with meds  8-9 wo    SPO2 80s-90s per her report   Pulm prefers > 92%  Now 96%    Moving bowels fine  No voiding issues  No recent fever     Review of Systems   All other systems reviewed and are negative.    Physical Exam  Gen: Alert, NAD  Respiratory:  resp effort NL  Neuro:  coordination intact   Mood: normal      Family History   Problem Relation Name Age of Onset    Alzheimer's disease Mother      Atrial fibrillation Mother      Lupus Mother      Arthritis Father      Other (rheumatic disease) Father      Charcot-Charisse-Tooth disease Sister         Medications  Medications reviewed with patient/caregiver?: Yes (3/28/2023  1:42 PM)  Is the patient having any side effects they believe may be caused by " any medication additions or changes?: No (3/28/2023  1:42 PM)  Does the patient have all medications ordered at discharge?: Yes (3/28/2023  1:42 PM)  Care Management Interventions: No intervention needed (3/28/2023  1:42 PM)  Is the patient taking all medications as directed (includes completed medication regime)?: Yes (3/28/2023  1:42 PM)    Appointments  Does the patient have a primary care provider?: Yes (3/28/2023  1:42 PM)  Care Management Interventions: Verified appointment date/time/provider (3/28/2023  1:42 PM)  Has the patient kept scheduled appointments due by today?: Yes (3/28/2023  1:42 PM)  Care Management Interventions: Advised patient to keep appointment (3/28/2023  1:42 PM)    Self Management  What is the home health agency?: [facility] - [phone] (3/28/2023  1:42 PM)  Has home health visited the patient within 72 hours of discharge?: Yes (3/28/2023  1:42 PM)    Patient Teaching  Does the patient have access to their discharge instructions?: Yes (3/28/2023  1:42 PM)  Care Management Interventions: Reviewed instructions with patient (3/28/2023  1:42 PM)  What is the patient's perception of their health status since discharge?: Improving (3/28/2023  1:42 PM)  Is the patient/caregiver able to teach back the hierarchy of who to call/visit for symptoms/problems? PCP, Specialist, Home Health nurse, Urgent Care, ED, 911: Yes (3/28/2023  1:42 PM)      No follow-ups on file.

## 2023-04-04 ENCOUNTER — TELEPHONE (OUTPATIENT)
Dept: PRIMARY CARE | Facility: CLINIC | Age: 68
End: 2023-04-04
Payer: COMMERCIAL

## 2023-04-05 ENCOUNTER — TELEPHONE (OUTPATIENT)
Dept: PAIN MANAGEMENT | Age: 68
End: 2023-04-05

## 2023-04-05 DIAGNOSIS — Z51.81 ENCOUNTER FOR MONITORING OPIOID MAINTENANCE THERAPY: ICD-10-CM

## 2023-04-05 DIAGNOSIS — M47.817 LUMBOSACRAL SPONDYLOSIS WITHOUT MYELOPATHY: Primary | ICD-10-CM

## 2023-04-05 DIAGNOSIS — Z79.891 ENCOUNTER FOR MONITORING OPIOID MAINTENANCE THERAPY: ICD-10-CM

## 2023-04-05 DIAGNOSIS — Z98.890 HISTORY OF LUMBOSACRAL SPINE SURGERY: ICD-10-CM

## 2023-04-05 PROBLEM — T81.31XS WOUND DEHISCENCE, SURGICAL, SEQUELA: Status: ACTIVE | Noted: 2023-04-05

## 2023-04-05 PROBLEM — R93.89 ABNORMAL CHEST X-RAY: Status: ACTIVE | Noted: 2023-04-05

## 2023-04-05 PROBLEM — K22.4 ESOPHAGEAL DYSMOTILITY: Status: ACTIVE | Noted: 2023-04-05

## 2023-04-05 PROBLEM — Z76.89 ENCOUNTER FOR SUPPORT AND COORDINATION OF TRANSITION OF CARE: Status: ACTIVE | Noted: 2023-04-05

## 2023-04-05 NOTE — TELEPHONE ENCOUNTER
Patient called stating that she is almost out of her oxycodone 5MG IR. She says that she went to the ER at the recommendation of her back surgeon. She was admitted to [address] [place] [address]. She ended ip with pnemonia and they also wanted to do another surgery on her back due to an infection but the patient declined because her oxygen was too low (70's). She says that while she was in the hospital they were not giving her the oxycodone 10mg ER. She says they were giving her the oxycodone IR 10MG every 3-4 hours. She says this is how she has been taking it since she got home. She says she had about #13 left which she says is enough for a day and half. She has not been taking the oxycodone ER, she has almost a whole bottle of that left. She is asking if Dr. Brayan Orozco will write her an Rx for oxycodone 5mg 2 every 4 hours?

## 2023-04-05 NOTE — PATIENT INSTRUCTIONS
Surveillance chest XR 5/1/2023  Fu with GI - esophageal dysmotility   Patient requesting second opinion from Dr Caleb Ngo @ [facility]  Fax labs to home care agency to draw   TCM fu with PCP within  90 days

## 2023-04-05 NOTE — ASSESSMENT & PLAN NOTE
2/2/23 MBS:  IMPRESSION:     1. NORMAL SWALLOWING WITHOUT EVIDENCE FOR ASPIRATION OR LARYNGEAL   PENETRATION.     2. ABSENCE OF PRIMARY AND SECONDARY PERISTALSIS RESULTING IN RETENTION   OF SEMISOLID MEDIA IN THE THORACIC ESOPHAGUS CONSISTENT WITH ESOPHAGEAL   DYSMOTILITY.  FURTHER EVALUATION MAY BE WARRANTED.     Fu with GI

## 2023-04-05 NOTE — ASSESSMENT & PLAN NOTE
2/6/23-2/8/23 L2-S1 laminectomy with bilateral foraminotomies   Dr. Bertrand (neurosurgeon)  2/8/23-2/16/23 [facility]  2/16/23-2/23/23 [facility] Elective Lumbar Wound Exploration  2/23/23-3/1/23 [facility] (left AMA)  3/15/23-3/18/23 [facility]  3/18/23-3/19/23 [facility]  3/19/23-3/27/23 current admission     TCM fu with PCP within 90 day s

## 2023-04-05 NOTE — ASSESSMENT & PLAN NOTE
3/20/23: There are symmetric bilateral perihilar heterogeneous   opacities, worse in the mid to lower lungs and most commonly secondary to   pulmonary edema.  There is a small right pleural effusion.  No   pneumothorax is identified.   6 week surveillance XR needed   Scheduled with pulm 5/31/23

## 2023-04-06 ENCOUNTER — TELEPHONE (OUTPATIENT)
Dept: PRIMARY CARE | Facility: CLINIC | Age: 68
End: 2023-04-06
Payer: COMMERCIAL

## 2023-04-06 DIAGNOSIS — R53.83 OTHER FATIGUE: ICD-10-CM

## 2023-04-06 DIAGNOSIS — E53.8 VITAMIN B12 DEFICIENCY: ICD-10-CM

## 2023-04-06 DIAGNOSIS — R76.8 RHEUMATOID FACTOR POSITIVE: ICD-10-CM

## 2023-04-06 DIAGNOSIS — I10 HYPERTENSION, UNSPECIFIED TYPE: Primary | ICD-10-CM

## 2023-04-06 DIAGNOSIS — E55.9 VITAMIN D DEFICIENCY: ICD-10-CM

## 2023-04-06 DIAGNOSIS — R53.1 WEAKNESS: ICD-10-CM

## 2023-04-06 DIAGNOSIS — F41.9 ANXIETY: ICD-10-CM

## 2023-04-06 DIAGNOSIS — E11.638: ICD-10-CM

## 2023-04-06 DIAGNOSIS — E87.6 LOW BLOOD POTASSIUM: ICD-10-CM

## 2023-04-06 RX ORDER — OXYCODONE HYDROCHLORIDE 10 MG/1
10 TABLET ORAL EVERY 6 HOURS PRN
Qty: 28 TABLET | Refills: 0 | Status: SHIPPED | OUTPATIENT
Start: 2023-04-06 | End: 2023-04-13

## 2023-04-06 NOTE — TELEPHONE ENCOUNTER
Pt states in the hospital she was taken off bumeanide 1mg and wants to go back on it for swelling in her feet.  She was also taken off lidocaine patch and told to only take ibuprofen because its safer for her, she wants to go back to lidocaine patches.  Please advise.

## 2023-04-06 NOTE — TELEPHONE ENCOUNTER
Please call patient and inform that per SM, oxycodone 10 mg Q6H has been sent to the pharmacy to use until her follow-up. Do not use OxyContin ER at this time. We will discuss further at next follow-up.

## 2023-04-07 LAB
BASOPHILS (10*3/UL) IN BLOOD BY AUTOMATED COUNT: 0.1 X10E9/L (ref 0–0.1)
BASOPHILS/100 LEUKOCYTES IN BLOOD BY AUTOMATED COUNT: 0.6 % (ref 0–2)
EOSINOPHILS (10*3/UL) IN BLOOD BY AUTOMATED COUNT: 0.32 X10E9/L (ref 0–0.7)
EOSINOPHILS/100 LEUKOCYTES IN BLOOD BY AUTOMATED COUNT: 2 % (ref 0–6)
ERYTHROCYTE DISTRIBUTION WIDTH (RATIO) BY AUTOMATED COUNT: 15.9 % (ref 11.5–14.5)
ERYTHROCYTE MEAN CORPUSCULAR HEMOGLOBIN CONCENTRATION (G/DL) BY AUTOMATED: 30.4 G/DL (ref 32–36)
ERYTHROCYTE MEAN CORPUSCULAR VOLUME (FL) BY AUTOMATED COUNT: 81 FL (ref 80–100)
ERYTHROCYTES (10*6/UL) IN BLOOD BY AUTOMATED COUNT: 3.66 X10E12/L (ref 4–5.2)
HEMATOCRIT (%) IN BLOOD BY AUTOMATED COUNT: 29.6 % (ref 36–46)
HEMOGLOBIN (G/DL) IN BLOOD: 9 G/DL (ref 12–16)
IMMATURE GRANULOCYTES/100 LEUKOCYTES IN BLOOD BY AUTOMATED COUNT: 0.4 % (ref 0–0.9)
LEUKOCYTES (10*3/UL) IN BLOOD BY AUTOMATED COUNT: 15.7 X10E9/L (ref 4.4–11.3)
LYMPHOCYTES (10*3/UL) IN BLOOD BY AUTOMATED COUNT: 2.11 X10E9/L (ref 1.2–4.8)
LYMPHOCYTES/100 LEUKOCYTES IN BLOOD BY AUTOMATED COUNT: 13.5 % (ref 13–44)
MONOCYTES (10*3/UL) IN BLOOD BY AUTOMATED COUNT: 1.01 X10E9/L (ref 0.1–1)
MONOCYTES/100 LEUKOCYTES IN BLOOD BY AUTOMATED COUNT: 6.4 % (ref 2–10)
NEUTROPHILS (10*3/UL) IN BLOOD BY AUTOMATED COUNT: 12.05 X10E9/L (ref 1.2–7.7)
NEUTROPHILS/100 LEUKOCYTES IN BLOOD BY AUTOMATED COUNT: 77.1 % (ref 40–80)
PLATELETS (10*3/UL) IN BLOOD AUTOMATED COUNT: 391 X10E9/L (ref 150–450)

## 2023-04-07 RX ORDER — BUMETANIDE 1 MG/1
1 TABLET ORAL DAILY
COMMUNITY
End: 2023-04-07 | Stop reason: SDUPTHER

## 2023-04-10 RX ORDER — BUMETANIDE 1 MG/1
1 TABLET ORAL DAILY
Qty: 90 TABLET | Refills: 3 | Status: SHIPPED | OUTPATIENT
Start: 2023-04-10 | End: 2023-06-01 | Stop reason: SDUPTHER

## 2023-04-11 ENCOUNTER — TELEPHONE (OUTPATIENT)
Dept: PRIMARY CARE | Facility: CLINIC | Age: 68
End: 2023-04-11
Payer: COMMERCIAL

## 2023-04-14 ENCOUNTER — TELEPHONE (OUTPATIENT)
Dept: PRIMARY CARE | Facility: CLINIC | Age: 68
End: 2023-04-14
Payer: COMMERCIAL

## 2023-04-21 DIAGNOSIS — M51.34 DEGENERATION OF THORACIC INTERVERTEBRAL DISC: ICD-10-CM

## 2023-04-21 DIAGNOSIS — M50.322 DEGENERATION OF C5-C6 INTERVERTEBRAL DISC: ICD-10-CM

## 2023-04-21 DIAGNOSIS — M47.817 LUMBOSACRAL SPONDYLOSIS WITHOUT MYELOPATHY: ICD-10-CM

## 2023-04-21 DIAGNOSIS — M47.812 CERVICAL SPONDYLOSIS WITHOUT MYELOPATHY: ICD-10-CM

## 2023-04-21 RX ORDER — GABAPENTIN 600 MG/1
600 TABLET ORAL 3 TIMES DAILY
Qty: 90 TABLET | Refills: 0 | Status: SHIPPED | OUTPATIENT
Start: 2023-04-21 | End: 2023-05-21

## 2023-04-21 RX ORDER — VENLAFAXINE HYDROCHLORIDE 150 MG/1
150 CAPSULE, EXTENDED RELEASE ORAL DAILY
Qty: 30 CAPSULE | Refills: 2 | Status: SHIPPED | OUTPATIENT
Start: 2023-04-21 | End: 2023-05-21

## 2023-04-21 RX ORDER — OMEPRAZOLE 40 MG/1
40 CAPSULE, DELAYED RELEASE ORAL DAILY
Qty: 90 CAPSULE | Refills: 0 | Status: SHIPPED | OUTPATIENT
Start: 2023-04-21

## 2023-04-21 RX ORDER — TIZANIDINE 4 MG/1
4 TABLET ORAL 2 TIMES DAILY PRN
Qty: 60 TABLET | Refills: 0 | Status: SHIPPED | OUTPATIENT
Start: 2023-04-21 | End: 2023-05-21

## 2023-04-21 NOTE — TELEPHONE ENCOUNTER
Requested Prescriptions     Pending Prescriptions Disp Refills    gabapentin (NEURONTIN) 600 MG tablet 90 tablet 0     Sig: Take 1 tablet by mouth 3 times daily for 30 days.     tiZANidine (ZANAFLEX) 4 MG tablet 60 tablet 0     Sig: Take 1 tablet by mouth 2 times daily as needed (pain spasms)       Patient last seen on:  04/12 PRISCILAZ  Date of last surgery:  N/A  Date of last refill:  GABAPENTIN 03/13/2023  TIZANIDINE: 11/2022  Pain level:  N/A  Patient complaining of:  PATIENT CALLED REQUESTING REFILL FOR GABAPENTIN AND TIZANIDINE  Future appts: 05/10/2022

## 2023-04-21 NOTE — TELEPHONE ENCOUNTER
Requested Prescriptions     Pending Prescriptions Disp Refills    venlafaxine (EFFEXOR XR) 150 MG extended release capsule 30 capsule 2     Sig: Take 1 capsule by mouth daily    omeprazole (PRILOSEC) 40 MG delayed release capsule 90 capsule 0     Sig: Take 1 capsule by mouth daily       Patient last seen on:  04/11/2023  Date of last surgery:  N/A  Date of last refill:  EFFEXOR: 01/23/2023  PRILOSEC: 11/25/2022  Pain level:  N/A  Patient complaining of:  PAT[facility]  Future appts: N/A

## 2023-04-25 ENCOUNTER — TELEPHONE (OUTPATIENT)
Dept: PAIN MANAGEMENT | Age: 68
End: 2023-04-25

## 2023-04-25 NOTE — TELEPHONE ENCOUNTER
I called and spoke with patient to advise per Chris Baeza CNP: \"Ok to continue the extra half tablet BID for the next few days. Hopefully you will feel better soon! \"

## 2023-04-25 NOTE — TELEPHONE ENCOUNTER
Ok to continue the extra half tablet BID for the next few days. Hopefully you will feel better soon!

## 2023-04-25 NOTE — TELEPHONE ENCOUNTER
Patient states she is having significant pain in her left lower back/into hip area. Pt states the past two days she has taken an extra half tablet in the morning and at night. Pt states it has helped but does not want to keep taking extra medication. Pt asks Dr. Vazquez Barillas to advise on what she should do? Pt asks if the pain will pass eventually and she should continue extra half tablet?

## 2023-04-27 ENCOUNTER — TELEPHONE (OUTPATIENT)
Dept: PRIMARY CARE | Facility: CLINIC | Age: 68
End: 2023-04-27
Payer: COMMERCIAL

## 2023-05-11 DIAGNOSIS — Z98.890 HISTORY OF LUMBOSACRAL SPINE SURGERY: ICD-10-CM

## 2023-05-11 DIAGNOSIS — M47.817 LUMBOSACRAL SPONDYLOSIS WITHOUT MYELOPATHY: ICD-10-CM

## 2023-05-11 DIAGNOSIS — F11.90 CHRONIC, CONTINUOUS USE OF OPIOIDS: ICD-10-CM

## 2023-05-11 DIAGNOSIS — Z51.81 ENCOUNTER FOR MONITORING OPIOID MAINTENANCE THERAPY: ICD-10-CM

## 2023-05-11 DIAGNOSIS — Z79.891 ENCOUNTER FOR MONITORING OPIOID MAINTENANCE THERAPY: ICD-10-CM

## 2023-05-11 RX ORDER — OXYCODONE HYDROCHLORIDE 5 MG/1
5 TABLET ORAL 3 TIMES DAILY PRN
Qty: 18 TABLET | Refills: 0 | Status: CANCELLED | OUTPATIENT
Start: 2023-05-11 | End: 2023-05-17

## 2023-05-11 NOTE — TELEPHONE ENCOUNTER
Pt states is requesting her prescription to be two 5mg tablets four times daily. Pt would like that so she can adjust dosing to more or less depending on how she is feeling. Pt states if Albania Krueger prefers to just send in the oxycodone 10mgs four times a day and she can discuss at appt.

## 2023-05-11 NOTE — TELEPHONE ENCOUNTER
Please call patient and ask if she is requesting the 10 mg dose and an additional RX of 5mg? It looks like she should be out of the 10 mg dose soon. We will continue the 10 mg 4 times a day until her follow-up, at which time she can discuss medications.

## 2023-05-11 NOTE — TELEPHONE ENCOUNTER
Requested Prescriptions     Pending Prescriptions Disp Refills    oxyCODONE (ROXICODONE) 5 MG immediate release tablet 18 tablet 0     Sig: Take 1 tablet by mouth 3 times daily as needed for Pain for up to 6 days. Max Daily Amount: 15 mg       Patient last seen on:  4/12/23  Date of last surgery: n/a  Date of last refill:  3/13/23  Pain level:  n/a  Patient complaining of:  Patient takes 10mg oxycodone every 6 hours. However, pt states she sometimes has to take an extra half tablet. Pt is also attempting to decrease as she does not want to take this much medication however right now it is not working out. Pt asks if Dr. Reynaldo Torres can send in a refill of the oxycodone 5mgs instead of the 10 so she does have to cut the pill herself.   Future appts: 5/1623

## 2023-05-12 RX ORDER — OXYCODONE HYDROCHLORIDE AND ACETAMINOPHEN 5; 325 MG/1; MG/1
2 TABLET ORAL EVERY 6 HOURS PRN
Qty: 32 TABLET | Refills: 0 | Status: SHIPPED | OUTPATIENT
Start: 2023-05-12 | End: 2023-05-16

## 2023-05-12 NOTE — TELEPHONE ENCOUNTER
Patient informed Patient tolerated procedure well. 1,750 mL drained from abdomen. Report called to floor RN.

## 2023-05-15 ENCOUNTER — TELEPHONE (OUTPATIENT)
Dept: PAIN MANAGEMENT | Age: 68
End: 2023-05-15

## 2023-05-15 NOTE — TELEPHONE ENCOUNTER
Having pain from her hip down into her knee. She was crying from the pain. Her son had to put her in a wheelchair to take her out for Mother's Day. Can you send her a prednisone mayelin to see if it helps her before she sees you tomorrow?

## 2023-05-16 ENCOUNTER — OFFICE VISIT (OUTPATIENT)
Dept: NEUROSURGERY | Age: 68
End: 2023-05-16
Payer: MEDICARE

## 2023-05-16 VITALS
HEIGHT: 60 IN | TEMPERATURE: 98.1 F | SYSTOLIC BLOOD PRESSURE: 134 MMHG | WEIGHT: 238 LBS | DIASTOLIC BLOOD PRESSURE: 74 MMHG | BODY MASS INDEX: 46.72 KG/M2

## 2023-05-16 DIAGNOSIS — M47.812 CERVICAL SPONDYLOSIS WITHOUT MYELOPATHY: ICD-10-CM

## 2023-05-16 DIAGNOSIS — Z51.81 ENCOUNTER FOR MONITORING OPIOID MAINTENANCE THERAPY: ICD-10-CM

## 2023-05-16 DIAGNOSIS — Z98.890 HISTORY OF LUMBOSACRAL SPINE SURGERY: ICD-10-CM

## 2023-05-16 DIAGNOSIS — F11.90 CHRONIC, CONTINUOUS USE OF OPIOIDS: ICD-10-CM

## 2023-05-16 DIAGNOSIS — M47.817 LUMBOSACRAL SPONDYLOSIS WITHOUT MYELOPATHY: ICD-10-CM

## 2023-05-16 DIAGNOSIS — Z79.891 ENCOUNTER FOR MONITORING OPIOID MAINTENANCE THERAPY: ICD-10-CM

## 2023-05-16 PROCEDURE — 1123F ACP DISCUSS/DSCN MKR DOCD: CPT | Performed by: NURSE PRACTITIONER

## 2023-05-16 PROCEDURE — 1090F PRES/ABSN URINE INCON ASSESS: CPT | Performed by: NURSE PRACTITIONER

## 2023-05-16 PROCEDURE — G8427 DOCREV CUR MEDS BY ELIG CLIN: HCPCS | Performed by: NURSE PRACTITIONER

## 2023-05-16 PROCEDURE — G8417 CALC BMI ABV UP PARAM F/U: HCPCS | Performed by: NURSE PRACTITIONER

## 2023-05-16 PROCEDURE — 99214 OFFICE O/P EST MOD 30 MIN: CPT | Performed by: NURSE PRACTITIONER

## 2023-05-16 PROCEDURE — 3017F COLORECTAL CA SCREEN DOC REV: CPT | Performed by: NURSE PRACTITIONER

## 2023-05-16 PROCEDURE — G8400 PT W/DXA NO RESULTS DOC: HCPCS | Performed by: NURSE PRACTITIONER

## 2023-05-16 PROCEDURE — 1036F TOBACCO NON-USER: CPT | Performed by: NURSE PRACTITIONER

## 2023-05-16 RX ORDER — TIZANIDINE 4 MG/1
4 TABLET ORAL 2 TIMES DAILY PRN
Qty: 60 TABLET | Refills: 0 | Status: SHIPPED | OUTPATIENT
Start: 2023-05-16 | End: 2023-06-15

## 2023-05-16 RX ORDER — METHYLPREDNISOLONE 4 MG/1
TABLET ORAL
Qty: 1 KIT | Refills: 0 | Status: SHIPPED | OUTPATIENT
Start: 2023-05-16 | End: 2023-05-22

## 2023-05-16 RX ORDER — OXYCODONE HYDROCHLORIDE 5 MG/1
5 TABLET ORAL EVERY 6 HOURS PRN
Qty: 120 TABLET | Refills: 0 | Status: SHIPPED | OUTPATIENT
Start: 2023-05-16 | End: 2023-06-15

## 2023-05-16 RX ORDER — GABAPENTIN 600 MG/1
600 TABLET ORAL 3 TIMES DAILY
Qty: 90 TABLET | Refills: 0 | Status: SHIPPED | OUTPATIENT
Start: 2023-05-16 | End: 2023-06-15

## 2023-05-16 RX ORDER — OXYCODONE HYDROCHLORIDE 5 MG/1
5 TABLET ORAL EVERY 6 HOURS PRN
Qty: 120 TABLET | Refills: 0 | Status: SHIPPED | OUTPATIENT
Start: 2023-05-16 | End: 2023-05-16

## 2023-05-16 ASSESSMENT — ENCOUNTER SYMPTOMS
RESPIRATORY NEGATIVE: 1
BACK PAIN: 1
CONSTIPATION: 0
DIARRHEA: 0

## 2023-05-16 NOTE — PROGRESS NOTES
Patient: Isiah Abraham  YOB: 1955  Date: 23        Subjective:     Isiah Abraham is a 74 y.o. female who complains today of:    Chief Complaint   Patient presents with    1 Month Follow-Up    Lower Back Pain    Knee Pain     Left sided from back to knee         Allergies:  Latex; 2,4-d dimethylamine; Mcdonald-2 inhibitors; Iodides; Nalidixic acid; Nsaids;  Shellfish allergy; and Statins    Past Medical History:   Diagnosis Date    [facility]     SKIN    Chronic pain     COPD (chronic obstructive pulmonary disease) ([facility])     Depression     Depression     Diabetes mellitus ([facility])     Hypertension     Neuropathy     Osteoarthritis     Sleep apnea     Urinary incontinence      Past Surgical History:   Procedure Laterality Date    JOINT REPLACEMENT      SPINE SURGERY       Family History   Problem Relation Age of Onset    Arthritis Mother     Hypertension Mother     Arthritis Father      Social History     Socioeconomic History    Marital status:      Spouse name: Not on file    Number of children: Not on file    Years of education: Not on file    Highest education level: Not on file   Occupational History    Not on file   Tobacco Use    Smoking status: Former     Packs/day: 1.00     Years: 15.00     Pack years: 15.00     Types: Cigarettes     Quit date: 6/15/2011     Years since quittin.9    Smokeless tobacco: Never   Substance and Sexual Activity    Alcohol use: Never    Drug use: Never    Sexual activity: Not on file   Other Topics Concern    Not on file   Social History Narrative    Not on file     Social Determinants of Health     Financial Resource Strain: Not on file   Food Insecurity: Not on file   Transportation Needs: Not on file   Physical Activity: Not on file   Stress: Not on file   Social Connections: Not on file   Intimate Partner Violence: Not on file   Housing Stability: Not on file       Current Outpatient Medications on File Prior to Visit   Medication Sig

## 2023-05-17 ENCOUNTER — TELEPHONE (OUTPATIENT)
Dept: PAIN MANAGEMENT | Age: 68
End: 2023-05-17

## 2023-05-17 DIAGNOSIS — M47.812 CERVICAL SPONDYLOSIS WITHOUT MYELOPATHY: ICD-10-CM

## 2023-05-17 DIAGNOSIS — M47.817 LUMBOSACRAL SPONDYLOSIS WITHOUT MYELOPATHY: ICD-10-CM

## 2023-05-17 RX ORDER — TIZANIDINE 4 MG/1
TABLET ORAL
Qty: 60 TABLET | Refills: 0 | OUTPATIENT
Start: 2023-05-17

## 2023-05-17 NOTE — TELEPHONE ENCOUNTER
I sent a message to [facility] regarding  needing authorization for  Tizanidine 4 mg tablets.

## 2023-05-17 NOTE — TELEPHONE ENCOUNTER
Patient's medication insurance is [facility]. Amena Fernandes will submit prior authorization request to [facility].

## 2023-05-23 ENCOUNTER — OFFICE VISIT (OUTPATIENT)
Dept: SPORTS MEDICINE | Age: 68
End: 2023-05-23
Payer: MEDICARE

## 2023-05-23 VITALS — WEIGHT: 238 LBS | HEIGHT: 60 IN | BODY MASS INDEX: 46.72 KG/M2 | TEMPERATURE: 97 F

## 2023-05-23 DIAGNOSIS — M25.562 ACUTE PAIN OF LEFT KNEE: Primary | ICD-10-CM

## 2023-05-23 DIAGNOSIS — Z96.652 STATUS POST LEFT KNEE REPLACEMENT: ICD-10-CM

## 2023-05-23 PROCEDURE — G8427 DOCREV CUR MEDS BY ELIG CLIN: HCPCS | Performed by: FAMILY MEDICINE

## 2023-05-23 PROCEDURE — 1123F ACP DISCUSS/DSCN MKR DOCD: CPT | Performed by: FAMILY MEDICINE

## 2023-05-23 PROCEDURE — 1036F TOBACCO NON-USER: CPT | Performed by: FAMILY MEDICINE

## 2023-05-23 PROCEDURE — 1090F PRES/ABSN URINE INCON ASSESS: CPT | Performed by: FAMILY MEDICINE

## 2023-05-23 PROCEDURE — G8417 CALC BMI ABV UP PARAM F/U: HCPCS | Performed by: FAMILY MEDICINE

## 2023-05-23 PROCEDURE — 99214 OFFICE O/P EST MOD 30 MIN: CPT | Performed by: FAMILY MEDICINE

## 2023-05-23 PROCEDURE — G8400 PT W/DXA NO RESULTS DOC: HCPCS | Performed by: FAMILY MEDICINE

## 2023-05-23 PROCEDURE — 3017F COLORECTAL CA SCREEN DOC REV: CPT | Performed by: FAMILY MEDICINE

## 2023-05-23 RX ORDER — LIDOCAINE 50 MG/G
1 PATCH TOPICAL DAILY
Qty: 30 PATCH | Refills: 0 | Status: SHIPPED | OUTPATIENT
Start: 2023-05-23 | End: 2023-06-22

## 2023-05-23 ASSESSMENT — ENCOUNTER SYMPTOMS
BACK PAIN: 0
CONSTIPATION: 0
SHORTNESS OF BREATH: 0
NAUSEA: 0
DIARRHEA: 0

## 2023-05-23 NOTE — PROGRESS NOTES
[facility]) Physicians  Neurosurgery and Pain [facility]  [address], [address] , [address] [facility], [place]: [phone]  F: [phone]      Saray Obrien  ()    2023    Subjective:     Saray Obrien is 74 y.o. female who complains today of:    Chief Complaint   Patient presents with    Knee Pain     Left      Back Pain       HPI     This patient returns stating that she is noticing pain in her left knee says she is not sure if it is coming from her back and radiating there or from her knee joint area because she does have a replacement that she was done about 20 to 25 years ago she does not recall any recent trauma to her knee  Allergies:  Latex; 2,4-d dimethylamine; Mcdonald-2 inhibitors; Iodides; Nalidixic acid; Nsaids;  Shellfish allergy; and Statins    Past Medical History:   Diagnosis Date    Cancer ([facility]     SKIN    Chronic pain     COPD (chronic obstructive pulmonary disease) ([facility])     Depression     Depression     Diabetes mellitus ([facility])     Hypertension     Neuropathy     Osteoarthritis     Sleep apnea     Urinary incontinence      Past Surgical History:   Procedure Laterality Date    JOINT REPLACEMENT      SPINE SURGERY       Family History   Problem Relation Age of Onset    Arthritis Mother     Hypertension Mother     Arthritis Father      Social History     Socioeconomic History    Marital status:      Spouse name: Not on file    Number of children: Not on file    Years of education: Not on file    Highest education level: Not on file   Occupational History    Not on file   Tobacco Use    Smoking status: Former     Packs/day: 1.00     Years: 15.00     Pack years: 15.00     Types: Cigarettes     Quit date: 6/15/2011     Years since quittin.9    Smokeless tobacco: Never   Substance and Sexual Activity    Alcohol use: Never    Drug use: Never    Sexual activity: Not on file   Other Topics Concern    Not on file   Social History Narrative

## 2023-05-30 ENCOUNTER — TELEPHONE (OUTPATIENT)
Dept: PAIN MANAGEMENT | Age: 68
End: 2023-05-30

## 2023-05-30 NOTE — TELEPHONE ENCOUNTER
Patient called stating that her low back pain has gotten worse. She says that it goes down into her knee. It is also effecting her neck and shoulder, she thinks because of the way she is sleeping due to the pain. She is asking if there is anything that Dr Reynaldo Torres can do?

## 2023-05-30 NOTE — TELEPHONE ENCOUNTER
Eyad Mcclendon, NP replied, \"She has not been here since 4/12. She should make appt with Dr Otto Rodriguez for evaluation \"    PT WAS CALLED & MADE AWARE, APPT WAS MADE WITH SM.

## 2023-06-01 ENCOUNTER — TELEPHONE (OUTPATIENT)
Dept: PAIN MANAGEMENT | Age: 68
End: 2023-06-01

## 2023-06-01 DIAGNOSIS — R06.09 DYSPNEA ON EXERTION: ICD-10-CM

## 2023-06-01 DIAGNOSIS — I10 HYPERTENSION, UNSPECIFIED TYPE: ICD-10-CM

## 2023-06-01 DIAGNOSIS — I20.89 ATYPICAL ANGINA (CMS-HCC): ICD-10-CM

## 2023-06-01 DIAGNOSIS — M54.16 LUMBAR RADICULOPATHY: Primary | ICD-10-CM

## 2023-06-01 DIAGNOSIS — E11.69 TYPE 2 DIABETES MELLITUS WITH OTHER SPECIFIED COMPLICATION, UNSPECIFIED WHETHER LONG TERM INSULIN USE (MULTI): ICD-10-CM

## 2023-06-01 DIAGNOSIS — R06.02 SHORTNESS OF BREATH: ICD-10-CM

## 2023-06-01 RX ORDER — ALBUTEROL SULFATE 90 UG/1
2 AEROSOL, METERED RESPIRATORY (INHALATION) EVERY 4 HOURS PRN
COMMUNITY
Start: 2017-05-25 | End: 2023-06-01 | Stop reason: SDUPTHER

## 2023-06-01 NOTE — TELEPHONE ENCOUNTER
Patient called back stating that she read our message and that she did see Williams Erickson on 5/16/23.

## 2023-06-01 NOTE — PROGRESS NOTES
Spoke with Kristen Bae and she has not had a wound for over a week and has been off her antibiotics for over a week. We will order an epidural left L3-4 and she is got her aspirin for 7 days. She said to send the permission to Dr. Cristy Santos.     TFESI left L3-4   Permission to be off asa from Dr Cristy Santos at [facility] spine per pt

## 2023-06-01 NOTE — TELEPHONE ENCOUNTER
I called patient in response to her telephone calls about her left leg bothering her. I had seen her in the office on [phone] and she was having this left thigh pain but had a negative straight leg raise. I sent her with a Medrol Dosepak which she said worked while she was taking it and then stopped the next day and she said her pain is very severe goes left low back around hip in the middle of the thigh to the knee. She had had recent surgery with Dr. Niurka Matthews on 2/6/2023 L2-L5 Decompression with placated seroma wound I&D and exploration. She called his office and he referred her to pain management. She is taking gabapentin 600 mg 3 times daily and her oxycodone IR 5 mg 4 times daily. She used to be on 10 4 times daily and we reduced her last month she says she is taking 10 sometimes because of the pain. I will discuss with Dr. Fatoumata Araujo may set her up for epidural at L3-4. She does take aspirin prescribed for her leg at 1 time turning blue.

## 2023-06-01 NOTE — TELEPHONE ENCOUNTER
Patient switching to [facility] pharmacy for all her rx's, I formatted the way they are asking for 90 day with 3 refills on meds

## 2023-06-02 ENCOUNTER — TELEPHONE (OUTPATIENT)
Dept: PAIN MANAGEMENT | Age: 68
End: 2023-06-02

## 2023-06-02 RX ORDER — BUMETANIDE 1 MG/1
1 TABLET ORAL DAILY
Qty: 90 TABLET | Refills: 0 | Status: SHIPPED | OUTPATIENT
Start: 2023-06-02 | End: 2023-07-03 | Stop reason: SDUPTHER

## 2023-06-02 RX ORDER — INSULIN GLARGINE 100 [IU]/ML
37 INJECTION, SOLUTION SUBCUTANEOUS NIGHTLY
Qty: 33.3 ML | Refills: 1 | Status: SHIPPED | OUTPATIENT
Start: 2023-06-02 | End: 2023-10-30 | Stop reason: SDUPTHER

## 2023-06-02 RX ORDER — FLASH GLUCOSE SENSOR
KIT MISCELLANEOUS
Qty: 6 EACH | Refills: 3 | Status: SHIPPED | OUTPATIENT
Start: 2023-06-02

## 2023-06-02 RX ORDER — DILTIAZEM HYDROCHLORIDE 240 MG/1
240 CAPSULE, COATED, EXTENDED RELEASE ORAL DAILY
Qty: 90 CAPSULE | Refills: 0 | Status: SHIPPED | OUTPATIENT
Start: 2023-06-02 | End: 2023-10-31 | Stop reason: SDUPTHER

## 2023-06-02 RX ORDER — ALBUTEROL SULFATE 90 UG/1
2 AEROSOL, METERED RESPIRATORY (INHALATION) 2 TIMES DAILY PRN
Qty: 3 G | Refills: 3 | Status: SHIPPED | OUTPATIENT
Start: 2023-06-02 | End: 2023-11-29 | Stop reason: SDUPTHER

## 2023-06-02 RX ORDER — POTASSIUM CHLORIDE 1500 MG/1
20 TABLET, EXTENDED RELEASE ORAL DAILY
Qty: 90 TABLET | Refills: 0 | Status: SHIPPED | OUTPATIENT
Start: 2023-06-02 | End: 2023-08-31

## 2023-06-02 RX ORDER — MONTELUKAST SODIUM 10 MG/1
10 TABLET ORAL DAILY
Qty: 90 TABLET | Refills: 0 | Status: SHIPPED | OUTPATIENT
Start: 2023-06-02 | End: 2023-11-20

## 2023-06-02 RX ORDER — ASPIRIN 81 MG/1
81 TABLET ORAL DAILY
Qty: 90 TABLET | Refills: 3 | Status: SHIPPED | OUTPATIENT
Start: 2023-06-02 | End: 2023-08-31

## 2023-06-02 RX ORDER — INSULIN LISPRO 100 [IU]/ML
4 INJECTION, SOLUTION INTRAVENOUS; SUBCUTANEOUS
Qty: 7.2 ML | Refills: 3 | Status: SHIPPED | OUTPATIENT
Start: 2023-06-02 | End: 2023-10-30 | Stop reason: SDUPTHER

## 2023-06-02 RX ORDER — LOSARTAN POTASSIUM 50 MG/1
50 TABLET ORAL 2 TIMES DAILY
Qty: 180 TABLET | Refills: 0 | Status: SHIPPED | OUTPATIENT
Start: 2023-06-02 | End: 2023-10-31 | Stop reason: SDUPTHER

## 2023-06-02 NOTE — TELEPHONE ENCOUNTER
Email sent to Derl Kin asking if prior authorization is to be submitted to [facility] or personal insurance. Diagnosis on prescription is for knees. Patient says it is for neck, shoulder, back and knee pain.

## 2023-06-02 NOTE — TELEPHONE ENCOUNTER
Per Albert Velez, since diagnosis is for knees, prior authorization will have to be done with personal insurance. Lidocaine (Lidoderm) 5% patch prior authorization request submitted online ([place] [employer] [place] [employer] clinical uploaded, medication approved. Approved today  Case [id number]; Status:Approved; Review Type:Prior Auth; Coverage Start Date:05/03/2023;  Coverage End Date:06/01/2024;

## 2023-06-05 ENCOUNTER — TELEPHONE (OUTPATIENT)
Dept: PAIN MANAGEMENT | Age: 68
End: 2023-06-05

## 2023-06-05 NOTE — TELEPHONE ENCOUNTER
Patient is asking if there is an update on the LEFT L3-4 TFESI? [facility] and it should go through her insurance.

## 2023-06-07 ENCOUNTER — TELEPHONE (OUTPATIENT)
Dept: PRIMARY CARE | Facility: CLINIC | Age: 68
End: 2023-06-07
Payer: COMMERCIAL

## 2023-06-07 DIAGNOSIS — M50.322 DEGENERATION OF C5-C6 INTERVERTEBRAL DISC: ICD-10-CM

## 2023-06-07 DIAGNOSIS — M51.34 DEGENERATION OF THORACIC INTERVERTEBRAL DISC: ICD-10-CM

## 2023-06-07 RX ORDER — ETODOLAC 400 MG/1
400 TABLET, FILM COATED ORAL 2 TIMES DAILY
Qty: 60 TABLET | Refills: 2 | OUTPATIENT
Start: 2023-06-07

## 2023-06-07 RX ORDER — ETODOLAC 400 MG/1
TABLET, FILM COATED ORAL
Qty: 60 TABLET | Refills: 2 | OUTPATIENT
Start: 2023-06-07

## 2023-06-07 NOTE — TELEPHONE ENCOUNTER
Patient stopped her aspirin 3 days ago due to a nerve block that her pain management doctor  wanted her to do for her, now she needs a form stating it was ok she did this, would you sign for her if she has faxed over?

## 2023-06-07 NOTE — TELEPHONE ENCOUNTER
Requested Prescriptions     Pending Prescriptions Disp Refills    etodolac (LODINE) 400 MG tablet 60 tablet 2     Sig: Take 1 tablet by mouth 2 times daily       Patient last seen on:  5/16/23  Date of last surgery:  n/a  Date of last refill:  11/29/22 to 5/16/23  Pain level:  n/a  Patient complaining of:  she cries every evening due to pain  Future appts: 6/16/23 with Dr. Ann Payor    PCP and surgeon did not prescribe the baby ASA, it was  prescribed by the hospital when she had COVID and her foot turned blue. She already stopped the baby ASA for 3 days.

## 2023-06-07 NOTE — TELEPHONE ENCOUNTER
She said he told her to get permission to stop aspirin from pcp but she just stopped on her own she did not know she needed a form signed stating ok to stop aspirin

## 2023-06-08 RX ORDER — ETODOLAC 400 MG/1
400 TABLET, FILM COATED ORAL 2 TIMES DAILY
Qty: 60 TABLET | Refills: 2 | Status: SHIPPED | OUTPATIENT
Start: 2023-06-08

## 2023-06-08 NOTE — TELEPHONE ENCOUNTER
This is not [facility] it's to go through her medical insurance. She needs it ASAP as she is in a lot of pain. She also has been off the ASA for four days.
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
## 2023-06-13 DIAGNOSIS — E11.69 TYPE 2 DIABETES MELLITUS WITH OTHER SPECIFIED COMPLICATION, UNSPECIFIED WHETHER LONG TERM INSULIN USE (MULTI): ICD-10-CM

## 2023-06-16 ENCOUNTER — OFFICE VISIT (OUTPATIENT)
Dept: PAIN MANAGEMENT | Age: 68
End: 2023-06-16
Payer: MEDICARE

## 2023-06-16 VITALS
WEIGHT: 238 LBS | HEIGHT: 60 IN | SYSTOLIC BLOOD PRESSURE: 170 MMHG | BODY MASS INDEX: 46.72 KG/M2 | DIASTOLIC BLOOD PRESSURE: 90 MMHG | TEMPERATURE: 98.1 F

## 2023-06-16 DIAGNOSIS — Z98.890 HISTORY OF LUMBOSACRAL SPINE SURGERY: ICD-10-CM

## 2023-06-16 DIAGNOSIS — M47.817 LUMBOSACRAL SPONDYLOSIS WITHOUT MYELOPATHY: ICD-10-CM

## 2023-06-16 DIAGNOSIS — M54.16 LUMBAR RADICULOPATHY: Primary | ICD-10-CM

## 2023-06-16 DIAGNOSIS — M47.812 CERVICAL SPONDYLOSIS WITHOUT MYELOPATHY: ICD-10-CM

## 2023-06-16 DIAGNOSIS — R10.32 LEFT GROIN PAIN: ICD-10-CM

## 2023-06-16 DIAGNOSIS — M79.605 LEFT LEG PAIN: ICD-10-CM

## 2023-06-16 PROCEDURE — 99214 OFFICE O/P EST MOD 30 MIN: CPT | Performed by: PHYSICAL MEDICINE & REHABILITATION

## 2023-06-16 PROCEDURE — 1090F PRES/ABSN URINE INCON ASSESS: CPT | Performed by: PHYSICAL MEDICINE & REHABILITATION

## 2023-06-16 PROCEDURE — G8427 DOCREV CUR MEDS BY ELIG CLIN: HCPCS | Performed by: PHYSICAL MEDICINE & REHABILITATION

## 2023-06-16 PROCEDURE — G8417 CALC BMI ABV UP PARAM F/U: HCPCS | Performed by: PHYSICAL MEDICINE & REHABILITATION

## 2023-06-16 PROCEDURE — G8400 PT W/DXA NO RESULTS DOC: HCPCS | Performed by: PHYSICAL MEDICINE & REHABILITATION

## 2023-06-16 PROCEDURE — 1123F ACP DISCUSS/DSCN MKR DOCD: CPT | Performed by: PHYSICAL MEDICINE & REHABILITATION

## 2023-06-16 PROCEDURE — 1036F TOBACCO NON-USER: CPT | Performed by: PHYSICAL MEDICINE & REHABILITATION

## 2023-06-16 PROCEDURE — 3017F COLORECTAL CA SCREEN DOC REV: CPT | Performed by: PHYSICAL MEDICINE & REHABILITATION

## 2023-06-16 RX ORDER — GABAPENTIN 800 MG/1
800 TABLET ORAL 4 TIMES DAILY
Qty: 120 TABLET | Refills: 0 | Status: SHIPPED | OUTPATIENT
Start: 2023-06-16 | End: 2023-07-16

## 2023-06-16 RX ORDER — OXYCODONE HYDROCHLORIDE 5 MG/1
5 TABLET ORAL EVERY 6 HOURS PRN
Qty: 120 TABLET | Refills: 0 | Status: SHIPPED | OUTPATIENT
Start: 2023-06-16 | End: 2023-07-16

## 2023-06-16 RX ORDER — TIZANIDINE 4 MG/1
4 TABLET ORAL 2 TIMES DAILY PRN
Qty: 60 TABLET | Refills: 0 | Status: SHIPPED | OUTPATIENT
Start: 2023-06-16 | End: 2023-07-16

## 2023-06-16 ASSESSMENT — ENCOUNTER SYMPTOMS
SHORTNESS OF BREATH: 0
BACK PAIN: 1
NAUSEA: 0
DIARRHEA: 0
CONSTIPATION: 0

## 2023-06-19 ENCOUNTER — TELEPHONE (OUTPATIENT)
Dept: PAIN MANAGEMENT | Age: 68
End: 2023-06-19

## 2023-06-19 NOTE — TELEPHONE ENCOUNTER
LEFT L3-4,L4-5 TRANSFORAMINAL    NO AUTH REQUIRED    OK to schedule procedure approved as above. Please note sides/levels approved and date range. (If applicable, sides/levels approved may differ from those ordered)    TO BE SCHEDULED WITH DR. Tamar San

## 2023-06-21 ENCOUNTER — PROCEDURE VISIT (OUTPATIENT)
Dept: PAIN MANAGEMENT | Age: 68
End: 2023-06-21

## 2023-06-21 DIAGNOSIS — M54.16 LUMBAR RADICULOPATHY: Primary | ICD-10-CM

## 2023-06-21 RX ORDER — DEXAMETHASONE SODIUM PHOSPHATE 10 MG/ML
10 INJECTION, SOLUTION INTRAMUSCULAR; INTRAVENOUS ONCE
Status: COMPLETED | OUTPATIENT
Start: 2023-06-21 | End: 2023-06-21

## 2023-06-21 RX ORDER — SODIUM CHLORIDE 9 MG/ML
2.5 INJECTION INTRAVENOUS ONCE
Status: COMPLETED | OUTPATIENT
Start: 2023-06-21 | End: 2023-06-21

## 2023-06-21 RX ORDER — LIDOCAINE HYDROCHLORIDE 10 MG/ML
6 INJECTION, SOLUTION INFILTRATION; PERINEURAL ONCE
Status: COMPLETED | OUTPATIENT
Start: 2023-06-21 | End: 2023-06-21

## 2023-06-21 RX ADMIN — Medication 0.5 MEQ: at 11:26

## 2023-06-21 RX ADMIN — DEXAMETHASONE SODIUM PHOSPHATE 10 MG: 10 INJECTION, SOLUTION INTRAMUSCULAR; INTRAVENOUS at 11:28

## 2023-06-21 RX ADMIN — SODIUM CHLORIDE 2.5 ML: 9 INJECTION INTRAVENOUS at 11:26

## 2023-06-21 RX ADMIN — LIDOCAINE HYDROCHLORIDE 6 ML: 10 INJECTION, SOLUTION INFILTRATION; PERINEURAL at 11:27

## 2023-06-21 NOTE — PROGRESS NOTES
This procedure was 75% more difficult and required 75% more work secondary to the patient's habitus. The patient has a BMI of 46.5. This required increased work for safe and proper positioning upon the fluoroscopy table, increased needle passes for safe and appropriate needle placement, and increased fluoroscopy time and radiation exposure for proper visualization.

## 2023-06-21 NOTE — PROGRESS NOTES
Lumbar Transforaminal Epidural Steroid Injection (TFESI)    Patient Name: Cody Medrano   : 1955  Date: 2023     Physician: Yves Diana MD     INDICATIONS: Cody Medrano is a 74 y.o. female who presents with symptoms and physical exam findings consistent with lumbar radiculopathy. She has lumbosacral paresthesias with a positive straight leg raise on physical exam. She has had persistent pain that limits her activities of daily living. The pain is persistent despite conservative measures. She has significant functional and psychological impairment due to this condition. She has had greater than 50% pain relief for over three months from prior epidural steroid injections, the pain has returned in a similar character, distribution, and intensity necessitating repeat treatment. Given her symptoms, physical exam findings, impairment in activities of daily living, and lack of response to conservative measures, consideration for lumbar transforaminal epidural corticosteroid injection was given. Discussed the risks including but not limited to bleeding, infection, worsened pain, damage to surrounding structures, side effects, toxicity, allergic reactions to medications used, need for surgery, headache, vision changes, difficulty with chewing and/or swallowing, immune and stress-response dysfunction, fat necrosis, skin pigmentation changes, blood sugar elevation, as well as catastrophic injury such as vision loss/blindness, paralysis, spinal cord or plexus injury, cerebral brainstem or spinal cord infarction, intrathecal injection, spinal cord puncture, arachnoiditis, discitis, stroke, bowel/bladder/sexual dysfunction, ventilator dependence, loss of use of the arms and/or legs, and death. Discussed off-label use of corticosteroids and how the Food and Drug Administration (FDA) has not approved corticosteroids for epidural use.  Discussed the risks, benefits, alternative procedures, and alternatives

## 2023-06-27 ENCOUNTER — TELEPHONE (OUTPATIENT)
Dept: PAIN MANAGEMENT | Age: 68
End: 2023-06-27

## 2023-06-27 DIAGNOSIS — M47.817 LUMBOSACRAL SPONDYLOSIS WITHOUT MYELOPATHY: Primary | ICD-10-CM

## 2023-06-27 DIAGNOSIS — E11.69 TYPE 2 DIABETES MELLITUS WITH OTHER SPECIFIED COMPLICATION, UNSPECIFIED WHETHER LONG TERM INSULIN USE (MULTI): ICD-10-CM

## 2023-06-29 RX ORDER — OXYCODONE HYDROCHLORIDE 10 MG/1
10 TABLET ORAL EVERY 8 HOURS PRN
Qty: 15 TABLET | Refills: 0 | Status: SHIPPED | OUTPATIENT
Start: 2023-06-29 | End: 2023-07-04

## 2023-06-30 DIAGNOSIS — E11.69 TYPE 2 DIABETES MELLITUS WITH OTHER SPECIFIED COMPLICATION, UNSPECIFIED WHETHER LONG TERM INSULIN USE (MULTI): Primary | ICD-10-CM

## 2023-06-30 RX ORDER — CALCIUM CITRATE/VITAMIN D3 200MG-6.25
1 TABLET ORAL 2 TIMES DAILY
Qty: 100 STRIP | Refills: 3 | Status: SHIPPED | OUTPATIENT
Start: 2023-06-30 | End: 2023-07-30

## 2023-07-03 ENCOUNTER — OFFICE VISIT (OUTPATIENT)
Dept: PRIMARY CARE | Facility: CLINIC | Age: 68
End: 2023-07-03
Payer: MEDICARE

## 2023-07-03 ENCOUNTER — TELEPHONE (OUTPATIENT)
Dept: PAIN MANAGEMENT | Age: 68
End: 2023-07-03

## 2023-07-03 ENCOUNTER — LAB (OUTPATIENT)
Dept: LAB | Facility: LAB | Age: 68
End: 2023-07-03
Payer: MEDICARE

## 2023-07-03 VITALS
SYSTOLIC BLOOD PRESSURE: 140 MMHG | DIASTOLIC BLOOD PRESSURE: 70 MMHG | BODY MASS INDEX: 43.87 KG/M2 | HEIGHT: 62 IN | TEMPERATURE: 97.7 F | OXYGEN SATURATION: 95 % | WEIGHT: 238.38 LBS

## 2023-07-03 DIAGNOSIS — R25.1 TREMOR: Primary | ICD-10-CM

## 2023-07-03 DIAGNOSIS — F11.99 OPIOID USE, UNSPECIFIED WITH UNSPECIFIED OPIOID-INDUCED DISORDER (MULTI): ICD-10-CM

## 2023-07-03 DIAGNOSIS — R76.8 RHEUMATOID FACTOR POSITIVE: ICD-10-CM

## 2023-07-03 DIAGNOSIS — E87.6 LOW BLOOD POTASSIUM: ICD-10-CM

## 2023-07-03 DIAGNOSIS — E11.638: ICD-10-CM

## 2023-07-03 DIAGNOSIS — K21.9 CHRONIC GERD: ICD-10-CM

## 2023-07-03 DIAGNOSIS — I10 HYPERTENSION, UNSPECIFIED TYPE: ICD-10-CM

## 2023-07-03 DIAGNOSIS — E11.69 TYPE 2 DIABETES MELLITUS WITH OTHER SPECIFIED COMPLICATION, UNSPECIFIED WHETHER LONG TERM INSULIN USE (MULTI): ICD-10-CM

## 2023-07-03 DIAGNOSIS — F39 MOOD DISORDER (CMS-HCC): ICD-10-CM

## 2023-07-03 DIAGNOSIS — E55.9 VITAMIN D DEFICIENCY: ICD-10-CM

## 2023-07-03 DIAGNOSIS — R53.83 OTHER FATIGUE: ICD-10-CM

## 2023-07-03 DIAGNOSIS — E53.8 VITAMIN B12 DEFICIENCY: ICD-10-CM

## 2023-07-03 DIAGNOSIS — R53.1 WEAKNESS: ICD-10-CM

## 2023-07-03 DIAGNOSIS — F41.9 ANXIETY: ICD-10-CM

## 2023-07-03 DIAGNOSIS — D64.9 ANEMIA, UNSPECIFIED TYPE: ICD-10-CM

## 2023-07-03 LAB
BASOPHILS (10*3/UL) IN BLOOD BY AUTOMATED COUNT: 0.12 X10E9/L (ref 0–0.1)
BASOPHILS/100 LEUKOCYTES IN BLOOD BY AUTOMATED COUNT: 1.2 % (ref 0–2)
EOSINOPHILS (10*3/UL) IN BLOOD BY AUTOMATED COUNT: 0.33 X10E9/L (ref 0–0.7)
EOSINOPHILS/100 LEUKOCYTES IN BLOOD BY AUTOMATED COUNT: 3.2 % (ref 0–6)
ERYTHROCYTE DISTRIBUTION WIDTH (RATIO) BY AUTOMATED COUNT: 18.8 % (ref 11.5–14.5)
ERYTHROCYTE MEAN CORPUSCULAR HEMOGLOBIN CONCENTRATION (G/DL) BY AUTOMATED: 29.3 G/DL (ref 32–36)
ERYTHROCYTE MEAN CORPUSCULAR VOLUME (FL) BY AUTOMATED COUNT: 79 FL (ref 80–100)
ERYTHROCYTES (10*6/UL) IN BLOOD BY AUTOMATED COUNT: 4.31 X10E12/L (ref 4–5.2)
HEMATOCRIT (%) IN BLOOD BY AUTOMATED COUNT: 34.1 % (ref 36–46)
HEMOGLOBIN (G/DL) IN BLOOD: 10 G/DL (ref 12–16)
IMMATURE GRANULOCYTES/100 LEUKOCYTES IN BLOOD BY AUTOMATED COUNT: 0.4 % (ref 0–0.9)
LEUKOCYTES (10*3/UL) IN BLOOD BY AUTOMATED COUNT: 10.4 X10E9/L (ref 4.4–11.3)
LYMPHOCYTES (10*3/UL) IN BLOOD BY AUTOMATED COUNT: 2.66 X10E9/L (ref 1.2–4.8)
LYMPHOCYTES/100 LEUKOCYTES IN BLOOD BY AUTOMATED COUNT: 25.5 % (ref 13–44)
MONOCYTES (10*3/UL) IN BLOOD BY AUTOMATED COUNT: 0.9 X10E9/L (ref 0.1–1)
MONOCYTES/100 LEUKOCYTES IN BLOOD BY AUTOMATED COUNT: 8.6 % (ref 2–10)
NEUTROPHILS (10*3/UL) IN BLOOD BY AUTOMATED COUNT: 6.38 X10E9/L (ref 1.2–7.7)
NEUTROPHILS/100 LEUKOCYTES IN BLOOD BY AUTOMATED COUNT: 61.1 % (ref 40–80)
PLATELETS (10*3/UL) IN BLOOD AUTOMATED COUNT: 346 X10E9/L (ref 150–450)

## 2023-07-03 PROCEDURE — 1157F ADVNC CARE PLAN IN RCRD: CPT | Performed by: INTERNAL MEDICINE

## 2023-07-03 PROCEDURE — 36415 COLL VENOUS BLD VENIPUNCTURE: CPT

## 2023-07-03 PROCEDURE — 4010F ACE/ARB THERAPY RXD/TAKEN: CPT | Performed by: INTERNAL MEDICINE

## 2023-07-03 PROCEDURE — 1036F TOBACCO NON-USER: CPT | Performed by: INTERNAL MEDICINE

## 2023-07-03 PROCEDURE — 82607 VITAMIN B-12: CPT

## 2023-07-03 PROCEDURE — 3077F SYST BP >= 140 MM HG: CPT | Performed by: INTERNAL MEDICINE

## 2023-07-03 PROCEDURE — 83036 HEMOGLOBIN GLYCOSYLATED A1C: CPT

## 2023-07-03 PROCEDURE — 80061 LIPID PANEL: CPT

## 2023-07-03 PROCEDURE — 3078F DIAST BP <80 MM HG: CPT | Performed by: INTERNAL MEDICINE

## 2023-07-03 PROCEDURE — 1159F MED LIST DOCD IN RCRD: CPT | Performed by: INTERNAL MEDICINE

## 2023-07-03 PROCEDURE — 80053 COMPREHEN METABOLIC PANEL: CPT

## 2023-07-03 PROCEDURE — 85025 COMPLETE CBC W/AUTO DIFF WBC: CPT

## 2023-07-03 PROCEDURE — 99213 OFFICE O/P EST LOW 20 MIN: CPT | Performed by: INTERNAL MEDICINE

## 2023-07-03 PROCEDURE — 84443 ASSAY THYROID STIM HORMONE: CPT

## 2023-07-03 PROCEDURE — 1160F RVW MEDS BY RX/DR IN RCRD: CPT | Performed by: INTERNAL MEDICINE

## 2023-07-03 PROCEDURE — 82306 VITAMIN D 25 HYDROXY: CPT

## 2023-07-03 PROCEDURE — 3046F HEMOGLOBIN A1C LEVEL >9.0%: CPT | Performed by: INTERNAL MEDICINE

## 2023-07-03 RX ORDER — VENLAFAXINE HYDROCHLORIDE 75 MG/1
75 CAPSULE, EXTENDED RELEASE ORAL DAILY
Qty: 30 CAPSULE | Refills: 5 | Status: SHIPPED | OUTPATIENT
Start: 2023-07-03 | End: 2023-12-11 | Stop reason: SDUPTHER

## 2023-07-03 RX ORDER — VENLAFAXINE HYDROCHLORIDE 150 MG/1
CAPSULE, EXTENDED RELEASE ORAL
Qty: 30 CAPSULE | Refills: 3 | Status: SHIPPED | OUTPATIENT
Start: 2023-07-03 | End: 2023-12-11 | Stop reason: SDUPTHER

## 2023-07-03 RX ORDER — BUMETANIDE 1 MG/1
2 TABLET ORAL DAILY
Qty: 90 TABLET | Refills: 2 | Status: SHIPPED | OUTPATIENT
Start: 2023-07-03 | End: 2023-11-15 | Stop reason: SDUPTHER

## 2023-07-03 RX ORDER — GABAPENTIN 800 MG/1
800 TABLET ORAL
COMMUNITY
End: 2023-09-12 | Stop reason: DRUGHIGH

## 2023-07-03 ASSESSMENT — PATIENT HEALTH QUESTIONNAIRE - PHQ9
10. IF YOU CHECKED OFF ANY PROBLEMS, HOW DIFFICULT HAVE THESE PROBLEMS MADE IT FOR YOU TO DO YOUR WORK, TAKE CARE OF THINGS AT HOME, OR GET ALONG WITH OTHER PEOPLE: VERY DIFFICULT
SUM OF ALL RESPONSES TO PHQ9 QUESTIONS 1 AND 2: 1
1. LITTLE INTEREST OR PLEASURE IN DOING THINGS: NOT AT ALL
2. FEELING DOWN, DEPRESSED OR HOPELESS: SEVERAL DAYS

## 2023-07-03 NOTE — PROGRESS NOTES
"Subjective   Patient ID: Kaylan Woo is a 67 y.o. female who presents for Follow-up.  HPI  Feb 6th back surg  Went to rehab  Bulbous infection repeat surgery feb 16th    Dr samuel  Home on oral cipro  Was in agony per pt  Did not give pain meds  Had home care    Fell once at home  100.6 temp in march  Then had pneumonia  Got ceftriaxone  Better  Went to [facility] then went to [facility]  Saw id  Was at main campus  3 months  Breathing good  No home o2  Cost cannot take januvia  Was on a lot of steroids   Has regular insulin ssri  140/70 at home  Sees pain management  Depressed, not suicidal  Edema sl worse bl     Review of Systems  Gen:  no fever  HEENT:  no trouble swallowing  CV:  no dyspnea, cyanosis  Lungs:  no new shortness of breath  GI:  no constipation, no blood in stool  Vascular:  no edema  Neuro:   no  new weakness  Skin:  no rash  MS:no joint swelling  Gu:  no urinary complaints  All other systems have been reviewed and are negative for complaint    /70   Temp 36.5 °C (97.7 °F)   Ht 1.562 m (5' 1.5\")   Wt 108 kg (238 lb 6 oz)   SpO2 95%   BMI 44.31 kg/m²   Objective   Physical Exam  Lab Results   Component Value Date    WBC 10.4 07/03/2023    HGB 10.0 (L) 07/03/2023    HCT 34.1 (L) 07/03/2023    MCV 79 (L) 07/03/2023     07/03/2023     Lab Results   Component Value Date    GLUCOSE 329 (H) 07/03/2023    CALCIUM 9.1 07/03/2023     07/03/2023    K 4.6 07/03/2023    CO2 31 07/03/2023    CL 97 (L) 07/03/2023    BUN 16 07/03/2023    CREATININE 0.91 07/03/2023     Social History     Socioeconomic History    Marital status:      Spouse name: None    Number of children: None    Years of education: None    Highest education level: None   Occupational History    None   Tobacco Use    Smoking status: Former     Types: Cigarettes    Smokeless tobacco: Never   Substance and Sexual Activity    Alcohol use: Yes     Comment: rarely    Drug use: Never    Sexual activity: None   Other Topics " Concern    None   Social History Narrative    None     Social Determinants of Health     Financial Resource Strain: Not on file   Food Insecurity: Not on file   Transportation Needs: Not on file   Physical Activity: Not on file   Stress: Not on file   Social Connections: Not on file   Intimate Partner Violence: Not on file   Housing Stability: Not on file     Family History   Problem Relation Name Age of Onset    Alzheimer's disease Mother      Atrial fibrillation Mother      Lupus Mother      Arthritis Father      Other (rheumatic disease) Father      Charcot-Charisse-Tooth disease Sister           General Appearance:  Alert and oriented.  NAD  Heent:  tms nl, throat clear.   Lungs, CTAB  Skin:  no suspicious lesions,  warm and dry  Head :  Normocephalic  Neck/thyroid:  neck supple, full rom, no cervical lymphadenopathy  no thyromegaly  Heart:  RRR  no murmurs  Abdomen:  Normal , bs present, soft, nontender, not distended, no masses palpated , obese  Extremities:  No clubbing, cyanosis, or edema  Neurologic:  Nonfocal but overall weak , uses wheel chair for longer distances   Psych: alert, normal mood    Problem List Items Addressed This Visit       Chronic GERD    Diabetes mellitus, type 2 (CMS/HCC)    Hypertension    Relevant Medications    bumetanide (Bumex) 1 mg tablet    Vitamin B12 deficiency    Vitamin D deficiency    Opioid use, unspecified with unspecified opioid-induced disorder (CMS/HCC)     Stable condition follow up at least  yearly.           Mood disorder (CMS/HCC)     Stable condition follow up at least  yearly.           Relevant Medications    venlafaxine XR (Effexor-XR) 150 mg 24 hr capsule    venlafaxine XR (Effexor-XR) 75 mg 24 hr capsule     Other Visit Diagnoses       Tremor    -  Primary    Relevant Orders    Referral to Neurology            Kaylan was seen today for follow-up.  Diagnoses and all orders for this visit:  Tremor (Primary)  -     Referral to Neurology; Future  Hypertension,  unspecified type  -     bumetanide (Bumex) 1 mg tablet; Take 2 tablets (2 mg) by mouth once daily.  Mood disorder (CMS/HCC)  Comments:  worsening due to medical issues  see orders  Orders:  -     venlafaxine XR (Effexor-XR) 150 mg 24 hr capsule; Take 1 capsule (150 mg total) by mouth daily with breakfast for 30 days.  -     venlafaxine XR (Effexor-XR) 75 mg 24 hr capsule; Take 1 capsule (75 mg) by mouth once daily. Take with food.  Opioid use, unspecified with unspecified opioid-induced disorder (CMS/HCC)  Comments:  stable, fu at least yearly  w pain management  Type 2 diabetes mellitus with other specified complication, unspecified whether long term insulin use (CMS/HCC)  Comments:  stable, fu at least yearly  Vitamin B12 deficiency  Vitamin D deficiency  Chronic GERD  Chronic conditions reviewed in the assessment and plan.    Continue medications unless specified otherwise.  Previous labs reviewed.   Other specialty provider notes reviewed.

## 2023-07-03 NOTE — TELEPHONE ENCOUNTER
Oxycodone (Oxy-IR) 10 mg tablet prior authorization request was received from [address] [place] [address]. Diagnosis is for lumbar. Fax placed in [employer] to submit authorization request to [facility].

## 2023-07-04 LAB
ALANINE AMINOTRANSFERASE (SGPT) (U/L) IN SER/PLAS: 12 U/L (ref 7–45)
ALBUMIN (G/DL) IN SER/PLAS: 4 G/DL (ref 3.4–5)
ALKALINE PHOSPHATASE (U/L) IN SER/PLAS: 97 U/L (ref 33–136)
ANION GAP IN SER/PLAS: 15 MMOL/L (ref 10–20)
ASPARTATE AMINOTRANSFERASE (SGOT) (U/L) IN SER/PLAS: 12 U/L (ref 9–39)
BILIRUBIN TOTAL (MG/DL) IN SER/PLAS: 0.4 MG/DL (ref 0–1.2)
CALCIDIOL (25 OH VITAMIN D3) (NG/ML) IN SER/PLAS: 31 NG/ML
CALCIUM (MG/DL) IN SER/PLAS: 9.1 MG/DL (ref 8.6–10.3)
CARBON DIOXIDE, TOTAL (MMOL/L) IN SER/PLAS: 31 MMOL/L (ref 21–32)
CHLORIDE (MMOL/L) IN SER/PLAS: 97 MMOL/L (ref 98–107)
CHOLESTEROL (MG/DL) IN SER/PLAS: 283 MG/DL (ref 0–199)
CHOLESTEROL IN HDL (MG/DL) IN SER/PLAS: 49.7 MG/DL
CHOLESTEROL/HDL RATIO: 5.7
COBALAMIN (VITAMIN B12) (PG/ML) IN SER/PLAS: 481 PG/ML (ref 211–911)
CREATININE (MG/DL) IN SER/PLAS: 0.91 MG/DL (ref 0.5–1.05)
ESTIMATED AVERAGE GLUCOSE FOR HBA1C: 223 MG/DL
GFR FEMALE: 70 ML/MIN/1.73M2
GLUCOSE (MG/DL) IN SER/PLAS: 329 MG/DL (ref 74–99)
HEMOGLOBIN A1C/HEMOGLOBIN TOTAL IN BLOOD: 9.4 %
LDL: 154 MG/DL (ref 0–99)
NON HDL CHOLESTEROL: 233 MG/DL
POTASSIUM (MMOL/L) IN SER/PLAS: 4.6 MMOL/L (ref 3.5–5.3)
PROTEIN TOTAL: 6.2 G/DL (ref 6.4–8.2)
SODIUM (MMOL/L) IN SER/PLAS: 138 MMOL/L (ref 136–145)
THYROTROPIN (MIU/L) IN SER/PLAS BY DETECTION LIMIT <= 0.05 MIU/L: 1.81 MIU/L (ref 0.44–3.98)
TRIGLYCERIDE (MG/DL) IN SER/PLAS: 396 MG/DL (ref 0–149)
UREA NITROGEN (MG/DL) IN SER/PLAS: 16 MG/DL (ref 6–23)
VLDL: 79 MG/DL (ref 0–40)

## 2023-07-05 ENCOUNTER — HOSPITAL ENCOUNTER (OUTPATIENT)
Dept: MRI IMAGING | Age: 68
Discharge: HOME | End: 2023-07-07
Attending: PHYSICAL MEDICINE & REHABILITATION
Payer: MEDICARE

## 2023-07-05 ENCOUNTER — ANESTHESIA (OUTPATIENT)
Dept: MRI IMAGING | Age: 68
End: 2023-07-05

## 2023-07-05 ENCOUNTER — ANESTHESIA EVENT (OUTPATIENT)
Dept: MRI IMAGING | Age: 68
End: 2023-07-05

## 2023-07-05 VITALS
SYSTOLIC BLOOD PRESSURE: 160 MMHG | TEMPERATURE: 97 F | DIASTOLIC BLOOD PRESSURE: 79 MMHG | OXYGEN SATURATION: 94 % | BODY MASS INDEX: 44.93 KG/M2 | HEIGHT: 61 IN | RESPIRATION RATE: 16 BRPM | HEART RATE: 98 BPM | WEIGHT: 238 LBS

## 2023-07-05 DIAGNOSIS — R10.32 LEFT GROIN PAIN: ICD-10-CM

## 2023-07-05 DIAGNOSIS — Z98.890 HISTORY OF LUMBOSACRAL SPINE SURGERY: ICD-10-CM

## 2023-07-05 DIAGNOSIS — M54.16 LUMBAR RADICULOPATHY: ICD-10-CM

## 2023-07-05 PROBLEM — J86.9 EMPYEMA (MULTI): Status: RESOLVED | Noted: 2023-04-03 | Resolved: 2023-07-05

## 2023-07-05 PROBLEM — F39 MOOD DISORDER (CMS-HCC): Status: ACTIVE | Noted: 2023-07-05

## 2023-07-05 PROBLEM — F11.99 OPIOID USE, UNSPECIFIED WITH UNSPECIFIED OPIOID-INDUCED DISORDER (MULTI): Status: ACTIVE | Noted: 2023-07-05

## 2023-07-05 PROBLEM — Z76.89 ENCOUNTER FOR SUPPORT AND COORDINATION OF TRANSITION OF CARE: Status: RESOLVED | Noted: 2023-04-05 | Resolved: 2023-07-05

## 2023-07-05 PROBLEM — I20.89 ATYPICAL ANGINA (CMS-HCC): Status: RESOLVED | Noted: 2023-04-03 | Resolved: 2023-07-05

## 2023-07-05 PROBLEM — B37.0 THRUSH, ORAL: Status: RESOLVED | Noted: 2023-04-03 | Resolved: 2023-07-05

## 2023-07-05 PROCEDURE — A9577 INJ MULTIHANCE: HCPCS | Performed by: PHYSICAL MEDICINE & REHABILITATION

## 2023-07-05 PROCEDURE — 6360000002 HC RX W HCPCS: Performed by: NURSE ANESTHETIST, CERTIFIED REGISTERED

## 2023-07-05 PROCEDURE — 6360000004 HC RX CONTRAST MEDICATION: Performed by: PHYSICAL MEDICINE & REHABILITATION

## 2023-07-05 PROCEDURE — 3700000001 HC ADD 15 MINUTES (ANESTHESIA)

## 2023-07-05 PROCEDURE — 3700000000 HC ANESTHESIA ATTENDED CARE

## 2023-07-05 PROCEDURE — 7100000010 HC PHASE II RECOVERY - FIRST 15 MIN

## 2023-07-05 PROCEDURE — 72158 MRI LUMBAR SPINE W/O & W/DYE: CPT

## 2023-07-05 PROCEDURE — 2580000003 HC RX 258: Performed by: ANESTHESIOLOGY

## 2023-07-05 RX ORDER — PROPOFOL 10 MG/ML
INJECTION, EMULSION INTRAVENOUS PRN
Status: DISCONTINUED | OUTPATIENT
Start: 2023-07-05 | End: 2023-07-05 | Stop reason: SDUPTHER

## 2023-07-05 RX ORDER — SODIUM CHLORIDE 0.9 % (FLUSH) 0.9 %
5-40 SYRINGE (ML) INJECTION EVERY 12 HOURS SCHEDULED
Status: DISCONTINUED | OUTPATIENT
Start: 2023-07-05 | End: 2023-07-08 | Stop reason: HOSPADM

## 2023-07-05 RX ORDER — PROPOFOL 10 MG/ML
INJECTION, EMULSION INTRAVENOUS CONTINUOUS PRN
Status: DISCONTINUED | OUTPATIENT
Start: 2023-07-05 | End: 2023-07-05 | Stop reason: SDUPTHER

## 2023-07-05 RX ORDER — SODIUM CHLORIDE, SODIUM LACTATE, POTASSIUM CHLORIDE, CALCIUM CHLORIDE 600; 310; 30; 20 MG/100ML; MG/100ML; MG/100ML; MG/100ML
INJECTION, SOLUTION INTRAVENOUS CONTINUOUS
Status: DISCONTINUED | OUTPATIENT
Start: 2023-07-05 | End: 2023-07-08 | Stop reason: HOSPADM

## 2023-07-05 RX ORDER — MIDAZOLAM HYDROCHLORIDE 1 MG/ML
INJECTION INTRAMUSCULAR; INTRAVENOUS PRN
Status: DISCONTINUED | OUTPATIENT
Start: 2023-07-05 | End: 2023-07-05 | Stop reason: SDUPTHER

## 2023-07-05 RX ORDER — ONDANSETRON 2 MG/ML
4 INJECTION INTRAMUSCULAR; INTRAVENOUS
Status: ACTIVE | OUTPATIENT
Start: 2023-07-05 | End: 2023-07-06

## 2023-07-05 RX ORDER — SODIUM CHLORIDE 9 MG/ML
INJECTION, SOLUTION INTRAVENOUS PRN
Status: DISCONTINUED | OUTPATIENT
Start: 2023-07-05 | End: 2023-07-08 | Stop reason: HOSPADM

## 2023-07-05 RX ORDER — SODIUM CHLORIDE 0.9 % (FLUSH) 0.9 %
5-40 SYRINGE (ML) INJECTION PRN
Status: DISCONTINUED | OUTPATIENT
Start: 2023-07-05 | End: 2023-07-08 | Stop reason: HOSPADM

## 2023-07-05 RX ADMIN — PROPOFOL 100 MG: 10 INJECTION, EMULSION INTRAVENOUS at 15:01

## 2023-07-05 RX ADMIN — GADOBENATE DIMEGLUMINE 20 ML: 529 INJECTION, SOLUTION INTRAVENOUS at 15:32

## 2023-07-05 RX ADMIN — MIDAZOLAM HYDROCHLORIDE 2 MG: 1 INJECTION, SOLUTION INTRAMUSCULAR; INTRAVENOUS at 14:58

## 2023-07-05 RX ADMIN — SODIUM CHLORIDE, POTASSIUM CHLORIDE, SODIUM LACTATE AND CALCIUM CHLORIDE: 600; 310; 30; 20 INJECTION, SOLUTION INTRAVENOUS at 14:09

## 2023-07-05 RX ADMIN — PROPOFOL 50 MCG/KG/MIN: 10 INJECTION, EMULSION INTRAVENOUS at 15:03

## 2023-07-05 ASSESSMENT — PAIN SCALES - GENERAL: PAINLEVEL_OUTOF10: 8

## 2023-07-05 ASSESSMENT — PAIN - FUNCTIONAL ASSESSMENT: PAIN_FUNCTIONAL_ASSESSMENT: 0-10

## 2023-07-05 NOTE — ANESTHESIA PRE PROCEDURE
Department of Anesthesiology  Preprocedure Note       Name:  Erika Caldwell   Age:  74 y.o.  :  1955                                          MRN:  [id number]         Date:  2023      Surgeon: * No surgeons listed *    Procedure: * No procedures listed *    Medications prior to admission:   Prior to Admission medications    Medication Sig Start Date End Date Taking? Authorizing Provider   gabapentin (NEURONTIN) 800 MG tablet Take 1 tablet by mouth 4 times daily for 30 days. 23  Dex John MD   tiZANidine (ZANAFLEX) 4 MG tablet Take 1 tablet by mouth 2 times daily as needed (pain spasms) 23  Dex Jonh MD   oxyCODONE (ROXICODONE) 5 MG immediate release tablet Take 1 tablet by mouth every 6 hours as needed for Pain for up to 30 days.  Take lowest dose possible to manage pain Max Daily Amount: 20 mg 23  Dex John MD   etodolac (LODINE) 400 MG tablet Take 1 tablet by mouth 2 times daily 23   Delores Cordero, DO   venlafaxine (EFFEXOR XR) 150 MG extended release capsule Take 1 capsule by mouth daily 23  Eugeneos Dhiraj Humilyad, DO   omeprazole (PRILOSEC) 40 MG delayed release capsule Take 1 capsule by mouth daily 23   Eugeneos Dhiraj Humilyad, DO   naloxone 4 MG/0.1ML LIQD nasal spray 1 spray by Nasal route as needed for Opioid Reversal 22   Dex John MD   bumetanide (BUMEX) 1 MG tablet Take 1 tablet by mouth daily    Historical Provider, MD   dilTIAZem (CARDIZEM CD) 240 MG extended release capsule  20   Historical Provider, MD   insulin glargine (LANTUS;BASAGLAR) 100 UNIT/ML injection pen Inject into the skin 19   Historical Provider, MD   insulin lispro, 1 Unit Dial, 100 UNIT/ML SOPN Inject into the skin 19   Historical Provider, MD   losartan (COZAAR) 50 MG tablet Take 1 tablet by mouth 2 times daily    Historical Provider, MD   SITagliptin (JANUVIA) 100 MG tablet     Historical Provider, MD   lidocaine

## 2023-07-05 NOTE — ANESTHESIA POSTPROCEDURE EVALUATION
Department of Anesthesiology  Postprocedure Note    Patient: Néstor Long  MRN: [id number]  YOB: 1955  Date of evaluation: 7/5/2023      Procedure Summary     Date: 07/05/23 Room / Location: [facility]    Anesthesia Start: 0874 Anesthesia Stop: 8445    Procedure: MRI LUMBAR SPINE W WO CONTRAST Diagnosis:       Left groin pain      Lumbar radiculopathy      History of lumbosacral spine surgery      (L2-S1 decompression March 2023, worsening left leg pain, known seroma s/p I&D March 2023, eval abscess/hematoma/neural compression left L3, left L2 nerve roots. SEDATION STUDY)    Scheduled Providers:  Responsible Provider: Danie Jones MD    Anesthesia Type: MAC ASA Status: 3          Anesthesia Type: No value filed.     Abdifatah Phase I:      Abdifatah Phase II:        Anesthesia Post Evaluation    Patient location during evaluation: bedside  Patient participation: complete - patient participated  Level of consciousness: awake and awake and alert  Airway patency: patent  Nausea & Vomiting: no nausea and no vomiting  Complications: no  Cardiovascular status: blood pressure returned to baseline and hemodynamically stable  Respiratory status: acceptable  Hydration status: euvolemic

## 2023-07-06 ENCOUNTER — TELEPHONE (OUTPATIENT)
Dept: PAIN MANAGEMENT | Age: 68
End: 2023-07-06

## 2023-07-06 DIAGNOSIS — E11.69 TYPE 2 DIABETES MELLITUS WITH OTHER SPECIFIED COMPLICATION, UNSPECIFIED WHETHER LONG TERM INSULIN USE (MULTI): ICD-10-CM

## 2023-07-06 RX ORDER — PEN NEEDLE, DIABETIC 30 GX3/16"
NEEDLE, DISPOSABLE MISCELLANEOUS
Qty: 100 EACH | Refills: 3 | Status: SHIPPED | OUTPATIENT
Start: 2023-07-06

## 2023-07-06 RX ORDER — PEN NEEDLE, DIABETIC 30 GX3/16"
NEEDLE, DISPOSABLE MISCELLANEOUS 2 TIMES DAILY
COMMUNITY
End: 2023-07-06 | Stop reason: SDUPTHER

## 2023-07-06 NOTE — TELEPHONE ENCOUNTER
Patient left message that she needs BD needles/Michelle 2ng generation [id number]  to check her glucose.  Formatted this for DG.    She also had an MRI yesterday and would like to know if we received the results. Dr. Diane was the ordering doctor. She would like these results sent to Dr. Raúl Barnes at [facility]    Please advise to the MRI

## 2023-07-06 NOTE — TELEPHONE ENCOUNTER
No findings concerning for an infection or abscess. There is a possible disc extrusion at L2/3 that may be causing the left groin pain. Unfortunately nearly every level of the spine has up to severe foraminal stenosis which may cause nerve compression into the thighs and legs. Please keep in person appointment to discuss findings in more detail. Complex Repair And V-Y Plasty Text: The defect edges were debeveled with a #15 scalpel blade.  The primary defect was closed partially with a complex linear closure.  Given the location of the remaining defect, shape of the defect and the proximity to free margins a V-Y plasty was deemed most appropriate for complete closure of the defect.  Using a sterile surgical marker, an appropriate advancement flap was drawn incorporating the defect and placing the expected incisions within the relaxed skin tension lines where possible.    The area thus outlined was incised deep to adipose tissue with a #15 scalpel blade.  The skin margins were undermined to an appropriate distance in all directions utilizing iris scissors.

## 2023-07-11 DIAGNOSIS — M47.817 LUMBOSACRAL SPONDYLOSIS WITHOUT MYELOPATHY: ICD-10-CM

## 2023-07-11 DIAGNOSIS — M51.34 DEGENERATION OF THORACIC INTERVERTEBRAL DISC: ICD-10-CM

## 2023-07-11 DIAGNOSIS — M50.322 DEGENERATION OF C5-C6 INTERVERTEBRAL DISC: ICD-10-CM

## 2023-07-11 RX ORDER — OMEPRAZOLE 40 MG/1
40 CAPSULE, DELAYED RELEASE ORAL DAILY
Qty: 90 CAPSULE | Refills: 0 | Status: SHIPPED | OUTPATIENT
Start: 2023-07-11

## 2023-07-11 NOTE — TELEPHONE ENCOUNTER
MRI of the Lumbar Spine is under imaging. 7/5/23  Pt would like for you to read the telephone message on 7/6/23 from Dr. Reyes and would like your thoughts.  Her follow up with him is on 7/21/23

## 2023-07-12 RX ORDER — GABAPENTIN 800 MG/1
800 TABLET ORAL 4 TIMES DAILY
Qty: 20 TABLET | Refills: 0 | Status: SHIPPED | OUTPATIENT
Start: 2023-07-16 | End: 2023-07-21

## 2023-07-12 NOTE — TELEPHONE ENCOUNTER
PATIENT CALLED AGAIN STATING SHE IS GOING TO BE OUT OF OF MEDICATION ON Sunday AND WILL NEED A REFILL THAT DR WHITE HAD UPPED HER MEDICATION

## 2023-07-14 DIAGNOSIS — Z51.81 ENCOUNTER FOR MONITORING OPIOID MAINTENANCE THERAPY: ICD-10-CM

## 2023-07-14 DIAGNOSIS — M47.817 LUMBOSACRAL SPONDYLOSIS WITHOUT MYELOPATHY: ICD-10-CM

## 2023-07-14 DIAGNOSIS — Z79.891 ENCOUNTER FOR MONITORING OPIOID MAINTENANCE THERAPY: ICD-10-CM

## 2023-07-14 DIAGNOSIS — G89.18 POSTOPERATIVE PAIN AFTER SPINAL SURGERY: Primary | ICD-10-CM

## 2023-07-14 DIAGNOSIS — F11.90 CHRONIC, CONTINUOUS USE OF OPIOIDS: ICD-10-CM

## 2023-07-14 DIAGNOSIS — M47.812 CERVICAL SPONDYLOSIS WITHOUT MYELOPATHY: ICD-10-CM

## 2023-07-14 DIAGNOSIS — Z98.890 HISTORY OF LUMBOSACRAL SPINE SURGERY: ICD-10-CM

## 2023-07-14 RX ORDER — OXYCODONE HYDROCHLORIDE 5 MG/1
5 TABLET ORAL EVERY 4 HOURS PRN
Qty: 42 TABLET | Refills: 0 | Status: SHIPPED | OUTPATIENT
Start: 2023-07-14 | End: 2023-07-21

## 2023-07-14 NOTE — TELEPHONE ENCOUNTER
Patient had increased postprocedure pain, called in tears on 6/27/2023 telephone call. Was provided with increased dose of pain medication oxycodone 10 mg 3 times daily as needed for 5 days. Telephone call from today reviewed, taking oxycodone 5 mg every 4 hours, total of 30 mg of oxycodone per day, same daily dose is oxycodone 10 mg 3 times daily. This is elevated from the patient's oxycodone dose of 20 mg daily as planned on 6/16/2023.    -Continue oxycodone 5 mg 6X per day as needed severe pain  - Keep follow-up on 7/21/2023 with pain management to discuss her pain in more detail  - Call neurosurgery spine surgeon to update their team on completion of MRI lumbar spine for further evaluation    Controlled Substance Monitoring:    Acute and Chronic Pain Monitoring:   RX Monitoring 7/14/2023   Periodic Controlled Substance Monitoring Obtaining appropriate analgesic effect of treatment.    Chronic Pain > 50 MEDD -

## 2023-07-14 NOTE — TELEPHONE ENCOUNTER
Patient is requesting refill of her Roxicodone, she's been taking 2 every 6 hours. She didn't know the increase was only for five days. With the 5 mg. She is having breakthrough pain. Taking 5 mg. She's having to take it every 4 hours. She would like you to call her if you have any questions. Requested Prescriptions     Pending Prescriptions Disp Refills    oxyCODONE (ROXICODONE) 5 MG immediate release tablet 120 tablet 0     Sig: Take 1 tablet by mouth every 6 hours as needed for Pain for up to 30 days.  Take lowest dose possible to manage pain Max Daily Amount: 20 mg       Patient last seen on:  6/21/23  Date of last surgery:  n/a  Date of last refill:  6/16/23  Pain level:  n/a  Patient complaining of:  Taking 2 tablets since 6/29/23  Future appts: 7/21/23 f/u with you

## 2023-07-17 ENCOUNTER — TELEPHONE (OUTPATIENT)
Dept: PAIN MANAGEMENT | Age: 68
End: 2023-07-17

## 2023-07-17 NOTE — TELEPHONE ENCOUNTER
Patient called wanting to let Dr Izella Cogan and Dr. Grey Torres know that she fell in her parking lot, she ended up going to [facility] ER. She had Xrays done but nothing was broken. She says that all of her joints hurt, especially her knees. Yes

## 2023-07-17 NOTE — TELEPHONE ENCOUNTER
Relayed message to patient.  She also wanted to let you she went to the ED on 7/15/23 due to a fall on her knees. ED at [facility].

## 2023-07-19 NOTE — TELEPHONE ENCOUNTER
I called and spoke with patient to advise per Dr. Cherise Charles recommending to ice the area that she fell on. If she would like to be seen sooner by either one of them, to let us know.

## 2023-07-21 ENCOUNTER — OFFICE VISIT (OUTPATIENT)
Dept: PAIN MANAGEMENT | Age: 68
End: 2023-07-21

## 2023-07-21 VITALS
TEMPERATURE: 97.5 F | BODY MASS INDEX: 44.93 KG/M2 | HEIGHT: 61 IN | WEIGHT: 238 LBS | DIASTOLIC BLOOD PRESSURE: 68 MMHG | SYSTOLIC BLOOD PRESSURE: 136 MMHG

## 2023-07-21 DIAGNOSIS — R10.32 LEFT GROIN PAIN: ICD-10-CM

## 2023-07-21 DIAGNOSIS — Z51.81 ENCOUNTER FOR MONITORING OPIOID MAINTENANCE THERAPY: ICD-10-CM

## 2023-07-21 DIAGNOSIS — M47.817 LUMBOSACRAL SPONDYLOSIS WITHOUT MYELOPATHY: ICD-10-CM

## 2023-07-21 DIAGNOSIS — F11.90 CHRONIC, CONTINUOUS USE OF OPIOIDS: ICD-10-CM

## 2023-07-21 DIAGNOSIS — G56.03 BILATERAL CARPAL TUNNEL SYNDROME: ICD-10-CM

## 2023-07-21 DIAGNOSIS — G89.18 POSTOPERATIVE PAIN AFTER SPINAL SURGERY: Primary | ICD-10-CM

## 2023-07-21 DIAGNOSIS — M47.812 CERVICAL SPONDYLOSIS WITHOUT MYELOPATHY: ICD-10-CM

## 2023-07-21 DIAGNOSIS — R07.81 RIB PAIN ON RIGHT SIDE: ICD-10-CM

## 2023-07-21 DIAGNOSIS — Z79.891 ENCOUNTER FOR MONITORING OPIOID MAINTENANCE THERAPY: ICD-10-CM

## 2023-07-21 DIAGNOSIS — Z98.890 HISTORY OF LUMBOSACRAL SPINE SURGERY: ICD-10-CM

## 2023-07-21 RX ORDER — OXYCODONE HYDROCHLORIDE 10 MG/1
10 TABLET ORAL EVERY 4 HOURS PRN
Qty: 180 TABLET | Refills: 0 | Status: CANCELLED | OUTPATIENT
Start: 2023-07-21 | End: 2023-08-20

## 2023-07-21 RX ORDER — TIZANIDINE 4 MG/1
4 TABLET ORAL 2 TIMES DAILY PRN
Qty: 60 TABLET | Refills: 0 | Status: SHIPPED | OUTPATIENT
Start: 2023-07-21 | End: 2023-08-20

## 2023-07-21 RX ORDER — OXYCODONE HYDROCHLORIDE 5 MG/1
5 TABLET ORAL EVERY 4 HOURS PRN
Qty: 180 TABLET | Refills: 0 | Status: SHIPPED | OUTPATIENT
Start: 2023-07-21 | End: 2023-08-20

## 2023-07-21 RX ORDER — GABAPENTIN 800 MG/1
800 TABLET ORAL 4 TIMES DAILY
Qty: 120 TABLET | Refills: 0 | Status: SHIPPED | OUTPATIENT
Start: 2023-07-21 | End: 2023-08-20

## 2023-07-21 ASSESSMENT — ENCOUNTER SYMPTOMS
DIARRHEA: 0
SHORTNESS OF BREATH: 0
CONSTIPATION: 0
BACK PAIN: 1
NAUSEA: 0

## 2023-07-21 NOTE — PROGRESS NOTES
author for any questions or concerns. Follow up:  Return in about 1 month (around 8/21/2023) for reassessment of pain and symptoms.     Wade Sun MD

## 2023-07-24 DIAGNOSIS — R07.81 RIB PAIN ON RIGHT SIDE: ICD-10-CM

## 2023-07-25 ENCOUNTER — OFFICE VISIT (OUTPATIENT)
Dept: SPORTS MEDICINE | Age: 68
End: 2023-07-25
Payer: MEDICARE

## 2023-07-25 VITALS
HEIGHT: 61 IN | WEIGHT: 238 LBS | DIASTOLIC BLOOD PRESSURE: 68 MMHG | TEMPERATURE: 97.9 F | SYSTOLIC BLOOD PRESSURE: 136 MMHG | BODY MASS INDEX: 44.93 KG/M2

## 2023-07-25 DIAGNOSIS — S22.41XA CLOSED FRACTURE OF MULTIPLE RIBS OF RIGHT SIDE, INITIAL ENCOUNTER: ICD-10-CM

## 2023-07-25 DIAGNOSIS — M89.8X6 PAIN IN BOTH TIBIAE: Primary | ICD-10-CM

## 2023-07-25 PROCEDURE — G8427 DOCREV CUR MEDS BY ELIG CLIN: HCPCS | Performed by: FAMILY MEDICINE

## 2023-07-25 PROCEDURE — 1123F ACP DISCUSS/DSCN MKR DOCD: CPT | Performed by: FAMILY MEDICINE

## 2023-07-25 PROCEDURE — G8400 PT W/DXA NO RESULTS DOC: HCPCS | Performed by: FAMILY MEDICINE

## 2023-07-25 PROCEDURE — G8417 CALC BMI ABV UP PARAM F/U: HCPCS | Performed by: FAMILY MEDICINE

## 2023-07-25 PROCEDURE — 1036F TOBACCO NON-USER: CPT | Performed by: FAMILY MEDICINE

## 2023-07-25 PROCEDURE — 3017F COLORECTAL CA SCREEN DOC REV: CPT | Performed by: FAMILY MEDICINE

## 2023-07-25 PROCEDURE — 1090F PRES/ABSN URINE INCON ASSESS: CPT | Performed by: FAMILY MEDICINE

## 2023-07-25 PROCEDURE — 99214 OFFICE O/P EST MOD 30 MIN: CPT | Performed by: FAMILY MEDICINE

## 2023-07-25 ASSESSMENT — ENCOUNTER SYMPTOMS
CONSTIPATION: 0
BACK PAIN: 0
NAUSEA: 0
SHORTNESS OF BREATH: 0
DIARRHEA: 0

## 2023-07-25 NOTE — PROGRESS NOTES
[facility]) Physicians  Neurosurgery and Pain [facility]  [address] [place], [address] [address] [facility], [address]: [phone]  F: [phone]      Laureano Bridges  ([birth year])    2023    Subjective:     Laureano Bridges is 74 y.o. female who complains today of:    Chief Complaint   Patient presents with    Follow-up    Rib Injury       HPI     Patient comes in complaining of right rib pain as well as bilateral shin pain she states she had a fall on 7/15/2023 and was taken the emergency room she is she also has knee pain but x-rays were taken there that did not show any issues and says her knees are feeling better but she continues have pain is other areas she was there says there were x-rays taken of the ribs but none of the shins  Allergies:  Latex; 2,4-d dimethylamine; Aspartame and phenylalanine; Rivaroxaban; Metformin; Mcdonald-2 inhibitors; Iodides; Nalidixic acid; Nsaids;  Shellfish allergy; Statins; and Cyclobenzaprine    Past Medical History:   Diagnosis Date    Cancer ([address])     SKIN    Chronic pain     COPD (chronic obstructive pulmonary disease) (HCC)     Depression     Depression     Diabetes mellitus (HCC)     Hypertension     Neuropathy     Osteoarthritis     Sleep apnea     Urinary incontinence      Past Surgical History:   Procedure Laterality Date    JOINT REPLACEMENT      SPINE SURGERY       Family History   Problem Relation Age of Onset    Arthritis Mother     Hypertension Mother     Arthritis Father      Social History     Socioeconomic History    Marital status:      Spouse name: Not on file    Number of children: Not on file    Years of education: Not on file    Highest education level: Not on file   Occupational History    Not on file   Tobacco Use    Smoking status: Former     Packs/day: 1.00     Years: 15.00     Pack years: 15.00     Types: Cigarettes     Quit date: 6/15/2011     Years since quittin.1    Smokeless tobacco: Never   Substance and

## 2023-07-31 ENCOUNTER — APPOINTMENT (OUTPATIENT)
Dept: PRIMARY CARE | Facility: CLINIC | Age: 68
End: 2023-07-31
Payer: COMMERCIAL

## 2023-08-02 ENCOUNTER — TELEPHONE (OUTPATIENT)
Dept: SPORTS MEDICINE | Age: 68
End: 2023-08-02

## 2023-08-02 ENCOUNTER — TELEMEDICINE (OUTPATIENT)
Dept: PRIMARY CARE | Facility: CLINIC | Age: 68
End: 2023-08-02
Payer: MEDICARE

## 2023-08-02 ENCOUNTER — TELEPHONE (OUTPATIENT)
Dept: PAIN MANAGEMENT | Age: 68
End: 2023-08-02

## 2023-08-02 VITALS — WEIGHT: 238 LBS | BODY MASS INDEX: 43.79 KG/M2 | HEIGHT: 62 IN

## 2023-08-02 DIAGNOSIS — E53.8 VITAMIN B12 DEFICIENCY: ICD-10-CM

## 2023-08-02 DIAGNOSIS — I10 HYPERTENSION, UNSPECIFIED TYPE: ICD-10-CM

## 2023-08-02 DIAGNOSIS — E78.2 MIXED HYPERLIPIDEMIA: Primary | ICD-10-CM

## 2023-08-02 DIAGNOSIS — E11.69 TYPE 2 DIABETES MELLITUS WITH OTHER SPECIFIED COMPLICATION, WITH LONG-TERM CURRENT USE OF INSULIN (MULTI): ICD-10-CM

## 2023-08-02 DIAGNOSIS — D64.9 ANEMIA, UNSPECIFIED TYPE: ICD-10-CM

## 2023-08-02 DIAGNOSIS — Z79.4 TYPE 2 DIABETES MELLITUS WITH OTHER SPECIFIED COMPLICATION, WITH LONG-TERM CURRENT USE OF INSULIN (MULTI): ICD-10-CM

## 2023-08-02 DIAGNOSIS — E66.01 MORBID OBESITY WITH BMI OF 40.0-44.9, ADULT (MULTI): ICD-10-CM

## 2023-08-02 PROCEDURE — 99215 OFFICE O/P EST HI 40 MIN: CPT | Performed by: NURSE PRACTITIONER

## 2023-08-02 NOTE — TELEPHONE ENCOUNTER
Patient is asking if she can increase her etodolac to 3x a day? She says she occasionally take an extra half around 1PM and it helps slightly. She says she did discuss this with her pharmacist as well who told her to ask Dr Matteo Payan. She says her knees feel like they are being stuck with a hot poker.

## 2023-08-02 NOTE — TELEPHONE ENCOUNTER
Patient states that since she increased her gabapentin she does not feel that it has helped her pain but it has caused her to feel like a 'zombie'. She says she wants to sleep all the time. She is asking if she could wean back to previous dose?

## 2023-08-02 NOTE — TELEPHONE ENCOUNTER
Can return call to patient and inform ok to reduce Gabapentin from 800mg QID to TID and see how she feels. If still having the symptoms can continue to reduce to BID.

## 2023-08-02 NOTE — PROGRESS NOTES
Problem List Items Addressed This Visit       Anemia    Current Assessment & Plan     S/p hospitalization and surgery  Last labs 7/3/23 trending upward  Recheck for return to baseline  Also added in iron panel          Relevant Orders    Iron and TIBC    Ferritin    CBC and Auto Differential    Hypertension    Relevant Medications    semaglutide (Rybelsus) 3 mg tablet    Other Relevant Orders    Hemoglobin A1c    Basic metabolic panel    Mixed hyperlipidemia - Primary    Current Assessment & Plan     She isn't sure if lipid panel was fasting  Will have her repeat now  Doesn't tolerate statins  CINEMA v preventive cards          Relevant Medications    semaglutide (Rybelsus) 3 mg tablet    Other Relevant Orders    Lipid Panel    Hemoglobin A1c    Basic metabolic panel    Morbid obesity with BMI of 40.0-44.9, adult (CMS/[facility])    Relevant Medications    semaglutide (Rybelsus) 3 mg tablet    Other Relevant Orders    Hemoglobin A1c    Basic metabolic panel    Type 2 diabetes mellitus, with long-term current use of insulin (CMS/[facility])    Overview     Metformin: severe GI SE  Januvia: $$  Ozempic: $$           Current Assessment & Plan     Ana María working inconsistently and would prefer CGM on abdomen  - switch to Dexcom G7  Presently on lantus only with sliding scale  See if rybelsus is better covered by Medicare after checking with Dr LARA  Plan to repeat labs in 3 months once med added  Also discussed CINEMA program          Relevant Medications    blood-glucose sensor (Dexcom G7 Sensor) device    semaglutide (Rybelsus) 3 mg tablet    Other Relevant Orders    Hemoglobin A1c    Basic metabolic panel    Vitamin B12 deficiency    Overview     Stable on PO supp  Monitor            An interactive audio/visual telecommunication system which permits real time communications between the patient (at the originating site) and provider (at the distant site) was utilized to provide this telehealth service.    Verbal consent was requested  and obtained from the patient for this telehealth visit.  We have confirmed the patient wishes to see me, Cornelia Ball, a board certified family nurse practitioner with an active [facility] license as well as verified the patient's identity and physical location in [place].    Subjective   Patient ID: Kaylan Woo is a 67 y.o. female who presents for Follow-up (1 month follow up).  HPI  Component      Latest Ref Rng 7/3/2023   WBC      4.4 - 11.3 x10E9/L 10.4    RBC      4.00 - 5.20 x10E12/L 4.31    HEMOGLOBIN      12.0 - 16.0 g/dL 10.0 (L)    HEMATOCRIT      36.0 - 46.0 % 34.1 (L)    MCV      80 - 100 fL 79 (L)    MCHC      32.0 - 36.0 g/dL 29.3 (L)    Platelets      150 - 450 x10E9/L 346    RED CELL DISTRIBUTION WIDTH      11.5 - 14.5 % 18.8 (H)    Neutrophils %      40.0 - 80.0 % 61.1    Immature Granulocytes %, Automated      0.0 - 0.9 % 0.4    Lymphocytes %      13.0 - 44.0 % 25.5    Monocytes %      2.0 - 10.0 % 8.6    Eosinophils %      0.0 - 6.0 % 3.2    Basophils %      0.0 - 2.0 % 1.2    Neutrophils Absolute      1.20 - 7.70 x10E9/L 6.38    Lymphocytes Absolute      1.20 - 4.80 x10E9/L 2.66    Monocytes Absolute      0.10 - 1.00 x10E9/L 0.90    Eosinophils Absolute      0.00 - 0.70 x10E9/L 0.33    Basophils Absolute      0.00 - 0.10 x10E9/L 0.12 (H)    GLUCOSE      74 - 99 mg/dL 329 (H)    SODIUM      136 - 145 mmol/L 138    POTASSIUM      3.5 - 5.3 mmol/L 4.6    CHLORIDE      98 - 107 mmol/L 97 (L)    Bicarbonate      21 - 32 mmol/L 31    Anion Gap      10 - 20 mmol/L 15    Blood Urea Nitrogen      6 - 23 mg/dL 16    Creatinine      0.50 - 1.05 mg/dL 0.91    GFR Female      >90 mL/min/1.73m2 70    Calcium      8.6 - 10.3 mg/dL 9.1    Albumin      3.4 - 5.0 g/dL 4.0    Alkaline Phosphatase      33 - 136 U/L 97    Total Protein      6.4 - 8.2 g/dL 6.2 (L)    AST      9 - 39 U/L 12    Bilirubin Total      0.0 - 1.2 mg/dL 0.4    ALT      7 - 45 U/L 12    CHOLESTEROL      0 - 199 mg/dL 283 (H)    HDL  "CHOLESTEROL      mg/dL 49.7    Cholesterol/HDL Ratio 5.7 !    LDL      0 - 99 mg/dL 154 (H)    VLDL      0 - 40 mg/dL 79 (H)    TRIGLYCERIDES      0 - 149 mg/dL 396 (H)    Non HDL Cholesterol      mg/dL 233    Hemoglobin A1C      % 9.4 !    Estimated Average Glucose      MG/    Thyroid Stimulating Hormone      0.44 - 3.98 mIU/L 1.81    Vitamin D, 25-Hydroxy, Total      ng/mL 31    Vitamin B12      211 - 911 pg/mL 481       Effexor and bumex recently increased     Vit B12  1000 mcg every day x about 6-8 months    Vitamin D  No kidney stone hx   2000 units every day with K2  Takes with food    DM2  Multiple rounds of steroids  Almost whole month prior to HA1C  Lantus 41 units HS  - increase from 37 units about 2 weeks ago  Humalog sliding scale   Last Januvia about 2 months  - $$  Doesn't tolerate metformin   - GI SE   Ozempic x couple months   - $$ medicare gap   Wearing Ana María  - having trouble with it   - falling off  - not reading     HLD  Isn't sure if lipid panel was fasting   Doesn't tolerate statin  Fenofibrate - myalgias    Review of Systems   All other systems reviewed and are negative.    BP Readings from Last 3 Encounters:   07/03/23 140/70   10/17/22 134/80   07/13/22 125/74      Wt Readings from Last 3 Encounters:   08/02/23 108 kg (238 lb)   07/03/23 108 kg (238 lb 6 oz)   10/17/22 110 kg (243 lb)      BMI:   Estimated body mass index is 44.24 kg/m² as calculated from the following:    Height as of this encounter: 1.562 m (5' 1.5\").    Weight as of this encounter: 108 kg (238 lb).    Objective   Physical Exam  Gen: Alert, NAD  Respiratory:  resp effort NL  Neuro:  coordination intact   Mood: normal    Sitting upright     "

## 2023-08-02 NOTE — TELEPHONE ENCOUNTER
Left voicemail for the patient to return office call    Can return call to patient and inform ok to reduce Gabapentin from 800mg QID to TID and see how she feels.  If still having the symptoms can continue to reduce to BID per [facility]

## 2023-08-03 PROBLEM — D64.9 ANEMIA: Status: ACTIVE | Noted: 2023-08-03

## 2023-08-03 PROBLEM — E78.2 MIXED HYPERLIPIDEMIA: Status: ACTIVE | Noted: 2023-08-03

## 2023-08-03 PROBLEM — Z79.4 TYPE 2 DIABETES MELLITUS, WITH LONG-TERM CURRENT USE OF INSULIN (MULTI): Status: ACTIVE | Noted: 2023-04-03

## 2023-08-03 RX ORDER — BLOOD-GLUCOSE SENSOR
EACH MISCELLANEOUS
Qty: 5 EACH | Refills: 11 | Status: SHIPPED | OUTPATIENT
Start: 2023-08-03 | End: 2024-03-28 | Stop reason: WASHOUT

## 2023-08-03 NOTE — ASSESSMENT & PLAN NOTE
Ana María working inconsistently and would prefer CGM on abdomen  - switch to Dexcom G7  Presently on lantus only with sliding scale  See if rybelsus is better covered by Medicare after checking with Dr LARA  Plan to repeat labs in 3 months once med added  Also discussed CINEMA program

## 2023-08-03 NOTE — ASSESSMENT & PLAN NOTE
S/p hospitalization and surgery  Last labs 7/3/23 trending upward  Recheck for return to baseline  Also added in iron panel

## 2023-08-03 NOTE — TELEPHONE ENCOUNTER
PT states that has she already been taking tylenol. She says she did discuss increasing the etolodac with her PCP who said it was okay as long as Dr Allen Sullivan says it was okay. Please advise.

## 2023-08-04 ENCOUNTER — LAB (OUTPATIENT)
Dept: LAB | Facility: LAB | Age: 68
End: 2023-08-04
Payer: MEDICARE

## 2023-08-04 DIAGNOSIS — D64.9 ANEMIA, UNSPECIFIED TYPE: ICD-10-CM

## 2023-08-04 DIAGNOSIS — E78.2 MIXED HYPERLIPIDEMIA: ICD-10-CM

## 2023-08-04 PROBLEM — E66.01 MORBID OBESITY WITH BMI OF 40.0-44.9, ADULT (MULTI): Status: ACTIVE | Noted: 2023-08-04

## 2023-08-04 LAB
BASOPHILS (10*3/UL) IN BLOOD BY AUTOMATED COUNT: 0.15 X10E9/L (ref 0–0.1)
BASOPHILS/100 LEUKOCYTES IN BLOOD BY AUTOMATED COUNT: 1.1 % (ref 0–2)
CHOLESTEROL (MG/DL) IN SER/PLAS: 223 MG/DL (ref 0–199)
CHOLESTEROL IN HDL (MG/DL) IN SER/PLAS: 51.7 MG/DL
CHOLESTEROL/HDL RATIO: 4.3
EOSINOPHILS (10*3/UL) IN BLOOD BY AUTOMATED COUNT: 0.31 X10E9/L (ref 0–0.7)
EOSINOPHILS/100 LEUKOCYTES IN BLOOD BY AUTOMATED COUNT: 2.3 % (ref 0–6)
ERYTHROCYTE DISTRIBUTION WIDTH (RATIO) BY AUTOMATED COUNT: 17.7 % (ref 11.5–14.5)
ERYTHROCYTE MEAN CORPUSCULAR HEMOGLOBIN CONCENTRATION (G/DL) BY AUTOMATED: 29.3 G/DL (ref 32–36)
ERYTHROCYTE MEAN CORPUSCULAR VOLUME (FL) BY AUTOMATED COUNT: 81 FL (ref 80–100)
ERYTHROCYTES (10*6/UL) IN BLOOD BY AUTOMATED COUNT: 4.11 X10E12/L (ref 4–5.2)
FERRITIN (UG/LL) IN SER/PLAS: 19 UG/L (ref 8–150)
HEMATOCRIT (%) IN BLOOD BY AUTOMATED COUNT: 33.4 % (ref 36–46)
HEMOGLOBIN (G/DL) IN BLOOD: 9.8 G/DL (ref 12–16)
IMMATURE GRANULOCYTES/100 LEUKOCYTES IN BLOOD BY AUTOMATED COUNT: 0.4 % (ref 0–0.9)
IRON (UG/DL) IN SER/PLAS: 28 UG/DL (ref 35–150)
IRON BINDING CAPACITY (UG/DL) IN SER/PLAS: 410 UG/DL (ref 240–445)
IRON SATURATION (%) IN SER/PLAS: 7 % (ref 25–45)
LDL: 149 MG/DL (ref 0–99)
LEUKOCYTES (10*3/UL) IN BLOOD BY AUTOMATED COUNT: 13.6 X10E9/L (ref 4.4–11.3)
LYMPHOCYTES (10*3/UL) IN BLOOD BY AUTOMATED COUNT: 3.48 X10E9/L (ref 1.2–4.8)
LYMPHOCYTES/100 LEUKOCYTES IN BLOOD BY AUTOMATED COUNT: 25.6 % (ref 13–44)
MONOCYTES (10*3/UL) IN BLOOD BY AUTOMATED COUNT: 1.06 X10E9/L (ref 0.1–1)
MONOCYTES/100 LEUKOCYTES IN BLOOD BY AUTOMATED COUNT: 7.8 % (ref 2–10)
NEUTROPHILS (10*3/UL) IN BLOOD BY AUTOMATED COUNT: 8.55 X10E9/L (ref 1.2–7.7)
NEUTROPHILS/100 LEUKOCYTES IN BLOOD BY AUTOMATED COUNT: 62.8 % (ref 40–80)
PLATELETS (10*3/UL) IN BLOOD AUTOMATED COUNT: 388 X10E9/L (ref 150–450)
TRIGLYCERIDE (MG/DL) IN SER/PLAS: 114 MG/DL (ref 0–149)
VLDL: 23 MG/DL (ref 0–40)

## 2023-08-04 PROCEDURE — 85025 COMPLETE CBC W/AUTO DIFF WBC: CPT

## 2023-08-04 PROCEDURE — 83550 IRON BINDING TEST: CPT

## 2023-08-04 PROCEDURE — 80061 LIPID PANEL: CPT

## 2023-08-04 PROCEDURE — 82728 ASSAY OF FERRITIN: CPT

## 2023-08-04 PROCEDURE — 36415 COLL VENOUS BLD VENIPUNCTURE: CPT

## 2023-08-04 PROCEDURE — 83540 ASSAY OF IRON: CPT

## 2023-08-04 NOTE — PATIENT INSTRUCTIONS
Repeat fasting lipid and anemia labs now  I sent Dexcom G7 to replace Ana María  Repeat HA1C in 3 months after 3 month trial of rybelsus

## 2023-08-04 NOTE — ASSESSMENT & PLAN NOTE
She isn't sure if lipid panel was fasting  Will have her repeat now  Doesn't tolerate statins  CINEMA v preventive cards

## 2023-08-07 DIAGNOSIS — D50.9 IRON DEFICIENCY ANEMIA, UNSPECIFIED IRON DEFICIENCY ANEMIA TYPE: ICD-10-CM

## 2023-08-09 DIAGNOSIS — D50.9 IRON DEFICIENCY ANEMIA, UNSPECIFIED IRON DEFICIENCY ANEMIA TYPE: Primary | ICD-10-CM

## 2023-08-09 RX ORDER — FERROUS SULFATE 325(65) MG
65 TABLET, DELAYED RELEASE (ENTERIC COATED) ORAL
Qty: 30 TABLET | Refills: 3 | Status: SHIPPED | OUTPATIENT
Start: 2023-08-09 | End: 2023-09-08

## 2023-08-10 ENCOUNTER — TELEPHONE (OUTPATIENT)
Dept: PAIN MANAGEMENT | Age: 68
End: 2023-08-10

## 2023-08-10 NOTE — TELEPHONE ENCOUNTER
Can call patient and inform that we will be happy to discuss any available options at her next office visit.

## 2023-08-10 NOTE — TELEPHONE ENCOUNTER
Patient states that she spoke to Dr. Justino Abdul with her MRI report. She says that he is recommending surgery. She is asking if there is anything else that Dr Triana Salvage suggests from a pain management stand point? She does not want to have surgery again.

## 2023-08-15 ENCOUNTER — OFFICE VISIT (OUTPATIENT)
Dept: SPORTS MEDICINE | Age: 68
End: 2023-08-15
Payer: MEDICARE

## 2023-08-15 VITALS — WEIGHT: 238 LBS | BODY MASS INDEX: 44.93 KG/M2 | HEIGHT: 61 IN

## 2023-08-15 DIAGNOSIS — S22.41XD CLOSED FRACTURE OF MULTIPLE RIBS OF RIGHT SIDE WITH ROUTINE HEALING, SUBSEQUENT ENCOUNTER: Primary | ICD-10-CM

## 2023-08-15 PROCEDURE — 1090F PRES/ABSN URINE INCON ASSESS: CPT | Performed by: FAMILY MEDICINE

## 2023-08-15 PROCEDURE — 1036F TOBACCO NON-USER: CPT | Performed by: FAMILY MEDICINE

## 2023-08-15 PROCEDURE — 3017F COLORECTAL CA SCREEN DOC REV: CPT | Performed by: FAMILY MEDICINE

## 2023-08-15 PROCEDURE — 1123F ACP DISCUSS/DSCN MKR DOCD: CPT | Performed by: FAMILY MEDICINE

## 2023-08-15 PROCEDURE — G8427 DOCREV CUR MEDS BY ELIG CLIN: HCPCS | Performed by: FAMILY MEDICINE

## 2023-08-15 PROCEDURE — G8417 CALC BMI ABV UP PARAM F/U: HCPCS | Performed by: FAMILY MEDICINE

## 2023-08-15 PROCEDURE — 99213 OFFICE O/P EST LOW 20 MIN: CPT | Performed by: FAMILY MEDICINE

## 2023-08-15 PROCEDURE — G8400 PT W/DXA NO RESULTS DOC: HCPCS | Performed by: FAMILY MEDICINE

## 2023-08-15 RX ORDER — ETODOLAC 200 MG/1
200 CAPSULE ORAL 2 TIMES DAILY
Qty: 60 CAPSULE | Refills: 0 | Status: SHIPPED | OUTPATIENT
Start: 2023-08-15

## 2023-08-15 ASSESSMENT — ENCOUNTER SYMPTOMS
DIARRHEA: 0
CONSTIPATION: 0
BACK PAIN: 0
SHORTNESS OF BREATH: 0
NAUSEA: 0

## 2023-08-15 NOTE — PROGRESS NOTES
[facility]) Physicians  Neurosurgery and Pain [address] , [address] [facility], [address] [place], [place] [address]vard: [phone]  F: [phone]      Judie Villanueva  ()    8/15/2023    Subjective:     Judie Villanueva is 74 y.o. female who complains today of:    Chief Complaint   Patient presents with    Pain       HPI     This patient returns stating that her ribs are feeling a lot better states that she still having some pain in her legs where she is and would be seeing Dr. Mother mcnulty for that  Allergies:  Latex; 2,4-d dimethylamine; Aspartame and phenylalanine; Rivaroxaban; Metformin; Mcdonald-2 inhibitors; Iodides; Nalidixic acid; Nsaids;  Shellfish allergy; Statins; and Cyclobenzaprine    Past Medical History:   Diagnosis Date    Cancer ([address] [facility])     SKIN    Chronic pain     COPD (chronic obstructive pulmonary disease) (HCC)     Depression     Depression     Diabetes mellitus (HCC)     Hypertension     Neuropathy     Osteoarthritis     Sleep apnea     Urinary incontinence      Past Surgical History:   Procedure Laterality Date    JOINT REPLACEMENT      SPINE SURGERY       Family History   Problem Relation Age of Onset    Arthritis Mother     Hypertension Mother     Arthritis Father      Social History     Socioeconomic History    Marital status:      Spouse name: Not on file    Number of children: Not on file    Years of education: Not on file    Highest education level: Not on file   Occupational History    Not on file   Tobacco Use    Smoking status: Former     Packs/day: 1.00     Years: 15.00     Pack years: 15.00     Types: Cigarettes     Quit date: 6/15/2011     Years since quittin.1    Smokeless tobacco: Never   Substance and Sexual Activity    Alcohol use: Never    Drug use: Never    Sexual activity: Not on file   Other Topics Concern    Not on file   Social History Narrative    Not on file     Social Determinants of Health     Financial Resource Strain: Not

## 2023-08-20 DIAGNOSIS — M47.817 LUMBOSACRAL SPONDYLOSIS WITHOUT MYELOPATHY: ICD-10-CM

## 2023-08-21 NOTE — TELEPHONE ENCOUNTER
Requested Prescriptions     Pending Prescriptions Disp Refills    gabapentin (NEURONTIN) 800 MG tablet [Pharmacy Med Name: GABAPENTIN 800MG TABLETS] 120 tablet 0     Sig: TAKE 1 TABLET BY MOUTH 4 TIMES DAILY FOR 30 DAYS       Patient last seen on: 07/21/23  Date of last refill: 07/21/23  Future appts: 08/22/23

## 2023-08-22 ENCOUNTER — OFFICE VISIT (OUTPATIENT)
Dept: PAIN MANAGEMENT | Age: 68
End: 2023-08-22
Payer: MEDICARE

## 2023-08-22 VITALS
TEMPERATURE: 98.1 F | BODY MASS INDEX: 44.37 KG/M2 | WEIGHT: 235 LBS | SYSTOLIC BLOOD PRESSURE: 136 MMHG | DIASTOLIC BLOOD PRESSURE: 68 MMHG | HEIGHT: 61 IN

## 2023-08-22 DIAGNOSIS — Z51.81 ENCOUNTER FOR MONITORING OPIOID MAINTENANCE THERAPY: ICD-10-CM

## 2023-08-22 DIAGNOSIS — F11.90 CHRONIC, CONTINUOUS USE OF OPIOIDS: ICD-10-CM

## 2023-08-22 DIAGNOSIS — Z79.891 ENCOUNTER FOR MONITORING OPIOID MAINTENANCE THERAPY: ICD-10-CM

## 2023-08-22 DIAGNOSIS — Z98.890 HISTORY OF LUMBOSACRAL SPINE SURGERY: ICD-10-CM

## 2023-08-22 DIAGNOSIS — M54.16 LUMBAR RADICULOPATHY: Primary | ICD-10-CM

## 2023-08-22 DIAGNOSIS — G89.18 POSTOPERATIVE PAIN AFTER SPINAL SURGERY: ICD-10-CM

## 2023-08-22 DIAGNOSIS — G56.03 BILATERAL CARPAL TUNNEL SYNDROME: ICD-10-CM

## 2023-08-22 DIAGNOSIS — M47.812 CERVICAL SPONDYLOSIS WITHOUT MYELOPATHY: ICD-10-CM

## 2023-08-22 DIAGNOSIS — M47.817 LUMBOSACRAL SPONDYLOSIS WITHOUT MYELOPATHY: ICD-10-CM

## 2023-08-22 PROCEDURE — G8427 DOCREV CUR MEDS BY ELIG CLIN: HCPCS | Performed by: PHYSICAL MEDICINE & REHABILITATION

## 2023-08-22 PROCEDURE — 1123F ACP DISCUSS/DSCN MKR DOCD: CPT | Performed by: PHYSICAL MEDICINE & REHABILITATION

## 2023-08-22 PROCEDURE — 1036F TOBACCO NON-USER: CPT | Performed by: PHYSICAL MEDICINE & REHABILITATION

## 2023-08-22 PROCEDURE — G8417 CALC BMI ABV UP PARAM F/U: HCPCS | Performed by: PHYSICAL MEDICINE & REHABILITATION

## 2023-08-22 PROCEDURE — G8400 PT W/DXA NO RESULTS DOC: HCPCS | Performed by: PHYSICAL MEDICINE & REHABILITATION

## 2023-08-22 PROCEDURE — 99214 OFFICE O/P EST MOD 30 MIN: CPT | Performed by: PHYSICAL MEDICINE & REHABILITATION

## 2023-08-22 PROCEDURE — 1090F PRES/ABSN URINE INCON ASSESS: CPT | Performed by: PHYSICAL MEDICINE & REHABILITATION

## 2023-08-22 PROCEDURE — 3017F COLORECTAL CA SCREEN DOC REV: CPT | Performed by: PHYSICAL MEDICINE & REHABILITATION

## 2023-08-22 RX ORDER — TIZANIDINE 4 MG/1
4 TABLET ORAL 2 TIMES DAILY PRN
Qty: 60 TABLET | Refills: 0 | Status: SHIPPED | OUTPATIENT
Start: 2023-08-22 | End: 2023-09-21

## 2023-08-22 RX ORDER — GABAPENTIN 800 MG/1
800 TABLET ORAL 4 TIMES DAILY
Qty: 120 TABLET | Refills: 0 | OUTPATIENT
Start: 2023-08-22 | End: 2023-09-21

## 2023-08-22 RX ORDER — GABAPENTIN 800 MG/1
800 TABLET ORAL 3 TIMES DAILY
Qty: 90 TABLET | Refills: 0 | Status: SHIPPED | OUTPATIENT
Start: 2023-08-22 | End: 2023-09-21

## 2023-08-22 RX ORDER — OXYCODONE HYDROCHLORIDE 5 MG/1
5 TABLET ORAL EVERY 4 HOURS PRN
Qty: 160 TABLET | Refills: 0 | Status: SHIPPED | OUTPATIENT
Start: 2023-08-22 | End: 2023-09-21

## 2023-08-22 ASSESSMENT — ENCOUNTER SYMPTOMS
CONSTIPATION: 0
BACK PAIN: 1
NAUSEA: 0
DIARRHEA: 0
SHORTNESS OF BREATH: 0

## 2023-08-22 NOTE — PROGRESS NOTES
facet arthropathy. T12/L1 normal canal and foramen. L1/2 up to severe canal stenosis, mild bilateral foramen narrowing. L2/3 up to severe canal stenosis, mild bilateral foramen narrowing. L3/4 up to severe canal stenosis, up to moderate bilateral foramen narrowing. L4/5 up to moderate canal stenosis, moderate right and up to moderate left foramen narrowing. L5/S1 normal canal stenosis, severe bilateral foramen narrowing. XR LS Spine 3/29/22: no fracture, degenerative disc disease and facet arthropathy. MRI C Spine 3/4/22: extensive postoperative changes as detailed above. canal stenosis is worst C3/4 moderate, moderate foraminal stenosis Bilateral C3/4 and left C5/6. C5/6 normal canal due to discectomy and fusion, moderate left up to moderate right foramen stenosis. C6/7 mild canal stenosis, fused endplates, mild bilateral foramen narrow. C7/T1 normal canal and foramen. XR C Spine 9/30/21: anterior fusion C5-7. Anterolisthesis C2/3, degenerative disc disease and C3/4, vertebral degenerative changes multiple levels. EMG B UE 1/31/20: This study is abnormal. There is current electrodiagnostic evidence for bilateral median mononeuropathy at the wrist consistent with a clinical diagnosis of Bilateral carpal tunnel syndrome. This is moderate in severity by electrical criteria bilaterally. The left is slightly worse than the right. There is sensory and motor nerve involvement bilaterally. There is no active denervation on electromyography bilaterally. There is no current evidence for an active cervical motor radiculopathy. There are mild chronic changes in a C7 distribution bilaterally that are likely related to her cervical spine pathology prior to cervical spine surgery. There is no current evidence for a generalized large fiber sensorimotor peripheral polyneuropathy.     Labs 4/8/21:   Platelet 585 normal  Creatinine 0.67 normal     UDS 7/00/06: free of illicits, consistent with oxycodone and Gabapentin  UDS

## 2023-08-24 DIAGNOSIS — M50.322 DEGENERATION OF C5-C6 INTERVERTEBRAL DISC: ICD-10-CM

## 2023-08-24 DIAGNOSIS — M51.34 DEGENERATION OF THORACIC INTERVERTEBRAL DISC: ICD-10-CM

## 2023-08-25 DIAGNOSIS — M50.322 DEGENERATION OF C5-C6 INTERVERTEBRAL DISC: ICD-10-CM

## 2023-08-25 DIAGNOSIS — M51.34 DEGENERATION OF THORACIC INTERVERTEBRAL DISC: ICD-10-CM

## 2023-08-25 RX ORDER — ETODOLAC 400 MG/1
400 TABLET, FILM COATED ORAL 2 TIMES DAILY
Qty: 60 TABLET | Refills: 2 | OUTPATIENT
Start: 2023-08-25

## 2023-08-25 RX ORDER — ETODOLAC 400 MG/1
TABLET, FILM COATED ORAL
Qty: 60 TABLET | Refills: 2 | OUTPATIENT
Start: 2023-08-25

## 2023-08-25 NOTE — TELEPHONE ENCOUNTER
Called pharmacy. They state PT picked up 30 day supply on 8/3. Called PT to make aware. She states she will look at her medication and call back.

## 2023-08-31 DIAGNOSIS — M51.34 DEGENERATION OF THORACIC INTERVERTEBRAL DISC: ICD-10-CM

## 2023-08-31 DIAGNOSIS — M50.322 DEGENERATION OF C5-C6 INTERVERTEBRAL DISC: ICD-10-CM

## 2023-08-31 RX ORDER — ETODOLAC 400 MG/1
400 TABLET, FILM COATED ORAL 2 TIMES DAILY
Qty: 60 TABLET | Refills: 2 | OUTPATIENT
Start: 2023-08-31

## 2023-08-31 NOTE — TELEPHONE ENCOUNTER
Appears that patient had this filled by Dr Dilma Diana on 8/15/23, given #60 capsule. Perhaps it is too early to fill? I would defer this question to Dr Dilma Diana.

## 2023-08-31 NOTE — TELEPHONE ENCOUNTER
Requested Prescriptions     Pending Prescriptions Disp Refills    etodolac (LODINE) 400 MG tablet 60 tablet 2     Sig: Take 1 tablet by mouth 2 times daily       Patient last seen on:  8/15  Date of last surgery:  N/A   Date of last refill:  8/3  Pain level:  N/A  Patient complaining of:  Req. Med refill of the 400mg before the holiday.    Future appts:

## 2023-09-05 DIAGNOSIS — M51.34 DEGENERATION OF THORACIC INTERVERTEBRAL DISC: ICD-10-CM

## 2023-09-05 DIAGNOSIS — M50.322 DEGENERATION OF C5-C6 INTERVERTEBRAL DISC: ICD-10-CM

## 2023-09-05 DIAGNOSIS — S22.41XD CLOSED FRACTURE OF MULTIPLE RIBS OF RIGHT SIDE WITH ROUTINE HEALING, SUBSEQUENT ENCOUNTER: ICD-10-CM

## 2023-09-05 RX ORDER — ETODOLAC 200 MG/1
200 CAPSULE ORAL 2 TIMES DAILY
Qty: 60 CAPSULE | Refills: 0 | Status: SHIPPED | OUTPATIENT
Start: 2023-09-05

## 2023-09-05 NOTE — TELEPHONE ENCOUNTER
Called patient. Spoke with patient and advised that, per  Dr. Araceli Regan  prescription was electronically sent to pharmacy.

## 2023-09-05 NOTE — TELEPHONE ENCOUNTER
Pt left a vm requesting this medication per MA. Pt aware that dr. Jazmine Peralta had denied it and then dr. Yessica Song filled the 200mg not the 400mg, pt did not specify mg on her vm per MA.

## 2023-09-05 NOTE — TELEPHONE ENCOUNTER
Requested Prescriptions     Pending Prescriptions Disp Refills    etodolac (LODINE) 200 MG capsule 60 capsule 0     Sig: Take 1 capsule by mouth in the morning and at bedtime       Patient last seen on:  8/15/23  Date of last refill:  8/3/23  Patient complaining of:  Pt request  Future appts: None

## 2023-09-06 ENCOUNTER — TELEPHONE (OUTPATIENT)
Dept: PRIMARY CARE | Facility: CLINIC | Age: 68
End: 2023-09-06
Payer: COMMERCIAL

## 2023-09-06 PROBLEM — M50.20 HERNIATED DISC, CERVICAL: Status: ACTIVE | Noted: 2017-05-08

## 2023-09-06 PROBLEM — Z98.890 HISTORY OF SPINAL SURGERY: Status: ACTIVE | Noted: 2023-09-06

## 2023-09-06 PROBLEM — T88.4XXA DIFFICULT INTUBATION: Status: ACTIVE | Noted: 2023-02-01

## 2023-09-06 PROBLEM — E44.1 MALNUTRITION OF MILD DEGREE (MULTI): Status: ACTIVE | Noted: 2023-03-20

## 2023-09-06 PROBLEM — D62 ABLA (ACUTE BLOOD LOSS ANEMIA): Status: ACTIVE | Noted: 2023-02-08

## 2023-09-06 PROBLEM — R05.8 NONPRODUCTIVE COUGH: Status: ACTIVE | Noted: 2023-09-06

## 2023-09-06 PROBLEM — M19.031 PRIMARY OSTEOARTHRITIS OF RIGHT WRIST: Status: ACTIVE | Noted: 2022-03-29

## 2023-09-06 PROBLEM — M50.222 HERNIATED NUCLEUS PULPOSUS, C5-6: Chronic | Status: ACTIVE | Noted: 2020-01-22

## 2023-09-06 PROBLEM — T81.49XA WOUND INFECTION AFTER SURGERY: Status: ACTIVE | Noted: 2023-02-20

## 2023-09-06 PROBLEM — D64.9 ANEMIA: Status: ACTIVE | Noted: 2023-03-15

## 2023-09-06 PROBLEM — J18.9 COMMUNITY ACQUIRED PNEUMONIA OF RIGHT LOWER LOBE OF LUNG: Status: ACTIVE | Noted: 2023-03-15

## 2023-09-06 PROBLEM — R25.1 TREMOR: Status: ACTIVE | Noted: 2023-09-06

## 2023-09-06 PROBLEM — N75.0 CYST OF BARTHOLIN'S GLAND DUCT: Status: ACTIVE | Noted: 2023-09-06

## 2023-09-06 PROBLEM — R09.02 OXYGEN DESATURATION: Status: ACTIVE | Noted: 2023-03-24

## 2023-09-06 PROBLEM — M67.88 ACHILLES TENDINOSIS OF BOTH LOWER EXTREMITIES: Status: ACTIVE | Noted: 2020-02-10

## 2023-09-06 PROBLEM — M50.322 DEGENERATION OF INTERVERTEBRAL DISC AT C5-C6 LEVEL: Chronic | Status: ACTIVE | Noted: 2020-01-22

## 2023-09-06 PROBLEM — J81.1 PULMONARY EDEMA (HHS-HCC): Status: ACTIVE | Noted: 2023-03-21

## 2023-09-06 PROBLEM — R13.10 DYSPHAGIA, UNSPECIFIED: Status: ACTIVE | Noted: 2023-02-02

## 2023-09-06 PROBLEM — M50.223 HERNIATED NUCLEUS PULPOSUS, C6-7: Chronic | Status: ACTIVE | Noted: 2020-01-22

## 2023-09-06 PROBLEM — Z99.81 DEPENDENCE ON SUPPLEMENTAL OXYGEN: Status: ACTIVE | Noted: 2023-09-06

## 2023-09-06 PROBLEM — R41.0 DELIRIUM: Status: ACTIVE | Noted: 2023-02-20

## 2023-09-06 PROBLEM — T81.42XA DEEP INCISIONAL SURGICAL SITE INFECTION: Status: ACTIVE | Noted: 2023-03-17

## 2023-09-06 PROBLEM — M50.30 DEGENERATIVE CERVICAL DISC: Status: ACTIVE | Noted: 2017-05-08

## 2023-09-06 PROBLEM — M77.32 CALCANEAL SPUR OF BOTH FEET: Status: ACTIVE | Noted: 2020-02-10

## 2023-09-06 PROBLEM — R07.89 CHEST DISCOMFORT: Status: ACTIVE | Noted: 2023-09-06

## 2023-09-06 PROBLEM — J45.50 SEVERE PERSISTENT ASTHMA WITHOUT COMPLICATION (MULTI): Status: ACTIVE | Noted: 2023-02-01

## 2023-09-06 PROBLEM — M51.34 DEGENERATION OF INTERVERTEBRAL DISC OF THORACIC REGION: Chronic | Status: ACTIVE | Noted: 2017-05-08

## 2023-09-06 PROBLEM — M77.31 CALCANEAL SPUR OF BOTH FEET: Status: ACTIVE | Noted: 2020-02-10

## 2023-09-06 PROBLEM — A41.9 SEPSIS DUE TO UNDETERMINED ORGANISM (MULTI): Status: ACTIVE | Noted: 2023-03-15

## 2023-09-06 PROBLEM — D72.829 LEUKOCYTOSIS: Status: ACTIVE | Noted: 2023-09-06

## 2023-09-06 RX ORDER — ETODOLAC 400 MG/1
400 TABLET, FILM COATED ORAL 2 TIMES DAILY
COMMUNITY
Start: 2023-07-31 | End: 2023-12-18 | Stop reason: SDUPTHER

## 2023-09-06 RX ORDER — ETODOLAC 200 MG/1
200 CAPSULE ORAL 2 TIMES DAILY
COMMUNITY
Start: 2023-08-16 | End: 2023-12-18 | Stop reason: ALTCHOICE

## 2023-09-06 NOTE — TELEPHONE ENCOUNTER
Stopped taking Januvia due to cost in medication $480 due to gap in medicare and she states you were going to look into medications for her to help during this gap.  Patient states occasionally she will have dizziness when she is sitting up after lying down for awhile.  She has checked her bp and bp seems to be ok when she checks it, heart rate is always fast.  She wanted to know if you had any recommendations for this?  Not sure if all the medications she is taking(I updated all her meds while I had her on the phone) or something else is going on.

## 2023-09-06 NOTE — TELEPHONE ENCOUNTER
Patient states noone have ever given her this message so she has not checked into it, she will call today or tomorrow and check on pricing.  Noone ever told her about [employer] program either, I offered to call to schedule but she asked for the phone number to call herself, jim

## 2023-09-07 ENCOUNTER — TELEPHONE (OUTPATIENT)
Dept: PRIMARY CARE | Facility: CLINIC | Age: 68
End: 2023-09-07
Payer: COMMERCIAL

## 2023-09-07 RX ORDER — ETODOLAC 400 MG/1
400 TABLET, FILM COATED ORAL 2 TIMES DAILY
Qty: 60 TABLET | Refills: 2 | Status: SHIPPED | OUTPATIENT
Start: 2023-09-07

## 2023-09-12 DIAGNOSIS — E11.69 TYPE 2 DIABETES MELLITUS WITH OTHER SPECIFIED COMPLICATION, WITH LONG-TERM CURRENT USE OF INSULIN (MULTI): Primary | ICD-10-CM

## 2023-09-12 DIAGNOSIS — Z79.4 TYPE 2 DIABETES MELLITUS WITH OTHER SPECIFIED COMPLICATION, WITH LONG-TERM CURRENT USE OF INSULIN (MULTI): Primary | ICD-10-CM

## 2023-09-12 RX ORDER — GABAPENTIN 800 MG/1
800 TABLET ORAL 3 TIMES DAILY
Qty: 30 TABLET | Refills: 1 | OUTPATIENT
Start: 2023-09-12 | End: 2024-03-28 | Stop reason: WASHOUT

## 2023-09-13 ENCOUNTER — PROCEDURE VISIT (OUTPATIENT)
Dept: PAIN MANAGEMENT | Age: 68
End: 2023-09-13

## 2023-09-13 DIAGNOSIS — M54.16 LUMBAR RADICULOPATHY: Primary | ICD-10-CM

## 2023-09-13 RX ORDER — DEXAMETHASONE SODIUM PHOSPHATE 10 MG/ML
10 INJECTION, SOLUTION INTRAMUSCULAR; INTRAVENOUS ONCE
Status: COMPLETED | OUTPATIENT
Start: 2023-09-13 | End: 2023-09-13

## 2023-09-13 RX ORDER — LIDOCAINE HYDROCHLORIDE 10 MG/ML
7 INJECTION, SOLUTION EPIDURAL; INFILTRATION; INTRACAUDAL; PERINEURAL ONCE
Status: COMPLETED | OUTPATIENT
Start: 2023-09-13 | End: 2023-09-13

## 2023-09-13 RX ORDER — SODIUM CHLORIDE 9 MG/ML
1.5 INJECTION INTRAVENOUS ONCE
Status: COMPLETED | OUTPATIENT
Start: 2023-09-13 | End: 2023-09-13

## 2023-09-13 RX ADMIN — Medication 1 MEQ: at 12:48

## 2023-09-13 RX ADMIN — LIDOCAINE HYDROCHLORIDE 7 ML: 10 INJECTION, SOLUTION EPIDURAL; INFILTRATION; INTRACAUDAL; PERINEURAL at 12:48

## 2023-09-13 RX ADMIN — SODIUM CHLORIDE 1.5 ML: 9 INJECTION INTRAVENOUS at 12:48

## 2023-09-13 RX ADMIN — DEXAMETHASONE SODIUM PHOSPHATE 10 MG: 10 INJECTION, SOLUTION INTRAMUSCULAR; INTRAVENOUS at 12:47

## 2023-09-13 NOTE — PROGRESS NOTES
This procedure was 75% more difficult and required 75% more work secondary to the patient's habitus. The patient has a BMI of 44.4. This required increased work for safe and proper positioning upon the fluoroscopy table, increased needle passes for safe and appropriate needle placement, and increased fluoroscopy time and radiation exposure for proper visualization. Discussed her elevated risk given possible anticoagulation status including bleeding, formation of hematomas, uncontrolled bleeding, spinal cord and nerve root compression, need for emergent surgery, wheelchair dependence, paralysis, hypotension, and death. Discussed her elevated risk given her diabetic status, and the risk of hyperglycemia, uncontrolled blood sugars, sepsis, and death.

## 2023-09-13 NOTE — PROGRESS NOTES
Lumbar Transforaminal Epidural Steroid Injection (TFESI)    Patient Name: Ivonne Gloria   : 1955  Date: 2023     Physician: Jeannette Kim MD     INDICATIONS: Ivonne Gloria is a 74 y.o. female who presents with symptoms and physical exam findings consistent with lumbar radiculopathy. She has lumbosacral paresthesias with a positive straight leg raise on physical exam. She has had persistent pain that limits her activities of daily living. The pain is persistent despite conservative measures. She has significant functional and psychological impairment due to this condition. She has had greater than 50% pain relief for over three months from prior epidural steroid injections, the pain has returned in a similar character, distribution, and intensity necessitating repeat treatment. Given her symptoms, physical exam findings, impairment in activities of daily living, and lack of response to conservative measures, consideration for lumbar transforaminal epidural corticosteroid injection was given. Discussed the risks including but not limited to bleeding, infection, worsened pain, damage to surrounding structures, side effects, toxicity, allergic reactions to medications used, need for surgery, headache, vision changes, difficulty with chewing and/or swallowing, immune and stress-response dysfunction, fat necrosis, skin pigmentation changes, blood sugar elevation, as well as catastrophic injury such as vision loss/blindness, paralysis, spinal cord or plexus injury, cerebral brainstem or spinal cord infarction, intrathecal injection, spinal cord puncture, arachnoiditis, discitis, stroke, bowel/bladder/sexual dysfunction, ventilator dependence, loss of use of the arms and/or legs, and death. Discussed off-label use of corticosteroids and how the Food and Drug Administration (FDA) has not approved corticosteroids for epidural use.  Discussed her elevated risk given anticoagulation status and the

## 2023-09-26 DIAGNOSIS — M47.817 LUMBOSACRAL SPONDYLOSIS WITHOUT MYELOPATHY: ICD-10-CM

## 2023-09-26 NOTE — TELEPHONE ENCOUNTER
Requested Prescriptions     Pending Prescriptions Disp Refills    gabapentin (NEURONTIN) 800 MG tablet 90 tablet 0     Sig: Take 1 tablet by mouth 3 times daily for 30 days.        Patient last seen on:  9/13/23  Date of last surgery:  N/A   Date of last refill:  8/22/23  Pain level:  N/A  Patient complaining of:  N/A  Future appts: 10/2/23

## 2023-09-27 RX ORDER — GABAPENTIN 800 MG/1
800 TABLET ORAL 3 TIMES DAILY
Qty: 90 TABLET | Refills: 0 | Status: SHIPPED | OUTPATIENT
Start: 2023-09-27 | End: 2023-10-27

## 2023-09-29 ENCOUNTER — TELEPHONE (OUTPATIENT)
Dept: PAIN MANAGEMENT | Age: 68
End: 2023-09-29

## 2023-09-29 NOTE — TELEPHONE ENCOUNTER
Gabapentin (Neurontin) 800 mg tablet prior authorization request was received from [address] [place] [address]. Diagnosis is for lumbar. Fax placed in [place] to submit authorization request to [facility].

## 2023-10-02 ENCOUNTER — OFFICE VISIT (OUTPATIENT)
Dept: PAIN MANAGEMENT | Age: 68
End: 2023-10-02
Payer: MEDICARE

## 2023-10-02 VITALS
BODY MASS INDEX: 44.93 KG/M2 | SYSTOLIC BLOOD PRESSURE: 140 MMHG | DIASTOLIC BLOOD PRESSURE: 80 MMHG | TEMPERATURE: 98.1 F | WEIGHT: 238 LBS | HEIGHT: 61 IN

## 2023-10-02 DIAGNOSIS — M47.812 CERVICAL SPONDYLOSIS WITHOUT MYELOPATHY: ICD-10-CM

## 2023-10-02 DIAGNOSIS — G56.03 BILATERAL CARPAL TUNNEL SYNDROME: ICD-10-CM

## 2023-10-02 DIAGNOSIS — F11.90 CHRONIC, CONTINUOUS USE OF OPIOIDS: ICD-10-CM

## 2023-10-02 DIAGNOSIS — G89.18 POSTOPERATIVE PAIN AFTER SPINAL SURGERY: ICD-10-CM

## 2023-10-02 DIAGNOSIS — Z79.891 ENCOUNTER FOR MONITORING OPIOID MAINTENANCE THERAPY: ICD-10-CM

## 2023-10-02 DIAGNOSIS — M47.817 LUMBOSACRAL SPONDYLOSIS WITHOUT MYELOPATHY: Primary | ICD-10-CM

## 2023-10-02 DIAGNOSIS — Z51.81 ENCOUNTER FOR MONITORING OPIOID MAINTENANCE THERAPY: ICD-10-CM

## 2023-10-02 DIAGNOSIS — Z98.890 HISTORY OF LUMBOSACRAL SPINE SURGERY: ICD-10-CM

## 2023-10-02 PROCEDURE — G8417 CALC BMI ABV UP PARAM F/U: HCPCS | Performed by: PHYSICAL MEDICINE & REHABILITATION

## 2023-10-02 PROCEDURE — 1123F ACP DISCUSS/DSCN MKR DOCD: CPT | Performed by: PHYSICAL MEDICINE & REHABILITATION

## 2023-10-02 PROCEDURE — G8427 DOCREV CUR MEDS BY ELIG CLIN: HCPCS | Performed by: PHYSICAL MEDICINE & REHABILITATION

## 2023-10-02 PROCEDURE — G8484 FLU IMMUNIZE NO ADMIN: HCPCS | Performed by: PHYSICAL MEDICINE & REHABILITATION

## 2023-10-02 PROCEDURE — 1036F TOBACCO NON-USER: CPT | Performed by: PHYSICAL MEDICINE & REHABILITATION

## 2023-10-02 PROCEDURE — 1090F PRES/ABSN URINE INCON ASSESS: CPT | Performed by: PHYSICAL MEDICINE & REHABILITATION

## 2023-10-02 PROCEDURE — 3017F COLORECTAL CA SCREEN DOC REV: CPT | Performed by: PHYSICAL MEDICINE & REHABILITATION

## 2023-10-02 PROCEDURE — 99213 OFFICE O/P EST LOW 20 MIN: CPT | Performed by: PHYSICAL MEDICINE & REHABILITATION

## 2023-10-02 PROCEDURE — G8400 PT W/DXA NO RESULTS DOC: HCPCS | Performed by: PHYSICAL MEDICINE & REHABILITATION

## 2023-10-02 RX ORDER — OXYCODONE HYDROCHLORIDE 5 MG/1
5 TABLET ORAL 3 TIMES DAILY PRN
Qty: 90 TABLET | Refills: 0 | Status: SHIPPED | OUTPATIENT
Start: 2023-10-02 | End: 2023-11-01

## 2023-10-02 RX ORDER — TIZANIDINE 4 MG/1
4 TABLET ORAL 2 TIMES DAILY PRN
Qty: 60 TABLET | Refills: 0 | Status: SHIPPED | OUTPATIENT
Start: 2023-10-02 | End: 2023-10-02

## 2023-10-02 RX ORDER — TIZANIDINE 4 MG/1
4 TABLET ORAL 3 TIMES DAILY PRN
Qty: 90 TABLET | Refills: 0 | Status: SHIPPED | OUTPATIENT
Start: 2023-10-02 | End: 2023-11-01

## 2023-10-02 RX ORDER — GABAPENTIN 800 MG/1
800 TABLET ORAL 3 TIMES DAILY
Qty: 90 TABLET | Refills: 0 | Status: SHIPPED | OUTPATIENT
Start: 2023-10-27 | End: 2023-11-26

## 2023-10-02 ASSESSMENT — ENCOUNTER SYMPTOMS
BACK PAIN: 1
SHORTNESS OF BREATH: 0
NAUSEA: 0
DIARRHEA: 0
CONSTIPATION: 0

## 2023-10-02 NOTE — PROGRESS NOTES
London Wall  ([id number])    10/2/2023    Subjective:     London Wall is 74 y.o. female who complains today of:    Chief Complaint   Patient presents with    Back Pain     Last seen by me 7/21/23: Left Lumbar L2/3 L3/4 TFESI on 9/13/23 provided her with greater than 60% pain relief. She is pleased. \"I don't have the big rises in pain\" and she has been able to reduce her pain medicine after getting the epidural injection. She told her daughter Fernanda Perez feel normal for the first time in years. \" She has dramatically reduced her opioid pain medication. She has reduced Oxycodone 5 mg from 6x/day down to 2-3x/day after the epidural. EMG not done. She finds Tizanidine helpful. She is s/p surgery Dr Acacia Ruff 8/8/23, L2-S1 decompression 2/6/23, lumbar wound I&D 2/17/23. Further surgery offered with Dr Acacia Ruff, patient is not interested in further surgery at this time. Getting Etodolac from Dr Shaheen Herring. Has plans to see pain psych. Oxycodone helps to remain functional with activities of daily living and remain mobile for daily activities. She does not have any open wounds. She is not on any antibiotics. No other tests therapy or updates from other physicians, no ER visits. Oxycodone IR 5 mg 3x/day, Gabapentin 800 mg TID, Tizanidine 4 mg BID, Lodine 400 mg BID, and Venlafaxine 150 mg daily help with remaining functional with chores, personal hygiene, remaining compliant with home exercise program, maintaining her quality of life, and performing activities of daily living. She obtains greater than 50% pain relief without any significant side effects. She is clear to avoid driving or operating heavy machinery or to perform any activities where she may harm herself or others while on pain medications. Neck pain is a 4/10. Gets to a 5/10. Her pain is located in both sides of the neck, left worse than right, she has pain into her left arm. It has been a constant ache for over 22 years.  Neck pain is from a

## 2023-10-08 DIAGNOSIS — M50.322 DEGENERATION OF C5-C6 INTERVERTEBRAL DISC: ICD-10-CM

## 2023-10-08 DIAGNOSIS — M51.34 DEGENERATION OF THORACIC INTERVERTEBRAL DISC: ICD-10-CM

## 2023-10-09 RX ORDER — OMEPRAZOLE 40 MG/1
40 CAPSULE, DELAYED RELEASE ORAL DAILY
Qty: 90 CAPSULE | Refills: 0 | Status: SHIPPED | OUTPATIENT
Start: 2023-10-09

## 2023-10-09 NOTE — TELEPHONE ENCOUNTER
Requested Prescriptions     Pending Prescriptions Disp Refills    omeprazole (PRILOSEC) 40 MG delayed release capsule [Pharmacy Med Name: OMEPRAZOLE 40MG CAPSULES] 90 capsule 0     Sig: TAKE 1 CAPSULE BY MOUTH DAILY       Patient last seen on: 08/15/23  Date of last refill: 07/11/23  Future appts: N/A

## 2023-10-11 DIAGNOSIS — Z00.6 RESEARCH SUBJECT: ICD-10-CM

## 2023-10-11 DIAGNOSIS — Z00.6 RESEARCH STUDY PATIENT: ICD-10-CM

## 2023-10-12 ENCOUNTER — APPOINTMENT (OUTPATIENT)
Dept: RESEARCH | Facility: HOSPITAL | Age: 68
End: 2023-10-12
Payer: MEDICARE

## 2023-10-17 DIAGNOSIS — Z00.6 RESEARCH SUBJECT: ICD-10-CM

## 2023-10-23 ENCOUNTER — OFFICE VISIT (OUTPATIENT)
Dept: PAIN MANAGEMENT | Age: 68
End: 2023-10-23
Payer: MEDICARE

## 2023-10-23 VITALS — WEIGHT: 238 LBS | TEMPERATURE: 97.1 F | BODY MASS INDEX: 43.79 KG/M2 | HEIGHT: 62 IN

## 2023-10-23 DIAGNOSIS — Z51.81 ENCOUNTER FOR MONITORING OPIOID MAINTENANCE THERAPY: ICD-10-CM

## 2023-10-23 DIAGNOSIS — M47.817 LUMBOSACRAL SPONDYLOSIS WITHOUT MYELOPATHY: Primary | ICD-10-CM

## 2023-10-23 DIAGNOSIS — Z98.890 HISTORY OF LUMBOSACRAL SPINE SURGERY: ICD-10-CM

## 2023-10-23 DIAGNOSIS — F11.90 CHRONIC, CONTINUOUS USE OF OPIOIDS: ICD-10-CM

## 2023-10-23 DIAGNOSIS — Z79.891 ENCOUNTER FOR MONITORING OPIOID MAINTENANCE THERAPY: ICD-10-CM

## 2023-10-23 DIAGNOSIS — G56.03 BILATERAL CARPAL TUNNEL SYNDROME: ICD-10-CM

## 2023-10-23 DIAGNOSIS — M47.812 CERVICAL SPONDYLOSIS WITHOUT MYELOPATHY: ICD-10-CM

## 2023-10-23 PROCEDURE — G8427 DOCREV CUR MEDS BY ELIG CLIN: HCPCS | Performed by: PHYSICAL MEDICINE & REHABILITATION

## 2023-10-23 PROCEDURE — G8484 FLU IMMUNIZE NO ADMIN: HCPCS | Performed by: PHYSICAL MEDICINE & REHABILITATION

## 2023-10-23 PROCEDURE — 1036F TOBACCO NON-USER: CPT | Performed by: PHYSICAL MEDICINE & REHABILITATION

## 2023-10-23 PROCEDURE — 3017F COLORECTAL CA SCREEN DOC REV: CPT | Performed by: PHYSICAL MEDICINE & REHABILITATION

## 2023-10-23 PROCEDURE — 1123F ACP DISCUSS/DSCN MKR DOCD: CPT | Performed by: PHYSICAL MEDICINE & REHABILITATION

## 2023-10-23 PROCEDURE — 1090F PRES/ABSN URINE INCON ASSESS: CPT | Performed by: PHYSICAL MEDICINE & REHABILITATION

## 2023-10-23 PROCEDURE — G8400 PT W/DXA NO RESULTS DOC: HCPCS | Performed by: PHYSICAL MEDICINE & REHABILITATION

## 2023-10-23 PROCEDURE — G8417 CALC BMI ABV UP PARAM F/U: HCPCS | Performed by: PHYSICAL MEDICINE & REHABILITATION

## 2023-10-23 PROCEDURE — 99213 OFFICE O/P EST LOW 20 MIN: CPT | Performed by: PHYSICAL MEDICINE & REHABILITATION

## 2023-10-23 RX ORDER — GABAPENTIN 600 MG/1
600 TABLET ORAL 3 TIMES DAILY
Qty: 90 TABLET | Refills: 0 | Status: SHIPPED | OUTPATIENT
Start: 2023-11-26 | End: 2023-12-26

## 2023-10-23 RX ORDER — TIZANIDINE 4 MG/1
4 TABLET ORAL 3 TIMES DAILY PRN
Qty: 90 TABLET | Refills: 0 | Status: SHIPPED | OUTPATIENT
Start: 2023-11-01 | End: 2023-12-01

## 2023-10-23 ASSESSMENT — ENCOUNTER SYMPTOMS
SHORTNESS OF BREATH: 0
DIARRHEA: 0
CONSTIPATION: 0
NAUSEA: 0
BACK PAIN: 1

## 2023-10-23 NOTE — PROGRESS NOTES
discussed. ;No signs of potential drug abuse or diversion identified. ;Assessed functional status (ability to engage in work or other purposeful activities, the pain intensity and its interference with activities of daily living, quality of family life and social activities, and the physical activity); Obtaining appropriate analgesic effect of treatment. Discussed the risks, side effects, and symptoms that would warrant urgent or emergent physician evaluation of all medications prescribed today. Discussed the risks of the above recommended procedures including but not limited to bleeding, infection, worsened pain, damage to surrounding structures, side effects, toxicity, allergic reactions to medications used, immune and stress-response dysfunction, fat necrosis, skin pigmentation changes, blood sugar elevation, headache, vision changes, need for surgery, as well as catastrophic injury such as vision loss, paralysis, stroke, spinal cord and/or plexus infarction or injury, intrathecal injection, spinal cord puncture, arachnoiditis, discitis, bowel or bladder incontinence, ventilator dependence, loss of use of the arms and/or legs, and death. Discussed off-label use of corticosteroids and how the Food and Drug Administration (FDA) has not approved corticosteroids for epidural use. Discussed the risks, benefits, alternative procedures, and alternatives to the procedure including no procedure at all. Discussed that we cannot undo any permanent neurologic damage or change the course of any underlying disease. The patient appears to be a good candidate for the above recommended procedures, but no guarantees expressed or implied are given regarding the outcome of any procedure. After thorough discussion, patient expressed understanding and willingness to proceed. Provided education and counseling regarding the diagnosis, prognosis, and treatment options. All questions were answered.  Encouraged her to

## 2023-10-27 ENCOUNTER — APPOINTMENT (OUTPATIENT)
Dept: PRIMARY CARE | Facility: CLINIC | Age: 68
End: 2023-10-27
Payer: COMMERCIAL

## 2023-10-30 ENCOUNTER — OFFICE VISIT (OUTPATIENT)
Dept: PRIMARY CARE | Facility: CLINIC | Age: 68
End: 2023-10-30
Payer: MEDICARE

## 2023-10-30 VITALS
HEART RATE: 94 BPM | DIASTOLIC BLOOD PRESSURE: 70 MMHG | HEIGHT: 62 IN | SYSTOLIC BLOOD PRESSURE: 136 MMHG | WEIGHT: 242.8 LBS | BODY MASS INDEX: 44.68 KG/M2 | TEMPERATURE: 98.3 F | OXYGEN SATURATION: 96 %

## 2023-10-30 DIAGNOSIS — Z79.4 TYPE 2 DIABETES MELLITUS WITH OTHER SPECIFIED COMPLICATION, WITH LONG-TERM CURRENT USE OF INSULIN (MULTI): ICD-10-CM

## 2023-10-30 DIAGNOSIS — I10 HYPERTENSION, UNSPECIFIED TYPE: ICD-10-CM

## 2023-10-30 DIAGNOSIS — Z78.0 ASYMPTOMATIC MENOPAUSE: ICD-10-CM

## 2023-10-30 DIAGNOSIS — Z12.31 SCREENING MAMMOGRAM FOR BREAST CANCER: ICD-10-CM

## 2023-10-30 DIAGNOSIS — M79.605 PAIN OF LEFT LOWER EXTREMITY: ICD-10-CM

## 2023-10-30 DIAGNOSIS — B37.0 THRUSH: ICD-10-CM

## 2023-10-30 DIAGNOSIS — D50.8 OTHER IRON DEFICIENCY ANEMIA: ICD-10-CM

## 2023-10-30 DIAGNOSIS — D72.829 LEUKOCYTOSIS, UNSPECIFIED TYPE: ICD-10-CM

## 2023-10-30 DIAGNOSIS — E78.2 MIXED HYPERLIPIDEMIA: ICD-10-CM

## 2023-10-30 DIAGNOSIS — F11.99 OPIOID USE, UNSPECIFIED WITH UNSPECIFIED OPIOID-INDUCED DISORDER (MULTI): ICD-10-CM

## 2023-10-30 DIAGNOSIS — Z00.00 WELL ADULT EXAM: Primary | ICD-10-CM

## 2023-10-30 DIAGNOSIS — E55.9 VITAMIN D DEFICIENCY: ICD-10-CM

## 2023-10-30 DIAGNOSIS — E66.01 CLASS 3 SEVERE OBESITY DUE TO EXCESS CALORIES WITHOUT SERIOUS COMORBIDITY WITH BODY MASS INDEX (BMI) OF 45.0 TO 49.9 IN ADULT (MULTI): ICD-10-CM

## 2023-10-30 DIAGNOSIS — Z12.11 SCREEN FOR COLON CANCER: ICD-10-CM

## 2023-10-30 DIAGNOSIS — R60.9 EDEMA, UNSPECIFIED TYPE: ICD-10-CM

## 2023-10-30 DIAGNOSIS — I10 PRIMARY HYPERTENSION: ICD-10-CM

## 2023-10-30 DIAGNOSIS — F39 MOOD DISORDER (CMS-HCC): ICD-10-CM

## 2023-10-30 DIAGNOSIS — E11.69 TYPE 2 DIABETES MELLITUS WITH OTHER SPECIFIED COMPLICATION, WITH LONG-TERM CURRENT USE OF INSULIN (MULTI): ICD-10-CM

## 2023-10-30 PROBLEM — R07.89 CHEST DISCOMFORT: Status: RESOLVED | Noted: 2023-09-06 | Resolved: 2023-10-30

## 2023-10-30 PROBLEM — E44.1 MALNUTRITION OF MILD DEGREE (MULTI): Status: RESOLVED | Noted: 2023-03-20 | Resolved: 2023-10-30

## 2023-10-30 PROBLEM — A41.9 SEPSIS DUE TO UNDETERMINED ORGANISM (MULTI): Status: RESOLVED | Noted: 2023-03-15 | Resolved: 2023-10-30

## 2023-10-30 PROBLEM — R41.0 DELIRIUM: Status: RESOLVED | Noted: 2023-02-20 | Resolved: 2023-10-30

## 2023-10-30 PROBLEM — Z99.81 DEPENDENCE ON SUPPLEMENTAL OXYGEN: Status: RESOLVED | Noted: 2023-09-06 | Resolved: 2023-10-30

## 2023-10-30 PROCEDURE — G0439 PPPS, SUBSEQ VISIT: HCPCS | Performed by: INTERNAL MEDICINE

## 2023-10-30 PROCEDURE — 1160F RVW MEDS BY RX/DR IN RCRD: CPT | Performed by: INTERNAL MEDICINE

## 2023-10-30 PROCEDURE — 3075F SYST BP GE 130 - 139MM HG: CPT | Performed by: INTERNAL MEDICINE

## 2023-10-30 PROCEDURE — 3078F DIAST BP <80 MM HG: CPT | Performed by: INTERNAL MEDICINE

## 2023-10-30 PROCEDURE — G0444 DEPRESSION SCREEN ANNUAL: HCPCS | Performed by: INTERNAL MEDICINE

## 2023-10-30 PROCEDURE — G0446 INTENS BEHAVE THER CARDIO DX: HCPCS | Performed by: INTERNAL MEDICINE

## 2023-10-30 PROCEDURE — 99215 OFFICE O/P EST HI 40 MIN: CPT | Performed by: INTERNAL MEDICINE

## 2023-10-30 PROCEDURE — 3046F HEMOGLOBIN A1C LEVEL >9.0%: CPT | Performed by: INTERNAL MEDICINE

## 2023-10-30 PROCEDURE — 1159F MED LIST DOCD IN RCRD: CPT | Performed by: INTERNAL MEDICINE

## 2023-10-30 PROCEDURE — 4010F ACE/ARB THERAPY RXD/TAKEN: CPT | Performed by: INTERNAL MEDICINE

## 2023-10-30 PROCEDURE — 3008F BODY MASS INDEX DOCD: CPT | Performed by: INTERNAL MEDICINE

## 2023-10-30 PROCEDURE — 1036F TOBACCO NON-USER: CPT | Performed by: INTERNAL MEDICINE

## 2023-10-30 PROCEDURE — 93000 ELECTROCARDIOGRAM COMPLETE: CPT | Performed by: INTERNAL MEDICINE

## 2023-10-30 RX ORDER — INSULIN LISPRO 100 [IU]/ML
INJECTION, SOLUTION INTRAVENOUS; SUBCUTANEOUS
Qty: 7.2 ML | Refills: 3
Start: 2023-10-30

## 2023-10-30 RX ORDER — INSULIN GLARGINE 100 [IU]/ML
44 INJECTION, SOLUTION SUBCUTANEOUS NIGHTLY
Qty: 33.3 ML | Refills: 1
Start: 2023-10-30 | End: 2024-03-28 | Stop reason: WASHOUT

## 2023-10-30 RX ORDER — NYSTATIN 100000 [USP'U]/ML
5 SUSPENSION ORAL 4 TIMES DAILY
Qty: 140 ML | Refills: 0 | Status: SHIPPED | OUTPATIENT
Start: 2023-10-30 | End: 2023-11-06

## 2023-10-30 ASSESSMENT — PATIENT HEALTH QUESTIONNAIRE - PHQ9
SUM OF ALL RESPONSES TO PHQ9 QUESTIONS 1 AND 2: 1
2. FEELING DOWN, DEPRESSED OR HOPELESS: SEVERAL DAYS
10. IF YOU CHECKED OFF ANY PROBLEMS, HOW DIFFICULT HAVE THESE PROBLEMS MADE IT FOR YOU TO DO YOUR WORK, TAKE CARE OF THINGS AT HOME, OR GET ALONG WITH OTHER PEOPLE: NOT DIFFICULT AT ALL
1. LITTLE INTEREST OR PLEASURE IN DOING THINGS: NOT AT ALL

## 2023-10-30 NOTE — PROGRESS NOTES
Chief Complaint:   Medicare Wellness Exam/Comprehensive Problem Focused Follow Up and Physical Exam    HPI:  Epidural inj for sciatic pain  Pain management perlita doyle  L2-3 cheryle disc  Inj wearing off     No cp no sob  Bs up due to inj  Lives alone  Sent back cpap  Bs has been erratic  Sp steroid inj     Patient Care Team:  Radha Holt MD as PCP - General  Cornelia Ball, JAYDEN-CNP as PCP - [facility]   Active Problem List  Patient Active Problem List   Diagnosis    Abnormal EKG    Allergic rhinitis    Anxiety    Asthma, moderate    Back pain, chronic    Brain fog    Calcific Achilles tendinitis of right lower extremity    Choking in adult    Chronic GERD    Chronic sinusitis    Costochondritis    Deviated nasal septum    Type 2 diabetes mellitus, with long-term current use of insulin (CMS/HCC)    Edema, peripheral    Fatty liver    Headache    Herniation of cervical intervertebral disc with radiculopathy    Hypertension    Joint pain    Low blood potassium    Lymphedema of both lower extremities    Mild diastolic dysfunction    Right knee pain    Rheumatoid factor positive    Post covid-19 condition, unspecified    Other constipation    Osteoarthritis    Obstructive sleep apnea    Numbness and tingling in both hands    New daily persistent headache    Neuropathy    Seborrheic keratoses    Solitary lung nodule    Urinary incontinence    Vitamin B12 deficiency    Vitamin D deficiency    Arteriosclerotic coronary artery disease    Dyspnea on exertion    Thyroid nodule    Fatigue    Cardiomegaly    Shortness of breath    Weakness    Thyromegaly    Pneumonia due to COVID-19 virus    Lumbar stenosis    Loss of bladder control    Abnormal chest x-ray    Esophageal dysmotility    Wound dehiscence, surgical, sequela    Opioid use, unspecified with unspecified opioid-induced disorder (CMS/HCC)    Mood disorder (CMS/HCC)    Iron deficiency anemia    Mixed hyperlipidemia    ABLA (acute  blood loss anemia)    Achilles tendinosis of both lower extremities    Anemia    Annular tear of cervical disc    Brachial neurit or radiculit due to displace of cervic intervert disc    Brachial neuritis or radiculitis    Calcaneal spur of both feet    Degeneration of intervertebral disc at C5-C6 level    Cervical spondylosis without myelopathy    Degeneration of intervertebral disc of thoracic region    Degenerative cervical disc    Herniated nucleus pulposus, C6-7    Herniated disc, cervical    Closed dislocation of acromioclavicular joint    Community acquired pneumonia of right lower lobe of lung    Cortical age-related cataract    Cyst of Bartholin's gland duct    Deep incisional surgical site infection    Difficult intubation    Dry eye syndrome    Dysphagia, unspecified    Herniated nucleus pulposus, C5-6    History of spinal surgery    Hyperopia of both eyes with astigmatism and presbyopia    Injury of superior glenoid labrum of shoulder joint    Left retinal lattice degeneration    Leukocytosis    Nonproductive cough    Oxygen desaturation    Primary osteoarthritis of right wrist    Pulmonary edema    Rotator cuff tear    Severe persistent asthma without complication    Sprain of rotator cuff capsule    Status post cervical discectomy    Symptomatic menopausal or female climacteric states    Tremor    Wound infection after surgery         Comprehensive Medical/Surgical/Social/Family History  Past Medical History:   Diagnosis Date    Body mass index (BMI)40.0-44.9, adult 09/16/2021    BMI 40.0-44.9, adult    COVID-19 02/20/2021    Pneumonia due to COVID-19 virus    COVID-19 02/20/2021    Pneumonia due to COVID-19 virus    Deficiency of other specified B group vitamins 01/12/2021    Vitamin B 12 deficiency    Personal history of other drug therapy 10/25/2018    History of influenza vaccination    Personal history of other medical treatment     History of nuclear stress test    Pyothorax without fistula  (CMS/[facility]) 10/17/2022    Empyema    Solitary pulmonary nodule 2021    Lung nodule     Past Surgical History:   Procedure Laterality Date    OTHER SURGICAL HISTORY  2021    Thoracic discectomy    OTHER SURGICAL HISTORY  01/10/2022    Neck surgery    OTHER SURGICAL HISTORY  2021    Colonoscopy    OTHER SURGICAL HISTORY  10/06/2020    Hand surgery    OTHER SURGICAL HISTORY  10/06/2020    Lumpectomy    OTHER SURGICAL HISTORY  10/06/2020     section    OTHER SURGICAL HISTORY  10/06/2020    Knee replacement    OTHER SURGICAL HISTORY  10/06/2020    Tonsillectomy    OTHER SURGICAL HISTORY  2019    Hysterectomy total abdominal with removal of both ovaries    TOTAL KNEE ARTHROPLASTY Bilateral      Social History     Social History Narrative    Not on file     Tobacco/Alcohol/Opioid use, as well as Illicit Drug Use was screened for/reviewed and documented in Social Documentation section of the chart and medication list as appropriate    Allergies and Medications  Aspartame, Buprenorphine, Celecoxib, Diet foods, Ezetimibe, Iodides, Latex, Metformin, Nabumetone, Nalidixic acid, Nsaids (non-steroidal anti-inflammatory drug), Pyrazolones, Rivaroxaban, Rofecoxib, Salicylates, Shellfish containing products, Shellfish derived, Statins-hmg-coa reductase inhibitors, Sulfa (sulfonamide antibiotics), Sulfasalazine, Sulfonylureas, Sulfur, Thiazides, Valdecoxib, and Cyclobenzaprine  Current Outpatient Medications   Medication Instructions    albuterol 90 mcg/actuation inhaler 2 puffs, inhalation, 2 times daily PRN    blood-glucose sensor (Dexcom G7 Sensor) device Apply one sensor on abdomen or back of upper arm for 7-10 days    bumetanide (BUMEX) 2 mg, oral, Daily    cholecalciferol (Vitamin D-3) 50 mcg (2,000 unit) capsule oral    coenzyme Q10 400 mg capsule oral, Daily RT    dilTIAZem CD (CARDIZEM CD) 240 mg, oral, Daily    etodolac (LODINE) 200 mg, oral, 2 times daily    etodolac (LODINE) 400 mg, oral, 2  "times daily    FreeStyle Ana María sensor system (FreeStyle Ana María 2 Sensor) kit 1 Cartridge every 14 days    gabapentin (Neurontin) 600 mg tablet TAKE 1 TABLET BY MOUTH THREE TIMES DAILY FOR 30 DAYS    gabapentin (NEURONTIN) 800 mg, oral, 3 times daily    insulin glargine (LANTUS U-100 INSULIN) 44 Units, subcutaneous, Nightly    insulin lispro (HumaLOG U-100 Insulin) 100 unit/mL injection Ssri    L. acidophilus/Bifid. animalis 32 billion cell capsule 1 capsule, oral, Daily    lidocaine (Lidoderm) 5 % patch Place 1 patch onto the skin daily 12 hours on, 12 hours off as needed    losartan (COZAAR) 50 mg, oral, 2 times daily    MAGNESIUM ORAL 1 tablet, oral, Daily    montelukast (SINGULAIR) 10 mg, oral, Daily    naloxone (Narcan) 4 mg/0.1 mL nasal spray SPARY 1 SPRAY INTO ONE NOSTRIL AS NEEDED FOR OPIOID REVERSAL AS DIRECTED    nystatin (MYCOSTATIN) 500,000 Units, oral, 4 times daily, Swish in mouth and swallow.    omeprazole (PRILOSEC) 40 mg, oral, Daily    oxyCODONE (Roxicodone) 5 mg immediate release tablet Every 6 hours    pen needle, diabetic 32 gauge x 5/32\" needle Use with daily glucose testing    semaglutide 0.25 mg, subcutaneous, Weekly    tiZANidine (Zanaflex) 4 mg capsule 1 capsule, oral, Nightly    TURMERIC ORAL 1 capsule, oral, Daily    venlafaxine XR (Effexor-XR) 150 mg 24 hr capsule Take 1 capsule (150 mg total) by mouth daily with breakfast for 30 days.    venlafaxine XR (EFFEXOR-XR) 75 mg, oral, Daily, Take with food.    VITAMIN B COMPLEX ORAL 1 tablet, oral, Every morning    zinc gluconate-zinc picolinate 30 mg capsule 1 capsule, oral, Daily     Medications and Supplements  prescribed by me and other practitioners or clinical pharmacist (such as prescriptions, OTC's, herbal therapies and supplements) were reviewed and documented in the medical record.      Activities of Daily Living  In your present state of health, do you have any difficulty performing the following activities?:   Preparing food and " "eating?: No  Bathing yourself: No  Getting dressed: No  Using the toilet:No  Moving around from place to place: Yes  In the past year have you fallen or had a near fall?:Yes  Able to manage finances independently: Yes  Able to perform grocery shopping: Yes  Able to manage medications independently: Yes  Able to do housework independently: No  Patient self-assessment of health status? Fair    Depression Screen  (Note: if answer to either of the following is \"Yes\", then a more complete depression screening is indicated)   Q1: Over the past two weeks, have you felt down, depressed or hopeless? Yes due to health concerns   Q2: Over the past two weeks, have you felt little interest or pleasure in doing things? No    Current exercise habits: walk 4-5 times a week   Dietary issues discussed: Yes  Hearing difficulties: Yes  Safe in current home environment: Yes  Visual Acuity assessed: No  Cognitive Impairment No    Advance directives  Advanced Care Planning (including a Living Will, Healthcare POA, as well as specific end of life choices and/or directives), was discussed with the patient and/or surrogate, voluntarily, and documented in the Problem List of the medical record.   Dtr and son   Cardiac Risk Assessment  Cardiovascular risk was discussed and, if needed, lifestyle modifications recommended, including nutritional choices, exercise, and elimination of habits contributing to risk. We agreed on a plan to reduce the current cardiovascular risk based on above discussion as needed.  Aspirin use/disuse was discussed and documented in the Problem List of the medical record after reviewing the updated guidelines below:  Used to see dr jaeger, has not in years   Consider low dose Aspirin ( mg) use if the benefit for cardiovascular disease prevention outweighs risk for bleeding complications.   In general, low dose ASA should be considered:  In patients WITHOUT prior MI/stroke/PAD (primary prevention):   a. Age <60: Use " "if 10-year cardiovascular disease risk >20%, with discussion of risks and benefits with patient  b. Age 60-<70: Use if 10-year cardiovascular disease risk >20% and low bleeding (e.g., gastrointenstinal) risk, with discussion of risks and benefits with patient  c. Age >=70: Do not use    In patients WITH prior MI/stroke/PAD (secondary prevention):   Generally use unless extremely high bleeding (e.g., gastrointenstinal) risk, with discussion of risks and benefits with patient        ROS otherwise negative aside from what was mentioned above in HPI.    Gen:  no fever  HEENT:  no trouble swallowing  CV:  no dyspnea, cyanosis  Lungs:  no shortness of breath  GI:  no constipation, no blood in stool  Vascular:  no edema  Neuro:   pos gen  weakness  Skin:  no rash  MS:no joint swelling  Gu:  no urinary complaints  All other systems have been reviewed and are negative for complaint    Vitals  /70   Pulse 94   Temp 36.8 °C (98.3 °F) (Temporal)   Ht 1.562 m (5' 1.5\")   Wt 110 kg (242 lb 12.8 oz)   SpO2 96%   BMI 45.13 kg/m²   Body mass index is 45.13 kg/m².  Objective   Physical Exam  General Appearance:  Alert and oriented.  NAD  HEENT:  Tm's normal , throat clear, no erythema,  pos thrush  Lungs, CTAB  Skin:  no suspicious lesions,  warm and dry  Head :  Normocephalic  Oral Cavity; Clear mucosa moist  Neck/thyroid:  neck supple, full rom, no cervical lymphadenopathy  no thyromegaly  Heart:  RRR  no murmurs  Abdomen:  Normal , bs present, soft, nontender, not distended, no masses palpated  Extremities:  No clubbing, cyanosis,   left le mild edema plus one pulse  Neurologic:  Nonfocal  Psych: alert, normal mood    During the course of the visit the patient was educated and counseled about age appropriate screening and preventive services. Completed preventive screenings were documented in the chart and orders were placed for outstanding screenings/procedures as documented in the Assessment and Plan.    Patient " Instructions (the written plan) was given to the patient at check out.       Chronic conditions reviewed in the assessment and plan.    Continue medications unless specified otherwise.  Previous labs reviewed.   Other specialty provider notes reviewed.       Annual Wellness exam completed   Preventive Health history reviewed   Labs ordered    Depression Screening done  Advanced Directives Discussion Completed  Cardiovascular risk discussed and if needed, lifestyle modifications recommended, including nutritional choices, exercise, and elimination of habits contributing to risk.  We agreed on a plan to reduce the current cardiovascular risk.  See ecalc ASCVD Risk  Plus for data discussed regarding risk and risk reduction opportunities.  Aspirin use/disuse was discussed after reviewing the updated guidelines.    Kaylan was seen today for medicare annual.  Diagnoses and all orders for this visit:  Well adult exam (Primary)  Primary hypertension  -     Hemoglobin A1C; Future  -     Comprehensive Metabolic Panel; Future  -     TSH with reflex to Free T4 if abnormal; Future  -     Lipid Panel; Future  -     Vitamin D 25-Hydroxy,Total (for eval of Vitamin D levels); Future  -     CBC and Auto Differential; Future  -     Magnesium; Future  -     ECG 12 Lead  -     dilTIAZem CD (Cardizem CD) 240 mg 24 hr capsule; Take 1 capsule (240 mg) by mouth once daily.  -     losartan (Cozaar) 50 mg tablet; Take 1 tablet (50 mg) by mouth 2 times a day.  Mixed hyperlipidemia  -     Hemoglobin A1C; Future  -     Comprehensive Metabolic Panel; Future  -     TSH with reflex to Free T4 if abnormal; Future  -     Lipid Panel; Future  -     Vitamin D 25-Hydroxy,Total (for eval of Vitamin D levels); Future  -     CBC and Auto Differential; Future  -     Magnesium; Future  Type 2 diabetes mellitus with other specified complication, with long-term current use of insulin (CMS/[facility])  Comments:  need bs, prefer to restart semaglutide to  also help w wt loss  side effects discussed  fu in 3 mo  Orders:  -     Hemoglobin A1C; Future  -     Comprehensive Metabolic Panel; Future  -     TSH with reflex to Free T4 if abnormal; Future  -     Lipid Panel; Future  -     Vitamin D 25-Hydroxy,Total (for eval of Vitamin D levels); Future  -     CBC and Auto Differential; Future  -     Magnesium; Future  -     insulin glargine (Lantus U-100 Insulin) 100 unit/mL injection; Inject 44 Units under the skin once daily at bedtime.  -     insulin lispro (HumaLOG U-100 Insulin) 100 unit/mL injection; Ssri  -     semaglutide 0.25 mg or 0.5 mg (2 mg/3 mL) pen injector; Inject 0.25 mg under the skin 1 (one) time per week.  Vitamin D deficiency  -     Hemoglobin A1C; Future  -     Comprehensive Metabolic Panel; Future  -     TSH with reflex to Free T4 if abnormal; Future  -     Lipid Panel; Future  -     Vitamin D 25-Hydroxy,Total (for eval of Vitamin D levels); Future  -     CBC and Auto Differential; Future  -     Magnesium; Future  Other iron deficiency anemia  -     Hemoglobin A1C; Future  -     Comprehensive Metabolic Panel; Future  -     TSH with reflex to Free T4 if abnormal; Future  -     Lipid Panel; Future  -     Vitamin D 25-Hydroxy,Total (for eval of Vitamin D levels); Future  -     CBC and Auto Differential; Future  -     Magnesium; Future  Leukocytosis, unspecified type  -     Hemoglobin A1C; Future  -     Comprehensive Metabolic Panel; Future  -     TSH with reflex to Free T4 if abnormal; Future  -     Lipid Panel; Future  -     Vitamin D 25-Hydroxy,Total (for eval of Vitamin D levels); Future  -     CBC and Auto Differential; Future  -     Magnesium; Future  Mood disorder (CMS/HCC)  -     Hemoglobin A1C; Future  -     Comprehensive Metabolic Panel; Future  -     TSH with reflex to Free T4 if abnormal; Future  -     Lipid Panel; Future  -     Vitamin D 25-Hydroxy,Total (for eval of Vitamin D levels); Future  -     CBC and Auto Differential; Future  -      Magnesium; Future  Class 3 severe obesity due to excess calories without serious comorbidity with body mass index (BMI) of 45.0 to 49.9 in adult (CMS/[facility])  Comments:  wt loss discussed  restart ozempic  Screening mammogram for breast cancer  -     BI mammo bilateral screening tomosynthesis; Future  Asymptomatic menopause  -     XR DEXA bone density; Future  Edema, unspecified type  Pain of left lower extremity  -     Lower extremity venous duplex left; Future  Thrush  -     nystatin (Mycostatin) 100,000 unit/mL suspension; Take 5 mL (500,000 Units) by mouth 4 times a day for 7 days. Swish in mouth and swallow.  Screen for colon cancer  -     Cologuard® colon cancer screening; Future  -     Cologuard® colon cancer screening  Opioid use, unspecified with unspecified opioid-induced disorder (CMS/[facility])  Hypertension, unspecified type  -     dilTIAZem CD (Cardizem CD) 240 mg 24 hr capsule; Take 1 capsule (240 mg) by mouth once daily.

## 2023-10-31 ENCOUNTER — HOSPITAL ENCOUNTER (OUTPATIENT)
Dept: CARDIOLOGY | Facility: HOSPITAL | Age: 68
Discharge: HOME | End: 2023-10-31
Payer: MEDICARE

## 2023-10-31 DIAGNOSIS — M79.605 PAIN OF LEFT LOWER EXTREMITY: ICD-10-CM

## 2023-10-31 PROCEDURE — 93971 EXTREMITY STUDY: CPT

## 2023-10-31 RX ORDER — LOSARTAN POTASSIUM 50 MG/1
50 TABLET ORAL 2 TIMES DAILY
Qty: 180 TABLET | Refills: 3 | Status: SHIPPED | OUTPATIENT
Start: 2023-10-31 | End: 2023-11-20

## 2023-10-31 RX ORDER — DILTIAZEM HYDROCHLORIDE 240 MG/1
240 CAPSULE, COATED, EXTENDED RELEASE ORAL DAILY
Qty: 90 CAPSULE | Refills: 3 | Status: SHIPPED | OUTPATIENT
Start: 2023-10-31 | End: 2024-10-25

## 2023-11-04 DIAGNOSIS — M51.34 DEGENERATION OF THORACIC INTERVERTEBRAL DISC: ICD-10-CM

## 2023-11-04 DIAGNOSIS — M50.322 DEGENERATION OF C5-C6 INTERVERTEBRAL DISC: ICD-10-CM

## 2023-11-06 ENCOUNTER — APPOINTMENT (OUTPATIENT)
Dept: IMMUNOLOGY | Facility: CLINIC | Age: 68
End: 2023-11-06
Payer: MEDICARE

## 2023-11-06 DIAGNOSIS — Z00.6 RESEARCH STUDY PATIENT: ICD-10-CM

## 2023-11-06 RX ORDER — ETODOLAC 400 MG/1
400 TABLET, FILM COATED ORAL 2 TIMES DAILY
Qty: 60 TABLET | Refills: 2 | OUTPATIENT
Start: 2023-11-06

## 2023-11-08 ENCOUNTER — TELEPHONE (OUTPATIENT)
Dept: NEUROLOGY | Age: 68
End: 2023-11-08

## 2023-11-08 ENCOUNTER — LAB (OUTPATIENT)
Dept: LAB | Facility: LAB | Age: 68
End: 2023-11-08
Payer: MEDICARE

## 2023-11-08 DIAGNOSIS — F39 MOOD DISORDER (CMS-HCC): ICD-10-CM

## 2023-11-08 DIAGNOSIS — E11.69 TYPE 2 DIABETES MELLITUS WITH OTHER SPECIFIED COMPLICATION, WITH LONG-TERM CURRENT USE OF INSULIN (MULTI): ICD-10-CM

## 2023-11-08 DIAGNOSIS — Z79.4 TYPE 2 DIABETES MELLITUS WITH OTHER SPECIFIED COMPLICATION, WITH LONG-TERM CURRENT USE OF INSULIN (MULTI): ICD-10-CM

## 2023-11-08 DIAGNOSIS — D50.9 IRON DEFICIENCY ANEMIA, UNSPECIFIED IRON DEFICIENCY ANEMIA TYPE: ICD-10-CM

## 2023-11-08 DIAGNOSIS — D72.829 LEUKOCYTOSIS, UNSPECIFIED TYPE: ICD-10-CM

## 2023-11-08 DIAGNOSIS — E55.9 VITAMIN D DEFICIENCY: ICD-10-CM

## 2023-11-08 DIAGNOSIS — E66.01 MORBID OBESITY WITH BMI OF 40.0-44.9, ADULT (MULTI): ICD-10-CM

## 2023-11-08 DIAGNOSIS — I10 HYPERTENSION, UNSPECIFIED TYPE: ICD-10-CM

## 2023-11-08 DIAGNOSIS — I10 PRIMARY HYPERTENSION: ICD-10-CM

## 2023-11-08 DIAGNOSIS — E78.2 MIXED HYPERLIPIDEMIA: ICD-10-CM

## 2023-11-08 DIAGNOSIS — Z00.6 RESEARCH STUDY PATIENT: ICD-10-CM

## 2023-11-08 DIAGNOSIS — D50.8 OTHER IRON DEFICIENCY ANEMIA: ICD-10-CM

## 2023-11-08 LAB
25(OH)D3 SERPL-MCNC: 50 NG/ML (ref 30–100)
ALBUMIN SERPL BCP-MCNC: 4.1 G/DL (ref 3.4–5)
ALP SERPL-CCNC: 117 U/L (ref 33–136)
ALT SERPL W P-5'-P-CCNC: 11 U/L (ref 7–45)
ANION GAP SERPL CALC-SCNC: 14 MMOL/L (ref 10–20)
APPEARANCE UR: ABNORMAL
AST SERPL W P-5'-P-CCNC: 12 U/L (ref 9–39)
BASOPHILS # BLD AUTO: 0.13 X10*3/UL (ref 0–0.1)
BASOPHILS NFR BLD AUTO: 1.3 %
BILIRUB SERPL-MCNC: 0.6 MG/DL (ref 0–1.2)
BILIRUB UR STRIP.AUTO-MCNC: NEGATIVE MG/DL
BUN SERPL-MCNC: 17 MG/DL (ref 6–23)
CALCIUM SERPL-MCNC: 9.7 MG/DL (ref 8.6–10.3)
CHLORIDE SERPL-SCNC: 99 MMOL/L (ref 98–107)
CHOLEST SERPL-MCNC: 297 MG/DL (ref 0–199)
CHOLESTEROL/HDL RATIO: 5.5
CO2 SERPL-SCNC: 33 MMOL/L (ref 21–32)
COLOR UR: YELLOW
CORTIS SERPL-MCNC: 18.3 UG/DL (ref 2.5–20)
CREAT SERPL-MCNC: 0.81 MG/DL (ref 0.5–1.05)
CRP SERPL HS-MCNC: 6.7 MG/L
D DIMER PPP FEU-MCNC: 660 NG/ML FEU
EOSINOPHIL # BLD AUTO: 0.3 X10*3/UL (ref 0–0.7)
EOSINOPHIL NFR BLD AUTO: 2.9 %
ERYTHROCYTE [DISTWIDTH] IN BLOOD BY AUTOMATED COUNT: 15.9 % (ref 11.5–14.5)
EST. AVERAGE GLUCOSE BLD GHB EST-MCNC: 206 MG/DL
FERRITIN SERPL-MCNC: 33 NG/ML (ref 8–150)
GFR SERPL CREATININE-BSD FRML MDRD: 80 ML/MIN/1.73M*2
GLUCOSE SERPL-MCNC: 124 MG/DL (ref 74–99)
GLUCOSE UR STRIP.AUTO-MCNC: NEGATIVE MG/DL
HBA1C MFR BLD: 8.8 %
HCT VFR BLD AUTO: 39.4 % (ref 36–46)
HDLC SERPL-MCNC: 53.8 MG/DL
HGB BLD-MCNC: 12.3 G/DL (ref 12–16)
HYALINE CASTS #/AREA URNS AUTO: ABNORMAL /LPF
IMM GRANULOCYTES # BLD AUTO: 0.04 X10*3/UL (ref 0–0.7)
IMM GRANULOCYTES NFR BLD AUTO: 0.4 % (ref 0–0.9)
IRON SATN MFR SERPL: 25 % (ref 25–45)
IRON SERPL-MCNC: 101 UG/DL (ref 35–150)
KETONES UR STRIP.AUTO-MCNC: NEGATIVE MG/DL
LDLC SERPL CALC-MCNC: 201 MG/DL
LEUKOCYTE ESTERASE UR QL STRIP.AUTO: ABNORMAL
LYMPHOCYTES # BLD AUTO: 2.68 X10*3/UL (ref 1.2–4.8)
LYMPHOCYTES NFR BLD AUTO: 25.9 %
MAGNESIUM SERPL-MCNC: 2.3 MG/DL (ref 1.6–2.4)
MCH RBC QN AUTO: 26.2 PG (ref 26–34)
MCHC RBC AUTO-ENTMCNC: 31.2 G/DL (ref 32–36)
MCV RBC AUTO: 84 FL (ref 80–100)
MONOCYTES # BLD AUTO: 0.73 X10*3/UL (ref 0.1–1)
MONOCYTES NFR BLD AUTO: 7 %
NEUTROPHILS # BLD AUTO: 6.48 X10*3/UL (ref 1.2–7.7)
NEUTROPHILS NFR BLD AUTO: 62.5 %
NITRITE UR QL STRIP.AUTO: NEGATIVE
NON HDL CHOLESTEROL: 243 MG/DL (ref 0–149)
NRBC BLD-RTO: 0 /100 WBCS (ref 0–0)
PH UR STRIP.AUTO: 7 [PH]
PLATELET # BLD AUTO: 357 X10*3/UL (ref 150–450)
POTASSIUM SERPL-SCNC: 4.4 MMOL/L (ref 3.5–5.3)
PROT SERPL-MCNC: 6.6 G/DL (ref 6.4–8.2)
PROT UR STRIP.AUTO-MCNC: ABNORMAL MG/DL
RBC # BLD AUTO: 4.69 X10*6/UL (ref 4–5.2)
RBC # UR STRIP.AUTO: NEGATIVE /UL
RBC #/AREA URNS AUTO: ABNORMAL /HPF
SODIUM SERPL-SCNC: 142 MMOL/L (ref 136–145)
SP GR UR STRIP.AUTO: 1.02
SQUAMOUS #/AREA URNS AUTO: ABNORMAL /HPF
TIBC SERPL-MCNC: 403 UG/DL (ref 240–445)
TRIGL SERPL-MCNC: 212 MG/DL (ref 0–149)
TSH SERPL-ACNC: 1.55 MIU/L (ref 0.44–3.98)
UIBC SERPL-MCNC: 302 UG/DL (ref 110–370)
UROBILINOGEN UR STRIP.AUTO-MCNC: 4 MG/DL
VLDL: 42 MG/DL (ref 0–40)
WBC # BLD AUTO: 10.4 X10*3/UL (ref 4.4–11.3)
WBC #/AREA URNS AUTO: ABNORMAL /HPF
YEAST BUDDING #/AREA UR COMP ASSIST: PRESENT /HPF

## 2023-11-08 PROCEDURE — 80053 COMPREHEN METABOLIC PANEL: CPT

## 2023-11-08 PROCEDURE — 82533 TOTAL CORTISOL: CPT

## 2023-11-08 PROCEDURE — 83540 ASSAY OF IRON: CPT

## 2023-11-08 PROCEDURE — 84443 ASSAY THYROID STIM HORMONE: CPT

## 2023-11-08 PROCEDURE — 82728 ASSAY OF FERRITIN: CPT

## 2023-11-08 PROCEDURE — 85025 COMPLETE CBC W/AUTO DIFF WBC: CPT

## 2023-11-08 PROCEDURE — 85379 FIBRIN DEGRADATION QUANT: CPT

## 2023-11-08 PROCEDURE — 80061 LIPID PANEL: CPT

## 2023-11-08 PROCEDURE — 86141 C-REACTIVE PROTEIN HS: CPT

## 2023-11-08 PROCEDURE — 82947 ASSAY GLUCOSE BLOOD QUANT: CPT

## 2023-11-08 PROCEDURE — 82024 ASSAY OF ACTH: CPT

## 2023-11-08 PROCEDURE — 82610 CYSTATIN C: CPT

## 2023-11-08 PROCEDURE — 36415 COLL VENOUS BLD VENIPUNCTURE: CPT

## 2023-11-08 PROCEDURE — 83036 HEMOGLOBIN GLYCOSYLATED A1C: CPT

## 2023-11-08 PROCEDURE — 83550 IRON BINDING TEST: CPT

## 2023-11-08 PROCEDURE — 81001 URINALYSIS AUTO W/SCOPE: CPT

## 2023-11-08 PROCEDURE — 82306 VITAMIN D 25 HYDROXY: CPT

## 2023-11-08 PROCEDURE — 83735 ASSAY OF MAGNESIUM: CPT

## 2023-11-10 LAB
ACTH PLAS-MCNC: 24.9 PG/ML (ref 7.2–63.3)
CYSTATIN C SERPL-MCNC: 1.4 MG/L (ref 0.5–1.2)
GFR/BSA.PRED SERPLBLD CYS-BASED-ARV: 45 ML/MIN/BSA

## 2023-11-14 DIAGNOSIS — I10 HYPERTENSION, UNSPECIFIED TYPE: ICD-10-CM

## 2023-11-14 RX ORDER — BUMETANIDE 1 MG/1
2 TABLET ORAL DAILY
Qty: 180 TABLET | Refills: 3 | Status: CANCELLED | OUTPATIENT
Start: 2023-11-14 | End: 2024-11-13

## 2023-11-15 RX ORDER — BUMETANIDE 1 MG/1
1.5 TABLET ORAL DAILY
Qty: 135 TABLET | Refills: 3 | Status: SHIPPED | OUTPATIENT
Start: 2023-11-15 | End: 2024-05-24 | Stop reason: WASHOUT

## 2023-11-20 ENCOUNTER — APPOINTMENT (OUTPATIENT)
Dept: RADIOLOGY | Facility: HOSPITAL | Age: 68
End: 2023-11-20
Payer: MEDICARE

## 2023-11-20 DIAGNOSIS — R06.02 SHORTNESS OF BREATH: ICD-10-CM

## 2023-11-20 DIAGNOSIS — S22.41XD CLOSED FRACTURE OF MULTIPLE RIBS OF RIGHT SIDE WITH ROUTINE HEALING, SUBSEQUENT ENCOUNTER: ICD-10-CM

## 2023-11-20 DIAGNOSIS — R06.09 DYSPNEA ON EXERTION: ICD-10-CM

## 2023-11-20 DIAGNOSIS — I10 PRIMARY HYPERTENSION: ICD-10-CM

## 2023-11-20 DIAGNOSIS — M47.817 LUMBOSACRAL SPONDYLOSIS WITHOUT MYELOPATHY: ICD-10-CM

## 2023-11-20 RX ORDER — MONTELUKAST SODIUM 10 MG/1
10 TABLET ORAL DAILY
Qty: 90 TABLET | Refills: 0 | Status: SHIPPED | OUTPATIENT
Start: 2023-11-20 | End: 2024-02-29

## 2023-11-20 RX ORDER — ETODOLAC 200 MG/1
200 CAPSULE ORAL 2 TIMES DAILY
Qty: 60 CAPSULE | Refills: 0 | OUTPATIENT
Start: 2023-11-20

## 2023-11-20 RX ORDER — LOSARTAN POTASSIUM 50 MG/1
50 TABLET ORAL 2 TIMES DAILY
Qty: 180 TABLET | Refills: 3 | Status: SHIPPED | OUTPATIENT
Start: 2023-11-20

## 2023-11-20 RX ORDER — CALCIUM CITRATE/VITAMIN D3 200MG-6.25
TABLET ORAL
COMMUNITY
Start: 2023-11-14

## 2023-11-20 NOTE — TELEPHONE ENCOUNTER
Requested Prescriptions     Pending Prescriptions Disp Refills    gabapentin (NEURONTIN) 600 MG tablet [Pharmacy Med Name: GABAPENTIN 600MG TABLETS] 90 tablet 0     Sig: TAKE 1 TABLET BY MOUTH THREE TIMES DAILY FOR 30 DAYS

## 2023-11-21 RX ORDER — GABAPENTIN 600 MG/1
TABLET ORAL
Qty: 90 TABLET | Refills: 0 | Status: SHIPPED | OUTPATIENT
Start: 2023-11-21 | End: 2023-12-21

## 2023-11-27 DIAGNOSIS — I10 PRIMARY HYPERTENSION: ICD-10-CM

## 2023-11-27 RX ORDER — POTASSIUM CHLORIDE 20 MEQ/1
20 TABLET, EXTENDED RELEASE ORAL DAILY
Qty: 90 TABLET | Refills: 3 | Status: SHIPPED | OUTPATIENT
Start: 2023-11-27 | End: 2024-11-26

## 2023-11-27 RX ORDER — POTASSIUM CHLORIDE 20 MEQ/1
20 TABLET, EXTENDED RELEASE ORAL DAILY
COMMUNITY
End: 2023-11-27 | Stop reason: SDUPTHER

## 2023-11-28 ENCOUNTER — TELEPHONE (OUTPATIENT)
Dept: PRIMARY CARE | Facility: CLINIC | Age: 68
End: 2023-11-28
Payer: COMMERCIAL

## 2023-11-28 DIAGNOSIS — M47.817 LUMBOSACRAL SPONDYLOSIS WITHOUT MYELOPATHY: ICD-10-CM

## 2023-11-28 DIAGNOSIS — M47.812 CERVICAL SPONDYLOSIS WITHOUT MYELOPATHY: ICD-10-CM

## 2023-11-28 RX ORDER — TIZANIDINE 4 MG/1
4 TABLET ORAL EVERY 8 HOURS PRN
Qty: 90 TABLET | Refills: 0 | Status: SHIPPED | OUTPATIENT
Start: 2023-11-28 | End: 2023-12-28

## 2023-11-28 NOTE — TELEPHONE ENCOUNTER
Pt called asking if her albuterol inhaler can be switched to vendolin inhaler because her carpal tunnel makes it hard for her to get the puffs out and it sometimes does not spray right per patient   Please advise.

## 2023-11-28 NOTE — TELEPHONE ENCOUNTER
Requested Prescriptions     Pending Prescriptions Disp Refills    tiZANidine (ZANAFLEX) 4 MG tablet [Pharmacy Med Name: TIZANIDINE 4MG TABLETS] 90 tablet 0     Sig: TAKE 1 TABLET BY MOUTH THREE TIMES DAILY AS NEEDED FOR PAIN OR SPASMS       Patient last seen on:  10/23/23  Date of last refill:  11/1/23  Future appts: 12/19/23

## 2023-11-29 DIAGNOSIS — J45.909 ASTHMA, UNSPECIFIED ASTHMA SEVERITY, UNSPECIFIED WHETHER COMPLICATED, UNSPECIFIED WHETHER PERSISTENT (HHS-HCC): Primary | ICD-10-CM

## 2023-11-29 RX ORDER — ALBUTEROL SULFATE 90 UG/1
AEROSOL, METERED RESPIRATORY (INHALATION)
Qty: 8 G | Refills: 11 | Status: SHIPPED | OUTPATIENT
Start: 2023-11-29

## 2023-11-30 ENCOUNTER — HOSPITAL ENCOUNTER (OUTPATIENT)
Dept: RADIOLOGY | Facility: HOSPITAL | Age: 68
Discharge: HOME | End: 2023-11-30
Payer: MEDICARE

## 2023-11-30 DIAGNOSIS — Z78.0 ASYMPTOMATIC MENOPAUSE: ICD-10-CM

## 2023-11-30 DIAGNOSIS — Z12.31 SCREENING MAMMOGRAM FOR BREAST CANCER: ICD-10-CM

## 2023-11-30 PROCEDURE — 77080 DXA BONE DENSITY AXIAL: CPT

## 2023-11-30 PROCEDURE — 77063 BREAST TOMOSYNTHESIS BI: CPT | Performed by: STUDENT IN AN ORGANIZED HEALTH CARE EDUCATION/TRAINING PROGRAM

## 2023-11-30 PROCEDURE — 77067 SCR MAMMO BI INCL CAD: CPT | Performed by: STUDENT IN AN ORGANIZED HEALTH CARE EDUCATION/TRAINING PROGRAM

## 2023-11-30 PROCEDURE — 77080 DXA BONE DENSITY AXIAL: CPT | Performed by: STUDENT IN AN ORGANIZED HEALTH CARE EDUCATION/TRAINING PROGRAM

## 2023-11-30 PROCEDURE — 77067 SCR MAMMO BI INCL CAD: CPT

## 2023-12-05 ENCOUNTER — TELEPHONE (OUTPATIENT)
Dept: PAIN MANAGEMENT | Age: 68
End: 2023-12-05

## 2023-12-05 NOTE — TELEPHONE ENCOUNTER
Patient called stating that she was having pain in her front thighs and some sciatic pain. She says she has trouble standing up straight aand walking with the walker. She is wondering if she needs to schedule another injection soon? She has an appointment coming up but wanted to make Dr Mendel Eland aware.

## 2023-12-06 DIAGNOSIS — M50.322 DEGENERATION OF C5-C6 INTERVERTEBRAL DISC: ICD-10-CM

## 2023-12-06 DIAGNOSIS — M51.34 DEGENERATION OF THORACIC INTERVERTEBRAL DISC: ICD-10-CM

## 2023-12-06 NOTE — TELEPHONE ENCOUNTER
Requested Prescriptions     Pending Prescriptions Disp Refills    etodolac (LODINE) 400 MG tablet 60 tablet 2     Sig: Take 1 tablet by mouth 2 times daily    venlafaxine (EFFEXOR XR) 150 MG extended release capsule 30 capsule 2     Sig: Take 1 capsule by mouth daily       Patient last seen on:  10/23  Date of last surgery:  n/a  Date of last refill:  4/21/23, 9/7/23  Pain level:  n/a  Patient complaining of:  PT is asking for refill of these medications that she got from Dr Selene Stewart. She says she will sign a physician of record form when she comes in next as these are her [facility] medications. She also states in October her PCP added effexor 75MG as well. She needs this medication too.    Future appts: 12/18

## 2023-12-08 DIAGNOSIS — M51.34 DEGENERATION OF THORACIC INTERVERTEBRAL DISC: ICD-10-CM

## 2023-12-08 DIAGNOSIS — S22.41XD CLOSED FRACTURE OF MULTIPLE RIBS OF RIGHT SIDE WITH ROUTINE HEALING, SUBSEQUENT ENCOUNTER: ICD-10-CM

## 2023-12-08 DIAGNOSIS — M50.322 DEGENERATION OF C5-C6 INTERVERTEBRAL DISC: ICD-10-CM

## 2023-12-08 LAB — NONINV COLON CA DNA+OCC BLD SCRN STL QL: NEGATIVE

## 2023-12-08 RX ORDER — ETODOLAC 200 MG/1
200 CAPSULE ORAL 2 TIMES DAILY
Qty: 60 CAPSULE | Refills: 0 | OUTPATIENT
Start: 2023-12-08

## 2023-12-08 RX ORDER — ETODOLAC 400 MG/1
400 TABLET, FILM COATED ORAL 2 TIMES DAILY
Qty: 60 TABLET | Refills: 2 | OUTPATIENT
Start: 2023-12-08

## 2023-12-08 RX ORDER — VENLAFAXINE HYDROCHLORIDE 150 MG/1
150 CAPSULE, EXTENDED RELEASE ORAL DAILY
Qty: 30 CAPSULE | Refills: 0 | OUTPATIENT
Start: 2023-12-08 | End: 2024-01-07

## 2023-12-08 RX ORDER — VENLAFAXINE HYDROCHLORIDE 150 MG/1
150 CAPSULE, EXTENDED RELEASE ORAL DAILY
Qty: 30 CAPSULE | Refills: 2 | OUTPATIENT
Start: 2023-12-08 | End: 2024-01-07

## 2023-12-08 NOTE — TELEPHONE ENCOUNTER
Pt called requesting rx for Effexor that was previously ordered by dr. Jonathon White. And [employer] requested the Etodolac. Pt has upcoming appt 12/18.

## 2023-12-11 DIAGNOSIS — F39 MOOD DISORDER (CMS-HCC): ICD-10-CM

## 2023-12-11 RX ORDER — VENLAFAXINE HYDROCHLORIDE 75 MG/1
75 CAPSULE, EXTENDED RELEASE ORAL DAILY
Qty: 30 CAPSULE | Refills: 6 | Status: SHIPPED | OUTPATIENT
Start: 2023-12-11 | End: 2024-06-08

## 2023-12-11 RX ORDER — VENLAFAXINE HYDROCHLORIDE 150 MG/1
CAPSULE, EXTENDED RELEASE ORAL
Qty: 30 CAPSULE | Refills: 6 | Status: SHIPPED | OUTPATIENT
Start: 2023-12-11

## 2023-12-14 ENCOUNTER — TELEPHONE (OUTPATIENT)
Dept: SPORTS MEDICINE | Age: 68
End: 2023-12-14

## 2023-12-14 NOTE — TELEPHONE ENCOUNTER
Omeprazole 40 mg capsule prior authorization request was received from [address] [place] [address]. Diagnosis is for Cervical and Thoracic. Fax placed in [address] [employer] to submit authorization request to [facility] and email sent to [address] [place] [address] her the medication needs prior authorization.

## 2023-12-18 ENCOUNTER — TELEPHONE (OUTPATIENT)
Dept: PRIMARY CARE | Facility: CLINIC | Age: 68
End: 2023-12-18
Payer: COMMERCIAL

## 2023-12-18 DIAGNOSIS — M25.50 ARTHRALGIA, UNSPECIFIED JOINT: ICD-10-CM

## 2023-12-18 DIAGNOSIS — M54.30 SCIATICA, UNSPECIFIED LATERALITY: Primary | ICD-10-CM

## 2023-12-18 RX ORDER — ETODOLAC 400 MG/1
400 TABLET, FILM COATED ORAL 2 TIMES DAILY
Qty: 60 TABLET | Refills: 3 | Status: CANCELLED | OUTPATIENT
Start: 2023-12-18

## 2023-12-18 RX ORDER — ETODOLAC 400 MG/1
400 TABLET, FILM COATED ORAL 2 TIMES DAILY
Qty: 28 TABLET | Refills: 0 | Status: SHIPPED | OUTPATIENT
Start: 2023-12-18

## 2023-12-18 NOTE — TELEPHONE ENCOUNTER
Pt called requesting refill on Lodine 400mg 2 times a day.   Allergies alerted me, unsure as to format or how to proceed.  Pt did state if she stops taking this medication sharp pain in legs begin   Please advise.     [facility]eng in [place] if approved

## 2023-12-18 NOTE — TELEPHONE ENCOUNTER
Dr. Fontaine was the prescribing dr and he is switching offices right now so he can't refill this is the only thing that helps her with siatica per pt

## 2023-12-19 ENCOUNTER — TELEPHONE (OUTPATIENT)
Dept: PAIN MANAGEMENT | Age: 68
End: 2023-12-19

## 2023-12-19 DIAGNOSIS — E11.69 TYPE 2 DIABETES MELLITUS WITH OTHER SPECIFIED COMPLICATION, WITH LONG-TERM CURRENT USE OF INSULIN (MULTI): Primary | ICD-10-CM

## 2023-12-19 DIAGNOSIS — Z79.4 TYPE 2 DIABETES MELLITUS WITH OTHER SPECIFIED COMPLICATION, WITH LONG-TERM CURRENT USE OF INSULIN (MULTI): Primary | ICD-10-CM

## 2023-12-19 PROBLEM — R60.9 EDEMA: Status: ACTIVE | Noted: 2023-12-19

## 2023-12-19 PROBLEM — J45.909 REACTIVE AIRWAY DISEASE (HHS-HCC): Status: ACTIVE | Noted: 2023-02-01

## 2023-12-19 PROBLEM — E66.01 SEVERE OBESITY (MULTI): Status: ACTIVE | Noted: 2023-11-08

## 2023-12-19 NOTE — TELEPHONE ENCOUNTER
Jose Mcgrath after speaking to Dr Avalos regarding Lodine.    No Lodine due to side effects with chronic nsaid use i-e heart and kidney.    TfESI ordered for the patient but patient needs to monitor sugars closely.    A letter sent to Dr Shila Fung for patient to stop asa 7 days, patient stated her pcp gets up set when we sent these and calls the patient asking why she can't just stop it.     I explain to the patient that we needed to have on file for the procedure.    Patient would still like Dr Avalos to prescribe the Lodine. She stated her pcp does labs and everything is always good, patient did say that the Lodine helps with the leg pain when she does have it.

## 2023-12-20 ENCOUNTER — TELEPHONE (OUTPATIENT)
Dept: PAIN MANAGEMENT | Age: 68
End: 2023-12-20

## 2023-12-20 RX ORDER — INSULIN GLARGINE 100 [IU]/ML
44 INJECTION, SOLUTION SUBCUTANEOUS NIGHTLY
Qty: 30 ML | Refills: 1 | Status: SHIPPED | OUTPATIENT
Start: 2023-12-20 | End: 2024-05-16

## 2023-12-20 NOTE — TELEPHONE ENCOUNTER
LEFT L2-3, L3-4 TFESI    NO AUTH REQUIRED    OK to schedule procedure approved as above. Please note sides/levels approved and date range.    (If applicable, sides/levels approved may differ from those ordered)    TO BE SCHEDULED WITH DR Shona Charles

## 2023-12-26 NOTE — TELEPHONE ENCOUNTER
Per Pt, she was told to have follow up with Dr. Mc Estrada before scheduling LEFT L2-3 L3-4 TFESI because of elevated blood glucose levels. Pt scheduled for follow up on  01-.

## 2024-01-04 NOTE — TELEPHONE ENCOUNTER
Patient keeps calling askinf for her refill, she wants to know if Dr Avalos will Refill, she can not wait until her appointment

## 2024-01-04 NOTE — TELEPHONE ENCOUNTER
I'm sorry for the confusion. I typically do not prescribe Lodine or other anti inflammatories on an on-going basis in your age range due to the elevated risks of kidney/cardiac/stomach injury. I'm glad to hear that blood work to-date is normal. Unfortunately, I do not check the blood work regularly enough myself to be sure to catch a problem if it were to start to develop. I'm sorry for the disappointing news, but I am not able to help with the Lodine refills.     Perhaps we can discuss the reason for your aspirin in person. For some patients, aspirin can be an important part of their primary preventative care, especially when recommended by a physician. I am not up to date on when aspirin is recommended - I depend on other physicians to help with this. Therefore, I request permission to stop aspirin from these other physicians. I'm sorry that this is not convenient prior to your epidural injections, but we do this as an added level of safety to ensure that your health and condition have not changed in a way that would make doing our spine injection after holding your aspirin potentially detrimental to your health. We can discuss this more in person at your next follow up appointment.

## 2024-01-17 ENCOUNTER — OFFICE VISIT (OUTPATIENT)
Dept: PAIN MANAGEMENT | Age: 69
End: 2024-01-17
Payer: MEDICARE

## 2024-01-17 VITALS
DIASTOLIC BLOOD PRESSURE: 80 MMHG | SYSTOLIC BLOOD PRESSURE: 132 MMHG | HEIGHT: 61 IN | WEIGHT: 238 LBS | TEMPERATURE: 97.7 F | BODY MASS INDEX: 44.93 KG/M2

## 2024-01-17 DIAGNOSIS — M47.812 CERVICAL SPONDYLOSIS WITHOUT MYELOPATHY: ICD-10-CM

## 2024-01-17 DIAGNOSIS — M54.16 LUMBAR RADICULOPATHY: Primary | ICD-10-CM

## 2024-01-17 DIAGNOSIS — M47.817 LUMBOSACRAL SPONDYLOSIS WITHOUT MYELOPATHY: ICD-10-CM

## 2024-01-17 PROCEDURE — G8484 FLU IMMUNIZE NO ADMIN: HCPCS | Performed by: PHYSICAL MEDICINE & REHABILITATION

## 2024-01-17 PROCEDURE — 99214 OFFICE O/P EST MOD 30 MIN: CPT | Performed by: PHYSICAL MEDICINE & REHABILITATION

## 2024-01-17 PROCEDURE — G8400 PT W/DXA NO RESULTS DOC: HCPCS | Performed by: PHYSICAL MEDICINE & REHABILITATION

## 2024-01-17 PROCEDURE — 3017F COLORECTAL CA SCREEN DOC REV: CPT | Performed by: PHYSICAL MEDICINE & REHABILITATION

## 2024-01-17 PROCEDURE — 1036F TOBACCO NON-USER: CPT | Performed by: PHYSICAL MEDICINE & REHABILITATION

## 2024-01-17 PROCEDURE — G8427 DOCREV CUR MEDS BY ELIG CLIN: HCPCS | Performed by: PHYSICAL MEDICINE & REHABILITATION

## 2024-01-17 PROCEDURE — 1090F PRES/ABSN URINE INCON ASSESS: CPT | Performed by: PHYSICAL MEDICINE & REHABILITATION

## 2024-01-17 PROCEDURE — 1123F ACP DISCUSS/DSCN MKR DOCD: CPT | Performed by: PHYSICAL MEDICINE & REHABILITATION

## 2024-01-17 PROCEDURE — G8417 CALC BMI ABV UP PARAM F/U: HCPCS | Performed by: PHYSICAL MEDICINE & REHABILITATION

## 2024-01-17 RX ORDER — TIZANIDINE 4 MG/1
4 TABLET ORAL 3 TIMES DAILY PRN
Qty: 90 TABLET | Refills: 0 | Status: SHIPPED | OUTPATIENT
Start: 2024-01-17 | End: 2024-02-16

## 2024-01-17 RX ORDER — GABAPENTIN 600 MG/1
600 TABLET ORAL 3 TIMES DAILY
Qty: 90 TABLET | Refills: 0 | Status: SHIPPED | OUTPATIENT
Start: 2024-01-17 | End: 2024-02-16

## 2024-01-17 ASSESSMENT — ENCOUNTER SYMPTOMS
NAUSEA: 0
SHORTNESS OF BREATH: 0
CONSTIPATION: 0
DIARRHEA: 0
BACK PAIN: 1

## 2024-01-17 NOTE — PROGRESS NOTES
management of her comorbidities as well as any new positive symptoms mentioned in review of systems above. Care was provided within the definitions and limitations of our specialty practice. Encouraged lifestyle interventions including healthy habits, lifestyle changes, regular aerobic exercise and appropriate weight maintenance as advised by their primary care physician or cardiovascular health provider. Discussed well care and disease prevention/maintenance. Anatomic spine model was used to illustrate pathology.     All recommendations for therapy are provided to improve function with activities of daily living, decrease pain, and help develop an exercise program. All recommendations for therapeutic injections are meant to help decrease pain, improve function with activities of daily living, maintain compliance with home exercise program, improve quality of life, and decrease reliance upon oral medications.     Encouraged compliance with her home exercise program. Recommended compliance with physical therapy program as outlined above.     Discussed the elevated risks of excessive sedation while on pain medications. Advised her against driving or operating heavy machinery or performing any activities where she may harm herself or others while on pain medications. Particular caution was emphasized especially during dose adjustments and medication changes.     Discussed the risks of temporary disability, permanent disability, morbidity, and mortality with poorly-managed or undiagnosed medical conditions and comorbidities. Emphasized the importance of timely medical evaluation and treatment as previously recommended by us or other medical professionals. Risks of not pursing these recommendations were emphasized. The patient was offered a treatment at our facility. The physician and patient have discussed in detail the risk of exposure to and/or potential harm posed by the COVID-19 virus with having office visits and

## 2024-01-19 ENCOUNTER — TELEPHONE (OUTPATIENT)
Dept: PAIN MANAGEMENT | Age: 69
End: 2024-01-19

## 2024-01-22 NOTE — TELEPHONE ENCOUNTER
Called back today, can she take ibuprophen?  LADY is scheduled for Thursday this  week. She is taking one 400 mg. about every 12 hours.

## 2024-01-25 ENCOUNTER — PROCEDURE VISIT (OUTPATIENT)
Dept: PAIN MANAGEMENT | Age: 69
End: 2024-01-25
Payer: MEDICARE

## 2024-01-25 DIAGNOSIS — E66.01 OBESITY, CLASS III, BMI 40-49.9 (MORBID OBESITY) (HCC): ICD-10-CM

## 2024-01-25 DIAGNOSIS — M54.16 LUMBAR RADICULOPATHY: Primary | ICD-10-CM

## 2024-01-25 PROCEDURE — 64483 NJX AA&/STRD TFRM EPI L/S 1: CPT | Performed by: PHYSICAL MEDICINE & REHABILITATION

## 2024-01-25 RX ORDER — LIDOCAINE HYDROCHLORIDE 10 MG/ML
5 INJECTION, SOLUTION EPIDURAL; INFILTRATION; INTRACAUDAL; PERINEURAL ONCE
Status: COMPLETED | OUTPATIENT
Start: 2024-01-25 | End: 2024-01-25

## 2024-01-25 RX ORDER — DEXAMETHASONE SODIUM PHOSPHATE 10 MG/ML
5 INJECTION, SOLUTION INTRAMUSCULAR; INTRAVENOUS ONCE
Status: COMPLETED | OUTPATIENT
Start: 2024-01-25 | End: 2024-01-25

## 2024-01-25 RX ORDER — NAPROXEN 250 MG/1
220 TABLET ORAL DAILY
COMMUNITY

## 2024-01-25 RX ORDER — SODIUM CHLORIDE 9 MG/ML
3 INJECTION INTRAVENOUS ONCE
Status: COMPLETED | OUTPATIENT
Start: 2024-01-25 | End: 2024-01-25

## 2024-01-25 RX ADMIN — SODIUM CHLORIDE 3 ML: 9 INJECTION INTRAVENOUS at 16:42

## 2024-01-25 RX ADMIN — DEXAMETHASONE SODIUM PHOSPHATE 5 MG: 10 INJECTION, SOLUTION INTRAMUSCULAR; INTRAVENOUS at 16:40

## 2024-01-25 RX ADMIN — LIDOCAINE HYDROCHLORIDE 5 ML: 10 INJECTION, SOLUTION EPIDURAL; INFILTRATION; INTRACAUDAL; PERINEURAL at 16:41

## 2024-01-25 RX ADMIN — Medication 1 MEQ: at 16:41

## 2024-01-25 NOTE — PROGRESS NOTES
Lumbar Transforaminal Epidural Steroid Injection (TFESI)    Patient Name: Juli Brand   : 1955  Date: 2024     Physician: Aleta Avalos MD     INDICATIONS: Juli Brand is a 68 y.o. female who presents with symptoms and physical exam findings consistent with lumbar radiculopathy. She has lumbosacral paresthesias with a positive straight leg raise on physical exam. She has had persistent pain that limits her activities of daily living. The pain is persistent despite conservative measures. She has significant functional and psychological impairment due to this condition. She has had greater than 50% pain relief for over three months from prior epidural steroid injections, the pain has returned in a similar character, distribution, and intensity necessitating repeat treatment. Given her symptoms, physical exam findings, impairment in activities of daily living, and lack of response to conservative measures, consideration for lumbar transforaminal epidural corticosteroid injection was given. Discussed the risks including but not limited to bleeding, infection, worsened pain, damage to surrounding structures, side effects, toxicity, allergic reactions to medications used, need for surgery, headache, vision changes, difficulty with chewing and/or swallowing, immune and stress-response dysfunction, fat necrosis, skin pigmentation changes, blood sugar elevation, as well as catastrophic injury such as vision loss/blindness, paralysis, spinal cord or plexus injury, cerebral brainstem or spinal cord infarction, intrathecal injection, spinal cord puncture, arachnoiditis, discitis, stroke, bowel/bladder/sexual dysfunction, ventilator dependence, loss of use of the arms and/or legs, and death. Discussed off-label use of corticosteroids and how the Food and Drug Administration (FDA) has not approved corticosteroids for epidural use. Discussed her elevated risk given anticoagulation status and the

## 2024-01-26 ENCOUNTER — TELEPHONE (OUTPATIENT)
Dept: PAIN MANAGEMENT | Age: 69
End: 2024-01-26

## 2024-01-26 NOTE — TELEPHONE ENCOUNTER
I'm very sorry to hear about the blood pressure and sugar changes. We used less dexamethasone than previously used. The patient did the right thing by going to the emergency room to make sure things stabilize and improve.     I'm hoping that the left side will start to improve soon. Please keep us posted.    -Ok to schedule earlier follow up with me to discuss response to injection and ER visit.

## 2024-01-26 NOTE — TELEPHONE ENCOUNTER
Pt ended up in er yesterday due to blood sugar of 550. Took all insulin she was able to beforehand and it didn't come down. Had testing done for kidneys and they are fine. Blood pressure at end of visit was 210/118.    But injections from yesterday seemed to help the right side but not the left side

## 2024-01-26 NOTE — TELEPHONE ENCOUNTER
Patient is scheduled for follow up in February.  She is home now resting and drinking lots of water.

## 2024-02-12 DIAGNOSIS — K21.9 CHRONIC GERD: ICD-10-CM

## 2024-02-12 RX ORDER — OMEPRAZOLE 40 MG/1
40 CAPSULE, DELAYED RELEASE ORAL DAILY
Qty: 90 CAPSULE | Refills: 3 | Status: SHIPPED | OUTPATIENT
Start: 2024-02-12

## 2024-02-29 DIAGNOSIS — R06.02 SHORTNESS OF BREATH: ICD-10-CM

## 2024-02-29 DIAGNOSIS — R06.09 DYSPNEA ON EXERTION: ICD-10-CM

## 2024-02-29 RX ORDER — MONTELUKAST SODIUM 10 MG/1
10 TABLET ORAL DAILY
Qty: 90 TABLET | Refills: 3 | Status: SHIPPED | OUTPATIENT
Start: 2024-02-29

## 2024-03-06 DIAGNOSIS — M47.812 CERVICAL SPONDYLOSIS WITHOUT MYELOPATHY: ICD-10-CM

## 2024-03-06 DIAGNOSIS — M47.817 LUMBOSACRAL SPONDYLOSIS WITHOUT MYELOPATHY: ICD-10-CM

## 2024-03-06 RX ORDER — TIZANIDINE 4 MG/1
TABLET ORAL
Qty: 90 TABLET | Refills: 0 | OUTPATIENT
Start: 2024-03-06

## 2024-03-14 NOTE — TELEPHONE ENCOUNTER
Admission medication history completed at [facility]. Please see Medication Scribe Admission Medication History note from 3/13/2024.     Patient called wanting to let Dr. Efrain Uriostegui know that she was not able to complete the MRI . She was not sure what to do now.

## 2024-03-26 DIAGNOSIS — M47.817 LUMBOSACRAL SPONDYLOSIS WITHOUT MYELOPATHY: ICD-10-CM

## 2024-03-27 RX ORDER — GABAPENTIN 600 MG/1
600 TABLET ORAL 3 TIMES DAILY
Qty: 90 TABLET | Refills: 0 | OUTPATIENT
Start: 2024-03-27

## 2024-03-28 ENCOUNTER — OFFICE VISIT (OUTPATIENT)
Dept: NEUROSURGERY | Facility: CLINIC | Age: 69
End: 2024-03-28
Payer: MEDICARE

## 2024-03-28 VITALS
BODY MASS INDEX: 46.29 KG/M2 | HEIGHT: 61 IN | DIASTOLIC BLOOD PRESSURE: 60 MMHG | WEIGHT: 245.2 LBS | HEART RATE: 96 BPM | SYSTOLIC BLOOD PRESSURE: 104 MMHG | TEMPERATURE: 97.7 F

## 2024-03-28 DIAGNOSIS — M47.812 CERVICAL SPONDYLOSIS WITHOUT MYELOPATHY: ICD-10-CM

## 2024-03-28 DIAGNOSIS — M53.2X6 LUMBAR SPINE INSTABILITY: Primary | ICD-10-CM

## 2024-03-28 DIAGNOSIS — M51.26 HERNIATED LUMBAR DISC WITHOUT MYELOPATHY: ICD-10-CM

## 2024-03-28 DIAGNOSIS — Z98.890 STATUS POST CERVICAL DISCECTOMY: ICD-10-CM

## 2024-03-28 DIAGNOSIS — M43.16 SPONDYLOLISTHESIS OF LUMBAR REGION: ICD-10-CM

## 2024-03-28 PROCEDURE — 1157F ADVNC CARE PLAN IN RCRD: CPT | Performed by: NEUROLOGICAL SURGERY

## 2024-03-28 PROCEDURE — 1036F TOBACCO NON-USER: CPT | Performed by: NEUROLOGICAL SURGERY

## 2024-03-28 PROCEDURE — 99205 OFFICE O/P NEW HI 60 MIN: CPT | Performed by: NEUROLOGICAL SURGERY

## 2024-03-28 PROCEDURE — 1125F AMNT PAIN NOTED PAIN PRSNT: CPT | Performed by: NEUROLOGICAL SURGERY

## 2024-03-28 PROCEDURE — 3074F SYST BP LT 130 MM HG: CPT | Performed by: NEUROLOGICAL SURGERY

## 2024-03-28 PROCEDURE — 3078F DIAST BP <80 MM HG: CPT | Performed by: NEUROLOGICAL SURGERY

## 2024-03-28 PROCEDURE — 4010F ACE/ARB THERAPY RXD/TAKEN: CPT | Performed by: NEUROLOGICAL SURGERY

## 2024-03-28 PROCEDURE — 1159F MED LIST DOCD IN RCRD: CPT | Performed by: NEUROLOGICAL SURGERY

## 2024-03-28 PROCEDURE — 3008F BODY MASS INDEX DOCD: CPT | Performed by: NEUROLOGICAL SURGERY

## 2024-03-28 RX ORDER — SEMAGLUTIDE 1.34 MG/ML
INJECTION, SOLUTION SUBCUTANEOUS
COMMUNITY

## 2024-03-28 RX ORDER — ASPIRIN 81 MG/1
81 TABLET ORAL DAILY
COMMUNITY

## 2024-03-28 ASSESSMENT — PAIN SCALES - GENERAL: PAINLEVEL: 5

## 2024-03-28 ASSESSMENT — PATIENT HEALTH QUESTIONNAIRE - PHQ9
SUM OF ALL RESPONSES TO PHQ9 QUESTIONS 1 AND 2: 0
2. FEELING DOWN, DEPRESSED OR HOPELESS: NOT AT ALL
1. LITTLE INTEREST OR PLEASURE IN DOING THINGS: NOT AT ALL

## 2024-03-28 ASSESSMENT — ENCOUNTER SYMPTOMS: OCCASIONAL FEELINGS OF UNSTEADINESS: 1

## 2024-03-28 NOTE — PROGRESS NOTES
[facility] [place]  Department of Neurological Surgery  New Patient Visit    History of Present Illness:  Kaylan Woo is a 68 y.o. year old female who presents to the spine clinic with her son complaining of significant low back pain with radiation down both of her legs and wrapping around the thigh on the left side.  She also has neck and shoulder pain especially on the left side.  That is chronic.  She was focused on her low back problems.  She used to have pain that would get worse with standing and walking and relieved by sitting.  She then underwent a decompressive 5 level laminectomy.  After that operation she says that she is having constant pain that she has to sit down and very often has to lie down to get relief of her back and leg pain.  She had an MRI in July 2023 and it demonstrates that she has a large disc herniation that is behind the body of L2.  She has a grade 1 spondylolisthesis at L4-5.  I do not see the inferior articulating process of some of the levels after the decompression and so I think it is important to get a CAT scan to see how stable or unstable the spine might be.  I also think it is reasonable to have her do some standing x-rays to see what the actual alignment is when she is upright and whether or not she moves with flexion and extension views.  I told her that if she needs an extensive multilevel instrumented fusion we will have to find her one of my partners to help manage that situation.  I just wanted to be upfront early.  She also needs to get her BMI and check because she is now approximately 45 and she needs to get her hemoglobin A1c back in the 7.5 range.  In addition I am going to get some plain x-rays of her neck because she has the continued pain although no signs of myelopathy she does have an extensive cervical history and we do not have anything to compare her spine with.      Prior Spine Surgeries: She has had a posterior laminectomy and  foraminotomies in the cervical region as well as an ACDF in the cervical region.  I do not know which levels.  He is also had his 5 level decompressive laminectomy in the lumbar region.    Physical Therapy: Yes she continues to work with her therapist  Diabetic: Yes  Osteoporosis: Unknown  Patient's BMI is Body mass index is 46.33 kg/m².    14/14 systems reviewed and negative other than what is listed in the history of present illness    Patient Active Problem List   Diagnosis    Abnormal EKG    Allergic rhinitis    Anxiety    Asthma, moderate    Back pain, chronic    Brain fog    Calcific Achilles tendinitis of right lower extremity    Choking in adult    Chronic GERD    Chronic sinusitis    Costochondritis    Deviated nasal septum    Type 2 diabetes mellitus, with long-term current use of insulin (CMS/HCC)    Edema, peripheral    Fatty liver    Headache    Herniation of cervical intervertebral disc with radiculopathy    Hypertension    Joint pain    Low blood potassium    Lymphedema of both lower extremities    Mild diastolic dysfunction    Right knee pain    Rheumatoid factor positive    Post covid-19 condition, unspecified    Other constipation    Osteoarthritis    Obstructive sleep apnea    Numbness and tingling in both hands    New daily persistent headache    Neuropathy    Seborrheic keratoses    Solitary lung nodule    Urinary incontinence    Vitamin B12 deficiency    Vitamin D deficiency    Arteriosclerotic coronary artery disease    Dyspnea on exertion    Thyroid nodule    Fatigue    Cardiomegaly    Shortness of breath    Weakness    Thyromegaly    Pneumonia due to COVID-19 virus    Lumbar stenosis    Loss of bladder control    Abnormal chest x-ray    Esophageal dysmotility    Wound dehiscence, surgical, sequela    Opioid use, unspecified with unspecified opioid-induced disorder (CMS/HCC)    Mood disorder (CMS/HCC)    Iron deficiency anemia    Mixed hyperlipidemia    ABLA (acute blood loss anemia)     Achilles tendinosis of both lower extremities    Anemia    Annular tear of cervical disc    Brachial neurit or radiculit due to displace of cervic intervert disc    Brachial neuritis or radiculitis    Calcaneal spur of both feet    Degeneration of intervertebral disc at C5-C6 level    Cervical spondylosis without myelopathy    Degeneration of intervertebral disc of thoracic region    Degenerative cervical disc    Herniated nucleus pulposus, C6-7    Herniated disc, cervical    Closed dislocation of acromioclavicular joint    Community acquired pneumonia of right lower lobe of lung    Cortical age-related cataract    Cyst of Bartholin's gland duct    Deep incisional surgical site infection    Difficult intubation    Dry eye syndrome    Dysphagia, unspecified    Herniated nucleus pulposus, C5-6    History of spinal surgery    Hyperopia of both eyes with astigmatism and presbyopia    Injury of superior glenoid labrum of shoulder joint    Left retinal lattice degeneration    Leukocytosis    Nonproductive cough    Oxygen desaturation    Primary osteoarthritis of right wrist    Pulmonary edema    Rotator cuff tear    Severe persistent asthma without complication    Sprain of rotator cuff capsule    Status post cervical discectomy    Symptomatic menopausal or female climacteric states    Tremor    Wound infection after surgery    Edema    Reactive airway disease    Severe obesity (CMS/HCC)    Lumbar spine instability    Herniated lumbar disc without myelopathy    Spondylolisthesis of lumbar region     Past Medical History:   Diagnosis Date    Body mass index (BMI)40.0-44.9, adult 09/16/2021    BMI 40.0-44.9, adult    COVID-19 02/20/2021    Pneumonia due to COVID-19 virus    COVID-19 02/20/2021    Pneumonia due to COVID-19 virus    Deficiency of other specified B group vitamins 01/12/2021    Vitamin B 12 deficiency    Personal history of other drug therapy 10/25/2018    History of influenza vaccination    Personal history of  other medical treatment     History of nuclear stress test    Pyothorax without fistula (CMS/HCC) 10/17/2022    Empyema    Solitary pulmonary nodule 2021    Lung nodule     Past Surgical History:   Procedure Laterality Date    OTHER SURGICAL HISTORY  2021    Thoracic discectomy    OTHER SURGICAL HISTORY  01/10/2022    Neck surgery    OTHER SURGICAL HISTORY  2021    Colonoscopy    OTHER SURGICAL HISTORY  10/06/2020    Hand surgery    OTHER SURGICAL HISTORY  10/06/2020    Lumpectomy    OTHER SURGICAL HISTORY  10/06/2020     section    OTHER SURGICAL HISTORY  10/06/2020    Knee replacement    OTHER SURGICAL HISTORY  10/06/2020    Tonsillectomy    OTHER SURGICAL HISTORY  2019    Hysterectomy total abdominal with removal of both ovaries    TOTAL KNEE ARTHROPLASTY Bilateral      Social History     Tobacco Use    Smoking status: Former     Types: Cigarettes    Smokeless tobacco: Never   Substance Use Topics    Alcohol use: Never     Comment: rarely     family history includes Alzheimer's disease in her mother; Arthritis in her father; Atrial fibrillation in her mother; Charcot-Charisse-Tooth disease in her father and sister; Lupus in her mother; rheumatic disease in her father.    Current Outpatient Medications:     bumetanide (Bumex) 1 mg tablet, Take 1.5 tablets (1.5 mg) by mouth once daily., Disp: 135 tablet, Rfl: 3    cholecalciferol (Vitamin D-3) 50 mcg (2,000 unit) capsule, Take by mouth., Disp: , Rfl:     dilTIAZem CD (Cardizem CD) 240 mg 24 hr capsule, Take 1 capsule (240 mg) by mouth once daily., Disp: 90 capsule, Rfl: 3    etodolac (Lodine) 400 mg tablet, Take 1 tablet (400 mg) by mouth 2 times a day., Disp: 28 tablet, Rfl: 0    FreeStyle Ana María sensor system (FreeStyle Ana María 2 Sensor) kit, 1 Cartridge every 14 days, Disp: 6 each, Rfl: 3    gabapentin (Neurontin) 600 mg tablet, TAKE 1 TABLET BY MOUTH THREE TIMES DAILY FOR 30 DAYS, Disp: , Rfl:     insulin glargine (Lantus Solostar  "U-100 Insulin) 100 unit/mL (3 mL) pen, Inject 44 Units under the skin once daily at bedtime., Disp: 30 mL, Rfl: 1    insulin lispro (HumaLOG U-100 Insulin) 100 unit/mL injection, Ssri, Disp: 7.2 mL, Rfl: 3    losartan (Cozaar) 50 mg tablet, TAKE 1 TABLET TWICE DAILY., Disp: 180 tablet, Rfl: 3    montelukast (Singulair) 10 mg tablet, TAKE 1 TABLET BY MOUTH DAILY, Disp: 90 tablet, Rfl: 3    pen needle, diabetic 32 gauge x 5/32\" needle, Use with daily glucose testing, Disp: 100 each, Rfl: 3    semaglutide 0.25 mg or 0.5 mg (2 mg/3 mL) pen injector, Inject 0.25 mg under the skin 1 (one) time per week., Disp: 12 mL, Rfl: 3    tiZANidine (Zanaflex) 4 mg capsule, Take 1 capsule (4 mg) by mouth once daily at bedtime., Disp: , Rfl:     True Metrix Glucose Test Strip strip, use to check blood sugar TWICE DAILY, Disp: , Rfl:     aspirin 81 mg EC tablet, Take 1 tablet (81 mg) by mouth once daily., Disp: , Rfl:     coenzyme Q10 400 mg capsule, Take by mouth once daily., Disp: , Rfl:     L. acidophilus/Bifid. animalis 32 billion cell capsule, Take 1 capsule by mouth once daily., Disp: , Rfl:     MAGNESIUM ORAL, Take 1 tablet by mouth once daily., Disp: , Rfl:     naloxone (Narcan) 4 mg/0.1 mL nasal spray, SPARY 1 SPRAY INTO ONE NOSTRIL AS NEEDED FOR OPIOID REVERSAL AS DIRECTED, Disp: , Rfl:     omeprazole (PriLOSEC) 40 mg DR capsule, Take 1 capsule (40 mg) by mouth once daily. (Patient not taking: Reported on 3/28/2024), Disp: 90 capsule, Rfl: 3    oxyCODONE (Roxicodone) 5 mg immediate release tablet, Every 6 hours, Disp: , Rfl:     potassium chloride CR 20 mEq ER tablet, Take 1 tablet (20 mEq) by mouth once daily. Do not crush or chew. (Patient not taking: Reported on 3/28/2024), Disp: 90 tablet, Rfl: 3    semaglutide (Ozempic) 0.25 mg or 0.5 mg(2 mg/1.5 mL) pen injector, Inject under the skin 1 (one) time per week., Disp: , Rfl:     TURMERIC ORAL, Take 1 capsule by mouth once daily., Disp: , Rfl:     venlafaxine XR (Effexor-XR) " 150 mg 24 hr capsule, Take 1 capsule (150 mg total) by mouth daily with breakfast for 30 days., Disp: 30 capsule, Rfl: 6    venlafaxine XR (Effexor-XR) 75 mg 24 hr capsule, Take 1 capsule (75 mg) by mouth once daily. Take with food. (Patient not taking: Reported on 3/28/2024), Disp: 30 capsule, Rfl: 6    Ventolin HFA 90 mcg/actuation inhaler, Ventolin aed 2 puffs q4h prn, Disp: 8 g, Rfl: 11    VITAMIN B COMPLEX ORAL, Take 1 tablet by mouth once daily in the morning., Disp: , Rfl:     zinc gluconate-zinc picolinate 30 mg capsule, Take 1 capsule by mouth once daily., Disp: , Rfl:   Allergies   Allergen Reactions    Aspartame Headache and Other    Buprenorphine Other     dizziness    Celecoxib Unknown    Diet Foods Other     migraine headache. Allergy to aspartame only. Able to tolerate sucralose (splenda).    Ezetimibe Unknown    Iodides Other    Latex Unknown    Metformin Other     Severe GI SE    Nabumetone Unknown    Nalidixic Acid Unknown    Nsaids (Non-Steroidal Anti-Inflammatory Drug) Unknown    Pyrazolones Other    Rivaroxaban Unknown    Rofecoxib Unknown    Salicylates Unknown    Shellfish Containing Products Unknown    Shellfish Derived Unknown    Statins-Hmg-Coa Reductase Inhibitors Other    Sulfa (Sulfonamide Antibiotics) Unknown    Sulfasalazine Unknown    Sulfonylureas Unknown    Sulfur Unknown    Thiazides Unknown    Valdecoxib Unknown    Cyclobenzaprine Rash       Physical Examination      General: NAD, AOx 3,  no aphasia or dysarthria, normal fund of knowledge  Cranial Nerves II-XII: VFF, PERRL, EOMI, Face Symm, Facial SILT, Palate/Tongue midline and symmetric  Motor: 5/5 Throughout all extremities except for left lower extremity which has pretty good strength but there is a hesitation and a slight amount of weakness throughout,  No drift, no dysmetria on finger to nose  Sensation: SILT and PP throughout all extremities  DTRS: 1+ Throughout except for her triceps bilaterally which were about 3, No  Hoffmans or Clonus    Results    I personally reviewed and interpreted the imaging results which included the MRI of her lumbar spine from July 2023 and the findings are as I stated above    Assessment and Plan:  Kaylan Woo is a 68 y.o. year old female who presents to the spine clinic with her son complaining of significant low back pain with radiation down both of her legs and wrapping around the thigh on the left side.  She also has neck and shoulder pain especially on the left side.  That is chronic.  She was focused on her low back problems.  She used to have pain that would get worse with standing and walking and relieved by sitting.  She then underwent a decompressive 5 level laminectomy.  After that operation she says that she is having constant pain that she has to sit down and very often has to lie down to get relief of her back and leg pain.  She had an MRI in July 2023 and it demonstrates that she has a large disc herniation that is behind the body of L2.  She has a grade 1 spondylolisthesis at L4-5.  I do not see the inferior articulating process of some of the levels after the decompression and so I think it is important to get a CAT scan to see how stable or unstable the spine might be.  I also think it is reasonable to have her do some standing x-rays to see what the actual alignment is when she is upright and whether or not she moves with flexion and extension views.  I told her that if she needs an extensive multilevel instrumented fusion we will have to find her one of my partners to help manage that situation.  I just wanted to be upfront early.  She also needs to get her BMI and check because she is now approximately 45 and she needs to get her hemoglobin A1c back in the 7.5 range.  In addition I am going to get some plain x-rays of her neck because she has the continued pain although no signs of myelopathy she does have an extensive cervical history and we do not have anything to compare  her spine with.    I have reviewed all prior documentation and reviewed the electronic medical record since admission. I have personally have reviewed all advanced imaging not just the reports and used my interpretation as documented as the relevant findings. I have reviewed the risks and benefits of all treatment recommendations listed in this note with the patient and family. I spent a total of 60 minutes in service to this patient's care during this date of service.      The above clinical summary has been dictated with voice recognition software. It has not been proofread for grammatical errors, typographical mistakes, or other semantic inconsistencies.    Thank you for visiting our office today. It was our pleasure to take part in your healthcare.     Do not hesitate to call with any questions regarding your plan of care after leaving. My office can be reached at [phone] M-F 8am-4pm.     To clinicians, thank you very much for this kind referral. It is a privilege to partner with you in the care of your patients. My office would be delighted to assist you with any further consultations or with questions regarding the plan of care outlined. Do not hesitate to call the office or contact me directly.     Sincerely,    Caleb Ngo MD, FAANS, FACS  Board Certified Neurological Surgeon  , Department of Neurological Surgery  [facility]    [facility]  [address]  Medical [facility] [address]  [address]  [address]  [address]  [place] [address]    Phone: [phone]  Fax: [phone]

## 2024-04-10 DIAGNOSIS — M47.817 LUMBOSACRAL SPONDYLOSIS WITHOUT MYELOPATHY: ICD-10-CM

## 2024-04-10 DIAGNOSIS — M47.812 CERVICAL SPONDYLOSIS WITHOUT MYELOPATHY: ICD-10-CM

## 2024-04-11 ENCOUNTER — HOSPITAL ENCOUNTER (OUTPATIENT)
Dept: RADIOLOGY | Facility: CLINIC | Age: 69
Discharge: HOME | End: 2024-04-11
Payer: MEDICARE

## 2024-04-11 DIAGNOSIS — M47.817 LUMBOSACRAL SPONDYLOSIS WITHOUT MYELOPATHY: ICD-10-CM

## 2024-04-11 DIAGNOSIS — M43.16 SPONDYLOLISTHESIS OF LUMBAR REGION: ICD-10-CM

## 2024-04-11 DIAGNOSIS — M51.26 HERNIATED LUMBAR DISC WITHOUT MYELOPATHY: ICD-10-CM

## 2024-04-11 DIAGNOSIS — M53.2X6 LUMBAR SPINE INSTABILITY: ICD-10-CM

## 2024-04-11 DIAGNOSIS — Z98.890 STATUS POST CERVICAL DISCECTOMY: ICD-10-CM

## 2024-04-11 DIAGNOSIS — M47.812 CERVICAL SPONDYLOSIS WITHOUT MYELOPATHY: ICD-10-CM

## 2024-04-11 PROCEDURE — 72110 X-RAY EXAM L-2 SPINE 4/>VWS: CPT | Performed by: RADIOLOGY

## 2024-04-11 PROCEDURE — 72052 X-RAY EXAM NECK SPINE 6/>VWS: CPT | Performed by: RADIOLOGY

## 2024-04-11 PROCEDURE — 72131 CT LUMBAR SPINE W/O DYE: CPT

## 2024-04-11 PROCEDURE — 72131 CT LUMBAR SPINE W/O DYE: CPT | Performed by: RADIOLOGY

## 2024-04-11 PROCEDURE — 72052 X-RAY EXAM NECK SPINE 6/>VWS: CPT

## 2024-04-11 PROCEDURE — 72120 X-RAY BEND ONLY L-S SPINE: CPT

## 2024-04-11 RX ORDER — TIZANIDINE 4 MG/1
4 TABLET ORAL 3 TIMES DAILY PRN
Qty: 15 TABLET | Refills: 0 | Status: SHIPPED | OUTPATIENT
Start: 2024-04-11 | End: 2024-04-16

## 2024-04-11 RX ORDER — TIZANIDINE 4 MG/1
TABLET ORAL
Qty: 90 TABLET | Refills: 0 | OUTPATIENT
Start: 2024-04-11

## 2024-04-11 NOTE — TELEPHONE ENCOUNTER
Requested Prescriptions     Pending Prescriptions Disp Refills    tiZANidine (ZANAFLEX) 4 MG tablet 15 tablet 0     Sig: Take 1 tablet by mouth 3 times daily as needed (pain spasms)       Patient last seen on:  1/25/24    Date of last surgery:  N/A    Date of last refill:  1/17/24    Reason for request:  requesting a refill    Request date for pharmacy pick-up:  4/11/24    Next office visit date: 4/15/2024    Latex; 2,4-d dimethylamine; Aspartame and phenylalanine; Rivaroxaban; Metformin; Mcdonald-2 inhibitors; Iodides; Nalidixic acid; Nsaids; Shellfish allergy; Statins; and Cyclobenzaprine

## 2024-04-15 ENCOUNTER — OFFICE VISIT (OUTPATIENT)
Dept: PAIN MANAGEMENT | Age: 69
End: 2024-04-15
Payer: MEDICARE

## 2024-04-15 VITALS
HEIGHT: 61 IN | SYSTOLIC BLOOD PRESSURE: 132 MMHG | WEIGHT: 238 LBS | DIASTOLIC BLOOD PRESSURE: 80 MMHG | TEMPERATURE: 97.7 F | BODY MASS INDEX: 44.93 KG/M2

## 2024-04-15 DIAGNOSIS — M47.817 LUMBOSACRAL SPONDYLOSIS WITHOUT MYELOPATHY: ICD-10-CM

## 2024-04-15 DIAGNOSIS — M47.812 CERVICAL SPONDYLOSIS WITHOUT MYELOPATHY: ICD-10-CM

## 2024-04-15 PROCEDURE — G8417 CALC BMI ABV UP PARAM F/U: HCPCS | Performed by: PHYSICAL MEDICINE & REHABILITATION

## 2024-04-15 PROCEDURE — 1090F PRES/ABSN URINE INCON ASSESS: CPT | Performed by: PHYSICAL MEDICINE & REHABILITATION

## 2024-04-15 PROCEDURE — 1123F ACP DISCUSS/DSCN MKR DOCD: CPT | Performed by: PHYSICAL MEDICINE & REHABILITATION

## 2024-04-15 PROCEDURE — G8400 PT W/DXA NO RESULTS DOC: HCPCS | Performed by: PHYSICAL MEDICINE & REHABILITATION

## 2024-04-15 PROCEDURE — 99213 OFFICE O/P EST LOW 20 MIN: CPT | Performed by: PHYSICAL MEDICINE & REHABILITATION

## 2024-04-15 PROCEDURE — 3017F COLORECTAL CA SCREEN DOC REV: CPT | Performed by: PHYSICAL MEDICINE & REHABILITATION

## 2024-04-15 PROCEDURE — 1036F TOBACCO NON-USER: CPT | Performed by: PHYSICAL MEDICINE & REHABILITATION

## 2024-04-15 PROCEDURE — G8427 DOCREV CUR MEDS BY ELIG CLIN: HCPCS | Performed by: PHYSICAL MEDICINE & REHABILITATION

## 2024-04-15 RX ORDER — TIZANIDINE 4 MG/1
4 TABLET ORAL 3 TIMES DAILY PRN
Qty: 90 TABLET | Refills: 0 | Status: SHIPPED | OUTPATIENT
Start: 2024-04-15 | End: 2024-05-15

## 2024-04-15 RX ORDER — SEMAGLUTIDE 0.68 MG/ML
INJECTION, SOLUTION SUBCUTANEOUS
COMMUNITY

## 2024-04-15 RX ORDER — GABAPENTIN 600 MG/1
600 TABLET ORAL 3 TIMES DAILY
Qty: 90 TABLET | Refills: 0 | Status: SHIPPED | OUTPATIENT
Start: 2024-04-15 | End: 2024-05-15

## 2024-04-15 ASSESSMENT — ENCOUNTER SYMPTOMS
BACK PAIN: 1
NAUSEA: 0
CONSTIPATION: 0
SHORTNESS OF BREATH: 0
DIARRHEA: 0

## 2024-04-15 NOTE — PROGRESS NOTES
spinal cord puncture, arachnoiditis, discitis, bowel or bladder incontinence, ventilator dependence, loss of use of the arms and/or legs, and death. Discussed off-label use of corticosteroids and how the Food and Drug Administration (FDA) has not approved corticosteroids for epidural use. Discussed the risks, benefits, alternative procedures, and alternatives to the procedure including no procedure at all. Discussed that we cannot undo any permanent neurologic damage or change the course of any underlying disease. The patient appears to be a good candidate for the above recommended procedures, but no guarantees expressed or implied are given regarding the outcome of any procedure.  After thorough discussion, patient expressed understanding and willingness to proceed.    Provided education and counseling regarding the diagnosis, prognosis, and treatment options. All questions were answered. Encouraged her to follow-up with her primary care physician and/or specialists as required for her overall health and management of her comorbidities as well as any new positive symptoms mentioned in review of systems above. Care was provided within the definitions and limitations of our specialty practice. Encouraged lifestyle interventions including healthy habits, lifestyle changes, regular aerobic exercise and appropriate weight maintenance as advised by their primary care physician or cardiovascular health provider. Discussed well care and disease prevention/maintenance. Anatomic spine model was used to illustrate pathology.     All recommendations for therapy are provided to improve function with activities of daily living, decrease pain, and help develop an exercise program. All recommendations for therapeutic injections are meant to help decrease pain, improve function with activities of daily living, maintain compliance with home exercise program, improve quality of life, and decrease reliance upon oral medications.

## 2024-04-17 ENCOUNTER — TELEPHONE (OUTPATIENT)
Dept: PAIN MANAGEMENT | Age: 69
End: 2024-04-17

## 2024-04-17 DIAGNOSIS — G56.03 BILATERAL CARPAL TUNNEL SYNDROME: Primary | ICD-10-CM

## 2024-04-17 NOTE — TELEPHONE ENCOUNTER
PT CALLED AND WANTED TO FOLLOW UP ON WHAT OPTIONS ARE AVAILABLE FOR HER HAND/FINGER PAIN.  SPOKE TO  ABOUT THIS PAIN AT APPT ON 04/15/2024  PLEASE ADVISE

## 2024-04-17 NOTE — TELEPHONE ENCOUNTER
Office note 4/15/24 reviewed.   Did she want to pursue repeat carpal tunnel injections? Did she want to try a corticosteroid injection, or hold off given ER visit with last corticosteroid injection with epidural? We can use a low dose like we did on 1/16/23 when this was last done.

## 2024-04-18 NOTE — TELEPHONE ENCOUNTER
PT WANTS TO AVOID INJECTION OF STEROID BECAUSE OF HOW BAD IT MADE HER NUMBNESS AND PAIN LAST TIME.    PT WANTS TO KNOW IF SHE CAN TAKE ORAL CORTICOSTEROIDS INSTEAD?

## 2024-04-18 NOTE — TELEPHONE ENCOUNTER
Oral corticosteroids may cause similar problems  We can try the carpal tunnel injection with lidocaine-only - no corticosteroid - if you would like

## 2024-04-19 NOTE — TELEPHONE ENCOUNTER
Pt called back for follow-up of previous conversation about options for her hand pain. She is not interested in injections, she says that the numbness and swelling they cause is bothersome and unhelpful. She feels that the oral steroids work better for her. Injections are not an option she wants to explore at this time. She is asking for provider call back.

## 2024-04-22 NOTE — TELEPHONE ENCOUNTER
Patient will talk to her  surgeon on Thursday 4/25/24 and decide if she wants to do the lidocaine-only CTS injections. She will call us and let us know.

## 2024-04-23 NOTE — TELEPHONE ENCOUNTER
PT states that she is having a lot of difficulty today, having trouble holding things. She is asking if she can do the lidocaine CT injections as soon as possible? Left hand is worse than right but both are bothering her.

## 2024-04-23 NOTE — TELEPHONE ENCOUNTER
Bilateral carpal tunnel syndrome injections under US with Dr Avalos. 15 minute procedure. No corticosteroid.     -Ok to schedule 4/23/24 end of day if staff available

## 2024-04-24 NOTE — TELEPHONE ENCOUNTER
Spoke with the patient, she states she saw Dr. Tripathi and he stated she should not have CTS injections at this time.  He is reviewing her xrays and will contact her with treatment plan.  Patient states if she will contact our office if she needs anything further.

## 2024-04-25 ENCOUNTER — OFFICE VISIT (OUTPATIENT)
Dept: NEUROSURGERY | Facility: CLINIC | Age: 69
End: 2024-04-25
Payer: MEDICARE

## 2024-04-25 VITALS
TEMPERATURE: 98.1 F | DIASTOLIC BLOOD PRESSURE: 80 MMHG | BODY MASS INDEX: 44.53 KG/M2 | WEIGHT: 242 LBS | HEIGHT: 62 IN | SYSTOLIC BLOOD PRESSURE: 142 MMHG | HEART RATE: 96 BPM

## 2024-04-25 DIAGNOSIS — E66.01 CLASS 3 SEVERE OBESITY WITH BODY MASS INDEX (BMI) OF 40.0 TO 44.9 IN ADULT, UNSPECIFIED OBESITY TYPE, UNSPECIFIED WHETHER SERIOUS COMORBIDITY PRESENT (MULTI): ICD-10-CM

## 2024-04-25 DIAGNOSIS — M48.062 SPINAL STENOSIS, LUMBAR REGION, WITH NEUROGENIC CLAUDICATION: Primary | ICD-10-CM

## 2024-04-25 DIAGNOSIS — M47.12 CERVICAL SPONDYLOSIS WITH MYELOPATHY: Primary | ICD-10-CM

## 2024-04-25 DIAGNOSIS — M47.22 CERVICAL SPONDYLOSIS WITH RADICULOPATHY: ICD-10-CM

## 2024-04-25 PROCEDURE — 1125F AMNT PAIN NOTED PAIN PRSNT: CPT | Performed by: NEUROLOGICAL SURGERY

## 2024-04-25 PROCEDURE — 1159F MED LIST DOCD IN RCRD: CPT | Performed by: NEUROLOGICAL SURGERY

## 2024-04-25 PROCEDURE — 4010F ACE/ARB THERAPY RXD/TAKEN: CPT | Performed by: NEUROLOGICAL SURGERY

## 2024-04-25 PROCEDURE — 1157F ADVNC CARE PLAN IN RCRD: CPT | Performed by: NEUROLOGICAL SURGERY

## 2024-04-25 PROCEDURE — 3077F SYST BP >= 140 MM HG: CPT | Performed by: NEUROLOGICAL SURGERY

## 2024-04-25 PROCEDURE — 99214 OFFICE O/P EST MOD 30 MIN: CPT | Performed by: NEUROLOGICAL SURGERY

## 2024-04-25 PROCEDURE — 3079F DIAST BP 80-89 MM HG: CPT | Performed by: NEUROLOGICAL SURGERY

## 2024-04-25 PROCEDURE — 3008F BODY MASS INDEX DOCD: CPT | Performed by: NEUROLOGICAL SURGERY

## 2024-04-25 PROCEDURE — 1036F TOBACCO NON-USER: CPT | Performed by: NEUROLOGICAL SURGERY

## 2024-04-25 ASSESSMENT — ENCOUNTER SYMPTOMS: OCCASIONAL FEELINGS OF UNSTEADINESS: 1

## 2024-04-25 ASSESSMENT — PATIENT HEALTH QUESTIONNAIRE - PHQ9
1. LITTLE INTEREST OR PLEASURE IN DOING THINGS: NOT AT ALL
2. FEELING DOWN, DEPRESSED OR HOPELESS: SEVERAL DAYS
SUM OF ALL RESPONSES TO PHQ9 QUESTIONS 1 AND 2: 1

## 2024-04-25 ASSESSMENT — PAIN SCALES - GENERAL: PAINLEVEL: 6

## 2024-04-25 NOTE — PROGRESS NOTES
[facility]  Department of Neurological Surgery  Established Patient Visit    History of Present Illness  Kaylan Woo is a 68 y.o. year old female who presents to the spine clinic in follow up with her daughter and she is in a wheelchair again today.  We have the CAT scan that I ordered last time and the plain x-rays of her cervical and lumbar spine.  Unfortunately there are several calcified disc herniations that are still present after the decompressive laminectomy and they are partially causing severe foraminal stenosis at multiple levels.  I think that the correction for this condition is beyond my personal skill set.  I do however think she should get multiple opinions.  I think that it would not be unreasonable for someone who can do selective areas to start at the bottom and do 1 or 2 levels at a time and gradually she is going to need to be decompressed and fused all the way up to her thoracic spine from her sacrum.  In the meantime I think it is reasonable for her to discuss the situation with the bariatric surgeon.  I think she should get multiple opinions from other spine surgeons who do these large reconstruction operations.  I also think it is not unreasonable to discuss things like pumps and stimulators with her pain management team.  I told her that the pump would probably have to be removed eventually with one of the operations but while she is trying to lose weight it might be an option for her.  I am going to give her several names at different institutions.  As far as her cervical spine is concerned she is solidly fused at her prior surgery site and she looks like she has spontaneous fusion at C3-4 but since she still has such significant pain I think we should get an MRI of her cervical spine to understand the situation better.    Patient's BMI is Body mass index is 44.99 kg/m².    14/14 systems reviewed and negative other than what is listed in the history of  present illness    Patient Active Problem List   Diagnosis    Abnormal EKG    Allergic rhinitis    Anxiety    Asthma, moderate (HHS-HCC)    Back pain, chronic    Brain fog    Calcific Achilles tendinitis of right lower extremity    Choking in adult    Chronic GERD    Chronic sinusitis    Costochondritis    Deviated nasal septum    Type 2 diabetes mellitus, with long-term current use of insulin (Multi)    Edema, peripheral    Fatty liver    Headache    Herniation of cervical intervertebral disc with radiculopathy    Hypertension    Joint pain    Low blood potassium    Lymphedema of both lower extremities    Mild diastolic dysfunction    Right knee pain    Rheumatoid factor positive    Post covid-19 condition, unspecified    Other constipation    Osteoarthritis    Obstructive sleep apnea    Numbness and tingling in both hands    New daily persistent headache    Neuropathy    Seborrheic keratoses    Solitary lung nodule    Urinary incontinence    Vitamin B12 deficiency    Vitamin D deficiency    Arteriosclerotic coronary artery disease    Dyspnea on exertion    Thyroid nodule    Fatigue    Cardiomegaly    Shortness of breath    Weakness    Thyromegaly    Pneumonia due to COVID-19 virus    Lumbar stenosis    Loss of bladder control    Abnormal chest x-ray    Esophageal dysmotility    Wound dehiscence, surgical, sequela    Opioid use, unspecified with unspecified opioid-induced disorder (Multi)    Mood disorder (CMS-HCC)    Iron deficiency anemia    Mixed hyperlipidemia    ABLA (acute blood loss anemia)    Achilles tendinosis of both lower extremities    Anemia    Annular tear of cervical disc    Brachial neurit or radiculit due to displace of cervic intervert disc    Brachial neuritis or radiculitis    Calcaneal spur of both feet    Degeneration of intervertebral disc at C5-C6 level    Cervical spondylosis without myelopathy    Degeneration of intervertebral disc of thoracic region    Degenerative cervical disc     Herniated nucleus pulposus, C6-7    Herniated disc, cervical    Closed dislocation of acromioclavicular joint    Community acquired pneumonia of right lower lobe of lung    Cortical age-related cataract    Cyst of Bartholin's gland duct    Deep incisional surgical site infection    Difficult intubation    Dry eye syndrome    Dysphagia, unspecified    Herniated nucleus pulposus, C5-6    History of spinal surgery    Hyperopia of both eyes with astigmatism and presbyopia    Injury of superior glenoid labrum of shoulder joint    Left retinal lattice degeneration    Leukocytosis    Nonproductive cough    Oxygen desaturation    Primary osteoarthritis of right wrist    Pulmonary edema (HHS-HCC)    Rotator cuff tear    Severe persistent asthma without complication (Multi)    Sprain of rotator cuff capsule    Status post cervical discectomy    Symptomatic menopausal or female climacteric states    Tremor    Wound infection after surgery    Edema    Reactive airway disease (HHS-HCC)    Severe obesity (Multi)    Lumbar spine instability    Herniated lumbar disc without myelopathy    Spondylolisthesis of lumbar region     Past Medical History:   Diagnosis Date    Body mass index (BMI)40.0-44.9, adult 09/16/2021    BMI 40.0-44.9, adult    COVID-19 02/20/2021    Pneumonia due to COVID-19 virus    COVID-19 02/20/2021    Pneumonia due to COVID-19 virus    Deficiency of other specified B group vitamins 01/12/2021    Vitamin B 12 deficiency    Personal history of other drug therapy 10/25/2018    History of influenza vaccination    Personal history of other medical treatment     History of nuclear stress test    Pyothorax without fistula (Multi) 10/17/2022    Empyema    Solitary pulmonary nodule 09/20/2021    Lung nodule     Past Surgical History:   Procedure Laterality Date    OTHER SURGICAL HISTORY  02/03/2021    Thoracic discectomy    OTHER SURGICAL HISTORY  01/10/2022    Neck surgery    OTHER SURGICAL HISTORY  03/23/2021     Colonoscopy    OTHER SURGICAL HISTORY  10/06/2020    Hand surgery    OTHER SURGICAL HISTORY  10/06/2020    Lumpectomy    OTHER SURGICAL HISTORY  10/06/2020     section    OTHER SURGICAL HISTORY  10/06/2020    Knee replacement    OTHER SURGICAL HISTORY  10/06/2020    Tonsillectomy    OTHER SURGICAL HISTORY  2019    Hysterectomy total abdominal with removal of both ovaries    TOTAL KNEE ARTHROPLASTY Bilateral      Social History     Tobacco Use    Smoking status: Former     Types: Cigarettes    Smokeless tobacco: Never   Substance Use Topics    Alcohol use: Never     Comment: rarely     family history includes Alzheimer's disease in her mother; Arthritis in her father; Atrial fibrillation in her mother; Charcot-Charisse-Tooth disease in her father and sister; Lupus in her mother; rheumatic disease in her father.    Current Outpatient Medications:     aspirin 81 mg EC tablet, Take 1 tablet (81 mg) by mouth once daily., Disp: , Rfl:     bumetanide (Bumex) 1 mg tablet, Take 1.5 tablets (1.5 mg) by mouth once daily., Disp: 135 tablet, Rfl: 3    cholecalciferol (Vitamin D-3) 50 mcg (2,000 unit) capsule, Take by mouth., Disp: , Rfl:     coenzyme Q10 400 mg capsule, Take by mouth once daily., Disp: , Rfl:     dilTIAZem CD (Cardizem CD) 240 mg 24 hr capsule, Take 1 capsule (240 mg) by mouth once daily., Disp: 90 capsule, Rfl: 3    etodolac (Lodine) 400 mg tablet, Take 1 tablet (400 mg) by mouth 2 times a day., Disp: 28 tablet, Rfl: 0    FreeStyle Ana María sensor system (FreeStyle Ana María 2 Sensor) kit, 1 Cartridge every 14 days, Disp: 6 each, Rfl: 3    gabapentin (Neurontin) 600 mg tablet, TAKE 1 TABLET BY MOUTH THREE TIMES DAILY FOR 30 DAYS, Disp: , Rfl:     insulin glargine (Lantus Solostar U-100 Insulin) 100 unit/mL (3 mL) pen, Inject 44 Units under the skin once daily at bedtime., Disp: 30 mL, Rfl: 1    insulin lispro (HumaLOG U-100 Insulin) 100 unit/mL injection, Ssri, Disp: 7.2 mL, Rfl: 3    L. acidophilus/Bifid.  "animalis 32 billion cell capsule, Take 1 capsule by mouth once daily., Disp: , Rfl:     losartan (Cozaar) 50 mg tablet, TAKE 1 TABLET TWICE DAILY., Disp: 180 tablet, Rfl: 3    MAGNESIUM ORAL, Take 1 tablet by mouth once daily., Disp: , Rfl:     montelukast (Singulair) 10 mg tablet, TAKE 1 TABLET BY MOUTH DAILY, Disp: 90 tablet, Rfl: 3    naloxone (Narcan) 4 mg/0.1 mL nasal spray, SPARY 1 SPRAY INTO ONE NOSTRIL AS NEEDED FOR OPIOID REVERSAL AS DIRECTED, Disp: , Rfl:     omeprazole (PriLOSEC) 40 mg DR capsule, Take 1 capsule (40 mg) by mouth once daily., Disp: 90 capsule, Rfl: 3    pen needle, diabetic 32 gauge x 5/32\" needle, Use with daily glucose testing, Disp: 100 each, Rfl: 3    potassium chloride CR 20 mEq ER tablet, Take 1 tablet (20 mEq) by mouth once daily. Do not crush or chew., Disp: 90 tablet, Rfl: 3    semaglutide (Ozempic) 0.25 mg or 0.5 mg(2 mg/1.5 mL) pen injector, Inject under the skin 1 (one) time per week., Disp: , Rfl:     semaglutide 0.25 mg or 0.5 mg (2 mg/3 mL) pen injector, Inject 0.25 mg under the skin 1 (one) time per week., Disp: 12 mL, Rfl: 3    tiZANidine (Zanaflex) 4 mg capsule, Take 1 capsule (4 mg) by mouth once daily at bedtime., Disp: , Rfl:     True Metrix Glucose Test Strip strip, use to check blood sugar TWICE DAILY, Disp: , Rfl:     TURMERIC ORAL, Take 1 capsule by mouth once daily., Disp: , Rfl:     venlafaxine XR (Effexor-XR) 150 mg 24 hr capsule, Take 1 capsule (150 mg total) by mouth daily with breakfast for 30 days., Disp: 30 capsule, Rfl: 6    venlafaxine XR (Effexor-XR) 75 mg 24 hr capsule, Take 1 capsule (75 mg) by mouth once daily. Take with food., Disp: 30 capsule, Rfl: 6    Ventolin HFA 90 mcg/actuation inhaler, Ventolin ade 2 puffs q4h prn, Disp: 8 g, Rfl: 11    VITAMIN B COMPLEX ORAL, Take 1 tablet by mouth once daily in the morning., Disp: , Rfl:     oxyCODONE (Roxicodone) 5 mg immediate release tablet, Every 6 hours, Disp: , Rfl:     zinc gluconate-zinc picolinate " 30 mg capsule, Take 1 capsule by mouth once daily., Disp: , Rfl:   Allergies   Allergen Reactions    Aspartame Headache and Other    Buprenorphine Other     dizziness    Celecoxib Unknown    Diet Foods Other     migraine headache. Allergy to aspartame only. Able to tolerate sucralose (splenda).    Ezetimibe Unknown    Iodides Other    Latex Unknown    Metformin Other     Severe GI SE    Nabumetone Unknown    Nalidixic Acid Unknown    Nsaids (Non-Steroidal Anti-Inflammatory Drug) Unknown    Pyrazolones Other    Rivaroxaban Unknown    Rofecoxib Unknown    Salicylates Unknown    Shellfish Containing Products Unknown    Shellfish Derived Unknown    Statins-Hmg-Coa Reductase Inhibitors Other    Sulfa (Sulfonamide Antibiotics) Unknown    Sulfasalazine Unknown    Sulfonylureas Unknown    Sulfur Unknown    Thiazides Unknown    Valdecoxib Unknown    Cyclobenzaprine Rash         Results:  I personally reviewed and interpreted the imaging results which included the plain x-rays and the CAT scan of the lumbar spine are as I described    Assessment and Plan:      Kaylan Woo is a 68 y.o. year old female who presents to the spine clinic in follow up with severe back problems including claudication secondary to the persistent foraminal stenosis at almost every level of her lumbar spine as well as persistent disc herniations and calcified disc herniations.  She is going to follow-up with the bariatric surgeon, other spine surgeons, and her pain management team.      I have reviewed all prior documentation and reviewed the electronic medical record since admission. I have personally have reviewed all advanced imaging not just the reports and used my interpretation as documented as the relevant findings. I have reviewed the risks and benefits of all treatment recommendations listed in this note with the patient and family. I spent a total of 30 minutes in service to this patient's care during this date of service.      The  above clinical summary has been dictated with voice recognition software. It has not been proofread for grammatical errors, typographical mistakes, or other semantic inconsistencies.    Thank you for visiting our office today. It was our pleasure to take part in your healthcare.     Do not hesitate to call with any questions regarding your plan of care after leaving. My office can be reached at [phone] M-F 8am-4pm.     To clinicians, thank you very much for this kind referral. It is a privilege to partner with you in the care of your patients. My office would be delighted to assist you with any further consultations or with questions regarding the plan of care outlined. Do not hesitate to call the office or contact me directly.     Sincerely,    Caleb Ngo MD, FAANS, FACS  Board Certified Neurological Surgeon  , Department of Neurological Surgery  [employer] [facility] [employer]    [facility]  [address] [place] [address]., [address]  [facility] [address]  [place] [address]    [facility]  [address] [facility]  [address] [place] [id number]    Phone: [phone]  Fax: [phone]

## 2024-05-09 ENCOUNTER — TELEPHONE (OUTPATIENT)
Dept: PAIN MANAGEMENT | Age: 69
End: 2024-05-09

## 2024-05-09 NOTE — TELEPHONE ENCOUNTER
Patient called in to see if someone can speak to her about other pain management options. She came across a brochure for something called \"mild\" and was interested to see if this may be something that can help her. She is asking for a provider call back. She stated she would come in for a sooner appointment than July if necessary.

## 2024-05-10 ENCOUNTER — TELEMEDICINE (OUTPATIENT)
Dept: PRIMARY CARE | Facility: CLINIC | Age: 69
End: 2024-05-10
Payer: MEDICARE

## 2024-05-10 DIAGNOSIS — R19.4 FREQUENT BOWEL MOVEMENTS: Primary | ICD-10-CM

## 2024-05-10 DIAGNOSIS — R19.7 DIARRHEA, UNSPECIFIED TYPE: ICD-10-CM

## 2024-05-10 DIAGNOSIS — R09.89 CHOKING IN ADULT: ICD-10-CM

## 2024-05-10 DIAGNOSIS — K57.90 DIVERTICULOSIS: ICD-10-CM

## 2024-05-10 PROCEDURE — 4010F ACE/ARB THERAPY RXD/TAKEN: CPT | Performed by: NURSE PRACTITIONER

## 2024-05-10 PROCEDURE — 1159F MED LIST DOCD IN RCRD: CPT | Performed by: NURSE PRACTITIONER

## 2024-05-10 PROCEDURE — 1157F ADVNC CARE PLAN IN RCRD: CPT | Performed by: NURSE PRACTITIONER

## 2024-05-10 PROCEDURE — 99443 PR PHYS/QHP TELEPHONE EVALUATION 21-30 MIN: CPT | Performed by: NURSE PRACTITIONER

## 2024-05-10 PROCEDURE — 3008F BODY MASS INDEX DOCD: CPT | Performed by: NURSE PRACTITIONER

## 2024-05-10 PROCEDURE — 1036F TOBACCO NON-USER: CPT | Performed by: NURSE PRACTITIONER

## 2024-05-10 ASSESSMENT — PATIENT HEALTH QUESTIONNAIRE - PHQ9
1. LITTLE INTEREST OR PLEASURE IN DOING THINGS: NOT AT ALL
SUM OF ALL RESPONSES TO PHQ9 QUESTIONS 1 AND 2: 0
2. FEELING DOWN, DEPRESSED OR HOPELESS: NOT AT ALL

## 2024-05-10 NOTE — PROGRESS NOTES
An interactive audio/visual  telecommunication system which permits real time communications between the patient (at the originating site) and provider (at the distant site) was utilized to provide this telehealth service.    Verbal consent was requested and obtained from the patient for this telehealth visit.  We have confirmed the patient wishes to see me, Cornelia Ball, a board certified nurse practitioner with an active [place] advanced practice license as well as verified the patient's identity and physical location in [place].    Problem List Items Addressed This Visit       Choking in adult    Overview     2/2023 MBS:   1.  NORMAL SWALLOWING WITHOUT EVIDENCE FOR ASPIRATION OR LARYNGEAL   PENETRATION.     2. ABSENCE OF PRIMARY AND SECONDARY PERISTALSIS RESULTING IN RETENTION   OF SEMISOLID MEDIA IN THE THORACIC ESOPHAGUS CONSISTENT WITH ESOPHAGEAL   DYSMOTILITY.  FURTHER EVALUATION MAY BE WARRANTED.          Current Assessment & Plan     Fu with GI  Anticipate EGD          Relevant Orders    Referral to Gastroenterology    Diverticulosis    Overview     4/11/2024 CT L spine finding:  There is fatty atrophy and scarring overlying the posterior  paraspinal musculature. Prior hysterectomy. Colonic diverticulosis. 5  mm diameter nonobstructing calculus within the lower pole of the left  kidney.         Frequent bowel movements - Primary    Overview     Cologuard 2020 and 2023 both neg          Current Assessment & Plan     X 6 months  Labs  Stool studies  CT abd/pelv  Fu with GI  Suspect will need a cscope if nothing revealed in my work up   Advised GI-friendly nutrition (no raw fruits/veggies, lean protein only, no veggies with skin, etc)            Relevant Orders    Referral to Gastroenterology    CT abdomen pelvis w IV contrast    CBC and Auto Differential    Comprehensive Metabolic Panel    C-reactive protein    Calprotectin Stool    Fecal Fat, Quantitative    Occult Blood, Stool    Sedimentation Rate    Celiac  "Panel    Stool Pathogen Panel, PCR    Ova/Para + Giardia/Cryptosporidium Antigen    C. difficile, PCR    Lactoferrin, Fecal, Quantitative     Other Visit Diagnoses       Diarrhea, unspecified type        worse with certain foods (veggies, high fiber)    Relevant Orders    CBC and Auto Differential    Comprehensive Metabolic Panel    C-reactive protein    Calprotectin Stool    Fecal Fat, Quantitative    Occult Blood, Stool    Sedimentation Rate    Celiac Panel    Stool Pathogen Panel, PCR    Ova/Para + Giardia/Cryptosporidium Antigen    C. difficile, PCR    Lactoferrin, Fecal, Quantitative             Subjective   Patient ID: Kaylan Woo \"Mariluz\" is a 68 y.o. female who presents for bowel issues (Bowel issues every time she voids she has a BM skinny BM/Had a neg cologard in oct/ nov whenever she eats vegetables has more frequent bm stopped eating vegetables no change).  HPI  BM issues x 6 months  Multiple BM every day  If she eats bland then less BM daily  Watery stool with veggies  High fiber going right through here    Rarely taking probiotic  Is also taking Mg   Just stopped tumeric     Restarted ozempic about 2 months ago  She was off ozempic when BM changes started   Nausea and stomach pain with ozempic  No vmtg    Feels bloated with frequent stool  Very gassy  No blood in her stool   Stool is thin, pencil-like  Incontinence only once    Choking spell every few weeks  Since spinal surgery  SLP    Cologuard 2020 neg  12/1/23 also neg     EGD 10+ years ago    No travel  No recent abx  Did have with spine surgeries    4/11/2024 CT L spine finding:  There is fatty atrophy and scarring overlying the posterior  paraspinal musculature. Prior hysterectomy. Colonic diverticulosis. 5  mm diameter nonobstructing calculus within the lower pole of the left  kidney.    Review of Systems   All other systems reviewed and are negative.      BP Readings from Last 3 Encounters:   04/25/24 142/80   03/28/24 104/60   10/30/23 " "136/70      Wt Readings from Last 3 Encounters:   04/25/24 110 kg (242 lb)   03/28/24 111 kg (245 lb 3.2 oz)   10/30/23 110 kg (242 lb 12.8 oz)      BMI:   Estimated body mass index is 44.99 kg/m² as calculated from the following:    Height as of 4/25/24: 1.562 m (5' 1.5\").    Weight as of 4/25/24: 110 kg (242 lb).    Objective   Physical Exam  Gen: oriented   Respiratory:  speaking in complete sentences   Mood: normal              "

## 2024-05-10 NOTE — ASSESSMENT & PLAN NOTE
X 6 months  Labs  Stool studies  CT abd/pelv  Fu with GI  Suspect will need a cscope if nothing revealed in my work up   Advised GI-friendly nutrition (no raw fruits/veggies, lean protein only, no veggies with skin, etc)

## 2024-05-12 DIAGNOSIS — M47.817 LUMBOSACRAL SPONDYLOSIS WITHOUT MYELOPATHY: ICD-10-CM

## 2024-05-12 DIAGNOSIS — M47.812 CERVICAL SPONDYLOSIS WITHOUT MYELOPATHY: ICD-10-CM

## 2024-05-13 ENCOUNTER — TELEPHONE (OUTPATIENT)
Dept: PRIMARY CARE | Facility: CLINIC | Age: 69
End: 2024-05-13
Payer: COMMERCIAL

## 2024-05-13 RX ORDER — TIZANIDINE 4 MG/1
TABLET ORAL
Qty: 90 TABLET | Refills: 0 | OUTPATIENT
Start: 2024-05-13

## 2024-05-13 RX ORDER — GABAPENTIN 600 MG/1
600 TABLET ORAL 3 TIMES DAILY
Qty: 90 TABLET | Refills: 0 | OUTPATIENT
Start: 2024-05-13

## 2024-05-13 NOTE — TELEPHONE ENCOUNTER
Called patient per check out notes from [facility] on 5/10/24 and relayed there were some referrals placed along with STAT CT abdomen pelvis. Patient stated that she will call and schedule herself

## 2024-05-15 ENCOUNTER — NUTRITION (OUTPATIENT)
Dept: SURGERY | Facility: CLINIC | Age: 69
End: 2024-05-15
Payer: MEDICARE

## 2024-05-15 DIAGNOSIS — E11.69 TYPE 2 DIABETES MELLITUS WITH OTHER SPECIFIED COMPLICATION, WITH LONG-TERM CURRENT USE OF INSULIN (MULTI): ICD-10-CM

## 2024-05-15 DIAGNOSIS — Z79.4 TYPE 2 DIABETES MELLITUS WITH OTHER SPECIFIED COMPLICATION, WITH LONG-TERM CURRENT USE OF INSULIN (MULTI): ICD-10-CM

## 2024-05-15 NOTE — PROGRESS NOTES
PREOPERATIVE, MULTIDISCIPLINARY, MEDICALLY SUPERVISED, REDUCED CALORIE DIET, BEHAVIOR MODIFICATION AND EXERCISE PROGRAM    S:  Pt had back surgery last year and was in the hospital for 40 years.  She still can't walk without assistance.  She has sciatic pain. Pt reports that she was told not to exercise right now d/t her back issue.   Pt was very active prior to having her back surgery.  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     Pt has a PMH of ulcerative colitis. She has T2D. She has diverticulosis. Her bowel habits have changed since December.  She is going to follow up with GI for this.   Pt usually skips lunch.  She eats breakfast and dinner.  She doesn't usually snack during the day.  She does not like to cook.    She is not financially stable due to medical bills from her back surgery.  She has SNAP.     Usual intake:  B: coffee, banana and PB for protein  D:  ground chicken/casseroles rice, Lean Cuisine and [employer] frozen meals.     O:    Wt: 242.0    Ht:    61.5 in           BMI: 44.9    Goal: 5% body weight loss over the course of program    Dietary recommendation:   1. Increase your intake of zero or low calorie beverages to meet a goal of 4 oz daily.  Wean off of caffeinated beverages  2. Practice the 30-30-30 rule by drinking between meals.  3. Structure your meal plan - have 3 meals and 1 snack daily.  Dont skip meals.  Start having a protein shake like Premier, Pure Protein, Fair life.  [employer] Equate 30 g protein  4. Have balanced meals that always contain a good source of protein.  Have 3-4 oz protein at breakfast and dinner.  5. Limit fruit servings to 2 per day.     6. Take a multivitamin daily like Centrum Adult      A/P:   Pt will start working on the recommendations.  She will follow  up with RD next month.     Exercise:  none to back pain    Ashlie Lyons RD, LD

## 2024-05-16 RX ORDER — INSULIN GLARGINE 100 [IU]/ML
INJECTION, SOLUTION SUBCUTANEOUS
Qty: 30 ML | Refills: 0 | Status: SHIPPED | OUTPATIENT
Start: 2024-05-16

## 2024-05-20 ENCOUNTER — APPOINTMENT (OUTPATIENT)
Dept: RADIOLOGY | Facility: HOSPITAL | Age: 69
End: 2024-05-20
Payer: MEDICARE

## 2024-05-20 ENCOUNTER — LAB (OUTPATIENT)
Dept: LAB | Facility: LAB | Age: 69
End: 2024-05-20
Payer: MEDICARE

## 2024-05-20 DIAGNOSIS — R19.4 FREQUENT BOWEL MOVEMENTS: ICD-10-CM

## 2024-05-20 DIAGNOSIS — R19.7 DIARRHEA, UNSPECIFIED TYPE: ICD-10-CM

## 2024-05-20 LAB
ALBUMIN SERPL BCP-MCNC: 4 G/DL (ref 3.4–5)
ALP SERPL-CCNC: 104 U/L (ref 33–136)
ALT SERPL W P-5'-P-CCNC: 12 U/L (ref 7–45)
ANION GAP SERPL CALC-SCNC: 13 MMOL/L (ref 10–20)
AST SERPL W P-5'-P-CCNC: 11 U/L (ref 9–39)
BASOPHILS # BLD AUTO: 0.11 X10*3/UL (ref 0–0.1)
BASOPHILS NFR BLD AUTO: 0.9 %
BILIRUB SERPL-MCNC: 0.5 MG/DL (ref 0–1.2)
BUN SERPL-MCNC: 17 MG/DL (ref 6–23)
CALCIUM SERPL-MCNC: 9.3 MG/DL (ref 8.6–10.3)
CHLORIDE SERPL-SCNC: 101 MMOL/L (ref 98–107)
CO2 SERPL-SCNC: 30 MMOL/L (ref 21–32)
CREAT SERPL-MCNC: 0.9 MG/DL (ref 0.5–1.05)
CRP SERPL-MCNC: 1.39 MG/DL
EGFRCR SERPLBLD CKD-EPI 2021: 71 ML/MIN/1.73M*2
EOSINOPHIL # BLD AUTO: 0.4 X10*3/UL (ref 0–0.7)
EOSINOPHIL NFR BLD AUTO: 3.3 %
ERYTHROCYTE [DISTWIDTH] IN BLOOD BY AUTOMATED COUNT: 14.7 % (ref 11.5–14.5)
ERYTHROCYTE [SEDIMENTATION RATE] IN BLOOD BY WESTERGREN METHOD: 35 MM/H (ref 0–30)
GLUCOSE SERPL-MCNC: 242 MG/DL (ref 74–99)
HCT VFR BLD AUTO: 35.8 % (ref 36–46)
HGB BLD-MCNC: 11.3 G/DL (ref 12–16)
IMM GRANULOCYTES # BLD AUTO: 0.04 X10*3/UL (ref 0–0.7)
IMM GRANULOCYTES NFR BLD AUTO: 0.3 % (ref 0–0.9)
LYMPHOCYTES # BLD AUTO: 2.38 X10*3/UL (ref 1.2–4.8)
LYMPHOCYTES NFR BLD AUTO: 19.7 %
MCH RBC QN AUTO: 27 PG (ref 26–34)
MCHC RBC AUTO-ENTMCNC: 31.6 G/DL (ref 32–36)
MCV RBC AUTO: 85 FL (ref 80–100)
MONOCYTES # BLD AUTO: 0.86 X10*3/UL (ref 0.1–1)
MONOCYTES NFR BLD AUTO: 7.1 %
NEUTROPHILS # BLD AUTO: 8.29 X10*3/UL (ref 1.2–7.7)
NEUTROPHILS NFR BLD AUTO: 68.7 %
NRBC BLD-RTO: 0 /100 WBCS (ref 0–0)
PLATELET # BLD AUTO: 372 X10*3/UL (ref 150–450)
POTASSIUM SERPL-SCNC: 3.7 MMOL/L (ref 3.5–5.3)
PROT SERPL-MCNC: 6.3 G/DL (ref 6.4–8.2)
RBC # BLD AUTO: 4.19 X10*6/UL (ref 4–5.2)
SODIUM SERPL-SCNC: 140 MMOL/L (ref 136–145)
WBC # BLD AUTO: 12.1 X10*3/UL (ref 4.4–11.3)

## 2024-05-20 PROCEDURE — 36415 COLL VENOUS BLD VENIPUNCTURE: CPT

## 2024-05-20 PROCEDURE — 86140 C-REACTIVE PROTEIN: CPT

## 2024-05-20 PROCEDURE — 83516 IMMUNOASSAY NONANTIBODY: CPT

## 2024-05-20 PROCEDURE — 85652 RBC SED RATE AUTOMATED: CPT

## 2024-05-20 PROCEDURE — 85025 COMPLETE CBC W/AUTO DIFF WBC: CPT

## 2024-05-20 PROCEDURE — 80053 COMPREHEN METABOLIC PANEL: CPT

## 2024-05-21 LAB
GLIADIN PEPTIDE IGA SER IA-ACNC: <1 U/ML
TTG IGA SER IA-ACNC: <1 U/ML

## 2024-05-22 ENCOUNTER — HOSPITAL ENCOUNTER (OUTPATIENT)
Dept: RADIOLOGY | Facility: CLINIC | Age: 69
Discharge: HOME | End: 2024-05-22
Payer: MEDICARE

## 2024-05-22 ENCOUNTER — LAB (OUTPATIENT)
Dept: LAB | Facility: LAB | Age: 69
End: 2024-05-22
Payer: MEDICARE

## 2024-05-22 ENCOUNTER — OFFICE VISIT (OUTPATIENT)
Dept: PRIMARY CARE | Facility: CLINIC | Age: 69
End: 2024-05-22
Payer: MEDICARE

## 2024-05-22 VITALS
WEIGHT: 247 LBS | BODY MASS INDEX: 45.45 KG/M2 | OXYGEN SATURATION: 95 % | TEMPERATURE: 97.3 F | HEIGHT: 62 IN | SYSTOLIC BLOOD PRESSURE: 153 MMHG | HEART RATE: 92 BPM | DIASTOLIC BLOOD PRESSURE: 83 MMHG

## 2024-05-22 DIAGNOSIS — R05.9 COUGH, UNSPECIFIED TYPE: ICD-10-CM

## 2024-05-22 DIAGNOSIS — Z79.4 TYPE 2 DIABETES MELLITUS WITHOUT COMPLICATION, WITH LONG-TERM CURRENT USE OF INSULIN (MULTI): Primary | ICD-10-CM

## 2024-05-22 DIAGNOSIS — R19.8 CHANGE IN BOWEL FUNCTION: ICD-10-CM

## 2024-05-22 DIAGNOSIS — R19.7 DIARRHEA, UNSPECIFIED TYPE: ICD-10-CM

## 2024-05-22 DIAGNOSIS — F11.99 OPIOID USE, UNSPECIFIED WITH UNSPECIFIED OPIOID-INDUCED DISORDER (MULTI): ICD-10-CM

## 2024-05-22 DIAGNOSIS — R19.4 FREQUENT BOWEL MOVEMENTS: ICD-10-CM

## 2024-05-22 DIAGNOSIS — R60.0 LOCALIZED EDEMA: ICD-10-CM

## 2024-05-22 DIAGNOSIS — R30.0 DYSURIA: ICD-10-CM

## 2024-05-22 DIAGNOSIS — I25.10 ARTERIOSCLEROTIC CORONARY ARTERY DISEASE: ICD-10-CM

## 2024-05-22 DIAGNOSIS — R25.1 TREMOR: ICD-10-CM

## 2024-05-22 DIAGNOSIS — J45.50 SEVERE PERSISTENT ASTHMA WITHOUT COMPLICATION (MULTI): ICD-10-CM

## 2024-05-22 DIAGNOSIS — K57.90 DIVERTICULOSIS: ICD-10-CM

## 2024-05-22 DIAGNOSIS — E11.9 TYPE 2 DIABETES MELLITUS WITHOUT COMPLICATION, WITH LONG-TERM CURRENT USE OF INSULIN (MULTI): Primary | ICD-10-CM

## 2024-05-22 LAB
GLIADIN PEPTIDE IGG SER IA-ACNC: <0.56 FLU (ref 0–4.99)
POC APPEARANCE, URINE: CLEAR
POC BILIRUBIN, URINE: ABNORMAL
POC BLOOD, URINE: NEGATIVE
POC COLOR, URINE: YELLOW
POC GLUCOSE, URINE: NEGATIVE MG/DL
POC KETONES, URINE: NEGATIVE MG/DL
POC LEUKOCYTES, URINE: NEGATIVE
POC NITRITE,URINE: NEGATIVE
POC PH, URINE: 7 PH
POC PROTEIN, URINE: NEGATIVE MG/DL
POC SPECIFIC GRAVITY, URINE: 1.01
POC UROBILINOGEN, URINE: 0.2 EU/DL
TTG IGG SER IA-ACNC: <0.82 FLU (ref 0–4.99)

## 2024-05-22 PROCEDURE — 87086 URINE CULTURE/COLONY COUNT: CPT

## 2024-05-22 PROCEDURE — 74177 CT ABD & PELVIS W/CONTRAST: CPT | Performed by: RADIOLOGY

## 2024-05-22 PROCEDURE — 71046 X-RAY EXAM CHEST 2 VIEWS: CPT | Performed by: RADIOLOGY

## 2024-05-22 PROCEDURE — 87329 GIARDIA AG IA: CPT

## 2024-05-22 PROCEDURE — 87328 CRYPTOSPORIDIUM AG IA: CPT

## 2024-05-22 PROCEDURE — 1159F MED LIST DOCD IN RCRD: CPT | Performed by: INTERNAL MEDICINE

## 2024-05-22 PROCEDURE — 83630 LACTOFERRIN FECAL (QUAL): CPT

## 2024-05-22 PROCEDURE — G2211 COMPLEX E/M VISIT ADD ON: HCPCS | Performed by: INTERNAL MEDICINE

## 2024-05-22 PROCEDURE — 3079F DIAST BP 80-89 MM HG: CPT | Performed by: INTERNAL MEDICINE

## 2024-05-22 PROCEDURE — 83993 ASSAY FOR CALPROTECTIN FECAL: CPT

## 2024-05-22 PROCEDURE — 81002 URINALYSIS NONAUTO W/O SCOPE: CPT | Performed by: INTERNAL MEDICINE

## 2024-05-22 PROCEDURE — 99214 OFFICE O/P EST MOD 30 MIN: CPT | Performed by: INTERNAL MEDICINE

## 2024-05-22 PROCEDURE — 1160F RVW MEDS BY RX/DR IN RCRD: CPT | Performed by: INTERNAL MEDICINE

## 2024-05-22 PROCEDURE — 1157F ADVNC CARE PLAN IN RCRD: CPT | Performed by: INTERNAL MEDICINE

## 2024-05-22 PROCEDURE — 3008F BODY MASS INDEX DOCD: CPT | Performed by: INTERNAL MEDICINE

## 2024-05-22 PROCEDURE — 4010F ACE/ARB THERAPY RXD/TAKEN: CPT | Performed by: INTERNAL MEDICINE

## 2024-05-22 PROCEDURE — 3077F SYST BP >= 140 MM HG: CPT | Performed by: INTERNAL MEDICINE

## 2024-05-22 PROCEDURE — 71046 X-RAY EXAM CHEST 2 VIEWS: CPT

## 2024-05-22 PROCEDURE — 74177 CT ABD & PELVIS W/CONTRAST: CPT

## 2024-05-22 PROCEDURE — 82710 FATS/LIPIDS FECES QUANT: CPT

## 2024-05-22 PROCEDURE — 1036F TOBACCO NON-USER: CPT | Performed by: INTERNAL MEDICINE

## 2024-05-22 PROCEDURE — 2550000001 HC RX 255 CONTRASTS: Performed by: NURSE PRACTITIONER

## 2024-05-22 RX ORDER — FLUTICASONE FUROATE AND VILANTEROL 100; 25 UG/1; UG/1
1 POWDER RESPIRATORY (INHALATION) DAILY
Qty: 60 EACH | Refills: 3 | Status: SHIPPED | OUTPATIENT
Start: 2024-05-22

## 2024-05-22 RX ADMIN — IOHEXOL 75 ML: 350 INJECTION, SOLUTION INTRAVENOUS at 15:07

## 2024-05-22 ASSESSMENT — PATIENT HEALTH QUESTIONNAIRE - PHQ9
1. LITTLE INTEREST OR PLEASURE IN DOING THINGS: NOT AT ALL
10. IF YOU CHECKED OFF ANY PROBLEMS, HOW DIFFICULT HAVE THESE PROBLEMS MADE IT FOR YOU TO DO YOUR WORK, TAKE CARE OF THINGS AT HOME, OR GET ALONG WITH OTHER PEOPLE: NOT DIFFICULT AT ALL
2. FEELING DOWN, DEPRESSED OR HOPELESS: SEVERAL DAYS
SUM OF ALL RESPONSES TO PHQ9 QUESTIONS 1 AND 2: 1

## 2024-05-22 NOTE — PROGRESS NOTES
"Subjective   Patient ID: Kaylan Woo \"Mariluz\" is a 68 y.o. female who presents for No chief complaint on file..  HPI  No cp no sob  Not depressed  Still tired  Mild dysuria  Legs swell  No difference since she had covid  Toe changes color occ  No pain      Was having combo constipation and diarrhea  Likes salad  Pencil sized bm off and on  Oct bowels changed  After epidural inj  Last one in jan did not work that well  Dr calderon  Back surgeon to see if surgery   Last colonoscopy 2020  Cough happens q3 weeks  Feet bl edema has been  Toe seems curved  Mildly worsening  Intermittent skin changes      Review of Systems  Gen:  no fever  HEENT:  no trouble swallowing  CV:  no dyspnea, cyanosis  Lungs:  no shortness of breath  GI:  no constipation, no blood in stool   Neuro:   no new weakness  Skin:  no rash  MS:no joint swelling   All other systems have been reviewed and are negative for complaint    /83   Pulse 92   Temp 36.3 °C (97.3 °F) (Temporal)   Ht 1.562 m (5' 1.5\")   Wt 112 kg (247 lb)   LMP  (LMP Unknown)   SpO2 95%   BMI 45.91 kg/m²   Objective   Physical Exam  Lab Results   Component Value Date    WBC 12.1 (H) 05/20/2024    HGB 11.3 (L) 05/20/2024    HCT 35.8 (L) 05/20/2024    MCV 85 05/20/2024     05/20/2024     Lab Results   Component Value Date    GLUCOSE 242 (H) 05/20/2024    CALCIUM 9.3 05/20/2024     05/20/2024    K 3.7 05/20/2024    CO2 30 05/20/2024     05/20/2024    BUN 17 05/20/2024    CREATININE 0.90 05/20/2024     Social History     Socioeconomic History    Marital status:      Spouse name: None    Number of children: None    Years of education: None    Highest education level: None   Occupational History    None   Tobacco Use    Smoking status: Former     Types: Cigarettes    Smokeless tobacco: Never   Substance and Sexual Activity    Alcohol use: Never     Comment: rarely    Drug use: Never    Sexual activity: None   Other Topics Concern    None "   Social History Narrative    None     Social Determinants of Health     Financial Resource Strain: Not on file   Food Insecurity: Not on file   Transportation Needs: Not on file   Physical Activity: Not on file   Stress: Not on file   Social Connections: Not on file   Intimate Partner Violence: Not on file   Housing Stability: Not on file     Family History   Problem Relation Name Age of Onset    Alzheimer's disease Mother      Atrial fibrillation Mother      Lupus Mother      Arthritis Father      Other (rheumatic disease) Father      Charcot-Charisse-Tooth disease Father      Charcot-Charisse-Tooth disease Sister         General:  Alert and in  NAD  Lungs, CTAB  Skin:  no suspicious lesions,  warm and dry  Head :  Normocephalic  Neck/thyroid:  neck supple, full rom, no cervical lymphadenopathy  no thyromegaly  Heart:  RRR  no murmurs  Abdomen:  Normal , bs present, soft, nontender, not distended, no masses palpated  Extremities:  No clubbing, or cyanosis, bl plus 1  edema pedal   Has stockings, not wearing  nl le pulse  4th digit on r curves in  Neurologic:  Nonfocal  Psych: alert, normal mood          Problem List Items Addressed This Visit       Type 2 diabetes mellitus, with long-term current use of insulin (Multi) - Primary    Overview     Metformin: severe GI SE  Januvia: $$  Ozempic: $$  Continue current medications.   No new symptoms,stable condition.   Follow up at least yearly.           Relevant Orders    Referral to Clinical Pharmacy    Arteriosclerotic coronary artery disease    Opioid use, unspecified with unspecified opioid-induced disorder (Multi)    Overview     Continue current medications.   No new symptoms,stable condition.   Follow up at least yearly.           RESOLVED: Severe persistent asthma without complication (Multi)    Overview     Last Assessment & Plan: Formatting of this note might be different from the original. Assessment: POA PLAN: SEE COPD POC         Relevant Medications     fluticasone furoate-vilanteroL (Breo Ellipta) 100-25 mcg/dose inhaler    Tremor    RESOLVED: Edema    Relevant Orders    Referral to Vascular Medicine     Other Visit Diagnoses       Change in bowel function        Relevant Orders    Colonoscopy Screening; Average Risk Patient    Referral to Gastroenterology    Dysuria        Relevant Orders    Urine Culture    POCT UA (nonautomated) manually resulted (Completed)    Cough, unspecified type        Relevant Orders    XR chest 2 views (Completed)        saw neuro at [facility] for tremor   Chronic conditions reviewed in the assessment and plan.    Continue medications unless specified otherwise.  Previous labs reviewed.   Other specialty provider notes reviewed.   Pt prefers not to see podiatry   Follow up in 3 months or prn.

## 2024-05-23 ENCOUNTER — TELEPHONE (OUTPATIENT)
Dept: PRIMARY CARE | Facility: CLINIC | Age: 69
End: 2024-05-23
Payer: COMMERCIAL

## 2024-05-23 PROBLEM — D62 ABLA (ACUTE BLOOD LOSS ANEMIA): Status: RESOLVED | Noted: 2023-02-08 | Resolved: 2024-05-23

## 2024-05-23 PROBLEM — J45.50 SEVERE PERSISTENT ASTHMA WITHOUT COMPLICATION (MULTI): Status: RESOLVED | Noted: 2023-02-01 | Resolved: 2024-05-23

## 2024-05-23 PROBLEM — R60.9 EDEMA: Status: RESOLVED | Noted: 2023-12-19 | Resolved: 2024-05-23

## 2024-05-23 PROBLEM — R09.89 CHOKING IN ADULT: Status: RESOLVED | Noted: 2023-04-03 | Resolved: 2024-05-23

## 2024-05-23 PROBLEM — J18.9 COMMUNITY ACQUIRED PNEUMONIA OF RIGHT LOWER LOBE OF LUNG: Status: RESOLVED | Noted: 2023-03-15 | Resolved: 2024-05-23

## 2024-05-23 PROBLEM — J45.909 REACTIVE AIRWAY DISEASE (HHS-HCC): Status: RESOLVED | Noted: 2023-02-01 | Resolved: 2024-05-23

## 2024-05-23 PROBLEM — U07.1 PNEUMONIA DUE TO COVID-19 VIRUS: Status: RESOLVED | Noted: 2023-04-03 | Resolved: 2024-05-23

## 2024-05-23 PROBLEM — J12.82 PNEUMONIA DUE TO COVID-19 VIRUS: Status: RESOLVED | Noted: 2023-04-03 | Resolved: 2024-05-23

## 2024-05-23 PROBLEM — T88.4XXA DIFFICULT INTUBATION: Status: RESOLVED | Noted: 2023-02-01 | Resolved: 2024-05-23

## 2024-05-23 PROBLEM — J81.1 PULMONARY EDEMA (HHS-HCC): Status: RESOLVED | Noted: 2023-03-21 | Resolved: 2024-05-23

## 2024-05-23 NOTE — TELEPHONE ENCOUNTER
----- Message from Radha Holt MD sent at 5/23/2024 11:39 AM EDT -----  Check bp at home , call with numbers after one week

## 2024-05-24 DIAGNOSIS — I10 PRIMARY HYPERTENSION: ICD-10-CM

## 2024-05-24 LAB
BACTERIA UR CULT: NORMAL
LACTOFERRIN STL QL IA: NEGATIVE

## 2024-05-24 RX ORDER — BUMETANIDE 1 MG/1
1 TABLET ORAL DAILY
Qty: 90 TABLET | Refills: 3 | Status: SHIPPED | OUTPATIENT
Start: 2024-05-24 | End: 2025-05-24

## 2024-05-24 RX ORDER — BUMETANIDE 0.5 MG/1
0.5 TABLET ORAL DAILY
Qty: 90 TABLET | Refills: 3 | Status: SHIPPED | OUTPATIENT
Start: 2024-05-24 | End: 2025-05-24

## 2024-05-25 LAB
COLLECT DURATION TIME STL: NORMAL H
FAT 24H STL-MRATE: NORMAL G/(24.H)
FAT/TOTAL SOLIDS STL: 17 % FAT
O+P STL MICRO: NEGATIVE
SPECIMEN WT STL QN: 2 G

## 2024-05-26 LAB — CALPROTECTIN STL-MCNT: 115 UG/G

## 2024-05-28 LAB
CRYPTOSP AG STL QL IA: NEGATIVE
G LAMBLIA AG STL QL IA: NEGATIVE

## 2024-05-29 DIAGNOSIS — R19.5 ELEVATED FECAL CALPROTECTIN: Primary | ICD-10-CM

## 2024-06-12 ENCOUNTER — TELEPHONE (OUTPATIENT)
Dept: SURGERY | Facility: CLINIC | Age: 69
End: 2024-06-12
Payer: COMMERCIAL

## 2024-06-12 DIAGNOSIS — M47.817 LUMBOSACRAL SPONDYLOSIS WITHOUT MYELOPATHY: ICD-10-CM

## 2024-06-12 DIAGNOSIS — M47.812 CERVICAL SPONDYLOSIS WITHOUT MYELOPATHY: ICD-10-CM

## 2024-06-12 RX ORDER — TIZANIDINE 4 MG/1
4 TABLET ORAL EVERY 8 HOURS PRN
Qty: 90 TABLET | Refills: 0 | OUTPATIENT
Start: 2024-06-12

## 2024-06-28 ENCOUNTER — APPOINTMENT (OUTPATIENT)
Dept: NEUROSURGERY | Facility: CLINIC | Age: 69
End: 2024-06-28
Payer: COMMERCIAL

## 2024-07-01 ENCOUNTER — OFFICE VISIT (OUTPATIENT)
Dept: PAIN MANAGEMENT | Age: 69
End: 2024-07-01
Payer: MEDICARE

## 2024-07-01 VITALS
SYSTOLIC BLOOD PRESSURE: 142 MMHG | WEIGHT: 243 LBS | BODY MASS INDEX: 45.88 KG/M2 | DIASTOLIC BLOOD PRESSURE: 84 MMHG | HEIGHT: 61 IN

## 2024-07-01 DIAGNOSIS — M47.817 LUMBOSACRAL SPONDYLOSIS WITHOUT MYELOPATHY: Primary | ICD-10-CM

## 2024-07-01 DIAGNOSIS — M47.812 CERVICAL SPONDYLOSIS WITHOUT MYELOPATHY: ICD-10-CM

## 2024-07-01 PROCEDURE — 3017F COLORECTAL CA SCREEN DOC REV: CPT | Performed by: PHYSICAL MEDICINE & REHABILITATION

## 2024-07-01 PROCEDURE — G8417 CALC BMI ABV UP PARAM F/U: HCPCS | Performed by: PHYSICAL MEDICINE & REHABILITATION

## 2024-07-01 PROCEDURE — 99213 OFFICE O/P EST LOW 20 MIN: CPT | Performed by: PHYSICAL MEDICINE & REHABILITATION

## 2024-07-01 PROCEDURE — G8427 DOCREV CUR MEDS BY ELIG CLIN: HCPCS | Performed by: PHYSICAL MEDICINE & REHABILITATION

## 2024-07-01 PROCEDURE — 1090F PRES/ABSN URINE INCON ASSESS: CPT | Performed by: PHYSICAL MEDICINE & REHABILITATION

## 2024-07-01 PROCEDURE — 1123F ACP DISCUSS/DSCN MKR DOCD: CPT | Performed by: PHYSICAL MEDICINE & REHABILITATION

## 2024-07-01 PROCEDURE — G8400 PT W/DXA NO RESULTS DOC: HCPCS | Performed by: PHYSICAL MEDICINE & REHABILITATION

## 2024-07-01 PROCEDURE — 1036F TOBACCO NON-USER: CPT | Performed by: PHYSICAL MEDICINE & REHABILITATION

## 2024-07-01 RX ORDER — GABAPENTIN 600 MG/1
600 TABLET ORAL 3 TIMES DAILY
Qty: 90 TABLET | Refills: 0 | Status: SHIPPED | OUTPATIENT
Start: 2024-07-01 | End: 2024-07-31

## 2024-07-01 RX ORDER — TIZANIDINE 4 MG/1
4 TABLET ORAL 3 TIMES DAILY PRN
Qty: 90 TABLET | Refills: 0 | Status: SHIPPED | OUTPATIENT
Start: 2024-07-01 | End: 2024-07-31

## 2024-07-01 ASSESSMENT — ENCOUNTER SYMPTOMS
BACK PAIN: 1
SHORTNESS OF BREATH: 0
DIARRHEA: 0
NAUSEA: 0
CONSTIPATION: 0

## 2024-07-09 ENCOUNTER — APPOINTMENT (OUTPATIENT)
Dept: CARDIOLOGY | Facility: CLINIC | Age: 69
End: 2024-07-09
Payer: COMMERCIAL

## 2024-07-09 VITALS
DIASTOLIC BLOOD PRESSURE: 80 MMHG | SYSTOLIC BLOOD PRESSURE: 132 MMHG | WEIGHT: 244 LBS | OXYGEN SATURATION: 95 % | HEART RATE: 88 BPM | HEIGHT: 61 IN | BODY MASS INDEX: 46.07 KG/M2

## 2024-07-09 DIAGNOSIS — I73.9 PAD (PERIPHERAL ARTERY DISEASE) (CMS-HCC): Primary | ICD-10-CM

## 2024-07-09 PROCEDURE — 3008F BODY MASS INDEX DOCD: CPT | Performed by: STUDENT IN AN ORGANIZED HEALTH CARE EDUCATION/TRAINING PROGRAM

## 2024-07-09 PROCEDURE — 4010F ACE/ARB THERAPY RXD/TAKEN: CPT | Performed by: STUDENT IN AN ORGANIZED HEALTH CARE EDUCATION/TRAINING PROGRAM

## 2024-07-09 PROCEDURE — 99203 OFFICE O/P NEW LOW 30 MIN: CPT | Performed by: STUDENT IN AN ORGANIZED HEALTH CARE EDUCATION/TRAINING PROGRAM

## 2024-07-09 PROCEDURE — 1157F ADVNC CARE PLAN IN RCRD: CPT | Performed by: STUDENT IN AN ORGANIZED HEALTH CARE EDUCATION/TRAINING PROGRAM

## 2024-07-09 PROCEDURE — 3079F DIAST BP 80-89 MM HG: CPT | Performed by: STUDENT IN AN ORGANIZED HEALTH CARE EDUCATION/TRAINING PROGRAM

## 2024-07-09 PROCEDURE — 1159F MED LIST DOCD IN RCRD: CPT | Performed by: STUDENT IN AN ORGANIZED HEALTH CARE EDUCATION/TRAINING PROGRAM

## 2024-07-09 PROCEDURE — 3075F SYST BP GE 130 - 139MM HG: CPT | Performed by: STUDENT IN AN ORGANIZED HEALTH CARE EDUCATION/TRAINING PROGRAM

## 2024-07-10 ENCOUNTER — APPOINTMENT (OUTPATIENT)
Dept: GASTROENTEROLOGY | Facility: CLINIC | Age: 69
End: 2024-07-10
Payer: COMMERCIAL

## 2024-07-11 DIAGNOSIS — F39 MOOD DISORDER (CMS-HCC): ICD-10-CM

## 2024-07-11 RX ORDER — VENLAFAXINE HYDROCHLORIDE 75 MG/1
75 CAPSULE, EXTENDED RELEASE ORAL DAILY
Qty: 30 CAPSULE | Refills: 6 | Status: SHIPPED | OUTPATIENT
Start: 2024-07-11

## 2024-07-12 DIAGNOSIS — R06.02 SHORTNESS OF BREATH: ICD-10-CM

## 2024-07-12 DIAGNOSIS — I10 PRIMARY HYPERTENSION: ICD-10-CM

## 2024-07-12 DIAGNOSIS — F39 MOOD DISORDER (CMS-HCC): ICD-10-CM

## 2024-07-12 DIAGNOSIS — R06.09 DYSPNEA ON EXERTION: ICD-10-CM

## 2024-07-12 RX ORDER — VENLAFAXINE HYDROCHLORIDE 150 MG/1
CAPSULE, EXTENDED RELEASE ORAL
Qty: 30 CAPSULE | Refills: 6 | Status: SHIPPED | OUTPATIENT
Start: 2024-07-12

## 2024-07-12 RX ORDER — LOSARTAN POTASSIUM 50 MG/1
50 TABLET ORAL 2 TIMES DAILY
Qty: 180 TABLET | Refills: 3 | Status: SHIPPED | OUTPATIENT
Start: 2024-07-12

## 2024-07-12 RX ORDER — BUMETANIDE 1 MG/1
1 TABLET ORAL DAILY
Qty: 90 TABLET | Refills: 3 | Status: SHIPPED | OUTPATIENT
Start: 2024-07-12 | End: 2025-07-12

## 2024-07-12 RX ORDER — MONTELUKAST SODIUM 10 MG/1
10 TABLET ORAL DAILY
Qty: 90 TABLET | Refills: 3 | Status: SHIPPED | OUTPATIENT
Start: 2024-07-12

## 2024-07-14 DIAGNOSIS — E11.69 TYPE 2 DIABETES MELLITUS WITH OTHER SPECIFIED COMPLICATION, WITH LONG-TERM CURRENT USE OF INSULIN (MULTI): ICD-10-CM

## 2024-07-14 DIAGNOSIS — Z79.4 TYPE 2 DIABETES MELLITUS WITH OTHER SPECIFIED COMPLICATION, WITH LONG-TERM CURRENT USE OF INSULIN (MULTI): ICD-10-CM

## 2024-07-15 RX ORDER — INSULIN GLARGINE 100 [IU]/ML
INJECTION, SOLUTION SUBCUTANEOUS
Qty: 30 ML | Refills: 0 | Status: SHIPPED | OUTPATIENT
Start: 2024-07-15

## 2024-07-18 ENCOUNTER — APPOINTMENT (OUTPATIENT)
Dept: SURGERY | Facility: CLINIC | Age: 69
End: 2024-07-18
Payer: COMMERCIAL

## 2024-07-19 ENCOUNTER — TELEPHONE (OUTPATIENT)
Dept: PRIMARY CARE | Facility: CLINIC | Age: 69
End: 2024-07-19
Payer: COMMERCIAL

## 2024-07-19 DIAGNOSIS — Z79.4 TYPE 2 DIABETES MELLITUS WITHOUT COMPLICATION, WITH LONG-TERM CURRENT USE OF INSULIN (MULTI): ICD-10-CM

## 2024-07-19 DIAGNOSIS — Z79.4 TYPE 2 DIABETES MELLITUS WITH OTHER SPECIFIED COMPLICATION, WITH LONG-TERM CURRENT USE OF INSULIN (MULTI): ICD-10-CM

## 2024-07-19 DIAGNOSIS — E11.9 TYPE 2 DIABETES MELLITUS WITHOUT COMPLICATION, WITH LONG-TERM CURRENT USE OF INSULIN (MULTI): ICD-10-CM

## 2024-07-19 DIAGNOSIS — E11.69 TYPE 2 DIABETES MELLITUS WITH OTHER SPECIFIED COMPLICATION, WITH LONG-TERM CURRENT USE OF INSULIN (MULTI): ICD-10-CM

## 2024-07-19 NOTE — TELEPHONE ENCOUNTER
Received fax notification of denial regarding Pt's Rx for Lantus Solostar soln -inj 100 unit/ml.  Referral for clinical pharmacy placed and  Pt notified. Denial paperwork placed in folder to scan into chart.

## 2024-07-22 DIAGNOSIS — E11.9 TYPE 2 DIABETES MELLITUS WITHOUT COMPLICATION, WITH LONG-TERM CURRENT USE OF INSULIN (MULTI): Primary | ICD-10-CM

## 2024-07-22 DIAGNOSIS — Z79.4 TYPE 2 DIABETES MELLITUS WITHOUT COMPLICATION, WITH LONG-TERM CURRENT USE OF INSULIN (MULTI): Primary | ICD-10-CM

## 2024-07-26 ENCOUNTER — TELEPHONE (OUTPATIENT)
Dept: PRIMARY CARE | Facility: CLINIC | Age: 69
End: 2024-07-26
Payer: COMMERCIAL

## 2024-07-26 RX ORDER — INSULIN DEGLUDEC 100 U/ML
40 INJECTION, SOLUTION SUBCUTANEOUS NIGHTLY
Qty: 3 ML | Refills: 12 | Status: SHIPPED | OUTPATIENT
Start: 2024-07-26 | End: 2025-07-26

## 2024-07-26 NOTE — TELEPHONE ENCOUNTER
Spoke to pt relayed message  Let the pt know this, I sent in tresiba  Do 40 units first and see how glucose level Is  ,  call us bandar bee

## 2024-07-26 NOTE — TELEPHONE ENCOUNTER
I started PA and this is result    Outcome  Denied on July 17 by [employer] 2017  Denied. This drug is not covered on the formulary. We are denying your request because we do not show that you have tried at least 2 covered drugs that can treat your condition. You have already tried Basaglar. Other covered drug(s) is/are: Tresiba flextouch u-100 subcutaneous insulin pen 100 unit/ml (3 ml), Tresiba flextouch u-200 subcutaneous insulin pen 200 unit/ml (3 ml), Tresiba u-100 insulin subcutaneous solution 100 unit/ml. We may be able to make an exception to cover this drug. Your doctor will need to send us medical records showing that you tried this drug. If you cannot take the covered drug, your doctor will need to tell us why. Note: Some covered drug(s) may have quantity limits. Please refer to the formulary for details.

## 2024-07-26 NOTE — TELEPHONE ENCOUNTER
Clinical-  Pt called in regarding PA on her lantus. Pt requesting update regarding. Pharmacy stated to Pt that Prior Auth has not been started. Pt has 1 syringe left .       Dr. Holt-   Pt also stated that she will no longer be using Breo Ellipta. She has stopped it because she was producing to much Sputum.

## 2024-07-29 ENCOUNTER — LAB (OUTPATIENT)
Dept: LAB | Facility: LAB | Age: 69
End: 2024-07-29
Payer: COMMERCIAL

## 2024-07-29 ENCOUNTER — PRE-ADMISSION TESTING (OUTPATIENT)
Dept: PREADMISSION TESTING | Facility: HOSPITAL | Age: 69
End: 2024-07-29
Payer: MEDICARE

## 2024-07-29 ENCOUNTER — APPOINTMENT (OUTPATIENT)
Dept: CARDIOLOGY | Facility: HOSPITAL | Age: 69
End: 2024-07-29
Payer: MEDICARE

## 2024-07-29 VITALS
WEIGHT: 242.29 LBS | TEMPERATURE: 96.8 F | OXYGEN SATURATION: 94 % | RESPIRATION RATE: 16 BRPM | HEIGHT: 61 IN | DIASTOLIC BLOOD PRESSURE: 64 MMHG | HEART RATE: 92 BPM | SYSTOLIC BLOOD PRESSURE: 126 MMHG | BODY MASS INDEX: 45.74 KG/M2

## 2024-07-29 DIAGNOSIS — E11.69 TYPE 2 DIABETES MELLITUS WITH OTHER SPECIFIED COMPLICATION, UNSPECIFIED WHETHER LONG TERM INSULIN USE (MULTI): ICD-10-CM

## 2024-07-29 DIAGNOSIS — Z01.818 PREOP EXAMINATION: ICD-10-CM

## 2024-07-29 DIAGNOSIS — M47.12 CERVICAL SPONDYLOSIS WITH MYELOPATHY: ICD-10-CM

## 2024-07-29 DIAGNOSIS — M47.12 CERVICAL SPONDYLOSIS WITH MYELOPATHY: Primary | ICD-10-CM

## 2024-07-29 LAB
ANION GAP SERPL CALC-SCNC: 15 MMOL/L (ref 10–20)
BUN SERPL-MCNC: 19 MG/DL (ref 6–23)
CALCIUM SERPL-MCNC: 9.5 MG/DL (ref 8.6–10.3)
CHLORIDE SERPL-SCNC: 98 MMOL/L (ref 98–107)
CO2 SERPL-SCNC: 31 MMOL/L (ref 21–32)
CREAT SERPL-MCNC: 0.9 MG/DL (ref 0.5–1.05)
EGFRCR SERPLBLD CKD-EPI 2021: 71 ML/MIN/1.73M*2
EST. AVERAGE GLUCOSE BLD GHB EST-MCNC: 163 MG/DL
GLUCOSE SERPL-MCNC: 173 MG/DL (ref 74–99)
HBA1C MFR BLD: 7.3 %
POTASSIUM SERPL-SCNC: 4.4 MMOL/L (ref 3.5–5.3)
SODIUM SERPL-SCNC: 140 MMOL/L (ref 136–145)

## 2024-07-29 PROCEDURE — 99202 OFFICE O/P NEW SF 15 MIN: CPT

## 2024-07-29 PROCEDURE — 93005 ELECTROCARDIOGRAM TRACING: CPT

## 2024-07-29 PROCEDURE — 83036 HEMOGLOBIN GLYCOSYLATED A1C: CPT

## 2024-07-29 PROCEDURE — 80048 BASIC METABOLIC PNL TOTAL CA: CPT

## 2024-07-29 ASSESSMENT — DUKE ACTIVITY SCORE INDEX (DASI)
CAN YOU DO MODERATE WORK AROUND THE HOUSE LIKE VACUUMING, SWEEPING FLOORS OR CARRYING GROCERIES: YES
CAN YOU DO YARD WORK LIKE RAKING LEAVES, WEEDING OR PUSHING A MOWER: NO
CAN YOU HAVE SEXUAL RELATIONS: NO
CAN YOU WALK A BLOCK OR TWO ON LEVEL GROUND: YES
DASI METS SCORE: 4.4
CAN YOU TAKE CARE OF YOURSELF (EAT, DRESS, BATHE, OR USE TOILET): YES
CAN YOU DO LIGHT WORK AROUND THE HOUSE LIKE DUSTING OR WASHING DISHES: YES
CAN YOU PARTICIPATE IN MODERATE RECREATIONAL ACTIVITIES LIKE GOLF, BOWLING, DANCING, DOUBLES TENNIS OR THROWING A BASEBALL OR FOOTBALL: NO
CAN YOU WALK INDOORS, SUCH AS AROUND YOUR HOUSE: YES
CAN YOU PARTICIPATE IN STRENOUS SPORTS LIKE SWIMMING, SINGLES TENNIS, FOOTBALL, BASKETBALL, OR SKIING: NO
CAN YOU RUN A SHORT DISTANCE: NO
CAN YOU CLIMB A FLIGHT OF STAIRS OR WALK UP A HILL: NO
TOTAL_SCORE: 13.45
CAN YOU DO HEAVY WORK AROUND THE HOUSE LIKE SCRUBBING FLOORS OR LIFTING AND MOVING HEAVY FURNITURE: NO

## 2024-07-29 ASSESSMENT — ACTIVITIES OF DAILY LIVING (ADL): ADL_SCORE: 1

## 2024-07-29 ASSESSMENT — LIFESTYLE VARIABLES: SMOKING_STATUS: NONSMOKER

## 2024-07-29 ASSESSMENT — PAIN - FUNCTIONAL ASSESSMENT: PAIN_FUNCTIONAL_ASSESSMENT: 0-10

## 2024-07-29 NOTE — PREPROCEDURE INSTRUCTIONS
Medication List            Accurate as of July 29, 2024 12:06 PM. Always use your most recent med list.                aspirin 81 mg EC tablet  Medication Adjustments for Surgery: Other (Comment)  Notes to patient: Coordinate with prescribing provider for further instructions on this medications prior to surgery.     * bumetanide 0.5 mg tablet  Commonly known as: Bumex  Take 1 tablet (0.5 mg) by mouth once daily. Take with 1 mg daily  Medication Adjustments for Surgery: Continue until night before surgery     * bumetanide 1 mg tablet  Commonly known as: Bumex  Take 1 tablet (1 mg) by mouth once daily. Take with 0.5mg daily  Medication Adjustments for Surgery: Continue until night before surgery     cholecalciferol 50 mcg (2,000 unit) capsule  Commonly known as: Vitamin D-3  Medication Adjustments for Surgery: Stop 7 days before surgery     dilTIAZem  mg 24 hr capsule  Commonly known as: Cardizem CD  Take 1 capsule (240 mg) by mouth once daily.  Medication Adjustments for Surgery: Take morning of surgery with sip of water, no other fluids     etodolac 400 mg tablet  Commonly known as: Lodine  Take 1 tablet (400 mg) by mouth 2 times a day.  Medication Adjustments for Surgery: Stop 7 days before surgery     fluticasone furoate-vilanteroL 100-25 mcg/dose inhaler  Commonly known as: Breo Ellipta  Inhale 1 puff once daily.  Medication Adjustments for Surgery: Other (Comment)  Notes to patient: Not taking      FreeStyle Ana María 2 Sensor kit  Generic drug: flash glucose sensor kit  1 Cartridge every 14 days     gabapentin 600 mg tablet  Commonly known as: Neurontin  Medication Adjustments for Surgery: Take morning of surgery with sip of water, no other fluids     insulin degludec 100 unit/mL (3 mL) injection  Commonly known as: Tresiba FlexTouch U-100  Inject 40 Units under the skin once daily at bedtime. Take as directed per insulin instructions.  Medication Adjustments for Surgery: Other (Comment)  Notes to  "patient: If on basal insulin (Tresiba) and scheduled mealtime rapid, GIVE full dose of basal insulin the night prior to surgery.      insulin lispro 100 unit/mL injection  Commonly known as: HumaLOG U-100 Insulin  Ssri  Medication Adjustments for Surgery: Other (Comment)  Notes to patient: TAKE full dose with meal the night prior to MRI. Do NOT take the day of procedure      L. acidophilus/Bifid. animalis 32 billion cell capsule  Medication Adjustments for Surgery: Stop 7 days before surgery     losartan 50 mg tablet  Commonly known as: Cozaar  Take 1 tablet (50 mg) by mouth 2 times a day.  Medication Adjustments for Surgery: Other (Comment)  Notes to patient: HOLD any evening dose the night before the day of surgery  HOLD the day of surgery     montelukast 10 mg tablet  Commonly known as: Singulair  Take 1 tablet (10 mg) by mouth once daily.  Medication Adjustments for Surgery: Take morning of surgery with sip of water, no other fluids     naloxone 4 mg/0.1 mL nasal spray  Commonly known as: Narcan  Medication Adjustments for Surgery: Other (Comment)  Notes to patient: If needed     omeprazole 40 mg DR capsule  Commonly known as: PriLOSEC  Take 1 capsule (40 mg) by mouth once daily.  Medication Adjustments for Surgery: Take morning of surgery with sip of water, no other fluids     oxyCODONE 5 mg immediate release tablet  Commonly known as: Roxicodone  Medication Adjustments for Surgery: Take morning of surgery with sip of water, no other fluids     Ozempic 0.25 mg or 0.5 mg(2 mg/1.5 mL) pen injector  Generic drug: semaglutide  Medication Adjustments for Surgery: Stop 7 days before surgery     pen needle, diabetic 32 gauge x 5/32\" needle  Use with daily glucose testing     potassium chloride CR 20 mEq ER tablet  Commonly known as: Klor-Con M20  Take 1 tablet (20 mEq) by mouth once daily. Do not crush or chew.  Medication Adjustments for Surgery: Continue until night before surgery     tiZANidine 4 mg " capsule  Commonly known as: Zanaflex  Medication Adjustments for Surgery: Take morning of surgery with sip of water, no other fluids     True Metrix Glucose Test Strip strip  Generic drug: blood sugar diagnostic     * venlafaxine XR 75 mg 24 hr capsule  Commonly known as: Effexor-XR  Take 1 capsule (75 mg) by mouth once daily. Take with food.  Medication Adjustments for Surgery: Take morning of surgery with sip of water, no other fluids     * venlafaxine  mg 24 hr capsule  Commonly known as: Effexor-XR  Take 1 capsule (150 mg total) by mouth daily with breakfast for 30 days.  Medication Adjustments for Surgery: Take morning of surgery with sip of water, no other fluids  Notes to patient: Not taking      Ventolin HFA 90 mcg/actuation inhaler  Generic drug: albuterol  Ventolin ade 2 puffs q4h prn  Medication Adjustments for Surgery: Other (Comment)  Notes to patient: As needed     VITAMIN B COMPLEX ORAL  Medication Adjustments for Surgery: Stop 7 days before surgery           * This list has 4 medication(s) that are the same as other medications prescribed for you. Read the directions carefully, and ask your doctor or other care provider to review them with you.                                      PRE-OPERATIVE INSTRUCTIONS    You will receive notification one business day prior to your procedure to confirm your arrival time. It is important that you answer your phone and/or check your messages during this time. If you do not hear from the surgery center by 5 pm. the day before your procedure, please call [phone].     Please enter the building through the Outpatient entrance and take the elevator off the lobby to the 2nd floor then check in at the Outpatient Surgery desk on the 2nd floor.    INSTRUCTIONS:  Talk to your surgeon for instructions if you should stop your aspirin, blood thinner, or diabetes medicines.  DO NOT take any multivitamins or over the counter supplements for 7-10 days before  surgery.  If not being admitted, you must have an adult immediately available to drive you home after surgery. We also highly recommend you have someone stay with you for the entire day and night of your surgery.  For children having surgery, a parent or legal guardian must accompany them to the surgery center. If this is not possible, please call [phone] to make additional arrangements.  For adults who are unable to consent or make medical decisions for themselves, a legal guardian or Power of  must accompany them to the surgery center. If this is not possible, please call [phone] to make additional arrangements.  Wear comfortable, loose fitting clothing.  All jewelry and piercings must be removed. If you are unable to remove an item or have a dermal piercing, please be sure to tell the nurse when you arrive for surgery.  Nail polish and make-up must be removed.  Avoid smoking or consuming alcohol for 24 hours before surgery.  To help prevent infection, please take a shower/bath and wash your hair the night before and/or morning of surgery (or follow other specific bathing instructions provided).    Preoperative Fasting Guidelines    Why must I stop eating and drinking near surgery time?  With sedation, food or liquid in your stomach can enter your lungs causing serious complications  Increases nausea and vomiting    When do I need to stop eating and drinking before my surgery?  Do not eat any solid food after midnight the night before your surgery/procedure unless otherwise instructed by your surgeon.   You may have up to 13.5 ounces of clear liquid until TWO hours before your instructed arrival time to the hospital.  This includes water, black tea/coffee, (no milk or cream) apple juice, and electrolyte drinks (Gatorade).   You may chew gum until TWO hours before your surgery/procedure      If applicable, notify your surgeons office immediately of any new skin changes that occur to the surgical  limb.      If you have any questions or concerns, please call Pre-Admission Testing at [phone].

## 2024-07-29 NOTE — CPM/PAT H&P
"CPM/PAT Evaluation       Name: Kaylan Woo (Kaylan Woo \"Mariluz\")  /Age: 1955/68 y.o.     In-Person       Chief Complaint: Neck pain, bilateral hand numbness     HPI  Pleasant 67 y/o female presents with neck pain and bilateral hand numbness. She has had multiple surgeries on her neck and the pain has persisted.      Past Medical History:   Diagnosis Date    Arthritis     Asthma ([facility])     Body mass index (BMI)40.0-44.9, adult 2021    BMI 40.0-44.9, adult    COPD (chronic obstructive pulmonary disease) (Multi)     Coronary artery disease     COVID-19 2021    Pneumonia due to COVID-19 virus    Deficiency of other specified B group vitamins 2021    Vitamin B 12 deficiency    Depression     GERD (gastroesophageal reflux disease)     Hard to intubate     Hyperlipidemia     Hypertension     Personal history of other drug therapy 10/25/2018    History of influenza vaccination    Personal history of other medical treatment     History of nuclear stress test    PVD (peripheral vascular disease) (CMS-HCC)     Pyothorax without fistula (Multi) 10/17/2022    Empyema    Skin cancer     Sleep apnea     Solitary pulmonary nodule 2021    Lung nodule    Type 2 diabetes mellitus (Multi)        Past Surgical History:   Procedure Laterality Date    CATARACT EXTRACTION      LUMBAR LAMINECTOMY      OTHER SURGICAL HISTORY  2021    Thoracic discectomy    OTHER SURGICAL HISTORY  01/10/2022    Neck surgery    OTHER SURGICAL HISTORY  2021    Colonoscopy    OTHER SURGICAL HISTORY  10/06/2020    Hand surgery    OTHER SURGICAL HISTORY  10/06/2020    Lumpectomy    OTHER SURGICAL HISTORY  10/06/2020     section    OTHER SURGICAL HISTORY Bilateral 10/06/2020    Knee replacement    OTHER SURGICAL HISTORY  10/06/2020    Tonsillectomy    OTHER SURGICAL HISTORY  2019    Hysterectomy total abdominal with removal of both ovaries    TONSILLECTOMY      TOTAL KNEE ARTHROPLASTY " Bilateral     TRIGGER FINGER RELEASE         Patient  has no history on file for sexual activity.    Family History   Problem Relation Name Age of Onset    Alzheimer's disease Mother      Atrial fibrillation Mother      Lupus Mother      Hypertension Mother      Arthritis Father      Other (rheumatic disease) Father      Charcot-Charisse-Tooth disease Father      Charcot-Charisse-Tooth disease Sister      Diabetes Sister      Hypertension Sister      Hypertension Brother         Allergies   Allergen Reactions    Aspartame Headache and Other    Celecoxib Shortness of breath and Rash    Rofecoxib Shortness of breath and Rash    Sulfa (Sulfonamide Antibiotics) Anaphylaxis    Sulfasalazine Anaphylaxis    Sulfonylureas Anaphylaxis    Sulfur Anaphylaxis    Buprenorphine Other     dizziness    Diet Foods Other     migraine headache. Allergy to aspartame only. Able to tolerate sucralose (splenda).    Ezetimibe Other     Couldn't walk    Iodides Other    Metformin Other     Severe GI SE    Nalidixic Acid Unknown     Pt does not think she is allergic to this    Nsaids (Non-Steroidal Anti-Inflammatory Drug) Unknown     Pt denies allergy    Pyrazolones Other     Pt does not know if allergy    Rivaroxaban Hives    Salicylates Unknown     Pt states she is not allergic to    Shellfish Containing Products Nausea/vomiting     scallops    Shellfish Derived Nausea/vomiting     scallops    Statins-Hmg-Coa Reductase Inhibitors Other    Thiazides Unknown     Pt denies allergy    Cyclobenzaprine Rash    Latex Swelling and Rash    Valdecoxib Swelling and Rash       Prior to Admission medications    Medication Sig Start Date End Date Taking? Authorizing Provider   aspirin 81 mg EC tablet Take 1 tablet (81 mg) by mouth once daily.    Historical Provider, MD   bumetanide (Bumex) 0.5 mg tablet Take 1 tablet (0.5 mg) by mouth once daily. Take with 1 mg daily 5/24/24 5/24/25  Radha Holt MD   bumetanide (Bumex) 1 mg tablet Take 1 tablet (1  mg) by mouth once daily. Take with 0.5mg daily 7/12/24 7/12/25  Radha Holt MD   cholecalciferol (Vitamin D-3) 50 mcg (2,000 unit) capsule Take by mouth. 7/13/22   Historical Provider, MD   coenzyme Q10 400 mg capsule Take by mouth once daily. 1/10/22   Historical Provider, MD   dilTIAZem CD (Cardizem CD) 240 mg 24 hr capsule Take 1 capsule (240 mg) by mouth once daily. 10/31/23 10/25/24  Radha Holt MD   etodolac (Lodine) 400 mg tablet Take 1 tablet (400 mg) by mouth 2 times a day. 12/18/23   Radha Holt MD   fluticasone furoate-vilanteroL (Breo Ellipta) 100-25 mcg/dose inhaler Inhale 1 puff once daily.  Patient not taking: Reported on 7/9/2024 5/22/24   Radha Holt MD   FreeStyle Ana María sensor system (FreeStyle Ana María 2 Sensor) kit 1 Cartridge every 14 days 6/2/23   Cornelia Ball, APRN-CNP   gabapentin (Neurontin) 600 mg tablet TAKE 1 TABLET BY MOUTH THREE TIMES DAILY FOR 30 DAYS    Historical Provider, MD   insulin degludec (Tresiba FlexTouch U-100) 100 unit/mL (3 mL) injection Inject 40 Units under the skin once daily at bedtime. Take as directed per insulin instructions. 7/26/24 7/26/25  Radha Holt MD   insulin lispro (HumaLOG U-100 Insulin) 100 unit/mL injection Ssri 10/30/23   Radha Holt MD   L. acidophilus/Bifid. animalis 32 billion cell capsule Take 1 capsule by mouth once daily. 1/10/22   Historical Provider, MD   losartan (Cozaar) 50 mg tablet Take 1 tablet (50 mg) by mouth 2 times a day. 7/12/24   Radha Holt MD   MAGNESIUM ORAL Take 1 tablet by mouth once daily. 1/10/22   Historical Provider, MD   montelukast (Singulair) 10 mg tablet Take 1 tablet (10 mg) by mouth once daily. 7/12/24   Radha Holt MD   naloxone (Narcan) 4 mg/0.1 mL nasal spray SPARY 1 SPRAY INTO ONE NOSTRIL AS NEEDED FOR OPIOID REVERSAL AS DIRECTED 11/22/22   Historical Provider, MD   omeprazole (PriLOSEC) 40 mg DR capsule Take 1 capsule (40 mg) by mouth  "once daily. 2/12/24   Radha Holt MD   oxyCODONE (Roxicodone) 5 mg immediate release tablet Every 6 hours 3/16/23   Historical Provider, MD   pen needle, diabetic 32 gauge x 5/32\" needle Use with daily glucose testing 7/6/23   MONY Fraga   potassium chloride CR 20 mEq ER tablet Take 1 tablet (20 mEq) by mouth once daily. Do not crush or chew. 11/27/23 11/26/24  Radha Holt MD   semaglutide (Ozempic) 0.25 mg or 0.5 mg(2 mg/1.5 mL) pen injector Inject under the skin 1 (one) time per week.    Historical Provider, MD   tiZANidine (Zanaflex) 4 mg capsule Take 1 capsule (4 mg) by mouth 3 times a day.    Historical Provider, MD   True Metrix Glucose Test Strip strip use to check blood sugar TWICE DAILY 11/14/23   Historical Provider, MD   TURMERIC ORAL Take 1 capsule by mouth once daily. 1/10/22   Historical Provider, MD   venlafaxine XR (Effexor-XR) 150 mg 24 hr capsule Take 1 capsule (150 mg total) by mouth daily with breakfast for 30 days. 7/12/24   Radha Holt MD   venlafaxine XR (Effexor-XR) 75 mg 24 hr capsule Take 1 capsule (75 mg) by mouth once daily. Take with food. 7/11/24   MONY Fraga   Ventolin HFA 90 mcg/actuation inhaler Ventolin ade 2 puffs q4h prn 11/29/23   Radha Holt MD   VITAMIN B COMPLEX ORAL Take 1 tablet by mouth once daily in the morning. 1/10/22   Historical Provider, MD   zinc gluconate-zinc picolinate 30 mg capsule Take 1 capsule by mouth once daily. 3/23/21   Historical Provider, MD Cisco Barone U-100 Insulin 100 unit/mL (3 mL) pen Inject 44 Units under the skin once daily at bedtime. 7/15/24 7/26/24  MONY Fraga        Constitutional: Negative for fever, chills, or sweats   ENMT: Negative for nasal discharge, congestion, ear pain, mouth pain, throat pain. Positive for readers. Positive for \"choking\" daily.   Respiratory: Negative for cough, wheezing, shortness of breath   Cardiac: Negative for chest pain, " dyspnea on exertion, palpitations   Gastrointestinal: Negative for nausea, vomiting, diarrhea, constipation, abdominal pain. Positive for diarrhea.   Genitourinary: Negative for dysuria, flank pain, frequency, hematuria   Musculoskeletal: Negative for decreased ROM, pain, swelling, weakness. Positive for bilat. Lower extremity weakness/numbness/tingling. Positive for numbness/tingling in bilat hands. Positive for neck stiffness.    Neurological: Negative for dizziness, confusion, headache  Psychiatric: Negative for mood changes   Skin: Negative for itching, rash, ulcer    Hematologic/Lymph: Negative for bruising, easy bleeding  Allergic/Immunologic: Negative itching, sneezing, swelling      Physical Exam  Vitals reviewed.   Constitutional:       Appearance: Normal appearance. She is obese.   HENT:      Head: Normocephalic.      Mouth/Throat:      Mouth: Mucous membranes are moist.      Pharynx: Oropharynx is clear.   Eyes:      Pupils: Pupils are equal, round, and reactive to light.   Cardiovascular:      Rate and Rhythm: Normal rate and regular rhythm.      Heart sounds: Normal heart sounds.   Pulmonary:      Effort: Pulmonary effort is normal.      Breath sounds: Normal breath sounds.   Abdominal:      General: Bowel sounds are normal.      Palpations: Abdomen is soft.   Musculoskeletal:         General: Normal range of motion.      Cervical back: Normal range of motion.      Comments: Uses rolator    Skin:     General: Skin is warm and dry.   Neurological:      General: No focal deficit present.      Mental Status: She is alert and oriented to person, place, and time.   Psychiatric:         Mood and Affect: Mood normal.         Behavior: Behavior normal.          PAT AIRWAY:   Airway:     Mallampati::  III    Neck ROM::  Limited  normal        Visit Vitals  /64   Pulse 92   Temp 36 °C (96.8 °F) (Temporal)   Resp 16       DASI Risk Score      Flowsheet Row Most Recent Value   DASI SCORE 13.45   METS Score  (Will be calculated only when all the questions are answered) 4.4          Caprini DVT Assessment      Flowsheet Row Most Recent Value   DVT Score 8   Current Status --  [MRI]   History Previous malignancy, Prior major surgery   Age 60-75 years   BMI 41-50 (Morbid obesity)          Modified Frailty Index      Flowsheet Row Most Recent Value   Modified Frailty Index Calculator .2727          CHADS2 Stroke Risk  Current as of 36 minutes ago        N/A 3 to 100%: High Risk   2 to < 3%: Medium Risk   0 to < 2%: Low Risk     Last Change: N/A          This score determines the patient's risk of having a stroke if the patient has atrial fibrillation.        This score is not applicable to this patient. Components are not calculated.          Revised Cardiac Risk Index    No data to display       Apfel Simplified Score    No data to display       Risk Analysis Index Results This Encounter         7/29/2024  1138             DIAZ Cancer History: Patient indicates history of cancer    Total Risk Analysis Index Score Without Cancer: 26    Total Risk Analysis Index Score: 40          Stop Bang Score      Flowsheet Row Most Recent Value   Do you snore loudly? 1   Do you often feel tired or fatigued after your sleep? 1   Has anyone ever observed you stop breathing in your sleep? 1   Do you have or are you being treated for high blood pressure? 1   Recent BMI (Calculated) 45.8   Is BMI greater than 35 kg/m2? 1=Yes   Age older than 50 years old? 1=Yes   Is your neck circumference greater than 17 inches (Male) or 16 inches (Female)? 1   Gender - Male 0=No   STOP-BANG Total Score 7            Assessment and Plan:     Assessment and Plan:     Preop:   MRI on 8/12  Labs ordered per anesthesia guidelines   EKG obtained and enclosed. NSR. VR: 88 BPM. Comparable EKG on file.     Cardiac:  Hypertension: Controlled with medical management   Hyperlipidemia: Monitored by PCP. Intolerant to statins   Carotid artery stenosis: Last carotid artery  "duplex was in 2019-  \"IMPRESSION:  1. Calcified atherosclerotic plaque in the bilateral carotid bulb,  and internal carotid arteries.  2. Suboptimal evaluation of the vertebral arteries.  3. 50-69% left internal carotid artery stenosis.\".    -states PCP monitors her. She was also seen by vascular who mentioned it.   CAD: ?-patient denies, documented in medical history. On aspirin. No history of stents.     Pulmonary:   Asthma: NO active symptoms. Does NOT use her Breo inhaler.   JEAN-PAUL: No CPAP   COPD: Denies this-states asthma only. Has seen Dr. Hernández in the past    Endocrine:  Diabetes mellitus:   Hemoglobin A1C   Date Value Ref Range Status   11/08/2023 8.8 (H) see below % Final   PCP managing. She has not seen endocrinology since hers retired.  A1c ordered.     GI:  GERD: Following with GI. States recently diagnosed with Barretts esophagus. She has been \"choking\" daily for months. PPI was increased.     Neuro-muscular:   Arthritis: Generalized arthritic pain. Uses a rolator. Takes multiple medications for pain control.   Chronic bilat. Lower extremity numbness/tingling/weakness. Left leg worse than right. Has been ongoing since one of her prior back surgeries. Uses rolator.     General:   Iron deficiency anemia: Was on iron. CBC from 5/20 stable in comparison with prior CBCs.   \"COVID toes\": Intermittently discolored toes. Seen by Dr. Luis Shook (vascular). Has testing ordered-not completed yet.     Anesthesia: Patient has a history of difficulty intubation-states she can only have fiberoptic intubation.     *See risk scores as previously documented   DVT prevention and memory exercise handouts provided to patient  "

## 2024-07-29 NOTE — PREPROCEDURE INSTRUCTIONS
Patient and Family Education             Ways You Can Help Prevent Blood Clots             This handout explains some simple things you can do to help prevent blood clots.      Blood clots are blockages that can form in the body's veins. When a blood clot forms in your deep veins, it may be called a deep vein thrombosis, or DVT for short. Blood clots can happen in any part of the body where blood flows, but they are most common in the arms and legs. If a piece of a blood clot breaks free and travels to the lungs, it is called a pulmonary embolus (PE). A PE can be a very serious problem.         Being in the hospital or having surgery can raise your chances of getting a blood clot because you may not be well enough to move around as much as you normally do.         Ways you can help prevent blood clots in the hospital         Wearing SCDs. SCDs stands for Sequential Compression Devices.   SCDs are special sleeves that wrap around your legs  They attach to a pump that fills them with air to gently squeeze your legs every few minutes.   This helps return the blood in your legs to your heart.   SCDs should only be taken off when walking or bathing.   SCDs may not be comfortable, but they can help save your life.               Wearing compression stockings - if your doctor orders them. These special snug fitting stockings gently squeeze your legs to help blood flow.       Walking. Walking helps move the blood in your legs.   If your doctor says it is ok, try walking the halls at least   5 times a day. Ask us to help you get up, so you don't fall.      Taking any blood thinning medicines your doctor orders.        Page 1 of 2     [facility]; 3/23   Ways you can help prevent blood clots at home       Wearing compression stockings - if your doctor orders them. ? Walking - to help move the blood in your legs.       Taking any blood thinning medicines your doctor orders.      Signs of a blood clot or PE       Tell your doctor or nurse know right away if you have of the problems listed below.    If you are at home, seek medical care right away. Call 911 for chest pain or problems breathing.               Signs of a blood clot (DVT) - such as pain,  swelling, redness or warmth in your arm or leg      Signs of a pulmonary embolism (PE) - such as chest     pain or feeling short of breath

## 2024-08-04 LAB
ATRIAL RATE: 88 BPM
P AXIS: 48 DEGREES
P OFFSET: 191 MS
P ONSET: 145 MS
PR INTERVAL: 150 MS
Q ONSET: 220 MS
QRS COUNT: 14 BEATS
QRS DURATION: 84 MS
QT INTERVAL: 394 MS
QTC CALCULATION(BAZETT): 476 MS
QTC FREDERICIA: 447 MS
R AXIS: 21 DEGREES
T AXIS: 88 DEGREES
T OFFSET: 417 MS
VENTRICULAR RATE: 88 BPM

## 2024-08-08 ENCOUNTER — TELEPHONE (OUTPATIENT)
Dept: PRIMARY CARE | Facility: CLINIC | Age: 69
End: 2024-08-08
Payer: COMMERCIAL

## 2024-08-08 DIAGNOSIS — Z79.4 TYPE 2 DIABETES MELLITUS WITHOUT COMPLICATION, WITH LONG-TERM CURRENT USE OF INSULIN (MULTI): ICD-10-CM

## 2024-08-08 DIAGNOSIS — E11.9 TYPE 2 DIABETES MELLITUS WITHOUT COMPLICATION, WITH LONG-TERM CURRENT USE OF INSULIN (MULTI): ICD-10-CM

## 2024-08-08 RX ORDER — INSULIN DEGLUDEC 100 U/ML
40 INJECTION, SOLUTION SUBCUTANEOUS NIGHTLY
Qty: 15 ML | Refills: 3 | Status: SHIPPED | OUTPATIENT
Start: 2024-08-08 | End: 2025-08-08

## 2024-08-08 NOTE — TELEPHONE ENCOUNTER
Pt called in regarding her Rx for Tresiba that was sent in to her pharmacy [facility] in [place]. Pt stated that Rx was sent in for 1 injection of 40 units daily. She needs Rx modified to be for whole box(5) . Pt injects 40 units nightly . Rx was sent in for for 1 pen and 12 refills. Pt would like changed to the whole box with refills . Her insurance will cover .      Will attach to clinical so can be formatted and attach PCP also

## 2024-08-08 NOTE — TELEPHONE ENCOUNTER
Received a Vm from nursing line preadmission testing at [place] . Pt is scheduled for MRI with anesthesia for Monday 8/12/24. Pt needs cardiac clearance before procedure can be completed. They stated that they faxed over a form that needs to be completed yesterday. Due to power outage we were unable to retrieve that fax. They are requesting form to be filled out and faxed back to them at [phone].      Please advise

## 2024-08-08 NOTE — TELEPHONE ENCOUNTER
I called Pt and left Vm for call back. Preadmission testing stated that she told them that [facility] handles her cardiac care . If Pt doesn't call back I will attempt to reach her at a later time today.

## 2024-08-08 NOTE — TELEPHONE ENCOUNTER
Rx for the 100 units 3 ml would last Pt about a week . Rx needs to be changed to 15 ml or for it to state 1 box 5 pens with refills. Box would last about 5 weeks .

## 2024-08-09 NOTE — TELEPHONE ENCOUNTER
Elly spoke with pre clearence who spoke with anesthesia and said she was good we do not have to see her for clearence

## 2024-08-12 ENCOUNTER — ANESTHESIA EVENT (OUTPATIENT)
Dept: RADIOLOGY | Facility: HOSPITAL | Age: 69
End: 2024-08-12
Payer: COMMERCIAL

## 2024-08-12 ENCOUNTER — ANESTHESIA (OUTPATIENT)
Dept: RADIOLOGY | Facility: HOSPITAL | Age: 69
End: 2024-08-12
Payer: COMMERCIAL

## 2024-08-12 ENCOUNTER — HOSPITAL ENCOUNTER (OUTPATIENT)
Dept: RADIOLOGY | Facility: HOSPITAL | Age: 69
Discharge: HOME | End: 2024-08-12
Payer: COMMERCIAL

## 2024-08-12 VITALS
TEMPERATURE: 98.1 F | BODY MASS INDEX: 45.69 KG/M2 | HEIGHT: 61 IN | DIASTOLIC BLOOD PRESSURE: 77 MMHG | HEART RATE: 81 BPM | WEIGHT: 242 LBS | RESPIRATION RATE: 18 BRPM | OXYGEN SATURATION: 96 % | SYSTOLIC BLOOD PRESSURE: 168 MMHG

## 2024-08-12 DIAGNOSIS — M47.12 CERVICAL SPONDYLOSIS WITH MYELOPATHY: ICD-10-CM

## 2024-08-12 LAB — GLUCOSE BLD MANUAL STRIP-MCNC: 104 MG/DL (ref 74–99)

## 2024-08-12 PROCEDURE — 82947 ASSAY GLUCOSE BLOOD QUANT: CPT

## 2024-08-12 RX ORDER — MIDAZOLAM HYDROCHLORIDE 1 MG/ML
INJECTION, SOLUTION INTRAMUSCULAR; INTRAVENOUS CONTINUOUS PRN
Status: SHIPPED | OUTPATIENT
Start: 2024-08-12

## 2024-08-12 RX ORDER — PROPOFOL 10 MG/ML
INJECTION, EMULSION INTRAVENOUS CONTINUOUS PRN
Status: SHIPPED | OUTPATIENT
Start: 2024-08-12

## 2024-08-12 RX ORDER — FENTANYL CITRATE 50 UG/ML
INJECTION, SOLUTION INTRAMUSCULAR; INTRAVENOUS CONTINUOUS PRN
Status: SHIPPED | OUTPATIENT
Start: 2024-08-12

## 2024-08-12 ASSESSMENT — PAIN SCALES - GENERAL: PAINLEVEL_OUTOF10: 5 - MODERATE PAIN

## 2024-08-12 ASSESSMENT — PAIN - FUNCTIONAL ASSESSMENT: PAIN_FUNCTIONAL_ASSESSMENT: 0-10

## 2024-08-16 ENCOUNTER — APPOINTMENT (OUTPATIENT)
Dept: RADIOLOGY | Facility: HOSPITAL | Age: 69
End: 2024-08-16
Payer: COMMERCIAL

## 2024-08-20 DIAGNOSIS — E11.69 TYPE 2 DIABETES MELLITUS WITH OTHER SPECIFIED COMPLICATION, WITH LONG-TERM CURRENT USE OF INSULIN (MULTI): ICD-10-CM

## 2024-08-20 DIAGNOSIS — Z79.4 TYPE 2 DIABETES MELLITUS WITH OTHER SPECIFIED COMPLICATION, WITH LONG-TERM CURRENT USE OF INSULIN (MULTI): ICD-10-CM

## 2024-08-20 RX ORDER — CALCIUM CITRATE/VITAMIN D3 200MG-6.25
TABLET ORAL
Qty: 60 STRIP | Refills: 3 | Status: SHIPPED | OUTPATIENT
Start: 2024-08-20

## 2024-08-21 DIAGNOSIS — I10 PRIMARY HYPERTENSION: ICD-10-CM

## 2024-08-21 RX ORDER — POTASSIUM CHLORIDE 20 MEQ/1
20 TABLET, EXTENDED RELEASE ORAL DAILY
Qty: 90 TABLET | Refills: 3 | Status: SHIPPED | OUTPATIENT
Start: 2024-08-21 | End: 2025-08-21

## 2024-08-23 ENCOUNTER — APPOINTMENT (OUTPATIENT)
Dept: PRIMARY CARE | Facility: CLINIC | Age: 69
End: 2024-08-23
Payer: COMMERCIAL

## 2024-08-26 ENCOUNTER — APPOINTMENT (OUTPATIENT)
Dept: RADIOLOGY | Facility: HOSPITAL | Age: 69
End: 2024-08-26
Payer: COMMERCIAL

## 2024-09-03 ENCOUNTER — TELEPHONE (OUTPATIENT)
Dept: PRIMARY CARE | Facility: CLINIC | Age: 69
End: 2024-09-03
Payer: MEDICARE

## 2024-09-03 NOTE — TELEPHONE ENCOUNTER
Patient states ever since tresiba she has had problems keeping blood sugar up   Eating chocolate and peanut butter at night due to it being low   I offered appt and declined does not have transportation     Has appt with you 10/30 per patient she will make it to appt

## 2024-09-05 ENCOUNTER — TELEPHONE (OUTPATIENT)
Age: 69
End: 2024-09-05

## 2024-09-05 NOTE — TELEPHONE ENCOUNTER
PATIENT CALLING BECAUSE SHE IS GOING TO BE HAVING CARPAL TUNNEL SURGERY SOON. ( NO DATE SET YET) BUT DR DECKER WHO IS DOING THE SURGERY IS ASKING PATIENT IF SHE WOULD LIKE TO GO THROUGH DR WHITE FOR HER POST OP.  SHE HAS AN APPOINTMENT THAT WE SET UP FOR 9/30 BUT SURGERY MIGHT BE SOONER.  WOULD DR WHITE BE WILLING TO SEE HER FOR THAT?    PLEASE ADVISE

## 2024-09-06 NOTE — TELEPHONE ENCOUNTER
Postop for carpal tunnel surgery should be with the surgeon.  I'm happy to see her for her other concerns as a regular follow up as needed.

## 2024-09-12 DIAGNOSIS — G56.01 RIGHT CARPAL TUNNEL SYNDROME: Primary | ICD-10-CM

## 2024-09-23 ASSESSMENT — DUKE ACTIVITY SCORE INDEX (DASI)
CAN YOU HAVE SEXUAL RELATIONS: YES
CAN YOU WALK INDOORS, SUCH AS AROUND YOUR HOUSE: YES
TOTAL_SCORE: 15.2
DASI METS SCORE: 4.6
CAN YOU PARTICIPATE IN STRENOUS SPORTS LIKE SWIMMING, SINGLES TENNIS, FOOTBALL, BASKETBALL, OR SKIING: NO
CAN YOU DO HEAVY WORK AROUND THE HOUSE LIKE SCRUBBING FLOORS OR LIFTING AND MOVING HEAVY FURNITURE: NO
CAN YOU PARTICIPATE IN MODERATE RECREATIONAL ACTIVITIES LIKE GOLF, BOWLING, DANCING, DOUBLES TENNIS OR THROWING A BASEBALL OR FOOTBALL: NO
CAN YOU DO LIGHT WORK AROUND THE HOUSE LIKE DUSTING OR WASHING DISHES: YES
CAN YOU RUN A SHORT DISTANCE: NO
CAN YOU CLIMB A FLIGHT OF STAIRS OR WALK UP A HILL: NO
CAN YOU DO MODERATE WORK AROUND THE HOUSE LIKE VACUUMING, SWEEPING FLOORS OR CARRYING GROCERIES: NO
CAN YOU WALK A BLOCK OR TWO ON LEVEL GROUND: YES
CAN YOU TAKE CARE OF YOURSELF (EAT, DRESS, BATHE, OR USE TOILET): YES
CAN YOU DO YARD WORK LIKE RAKING LEAVES, WEEDING OR PUSHING A MOWER: NO

## 2024-09-23 ASSESSMENT — ACTIVITIES OF DAILY LIVING (ADL): ADL_SCORE: 1

## 2024-09-23 ASSESSMENT — LIFESTYLE VARIABLES: SMOKING_STATUS: NONSMOKER

## 2024-09-24 ENCOUNTER — LAB (OUTPATIENT)
Dept: LAB | Facility: LAB | Age: 69
End: 2024-09-24
Payer: COMMERCIAL

## 2024-09-24 ENCOUNTER — PRE-ADMISSION TESTING (OUTPATIENT)
Dept: PREADMISSION TESTING | Facility: HOSPITAL | Age: 69
End: 2024-09-24
Payer: MEDICARE

## 2024-09-24 VITALS
WEIGHT: 240.3 LBS | HEIGHT: 61 IN | BODY MASS INDEX: 45.37 KG/M2 | SYSTOLIC BLOOD PRESSURE: 146 MMHG | DIASTOLIC BLOOD PRESSURE: 71 MMHG | OXYGEN SATURATION: 95 % | RESPIRATION RATE: 17 BRPM | TEMPERATURE: 98.6 F | HEART RATE: 90 BPM

## 2024-09-24 DIAGNOSIS — Z01.818 PREOP EXAMINATION: Primary | ICD-10-CM

## 2024-09-24 DIAGNOSIS — G56.01 RIGHT CARPAL TUNNEL SYNDROME: ICD-10-CM

## 2024-09-24 DIAGNOSIS — Z01.818 PREOP EXAMINATION: ICD-10-CM

## 2024-09-24 LAB
ANION GAP SERPL CALC-SCNC: 16 MMOL/L (ref 10–20)
BUN SERPL-MCNC: 20 MG/DL (ref 6–23)
CALCIUM SERPL-MCNC: 9.4 MG/DL (ref 8.6–10.3)
CHLORIDE SERPL-SCNC: 98 MMOL/L (ref 98–107)
CO2 SERPL-SCNC: 30 MMOL/L (ref 21–32)
CREAT SERPL-MCNC: 1.07 MG/DL (ref 0.5–1.05)
EGFRCR SERPLBLD CKD-EPI 2021: 57 ML/MIN/1.73M*2
ERYTHROCYTE [DISTWIDTH] IN BLOOD BY AUTOMATED COUNT: 14.6 % (ref 11.5–14.5)
GLUCOSE SERPL-MCNC: 154 MG/DL (ref 74–99)
HCT VFR BLD AUTO: 39.6 % (ref 36–46)
HGB BLD-MCNC: 12.2 G/DL (ref 12–16)
MCH RBC QN AUTO: 26.3 PG (ref 26–34)
MCHC RBC AUTO-ENTMCNC: 30.8 G/DL (ref 32–36)
MCV RBC AUTO: 85 FL (ref 80–100)
NRBC BLD-RTO: 0 /100 WBCS (ref 0–0)
PLATELET # BLD AUTO: 374 X10*3/UL (ref 150–450)
POTASSIUM SERPL-SCNC: 4 MMOL/L (ref 3.5–5.3)
RBC # BLD AUTO: 4.64 X10*6/UL (ref 4–5.2)
SODIUM SERPL-SCNC: 140 MMOL/L (ref 136–145)
WBC # BLD AUTO: 10.7 X10*3/UL (ref 4.4–11.3)

## 2024-09-24 PROCEDURE — 80048 BASIC METABOLIC PNL TOTAL CA: CPT

## 2024-09-24 PROCEDURE — 99212 OFFICE O/P EST SF 10 MIN: CPT | Performed by: NURSE PRACTITIONER

## 2024-09-24 PROCEDURE — 85027 COMPLETE CBC AUTOMATED: CPT

## 2024-09-24 NOTE — H&P (VIEW-ONLY)
"CPM/PAT Evaluation       Name: Kaylan Woo (Kaylan Woo \"Mariluz\")  /Age: 1955/68 y.o.     In-Person       Chief Complaint: Known right carpal tunnel syndrome    HPI    APOLINAR is a 69 yo female who has been having upper neck pains with bilateral numbness tingling down the arms and hands.  She is getting scheduled for cervical MRI in the meantime for further evaluation of the cervical spine considering she has had surgery in the past.  She also was evaluated by hand surgeon with EMG testing confirming bilateral carpal tunnel syndrome, right worse than left-subsequently has been scheduled for right carpal tunnel release surgery with left hand to undergo surgery in the near future. Presents to [facility] today for perioperative risk stratification and optimization. She denies any recent steroid injections in either hands.  Skin is intact to surgical hand. Otherwise denies any recent illness, fever/chills, chest pains or shortness of breath.     Past Medical History:   Diagnosis Date    Adverse effect of anesthesia     Arthritis     Asthma ([facility])     Body mass index (BMI)40.0-44.9, adult 2021    BMI 40.0-44.9, adult    COPD (chronic obstructive pulmonary disease) (Multi)     COVID-19 2021    Pneumonia due to COVID-19 virus    Deficiency of other specified B group vitamins 2021    Vitamin B 12 deficiency    Depression     GERD (gastroesophageal reflux disease)     Hard to intubate     Hiatal hernia     Hyperlipidemia     Hypertension     Personal history of other drug therapy 10/25/2018    History of influenza vaccination    Personal history of other medical treatment     History of nuclear stress test    PVD (peripheral vascular disease) (CMS-HCC)     Pyothorax without fistula (Multi) 10/17/2022    Empyema    Skin cancer     Sleep apnea     Solitary pulmonary nodule 2021    Lung nodule    Type 2 diabetes mellitus (Multi)      PCP: Dr. Herbert    Lab Results   Component Value Date    " "WBC 12.1 (H) 05/20/2024    HGB 11.3 (L) 05/20/2024    HCT 35.8 (L) 05/20/2024    MCV 85 05/20/2024     05/20/2024     Lab Results   Component Value Date    GLUCOSE 173 (H) 07/29/2024    CALCIUM 9.5 07/29/2024     07/29/2024    K 4.4 07/29/2024    CO2 31 07/29/2024    CL 98 07/29/2024    BUN 19 07/29/2024    CREATININE 0.90 07/29/2024     Hemoglobin A1C   Date Value Ref Range Status   07/29/2024 7.3 (H) see below % Final      carotid artery duplex was in 2019  \"IMPRESSION:  1. Calcified atherosclerotic plaque in the bilateral carotid bulb,  and internal carotid arteries.  2. Suboptimal evaluation of the vertebral arteries.  3. 50-69% left internal carotid artery stenosis.\".             Lab Results   Component Value Date    WBC 12.1 (H) 05/20/2024    HGB 11.3 (L) 05/20/2024    HCT 35.8 (L) 05/20/2024    MCV 85 05/20/2024     05/20/2024     Lab Results   Component Value Date    GLUCOSE 173 (H) 07/29/2024    CALCIUM 9.5 07/29/2024     07/29/2024    K 4.4 07/29/2024    CO2 31 07/29/2024    CL 98 07/29/2024    BUN 19 07/29/2024    CREATININE 0.90 07/29/2024     Hemoglobin A1C   Date Value Ref Range Status   07/29/2024 7.3 (H) see below % Final      Encounter Date: 07/29/24   ECG 12 lead (Clinic Performed)   Result Value    Ventricular Rate 88    Atrial Rate 88    MN Interval 150    QRS Duration 84    QT Interval 394    QTC Calculation(Bazett) 476    P Axis 48    R Axis 21    T Axis 88    QRS Count 14    Q Onset 220    P Onset 145    P Offset 191    T Offset 417    QTC Fredericia 447    Narrative    Normal sinus rhythm  Prolonged QT  Abnormal ECG  When compared with ECG of 30-JAN-2021 17:12,  Premature atrial complexes are no longer Present  Confirmed by Uriel Figueroa ([id number]) on 8/4/2024 6:57:54 PM       Past Surgical History:   Procedure Laterality Date    CATARACT EXTRACTION      COLONOSCOPY      LUMBAR LAMINECTOMY      OTHER SURGICAL HISTORY  02/03/2021    Thoracic discectomy    OTHER SURGICAL " HISTORY  01/10/2022    Neck surgery    OTHER SURGICAL HISTORY  2021    Colonoscopy    OTHER SURGICAL HISTORY  10/06/2020    Hand surgery    OTHER SURGICAL HISTORY  10/06/2020    Lumpectomy    OTHER SURGICAL HISTORY  10/06/2020     section    OTHER SURGICAL HISTORY Bilateral 10/06/2020    Knee replacement    OTHER SURGICAL HISTORY  10/06/2020    Tonsillectomy    OTHER SURGICAL HISTORY  2019    Hysterectomy total abdominal with removal of both ovaries    TONSILLECTOMY      TOTAL KNEE ARTHROPLASTY Bilateral     TRIGGER FINGER RELEASE         Patient  has no history on file for sexual activity.    Family History   Problem Relation Name Age of Onset    Alzheimer's disease Mother      Atrial fibrillation Mother      Lupus Mother      Hypertension Mother      Arthritis Father      Other (rheumatic disease) Father      Charcot-Charisse-Tooth disease Father      Charcot-Charisse-Tooth disease Sister      Diabetes Sister      Hypertension Sister      Hypertension Brother         Allergies   Allergen Reactions    Aspartame Headache and Other    Celecoxib Shortness of breath and Rash    Rofecoxib Shortness of breath and Rash    Sulfa (Sulfonamide Antibiotics) Anaphylaxis    Sulfasalazine Anaphylaxis    Sulfonylureas Anaphylaxis    Sulfur Anaphylaxis    Buprenorphine Other     dizziness    Diet Foods Other     migraine headache. Allergy to aspartame only. Able to tolerate sucralose (splenda).    Ezetimibe Other     Couldn't walk    Iodides Other    Metformin Other     Severe GI SE    Nalidixic Acid Unknown     Pt does not think she is allergic to this    Nsaids (Non-Steroidal Anti-Inflammatory Drug) Unknown     Pt denies allergy    Pyrazolones Other     Pt does not know if allergy    Rivaroxaban Hives    Salicylates Unknown     Pt states she is not allergic to    Shellfish Containing Products Nausea/vomiting     scallops    Shellfish Derived Nausea/vomiting     scallops    Statins-Hmg-Coa Reductase Inhibitors Other     Thiazides Unknown     Pt denies allergy    Cyclobenzaprine Rash    Latex Swelling and Rash    Valdecoxib Swelling and Rash       Prior to Admission medications    Medication Sig Start Date End Date Taking? Authorizing Provider   aspirin 81 mg EC tablet Take 1 tablet (81 mg) by mouth once daily.    Historical Provider, MD   bumetanide (Bumex) 0.5 mg tablet Take 1 tablet (0.5 mg) by mouth once daily. Take with 1 mg daily 5/24/24 5/24/25  Radha Holt MD   bumetanide (Bumex) 1 mg tablet Take 1 tablet (1 mg) by mouth once daily. Take with 0.5mg daily 7/12/24 7/12/25  Radha Holt MD   cholecalciferol (Vitamin D-3) 50 mcg (2,000 unit) capsule Take by mouth. 7/13/22   Historical Provider, MD   dilTIAZem CD (Cardizem CD) 240 mg 24 hr capsule Take 1 capsule (240 mg) by mouth once daily. 10/31/23 10/25/24  Radha Holt MD   etodolac (Lodine) 400 mg tablet Take 1 tablet (400 mg) by mouth 2 times a day. 12/18/23   Radha Holt MD   FreeStyle Ana María sensor system (FreeStyle Ana María 2 Sensor) kit 1 Cartridge every 14 days 6/2/23   MONY Fraga   gabapentin (Neurontin) 600 mg tablet TAKE 1 TABLET BY MOUTH THREE TIMES DAILY FOR 30 DAYS    Historical Provider, MD   insulin degludec (Tresiba FlexTouch U-100) 100 unit/mL (3 mL) injection Inject 40 Units under the skin once daily at bedtime. Take as directed per insulin instructions.  Patient taking differently: Inject 37 Units under the skin once daily at bedtime. Take as directed per insulin instructions. 8/8/24 8/8/25  MONY Fraga   insulin lispro (HumaLOG U-100 Insulin) 100 unit/mL injection Ssri  Patient not taking: Reported on 8/12/2024 10/30/23   Radha Holt MD   L. acidophilus/Bifid. animalis 32 billion cell capsule Take 1 capsule by mouth once daily. 1/10/22   Historical Provider, MD   losartan (Cozaar) 50 mg tablet Take 1 tablet (50 mg) by mouth 2 times a day. 7/12/24   Radha Holt MD  "  montelukast (Singulair) 10 mg tablet Take 1 tablet (10 mg) by mouth once daily. 7/12/24   Radha Holt MD   naloxone (Narcan) 4 mg/0.1 mL nasal spray SPARY 1 SPRAY INTO ONE NOSTRIL AS NEEDED FOR OPIOID REVERSAL AS DIRECTED 11/22/22   Historical Provider, MD   omeprazole (PriLOSEC) 40 mg DR capsule Take 1 capsule (40 mg) by mouth once daily. 2/12/24   Radha Holt MD   oxyCODONE (Roxicodone) 5 mg immediate release tablet Every 6 hours 3/16/23   Historical Provider, MD   pen needle, diabetic 32 gauge x 5/32\" needle Use with daily glucose testing 7/6/23   MONY Fraga   potassium chloride CR 20 mEq ER tablet Take 1 tablet (20 mEq) by mouth once daily. DO NOT CRUSH CHEW OR SPLIT 8/21/24 8/21/25  MONY Peng   semaglutide (Ozempic) 0.25 mg or 0.5 mg(2 mg/1.5 mL) pen injector Inject under the skin 1 (one) time per week.    Historical Provider, MD   tiZANidine (Zanaflex) 4 mg capsule Take 1 capsule (4 mg) by mouth 3 times a day.    Historical Provider, MD   True Metrix Glucose Test Strip strip Use to check blood sugar twice daily 8/20/24   Radha Holt MD   venlafaxine XR (Effexor-XR) 150 mg 24 hr capsule Take 1 capsule (150 mg total) by mouth daily with breakfast for 30 days. 7/12/24   Radha Holt MD   venlafaxine XR (Effexor-XR) 75 mg 24 hr capsule Take 1 capsule (75 mg) by mouth once daily. Take with food. 7/11/24   MONY Fraga   Ventolin HFA 90 mcg/actuation inhaler Ventolin ade 2 puffs q4h prn 11/29/23   Radha Holt MD   VITAMIN B COMPLEX ORAL Take 1 tablet by mouth once daily in the morning. 1/10/22   Historical Provider, MD MAGGIE JARVIS   Constitutional: Negative for fever, chills, or sweats   ENMT: Negative for nasal discharge, congestion, ear pain, mouth pain, throat pain   Respiratory: Negative for cough, wheezing, shortness of breath   Cardiac: Negative for chest pain, dyspnea on exertion, " palpitations     Gastrointestinal: Negative for nausea, vomiting, diarrhea, constipation, abdominal pain; + occasional reflux issues causing choking with eating    Genitourinary: Negative for dysuria, flank pain, frequency, hematuria     Musculoskeletal: Positive for decreased ROM, pain, weakness in upper neck from cervical spine; + Numbness and tingling with limited range of motion both hands carpal tunnel      Neurological: Negative for dizziness, confusion, headache  Psychiatric: Negative for mood changes   Skin: Negative for itching, rash, ulcer    Hematologic/Lymph: Negative for bruising, easy bleeding  Allergic/Immunologic: Negative itching, sneezing, swelling     Physical Exam  Constitutional:       Appearance: Normal appearance. She is obese.   HENT:      Head: Normocephalic.      Mouth/Throat:      Mouth: Mucous membranes are moist.   Eyes:      Extraocular Movements: Extraocular movements intact.   Cardiovascular:      Rate and Rhythm: Normal rate and regular rhythm.   Pulmonary:      Effort: Pulmonary effort is normal.      Breath sounds: Normal breath sounds.   Abdominal:      General: Abdomen is flat.      Palpations: Abdomen is soft.   Musculoskeletal:      Right hand: Decreased range of motion. Decreased strength. Decreased sensation.      Left hand: Decreased range of motion. Decreased strength. Decreased sensation.      Cervical back: Normal range of motion.   Skin:     General: Skin is warm and dry.   Neurological:      General: No focal deficit present.      Mental Status: She is alert.   Psychiatric:         Mood and Affect: Mood normal.        PAT AIRWAY:   Airway:     Neck ROM::  Full  normal        Visit Vitals  /71   Pulse 90   Temp 37 °C (98.6 °F) (Temporal)   Resp 17       DASI Risk Score      Flowsheet Row Most Recent Value   DASI SCORE 15.2   METS Score (Will be calculated only when all the questions are answered) 4.6          Caprini DVT Assessment      Flowsheet Row Most Recent  Value   DVT Score 12   Current Status Minor surgery planned, COPD   History Prior major surgery, COPD, Pneumonia, Previous malignancy   Age 60-75 years   BMI 41-50 (Morbid obesity)          Modified Frailty Index      Flowsheet Row Most Recent Value   Modified Frailty Index Calculator .2727          CHADS2 Stroke Risk  Current as of today        N/A 3 to 100%: High Risk   2 to < 3%: Medium Risk   0 to < 2%: Low Risk     Last Change: N/A          This score determines the patient's risk of having a stroke if the patient has atrial fibrillation.        This score is not applicable to this patient. Components are not calculated.          Revised Cardiac Risk Index      Flowsheet Row Most Recent Value   Revised Cardiac Risk Calculator 1          Apfel Simplified Score    No data to display       Risk Analysis Index Results This Encounter         9/23/2024  1139             DIAZ Cancer History: Patient does not indicate history of cancer    Total Risk Analysis Index Score Without Cancer: 21    Total Risk Analysis Index Score: 21          Stop Bang Score      Flowsheet Row Most Recent Value   Do you snore loudly? 1   Do you often feel tired or fatigued after your sleep? 1   Has anyone ever observed you stop breathing in your sleep? 0   Do you have or are you being treated for high blood pressure? 1   Recent BMI (Calculated) 45.8   Is BMI greater than 35 kg/m2? 1=Yes   Age older than 50 years old? 1=Yes   Is your neck circumference greater than 17 inches (Male) or 16 inches (Female)? 1   Gender - Male 0=No   STOP-BANG Total Score 6            Assessment and Plan:     Preop:   68-year-old female scheduled for right carpal tunnel release on 10/11/2024 with Dr. Cifuentes.  Blood work ordered.  Previous EKG from July on file as well under cardiology. Otherwise no further orders indicated.     Cardiac:  -Hypertension, compliant with taking aspirin, Bumex, Cardizem, and losartan  -Hyperlipidemia, Intolerant to statins   -Carotid  artery disease, takes aspirin; carotid ultrasound 2021  -PVD  -Follows with vascular and cardiology  -These conditions are managed by PCP  DASI Score: 15.2   MET Score: 4.6  RCRI 1, 6% risk for postoperative MACE     Pulmonary:   -Asthma, allergy induced, currently stable; does NOT use her Breo inhaler; uses albuterol inhaler for as needed use  -Lung nodule  -JEAN-PAUL, noncompliant with CPAP   -COPD, denies this dx (is a former smoker), no current treatment   STOP BANG 6, high risk obstructive sleep apnea    Neuro  -Cervical spine disease with history of spinal surgery requiring hardware  -Thoracic spine disease with a history of thoracic discectomy  -Taking gabapentin and muscle relaxers for symptoms   -patient is at an increased risk for post operative delirium secondary to age >/= 65.  Preoperative brain exercise educational handout provided to patient.  -The patient is at an increased risk for perioperative stroke secondary to HTN, HLD, and female sex .    Endocrine  -Type 2 diabetes mellitus, insulin-dependent; compliant with taking short acting insulin with sliding scale before meals and long-acting insulin in the evenings (she has been instructed on insulin regimen for prior to surgery); she shares that glucose has been running lower, uses skin glucose monitoring device    Hemoglobin A1C   Date Value Ref Range Status   07/29/2024 7.3 (H) see below % Final   -Taking Ozempic injections on a weekly basis on Mondays, instructed to hold dose on 10/2/2024 for hold of 7 days prior to surgery  -Managed by PCP     GI  -GERD  -Schwab's esophagus  -Follows with GI; compliant with taking PPI  Apfel score of 2 with 39 % PONV risk    Neuro-muscular:   Osteoarthritis, uses a rolator     Hem/Immuno  -Iron deficiency anemia, no longer taking iron supplements  -high Caprini score of  12 patient is at HIGH risk for perioperative DVT, informative education handout provided  -COVID-19 infection in 2021, complicated with  "hospitalization requiring supplemental oxygen (did not require intubation); has residual symptoms of  \"COVID toes\": Intermittently discolored toes.     Psych  Depression, taking Effexor    Anesthesia   Patient has a history of difficulty intubation-states she can only have fiberoptic intubation-otherwise no issues  -Upcoming scheduled surgery is planned for regional anesthesia  ASA 3: A to review  BMI 45.40    See risk scores as previously documented.     "

## 2024-09-24 NOTE — CPM/PAT H&P
"CPM/PAT Evaluation       Name: Kaylan Woo (Kaylan Woo \"Mariluz\")  /Age: 1955/68 y.o.     In-Person       Chief Complaint: Known right carpal tunnel syndrome    HPI    APOLINAR is a 69 yo female who has been having upper neck pains with bilateral numbness tingling down the arms and hands.  She is getting scheduled for cervical MRI in the meantime for further evaluation of the cervical spine considering she has had surgery in the past.  She also was evaluated by hand surgeon with EMG testing confirming bilateral carpal tunnel syndrome, right worse than left-subsequently has been scheduled for right carpal tunnel release surgery with left hand to undergo surgery in the near future. Presents to [facility] today for perioperative risk stratification and optimization. She denies any recent steroid injections in either hands.  Skin is intact to surgical hand. Otherwise denies any recent illness, fever/chills, chest pains or shortness of breath.     Past Medical History:   Diagnosis Date    Adverse effect of anesthesia     Arthritis     Asthma ([facility])     Body mass index (BMI)40.0-44.9, adult 2021    BMI 40.0-44.9, adult    COPD (chronic obstructive pulmonary disease) (Multi)     COVID-19 2021    Pneumonia due to COVID-19 virus    Deficiency of other specified B group vitamins 2021    Vitamin B 12 deficiency    Depression     GERD (gastroesophageal reflux disease)     Hard to intubate     Hiatal hernia     Hyperlipidemia     Hypertension     Personal history of other drug therapy 10/25/2018    History of influenza vaccination    Personal history of other medical treatment     History of nuclear stress test    PVD (peripheral vascular disease) (CMS-HCC)     Pyothorax without fistula (Multi) 10/17/2022    Empyema    Skin cancer     Sleep apnea     Solitary pulmonary nodule 2021    Lung nodule    Type 2 diabetes mellitus (Multi)      PCP: Dr. Herbert    Lab Results   Component Value Date    " "WBC 12.1 (H) 05/20/2024    HGB 11.3 (L) 05/20/2024    HCT 35.8 (L) 05/20/2024    MCV 85 05/20/2024     05/20/2024     Lab Results   Component Value Date    GLUCOSE 173 (H) 07/29/2024    CALCIUM 9.5 07/29/2024     07/29/2024    K 4.4 07/29/2024    CO2 31 07/29/2024    CL 98 07/29/2024    BUN 19 07/29/2024    CREATININE 0.90 07/29/2024     Hemoglobin A1C   Date Value Ref Range Status   07/29/2024 7.3 (H) see below % Final      carotid artery duplex was in 2019  \"IMPRESSION:  1. Calcified atherosclerotic plaque in the bilateral carotid bulb,  and internal carotid arteries.  2. Suboptimal evaluation of the vertebral arteries.  3. 50-69% left internal carotid artery stenosis.\".             Lab Results   Component Value Date    WBC 12.1 (H) 05/20/2024    HGB 11.3 (L) 05/20/2024    HCT 35.8 (L) 05/20/2024    MCV 85 05/20/2024     05/20/2024     Lab Results   Component Value Date    GLUCOSE 173 (H) 07/29/2024    CALCIUM 9.5 07/29/2024     07/29/2024    K 4.4 07/29/2024    CO2 31 07/29/2024    CL 98 07/29/2024    BUN 19 07/29/2024    CREATININE 0.90 07/29/2024     Hemoglobin A1C   Date Value Ref Range Status   07/29/2024 7.3 (H) see below % Final      Encounter Date: 07/29/24   ECG 12 lead (Clinic Performed)   Result Value    Ventricular Rate 88    Atrial Rate 88    AK Interval 150    QRS Duration 84    QT Interval 394    QTC Calculation(Bazett) 476    P Axis 48    R Axis 21    T Axis 88    QRS Count 14    Q Onset 220    P Onset 145    P Offset 191    T Offset 417    QTC Fredericia 447    Narrative    Normal sinus rhythm  Prolonged QT  Abnormal ECG  When compared with ECG of 30-JAN-2021 17:12,  Premature atrial complexes are no longer Present  Confirmed by Uriel Figueroa ([id number]) on 8/4/2024 6:57:54 PM       Past Surgical History:   Procedure Laterality Date    CATARACT EXTRACTION      COLONOSCOPY      LUMBAR LAMINECTOMY      OTHER SURGICAL HISTORY  02/03/2021    Thoracic discectomy    OTHER SURGICAL " HISTORY  01/10/2022    Neck surgery    OTHER SURGICAL HISTORY  2021    Colonoscopy    OTHER SURGICAL HISTORY  10/06/2020    Hand surgery    OTHER SURGICAL HISTORY  10/06/2020    Lumpectomy    OTHER SURGICAL HISTORY  10/06/2020     section    OTHER SURGICAL HISTORY Bilateral 10/06/2020    Knee replacement    OTHER SURGICAL HISTORY  10/06/2020    Tonsillectomy    OTHER SURGICAL HISTORY  2019    Hysterectomy total abdominal with removal of both ovaries    TONSILLECTOMY      TOTAL KNEE ARTHROPLASTY Bilateral     TRIGGER FINGER RELEASE         Patient  has no history on file for sexual activity.    Family History   Problem Relation Name Age of Onset    Alzheimer's disease Mother      Atrial fibrillation Mother      Lupus Mother      Hypertension Mother      Arthritis Father      Other (rheumatic disease) Father      Charcot-Charisse-Tooth disease Father      Charcot-Charisse-Tooth disease Sister      Diabetes Sister      Hypertension Sister      Hypertension Brother         Allergies   Allergen Reactions    Aspartame Headache and Other    Celecoxib Shortness of breath and Rash    Rofecoxib Shortness of breath and Rash    Sulfa (Sulfonamide Antibiotics) Anaphylaxis    Sulfasalazine Anaphylaxis    Sulfonylureas Anaphylaxis    Sulfur Anaphylaxis    Buprenorphine Other     dizziness    Diet Foods Other     migraine headache. Allergy to aspartame only. Able to tolerate sucralose (splenda).    Ezetimibe Other     Couldn't walk    Iodides Other    Metformin Other     Severe GI SE    Nalidixic Acid Unknown     Pt does not think she is allergic to this    Nsaids (Non-Steroidal Anti-Inflammatory Drug) Unknown     Pt denies allergy    Pyrazolones Other     Pt does not know if allergy    Rivaroxaban Hives    Salicylates Unknown     Pt states she is not allergic to    Shellfish Containing Products Nausea/vomiting     scallops    Shellfish Derived Nausea/vomiting     scallops    Statins-Hmg-Coa Reductase Inhibitors Other     Thiazides Unknown     Pt denies allergy    Cyclobenzaprine Rash    Latex Swelling and Rash    Valdecoxib Swelling and Rash       Prior to Admission medications    Medication Sig Start Date End Date Taking? Authorizing Provider   aspirin 81 mg EC tablet Take 1 tablet (81 mg) by mouth once daily.    Historical Provider, MD   bumetanide (Bumex) 0.5 mg tablet Take 1 tablet (0.5 mg) by mouth once daily. Take with 1 mg daily 5/24/24 5/24/25  Radha Holt MD   bumetanide (Bumex) 1 mg tablet Take 1 tablet (1 mg) by mouth once daily. Take with 0.5mg daily 7/12/24 7/12/25  Radha Holt MD   cholecalciferol (Vitamin D-3) 50 mcg (2,000 unit) capsule Take by mouth. 7/13/22   Historical Provider, MD   dilTIAZem CD (Cardizem CD) 240 mg 24 hr capsule Take 1 capsule (240 mg) by mouth once daily. 10/31/23 10/25/24  Radha Holt MD   etodolac (Lodine) 400 mg tablet Take 1 tablet (400 mg) by mouth 2 times a day. 12/18/23   Radha Holt MD   FreeStyle Ana María sensor system (FreeStyle Ana María 2 Sensor) kit 1 Cartridge every 14 days 6/2/23   MONY Fraga   gabapentin (Neurontin) 600 mg tablet TAKE 1 TABLET BY MOUTH THREE TIMES DAILY FOR 30 DAYS    Historical Provider, MD   insulin degludec (Tresiba FlexTouch U-100) 100 unit/mL (3 mL) injection Inject 40 Units under the skin once daily at bedtime. Take as directed per insulin instructions.  Patient taking differently: Inject 37 Units under the skin once daily at bedtime. Take as directed per insulin instructions. 8/8/24 8/8/25  MONY Fraga   insulin lispro (HumaLOG U-100 Insulin) 100 unit/mL injection Ssri  Patient not taking: Reported on 8/12/2024 10/30/23   Radha Holt MD   L. acidophilus/Bifid. animalis 32 billion cell capsule Take 1 capsule by mouth once daily. 1/10/22   Historical Provider, MD   losartan (Cozaar) 50 mg tablet Take 1 tablet (50 mg) by mouth 2 times a day. 7/12/24   Radha Holt MD  "  montelukast (Singulair) 10 mg tablet Take 1 tablet (10 mg) by mouth once daily. 7/12/24   Radha Holt MD   naloxone (Narcan) 4 mg/0.1 mL nasal spray SPARY 1 SPRAY INTO ONE NOSTRIL AS NEEDED FOR OPIOID REVERSAL AS DIRECTED 11/22/22   Historical Provider, MD   omeprazole (PriLOSEC) 40 mg DR capsule Take 1 capsule (40 mg) by mouth once daily. 2/12/24   Radha Holt MD   oxyCODONE (Roxicodone) 5 mg immediate release tablet Every 6 hours 3/16/23   Historical Provider, MD   pen needle, diabetic 32 gauge x 5/32\" needle Use with daily glucose testing 7/6/23   MONY Fraga   potassium chloride CR 20 mEq ER tablet Take 1 tablet (20 mEq) by mouth once daily. DO NOT CRUSH CHEW OR SPLIT 8/21/24 8/21/25  MONY Peng   semaglutide (Ozempic) 0.25 mg or 0.5 mg(2 mg/1.5 mL) pen injector Inject under the skin 1 (one) time per week.    Historical Provider, MD   tiZANidine (Zanaflex) 4 mg capsule Take 1 capsule (4 mg) by mouth 3 times a day.    Historical Provider, MD   True Metrix Glucose Test Strip strip Use to check blood sugar twice daily 8/20/24   Radha Holt MD   venlafaxine XR (Effexor-XR) 150 mg 24 hr capsule Take 1 capsule (150 mg total) by mouth daily with breakfast for 30 days. 7/12/24   Radha Holt MD   venlafaxine XR (Effexor-XR) 75 mg 24 hr capsule Take 1 capsule (75 mg) by mouth once daily. Take with food. 7/11/24   MONY Fraga   Ventolin HFA 90 mcg/actuation inhaler Ventolin ade 2 puffs q4h prn 11/29/23   Radha Holt MD   VITAMIN B COMPLEX ORAL Take 1 tablet by mouth once daily in the morning. 1/10/22   Historical Provider, MD MAGGIE JARVIS   Constitutional: Negative for fever, chills, or sweats   ENMT: Negative for nasal discharge, congestion, ear pain, mouth pain, throat pain   Respiratory: Negative for cough, wheezing, shortness of breath   Cardiac: Negative for chest pain, dyspnea on exertion, " palpitations     Gastrointestinal: Negative for nausea, vomiting, diarrhea, constipation, abdominal pain; + occasional reflux issues causing choking with eating    Genitourinary: Negative for dysuria, flank pain, frequency, hematuria     Musculoskeletal: Positive for decreased ROM, pain, weakness in upper neck from cervical spine; + Numbness and tingling with limited range of motion both hands carpal tunnel      Neurological: Negative for dizziness, confusion, headache  Psychiatric: Negative for mood changes   Skin: Negative for itching, rash, ulcer    Hematologic/Lymph: Negative for bruising, easy bleeding  Allergic/Immunologic: Negative itching, sneezing, swelling     Physical Exam  Constitutional:       Appearance: Normal appearance. She is obese.   HENT:      Head: Normocephalic.      Mouth/Throat:      Mouth: Mucous membranes are moist.   Eyes:      Extraocular Movements: Extraocular movements intact.   Cardiovascular:      Rate and Rhythm: Normal rate and regular rhythm.   Pulmonary:      Effort: Pulmonary effort is normal.      Breath sounds: Normal breath sounds.   Abdominal:      General: Abdomen is flat.      Palpations: Abdomen is soft.   Musculoskeletal:      Right hand: Decreased range of motion. Decreased strength. Decreased sensation.      Left hand: Decreased range of motion. Decreased strength. Decreased sensation.      Cervical back: Normal range of motion.   Skin:     General: Skin is warm and dry.   Neurological:      General: No focal deficit present.      Mental Status: She is alert.   Psychiatric:         Mood and Affect: Mood normal.        PAT AIRWAY:   Airway:     Neck ROM::  Full  normal        Visit Vitals  /71   Pulse 90   Temp 37 °C (98.6 °F) (Temporal)   Resp 17       DASI Risk Score      Flowsheet Row Most Recent Value   DASI SCORE 15.2   METS Score (Will be calculated only when all the questions are answered) 4.6          Caprini DVT Assessment      Flowsheet Row Most Recent  Value   DVT Score 12   Current Status Minor surgery planned, COPD   History Prior major surgery, COPD, Pneumonia, Previous malignancy   Age 60-75 years   BMI 41-50 (Morbid obesity)          Modified Frailty Index      Flowsheet Row Most Recent Value   Modified Frailty Index Calculator .2727          CHADS2 Stroke Risk  Current as of today        N/A 3 to 100%: High Risk   2 to < 3%: Medium Risk   0 to < 2%: Low Risk     Last Change: N/A          This score determines the patient's risk of having a stroke if the patient has atrial fibrillation.        This score is not applicable to this patient. Components are not calculated.          Revised Cardiac Risk Index      Flowsheet Row Most Recent Value   Revised Cardiac Risk Calculator 1          Apfel Simplified Score    No data to display       Risk Analysis Index Results This Encounter         9/23/2024  1139             DIAZ Cancer History: Patient does not indicate history of cancer    Total Risk Analysis Index Score Without Cancer: 21    Total Risk Analysis Index Score: 21          Stop Bang Score      Flowsheet Row Most Recent Value   Do you snore loudly? 1   Do you often feel tired or fatigued after your sleep? 1   Has anyone ever observed you stop breathing in your sleep? 0   Do you have or are you being treated for high blood pressure? 1   Recent BMI (Calculated) 45.8   Is BMI greater than 35 kg/m2? 1=Yes   Age older than 50 years old? 1=Yes   Is your neck circumference greater than 17 inches (Male) or 16 inches (Female)? 1   Gender - Male 0=No   STOP-BANG Total Score 6            Assessment and Plan:     Preop:   68-year-old female scheduled for right carpal tunnel release on 10/11/2024 with Dr. Cifuentes.  Blood work ordered.  Previous EKG from July on file as well under cardiology. Otherwise no further orders indicated.     Cardiac:  -Hypertension, compliant with taking aspirin, Bumex, Cardizem, and losartan  -Hyperlipidemia, Intolerant to statins   -Carotid  artery disease, takes aspirin; carotid ultrasound 2021  -PVD  -Follows with vascular and cardiology  -These conditions are managed by PCP  DASI Score: 15.2   MET Score: 4.6  RCRI 1, 6% risk for postoperative MACE     Pulmonary:   -Asthma, allergy induced, currently stable; does NOT use her Breo inhaler; uses albuterol inhaler for as needed use  -Lung nodule  -JEAN-PAUL, noncompliant with CPAP   -COPD, denies this dx (is a former smoker), no current treatment   STOP BANG 6, high risk obstructive sleep apnea    Neuro  -Cervical spine disease with history of spinal surgery requiring hardware  -Thoracic spine disease with a history of thoracic discectomy  -Taking gabapentin and muscle relaxers for symptoms   -patient is at an increased risk for post operative delirium secondary to age >/= 65.  Preoperative brain exercise educational handout provided to patient.  -The patient is at an increased risk for perioperative stroke secondary to HTN, HLD, and female sex .    Endocrine  -Type 2 diabetes mellitus, insulin-dependent; compliant with taking short acting insulin with sliding scale before meals and long-acting insulin in the evenings (she has been instructed on insulin regimen for prior to surgery); she shares that glucose has been running lower, uses skin glucose monitoring device    Hemoglobin A1C   Date Value Ref Range Status   07/29/2024 7.3 (H) see below % Final   -Taking Ozempic injections on a weekly basis on Mondays, instructed to hold dose on 10/2/2024 for hold of 7 days prior to surgery  -Managed by PCP     GI  -GERD  -Schwab's esophagus  -Follows with GI; compliant with taking PPI  Apfel score of 2 with 39 % PONV risk    Neuro-muscular:   Osteoarthritis, uses a rolator     Hem/Immuno  -Iron deficiency anemia, no longer taking iron supplements  -high Caprini score of  12 patient is at HIGH risk for perioperative DVT, informative education handout provided  -COVID-19 infection in 2021, complicated with  "hospitalization requiring supplemental oxygen (did not require intubation); has residual symptoms of  \"COVID toes\": Intermittently discolored toes.     Psych  Depression, taking Effexor    Anesthesia   Patient has a history of difficulty intubation-states she can only have fiberoptic intubation-otherwise no issues  -Upcoming scheduled surgery is planned for regional anesthesia  ASA 3: A to review  BMI 45.40    See risk scores as previously documented.     "

## 2024-09-24 NOTE — PREPROCEDURE INSTRUCTIONS
Medication List            Accurate as of September 24, 2024 10:51 AM. Always use your most recent med list.                aspirin 81 mg EC tablet  Additional Medication Adjustments for Surgery: Take last dose 7 days before surgery     * bumetanide 0.5 mg tablet  Commonly known as: Bumex  Take 1 tablet (0.5 mg) by mouth once daily. Take with 1 mg daily  Medication Adjustments for Surgery: Do Not take on the morning of surgery     * bumetanide 1 mg tablet  Commonly known as: Bumex  Take 1 tablet (1 mg) by mouth once daily. Take with 0.5mg daily  Medication Adjustments for Surgery: Do Not take on the morning of surgery     cholecalciferol 50 mcg (2,000 unit) capsule  Commonly known as: Vitamin D-3  Additional Medication Adjustments for Surgery: Take last dose 7 days before surgery  Notes to patient: STOP all NSAIDS, over the counter medications, herbals and supplements for 7 days prior to surgery      dilTIAZem  mg 24 hr capsule  Commonly known as: Cardizem CD  Take 1 capsule (240 mg) by mouth once daily.  Medication Adjustments for Surgery: Take/Use as prescribed     etodolac 400 mg tablet  Commonly known as: Lodine  Take 1 tablet (400 mg) by mouth 2 times a day.  Additional Medication Adjustments for Surgery: Take last dose 7 days before surgery     FreeStyle Ana María 2 Sensor kit  Generic drug: flash glucose sensor kit  1 Cartridge every 14 days     gabapentin 600 mg tablet  Commonly known as: Neurontin  Medication Adjustments for Surgery: Take/Use as prescribed     insulin degludec 100 unit/mL (3 mL) injection  Commonly known as: Tresiba FlexTouch U-100  Inject 40 Units under the skin once daily at bedtime. Take as directed per insulin instructions.  Additional Medication Adjustments for Surgery: Other (Comment)  Notes to patient: Usually 35 units at night    So half dose,    Take 17 units the night before surgery, on 10/6/2024     insulin lispro 100 unit/mL injection  Commonly known as: HumaLOG U-100  "Insulin  Ssri  Additional Medication Adjustments for Surgery: Other (Comment)  Notes to patient: Sliding scale before meals     L. acidophilus/Bifid. animalis 32 billion cell capsule  Additional Medication Adjustments for Surgery: Take last dose 7 days before surgery     losartan 50 mg tablet  Commonly known as: Cozaar  Take 1 tablet (50 mg) by mouth 2 times a day.  Additional Medication Adjustments for Surgery: Other (Comment)  Notes to patient: HOLD any evening dose the night before the day of surgery  HOLD the day of surgery     montelukast 10 mg tablet  Commonly known as: Singulair  Take 1 tablet (10 mg) by mouth once daily.  Medication Adjustments for Surgery: Take/Use as prescribed     naloxone 4 mg/0.1 mL nasal spray  Commonly known as: Narcan  Additional Medication Adjustments for Surgery: Other (Comment)  Notes to patient: May use the morning of surgery if needed     omeprazole 40 mg DR capsule  Commonly known as: PriLOSEC  Take 1 capsule (40 mg) by mouth once daily.  Medication Adjustments for Surgery: Take/Use as prescribed     oxyCODONE 5 mg immediate release tablet  Commonly known as: Roxicodone  Additional Medication Adjustments for Surgery: Other (Comment)  Notes to patient: May use the morning of surgery if needed     Ozempic 0.25 mg or 0.5 mg(2 mg/1.5 mL) pen injector  Generic drug: semaglutide  Additional Medication Adjustments for Surgery: Take last dose 7 days before surgery  Notes to patient: DO NOT TAKE OZEMPIC DOSE ON 10/2/2024, one week prior to surgery     pen needle, diabetic 32 gauge x 5/32\" needle  Use with daily glucose testing     potassium chloride CR 20 mEq ER tablet  Commonly known as: Klor-Con M20  Take 1 tablet (20 mEq) by mouth once daily. DO NOT CRUSH CHEW OR SPLIT  Medication Adjustments for Surgery: Take last dose 1 day (24 hours) before surgery     tiZANidine 4 mg capsule  Commonly known as: Zanaflex  Medication Adjustments for Surgery: Take/Use as prescribed     True Metrix " Glucose Test Strip strip  Generic drug: blood sugar diagnostic  Use to check blood sugar twice daily     * venlafaxine XR 75 mg 24 hr capsule  Commonly known as: Effexor-XR  Take 1 capsule (75 mg) by mouth once daily. Take with food.  Medication Adjustments for Surgery: Take/Use as prescribed     * venlafaxine  mg 24 hr capsule  Commonly known as: Effexor-XR  Take 1 capsule (150 mg total) by mouth daily with breakfast for 30 days.  Medication Adjustments for Surgery: Take/Use as prescribed     Ventolin HFA 90 mcg/actuation inhaler  Generic drug: albuterol  Ventolin ade 2 puffs q4h prn  Medication Adjustments for Surgery: Take/Use as prescribed     VITAMIN B COMPLEX ORAL  Additional Medication Adjustments for Surgery: Take last dose 7 days before surgery           * This list has 4 medication(s) that are the same as other medications prescribed for you. Read the directions carefully, and ask your doctor or other care provider to review them with you.                                  PRE-OPERATIVE INSTRUCTIONS    You will receive notification one business day prior to your procedure to confirm your arrival time. It is important that you answer your phone and/or check your messages during this time. If you do not hear from the surgery center by 5 pm. the day before your procedure, please call [phone].     Please enter the building through the Outpatient entrance and take the elevator off the lobby to the 2nd floor then check in at the Outpatient Surgery desk on the 2nd floor.    INSTRUCTIONS:  Talk to your surgeon for instructions if you should stop your aspirin, blood thinner, or diabetes medicines.  DO NOT take any multivitamins or over the counter supplements for 7-10 days before surgery.  If not being admitted, you must have an adult immediately available to drive you home after surgery. We also highly recommend you have someone stay with you for the entire day and night of your surgery.  For children  having surgery, a parent or legal guardian must accompany them to the surgery center. If this is not possible, please call [phone] to make additional arrangements.  For adults who are unable to consent or make medical decisions for themselves, a legal guardian or Power of  must accompany them to the surgery center. If this is not possible, please call [phone] to make additional arrangements.  Wear comfortable, loose fitting clothing.  All jewelry and piercings must be removed. If you are unable to remove an item or have a dermal piercing, please be sure to tell the nurse when you arrive for surgery.  Nail polish and make-up must be removed.  Avoid smoking or consuming alcohol for 24 hours before surgery.  To help prevent infection, please take a shower/bath and wash your hair the night before and/or morning of surgery (or follow other specific bathing instructions provided).    Preoperative Fasting Guidelines    Why must I stop eating and drinking near surgery time?  With sedation, food or liquid in your stomach can enter your lungs causing serious complications  Increases nausea and vomiting    When do I need to stop eating and drinking before my surgery?  Do not eat any solid food after midnight the night before your surgery/procedure unless otherwise instructed by your surgeon.   You may have up to 13.5 ounces of clear liquid until TWO hours before your instructed arrival time to the hospital.  This includes water, black tea/coffee, (no milk or cream) apple juice, and electrolyte drinks (Gatorade).   You may chew gum until TWO hours before your surgery/procedure      If applicable, notify your surgeons office immediately of any new skin changes that occur to the surgical limb.      If you have any questions or concerns, please call Pre-Admission Testing at [phone].                                       Home Preoperative Antibacterial Shower with Chlorhexidine gluconate (CHG)     What is a  home preoperative antibacterial shower?  This shower is a way of cleaning the skin with a germ killing solution before surgery. The solution contains chlorhexidine gluconate, commonly known as CHG. CHG is a skin cleanser with germ killing ability. Let your doctor know if you are allergic to chlorhexidine.    Why do I need to take a preoperative antibacterial shower?  Skin is not sterile. It is best to try to make your skin as free of germs as possible before surgery. Proper cleansing with a germ killing soap before surgery can lower the number of germs on your skin. This helps to reduce the risk of infection at the surgical site. Following the instructions listed below will help you prepare your skin for surgery.    How do I use the solution?  Begin using your CHG soap the night before and again the morning of your procedure.   Do not shave the day before or day of surgery.  Remove all jewelry until after surgery. Take off rings and take out all body-piercing jewelry.  Wash your face and hair with normal soap and shampoo before you use the CHG soap.  Apply the CHG solution to a clean wet washcloth. Move away from the water to avoid premature rinsing of the CHG soap as you are applying. Firmly lather your entire body from the neck down. Do not use CHG on your face, eyes, ears, or genitals.   Pay special attention to the area where your incisions will be located.  Do not scrub your skin too hard.  It is important to allow the CHG soap to sit on your skin for 3-5 minutes.  Rinse the solution off your body completely. Do not wash with your normal soap after the CHG soap solution.  Pat yourself dry with a clean, soft towel.  Do not apply powders, lotions or deodorants as these might block how the CHG soap works.   Dress in clean clothing.  Be sure to sleep with clean, freshly laundered sheets.  Be aware that CHG can cause stains on fabric. Rinse your washcloth and other linens that have contact with CHG completely. Use  only non-chlorine detergents to launder the items used.

## 2024-09-24 NOTE — PREPROCEDURE INSTRUCTIONS
Thank you for visiting Preadmission Testing at [facility]. If you have any changes to your health condition, please call the SURGEON's office to alert them and give them details of your symptoms.        Preoperative Brain Exercises    What are brain exercises?  A brain exercise is any activity that engages your thinking (cognitive) skills.    What types of activities are considered brain exercises?  Jigsaw puzzles, crossword puzzles, word jumble, memory games, word search, and many more.  Many can be found free online or on your phone via a mobile patrice.    Why should I do brain exercises before my surgery?  More recent research has shown brain exercise before surgery can lower the risk of postoperative delirium (confusion) which can be especially important for older adults.  Patients who did brain exercises for 5 to 10 hours the days before surgery, cut their risk of postoperative delirium in half up to 1 week after surgery.      Preoperative Deep Breathing Exercises    Why it is important to do deep breathing exercises before my surgery?  Deep breathing exercises strengthen your breathing muscles.  This helps you to recover after your surgery and decreases the chance of breathing complications.    How are the deep breathing exercises done?  Sit straight with your back supported.  Breathe in deeply and slowly through your nose. Your lower rib cage should expand and your abdomen may move forward.  Hold that breath for 3 to 5 seconds.  Breathe out through pursed lips, slowly and completely.  Rest and repeat 10 times every hour while awake.  Rest longer if you become dizzy or lightheaded.      Patient and Family Education   Ways You Can Help Prevent Blood Clots     This handout explains some simple things you can do to help prevent blood clots.      Blood clots are blockages that can form in the body's veins. When a blood clot forms in your deep veins, it may be called a deep vein thrombosis, or DVT for short. Blood clots can  happen in any part of the body where blood flows, but they are most common in the arms and legs. If a piece of a blood clot breaks free and travels to the lungs, it is called a pulmonary embolus (PE). A PE can be a very serious problem.      Being in the hospital or having surgery can raise your chances of getting a blood clot because you may not be well enough to move around as much as you normally do.      Ways you can help prevent blood clots in the hospital         Wearing SCDs. SCDs stands for Sequential Compression Devices.   SCDs are special sleeves that wrap around your legs  They attach to a pump that fills them with air to gently squeeze your legs every few minutes.   This helps return the blood in your legs to your heart.   SCDs should only be taken off when walking or bathing.   SCDs may not be comfortable, but they can help save your life.               Wearing compression stockings - if your doctor orders them. These special snug fitting stockings gently squeeze your legs to help blood flow.       Walking. Walking helps move the blood in your legs.   If your doctor says it is ok, try walking the halls at least   5 times a day. Ask us to help you get up, so you don't fall.      Taking any blood thinning medicines your doctor orders.          ©[facility]; 3/23        Ways you can help prevent blood clots at home       Wearing compression stockings - if your doctor orders them. ? Walking - to help move the blood in your legs.       Taking any blood thinning medicines your doctor orders.      Signs of a blood clot or PE      Tell your doctor or nurse know right away if you have of the problems listed below.    If you are at home, seek medical care right away. Call 911 for chest pain or problems breathing.          Signs of a blood clot (DVT) - such as pain,  swelling, redness or warmth in your arm or leg      Signs of a pulmonary embolism (PE) - such as chest     pain or feeling short of breath

## 2024-09-26 ENCOUNTER — TELEPHONE (OUTPATIENT)
Dept: PRIMARY CARE | Facility: CLINIC | Age: 69
End: 2024-09-26
Payer: MEDICARE

## 2024-09-26 DIAGNOSIS — E11.69 TYPE 2 DIABETES MELLITUS WITH OTHER SPECIFIED COMPLICATION, WITH LONG-TERM CURRENT USE OF INSULIN: ICD-10-CM

## 2024-09-26 DIAGNOSIS — Z79.4 TYPE 2 DIABETES MELLITUS WITH OTHER SPECIFIED COMPLICATION, WITH LONG-TERM CURRENT USE OF INSULIN: ICD-10-CM

## 2024-09-26 NOTE — TELEPHONE ENCOUNTER
Pt called in and left VM requesting a refill on Humalog Kwikpen . Pt stated that it has been over 2 years since last RX was sent in because she had 2 box. She is requesting a refill now because hers has . Ok to format       Please advise

## 2024-09-27 RX ORDER — SEMAGLUTIDE 0.68 MG/ML
INJECTION, SOLUTION SUBCUTANEOUS
Qty: 12 ML | Refills: 0 | Status: SHIPPED | OUTPATIENT
Start: 2024-09-27

## 2024-09-27 RX ORDER — INSULIN LISPRO 100 [IU]/ML
INJECTION, SOLUTION INTRAVENOUS; SUBCUTANEOUS
Qty: 90 ML | Refills: 1 | Status: SHIPPED | OUTPATIENT
Start: 2024-09-27

## 2024-09-27 NOTE — TELEPHONE ENCOUNTER
35 units for the tresiba   Short acting humalog is sliding scale does not use humalog often unless blood sugar is elevated

## 2024-09-30 ENCOUNTER — OFFICE VISIT (OUTPATIENT)
Age: 69
End: 2024-09-30
Payer: MEDICARE

## 2024-09-30 VITALS
BODY MASS INDEX: 45.31 KG/M2 | TEMPERATURE: 97.9 F | HEIGHT: 61 IN | WEIGHT: 240 LBS | SYSTOLIC BLOOD PRESSURE: 136 MMHG | DIASTOLIC BLOOD PRESSURE: 86 MMHG

## 2024-09-30 DIAGNOSIS — Z98.890 HISTORY OF LUMBOSACRAL SPINE SURGERY: ICD-10-CM

## 2024-09-30 DIAGNOSIS — M96.1 LUMBAR POST-LAMINECTOMY SYNDROME: ICD-10-CM

## 2024-09-30 DIAGNOSIS — M47.812 CERVICAL SPONDYLOSIS WITHOUT MYELOPATHY: ICD-10-CM

## 2024-09-30 DIAGNOSIS — G47.33 OSA (OBSTRUCTIVE SLEEP APNEA): ICD-10-CM

## 2024-09-30 DIAGNOSIS — M47.817 LUMBOSACRAL SPONDYLOSIS WITHOUT MYELOPATHY: Primary | ICD-10-CM

## 2024-09-30 PROCEDURE — 1090F PRES/ABSN URINE INCON ASSESS: CPT | Performed by: PHYSICAL MEDICINE & REHABILITATION

## 2024-09-30 PROCEDURE — G8400 PT W/DXA NO RESULTS DOC: HCPCS | Performed by: PHYSICAL MEDICINE & REHABILITATION

## 2024-09-30 PROCEDURE — 99215 OFFICE O/P EST HI 40 MIN: CPT

## 2024-09-30 PROCEDURE — G8427 DOCREV CUR MEDS BY ELIG CLIN: HCPCS | Performed by: PHYSICAL MEDICINE & REHABILITATION

## 2024-09-30 PROCEDURE — 1123F ACP DISCUSS/DSCN MKR DOCD: CPT | Performed by: PHYSICAL MEDICINE & REHABILITATION

## 2024-09-30 PROCEDURE — 1036F TOBACCO NON-USER: CPT | Performed by: PHYSICAL MEDICINE & REHABILITATION

## 2024-09-30 PROCEDURE — G8417 CALC BMI ABV UP PARAM F/U: HCPCS | Performed by: PHYSICAL MEDICINE & REHABILITATION

## 2024-09-30 PROCEDURE — 3017F COLORECTAL CA SCREEN DOC REV: CPT | Performed by: PHYSICAL MEDICINE & REHABILITATION

## 2024-09-30 PROCEDURE — 99214 OFFICE O/P EST MOD 30 MIN: CPT | Performed by: PHYSICAL MEDICINE & REHABILITATION

## 2024-09-30 RX ORDER — INSULIN DEGLUDEC 100 U/ML
INJECTION, SOLUTION SUBCUTANEOUS
COMMUNITY

## 2024-09-30 ASSESSMENT — ENCOUNTER SYMPTOMS
BACK PAIN: 1
CONSTIPATION: 0
DIARRHEA: 0
NAUSEA: 0
SHORTNESS OF BREATH: 0

## 2024-09-30 NOTE — PROGRESS NOTES
pain medicines. It has been a constant ache for over 1 year. S/p Lumbar L2-S1 laminectomy 2/6/23, I&D 2/17/23 with Dr Brooke [facility]. She has obtained greater than 50% pain relief for over 6 months from prior lumbar radiofrequency ablation. Her pain is worse with bending and walking. Her pain is better with rest, sitting, and pain medicine. There are no other associated symptoms or contextual factors. She denies any new weakness, saddle anesthesia, bowel or bladder dysfunction, or falls.    Bilateral hand numbness is a 5/10 on NRS pain scale.  Gets up to a 6/10.  Numbness is worse with using her hands.  Numbness is better with rest injections and pain medicine.  Constant ache for over 1 year.  History of carpal tunnel, no prior surgical release.  No new weakness clumsiness in either hand.    Allergies:  Latex; 2,4-d dimethylamine; Aspartame and phenylalanine; Rivaroxaban; Metformin; Mcdonald-2 inhibitors (sulfonamide); Iodides; Nalidixic acid; Nsaids; Shellfish allergy; Statins; and Cyclobenzaprine    Past Medical History:   Diagnosis Date    Cancer (HCC)     SKIN    Chronic pain     COPD (chronic obstructive pulmonary disease) (HCC)     Depression     Depression     Diabetes mellitus (HCC)     Hypertension     Neuropathy     Osteoarthritis     Sleep apnea     Urinary incontinence      Past Surgical History:   Procedure Laterality Date    JOINT REPLACEMENT      SPINE SURGERY       Family History   Problem Relation Age of Onset    Arthritis Mother     Hypertension Mother     Arthritis Father      Social History     Socioeconomic History    Marital status:      Spouse name: Not on file    Number of children: Not on file    Years of education: Not on file    Highest education level: Not on file   Occupational History    Not on file   Tobacco Use    Smoking status: Former     Current packs/day: 0.00     Average packs/day: 1 pack/day for 15.0 years (15.0 ttl pk-yrs)     Types: Cigarettes     Start date:

## 2024-10-01 ENCOUNTER — TELEPHONE (OUTPATIENT)
Age: 69
End: 2024-10-01

## 2024-10-07 ENCOUNTER — ANESTHESIA EVENT (OUTPATIENT)
Dept: OPERATING ROOM | Facility: HOSPITAL | Age: 69
End: 2024-10-07
Payer: MEDICARE

## 2024-10-07 ENCOUNTER — HOSPITAL ENCOUNTER (OUTPATIENT)
Facility: HOSPITAL | Age: 69
Setting detail: OUTPATIENT SURGERY
Discharge: NURSING, REHAB OR LONG-TERM CARE | End: 2024-10-07
Payer: MEDICARE

## 2024-10-07 ENCOUNTER — TELEPHONE (OUTPATIENT)
Age: 69
End: 2024-10-07

## 2024-10-07 ENCOUNTER — ANESTHESIA (OUTPATIENT)
Dept: OPERATING ROOM | Facility: HOSPITAL | Age: 69
End: 2024-10-07
Payer: MEDICARE

## 2024-10-07 VITALS
DIASTOLIC BLOOD PRESSURE: 67 MMHG | WEIGHT: 238 LBS | BODY MASS INDEX: 44.93 KG/M2 | HEIGHT: 61 IN | TEMPERATURE: 97.3 F | HEART RATE: 84 BPM | SYSTOLIC BLOOD PRESSURE: 160 MMHG | OXYGEN SATURATION: 95 % | RESPIRATION RATE: 18 BRPM

## 2024-10-07 DIAGNOSIS — Z40.9 ENCOUNTER FOR PROPHYLACTIC SURGERY: Primary | ICD-10-CM

## 2024-10-07 DIAGNOSIS — G89.18 POST-OPERATIVE PAIN: ICD-10-CM

## 2024-10-07 LAB — GLUCOSE BLD MANUAL STRIP-MCNC: 159 MG/DL (ref 74–99)

## 2024-10-07 PROCEDURE — 82947 ASSAY GLUCOSE BLOOD QUANT: CPT

## 2024-10-07 PROCEDURE — 2720000007 HC OR 272 NO HCPCS

## 2024-10-07 PROCEDURE — 7100000009 HC PHASE TWO TIME - INITIAL BASE CHARGE

## 2024-10-07 PROCEDURE — 2500000004 HC RX 250 GENERAL PHARMACY W/ HCPCS (ALT 636 FOR OP/ED)

## 2024-10-07 PROCEDURE — 7100000010 HC PHASE TWO TIME - EACH INCREMENTAL 1 MINUTE

## 2024-10-07 PROCEDURE — 3600000008 HC OR TIME - EACH INCREMENTAL 1 MINUTE - PROCEDURE LEVEL THREE

## 2024-10-07 PROCEDURE — 3700000002 HC GENERAL ANESTHESIA TIME - EACH INCREMENTAL 1 MINUTE

## 2024-10-07 PROCEDURE — 2500000004 HC RX 250 GENERAL PHARMACY W/ HCPCS (ALT 636 FOR OP/ED): Performed by: NURSE ANESTHETIST, CERTIFIED REGISTERED

## 2024-10-07 PROCEDURE — 3600000003 HC OR TIME - INITIAL BASE CHARGE - PROCEDURE LEVEL THREE

## 2024-10-07 PROCEDURE — 3700000001 HC GENERAL ANESTHESIA TIME - INITIAL BASE CHARGE

## 2024-10-07 RX ORDER — CEFAZOLIN SODIUM 2 G/100ML
2 INJECTION, SOLUTION INTRAVENOUS ONCE
Status: DISCONTINUED | OUTPATIENT
Start: 2024-10-07 | End: 2024-10-07 | Stop reason: HOSPADM

## 2024-10-07 RX ORDER — ONDANSETRON HYDROCHLORIDE 2 MG/ML
INJECTION, SOLUTION INTRAVENOUS AS NEEDED
Status: DISCONTINUED | OUTPATIENT
Start: 2024-10-07 | End: 2024-10-07

## 2024-10-07 RX ORDER — OXYCODONE AND ACETAMINOPHEN 5; 325 MG/1; MG/1
1 TABLET ORAL EVERY 6 HOURS PRN
Qty: 12 TABLET | Refills: 0 | Status: SHIPPED | OUTPATIENT
Start: 2024-10-07 | End: 2024-10-10

## 2024-10-07 RX ORDER — LIDOCAINE HYDROCHLORIDE 5 MG/ML
INJECTION, SOLUTION INFILTRATION; INTRAVENOUS AS NEEDED
Status: DISCONTINUED | OUTPATIENT
Start: 2024-10-07 | End: 2024-10-07

## 2024-10-07 RX ORDER — BUPIVACAINE HYDROCHLORIDE 2.5 MG/ML
INJECTION, SOLUTION EPIDURAL; INFILTRATION; INTRACAUDAL AS NEEDED
Status: DISCONTINUED | OUTPATIENT
Start: 2024-10-07 | End: 2024-10-07 | Stop reason: HOSPADM

## 2024-10-07 RX ORDER — MIDAZOLAM HYDROCHLORIDE 1 MG/ML
INJECTION, SOLUTION INTRAMUSCULAR; INTRAVENOUS AS NEEDED
Status: DISCONTINUED | OUTPATIENT
Start: 2024-10-07 | End: 2024-10-07

## 2024-10-07 RX ORDER — CEFAZOLIN SODIUM 2 G/100ML
INJECTION, SOLUTION INTRAVENOUS AS NEEDED
Status: DISCONTINUED | OUTPATIENT
Start: 2024-10-07 | End: 2024-10-07

## 2024-10-07 RX ORDER — FENTANYL CITRATE 50 UG/ML
INJECTION, SOLUTION INTRAMUSCULAR; INTRAVENOUS AS NEEDED
Status: DISCONTINUED | OUTPATIENT
Start: 2024-10-07 | End: 2024-10-07

## 2024-10-07 RX ORDER — LABETALOL HYDROCHLORIDE 5 MG/ML
INJECTION, SOLUTION INTRAVENOUS AS NEEDED
Status: DISCONTINUED | OUTPATIENT
Start: 2024-10-07 | End: 2024-10-07

## 2024-10-07 RX ORDER — PROPOFOL 10 MG/ML
INJECTION, EMULSION INTRAVENOUS CONTINUOUS PRN
Status: DISCONTINUED | OUTPATIENT
Start: 2024-10-07 | End: 2024-10-07

## 2024-10-07 RX ORDER — AMOXICILLIN 500 MG/1
500 CAPSULE ORAL EVERY 12 HOURS SCHEDULED
Qty: 20 CAPSULE | Refills: 0 | Status: SHIPPED | OUTPATIENT
Start: 2024-10-07 | End: 2024-10-17

## 2024-10-07 RX ORDER — LIDOCAINE HYDROCHLORIDE 20 MG/ML
INJECTION, SOLUTION INFILTRATION; PERINEURAL AS NEEDED
Status: DISCONTINUED | OUTPATIENT
Start: 2024-10-07 | End: 2024-10-07 | Stop reason: HOSPADM

## 2024-10-07 SDOH — HEALTH STABILITY: MENTAL HEALTH: CURRENT SMOKER: 0

## 2024-10-07 ASSESSMENT — PAIN - FUNCTIONAL ASSESSMENT: PAIN_FUNCTIONAL_ASSESSMENT: 0-10

## 2024-10-07 ASSESSMENT — PAIN SCALES - GENERAL
PAINLEVEL_OUTOF10: 0 - NO PAIN
PAINLEVEL_OUTOF10: 5 - MODERATE PAIN
PAINLEVEL_OUTOF10: 0 - NO PAIN
PAINLEVEL_OUTOF10: 0 - NO PAIN

## 2024-10-07 ASSESSMENT — COLUMBIA-SUICIDE SEVERITY RATING SCALE - C-SSRS
2. HAVE YOU ACTUALLY HAD ANY THOUGHTS OF KILLING YOURSELF?: NO
1. IN THE PAST MONTH, HAVE YOU WISHED YOU WERE DEAD OR WISHED YOU COULD GO TO SLEEP AND NOT WAKE UP?: NO
6. HAVE YOU EVER DONE ANYTHING, STARTED TO DO ANYTHING, OR PREPARED TO DO ANYTHING TO END YOUR LIFE?: NO

## 2024-10-07 NOTE — DISCHARGE INSTRUCTIONS
Post-Operative Instructions    These discharge instructions provide you with general information on caring for yourself after you leave the hospital. Your doctor may also give you specific instructions. If you have any problems or questions after discharge, please call Dr. Cifuentes office [phone].  Home Going Instructions:  Take over-the-counter or prescription medications for pain as directed. Resume your usual medications at home.   Keep your hand raised (elevated) for 2-3 days on 4-5 pillows above the level of your heart as much possible even when sleeping. This keeps swelling down and helps with discomfort.  Gently move your fingers to prevent stiffness, DO NOT USE HAND. Straighten fingers to full extension. No gripping weights, grabbing, pushing, pulling, lifting or carrying.  Use hand for very minimal activity. Ex: Buttons, Zippers.  When showering cover hand with a plastic bag to keep the dressing clean, dry and intact.   DO NOT CHANGE DRESSING, Dr. Cifuentes will remove dressing in his office during the follow up visit in 3-4 days.  Call your Doctor at his office if:  You develop severe pain not relieved by medications.  You develop bleeding from your surgical site.  You have an oral temperature about 101°F.  You develop redness or swelling of the surgical site.  SEEK IMMEDIATE MEDICAL CARE IF: You have chest pain, difficulty breathy, and/or if you develop a reaction or side effects to medication given.  For Your Safety since you were given sedation/anesthesia and will be taking pain medication:  Someone should stay with you for 24 hours.  Change positions slowly.

## 2024-10-07 NOTE — ANESTHESIA PROCEDURE NOTES
Peripheral Block    Patient location during procedure: OR  Start time: 10/7/2024 12:41 PM  End time: 10/7/2024 12:44 PM  Reason for block: at surgeon's request  Staffing  Performed: CRNA   Authorized by: Agustin Vasquez MD    Performed by: AMY Landon  Preanesthetic Checklist  Completed: patient identified, IV checked, site marked, risks and benefits discussed, surgical consent, monitors and equipment checked, pre-op evaluation and timeout performed   Timeout performed at: 10/7/2024 12:40 PM  Peripheral Block  Patient position: laying flat  Prep: alcohol swabs  Patient monitoring: heart rate, cardiac monitor and continuous pulse ox  Block type: Deanne block  Laterality: left  Injection technique: single-shot  Local infiltration: lidocaine  Infiltration strength: 0.5 %  Dose: 45 mL  Assessment  Heart rate change: no

## 2024-10-07 NOTE — ANESTHESIA PREPROCEDURE EVALUATION
"Patient: Kaylan Woo \"Mariluz\"    Procedure Information       Date/Time: 10/07/24 1315    Procedure: Decompression Median Nerve with Carpal Tunnel Release (Right) - 60 min    Location: [facility] OR 05 / [facility]    Surgeons: Babak Cifuentes MD            Relevant Problems   Cardiac   (+) Abnormal EKG   (+) Arteriosclerotic coronary artery disease   (+) Hypertension   (+) Mixed hyperlipidemia      Pulmonary   (+) Asthma, moderate (HHS-HCC)   (+) Dyspnea on exertion   (+) Obstructive sleep apnea      Neuro   (+) Anxiety   (+) Brachial neurit or radiculit due to displace of cervic intervert disc   (+) Brachial neuritis or radiculitis   (+) Herniation of cervical intervertebral disc with radiculopathy   (+) New daily persistent headache   (+) Opioid use, unspecified with unspecified opioid-induced disorder (Multi)   (+) Right carpal tunnel syndrome      GI   (+) Chronic GERD   (+) Dysphagia, unspecified      Liver   (+) Fatty liver      Endocrine   (+) Severe obesity (Multi)   (+) Thyromegaly   (+) Type 2 diabetes mellitus, with long-term current use of insulin      Hematology   (+) Anemia   (+) Iron deficiency anemia      Musculoskeletal   (+) Cervical spondylosis without myelopathy   (+) Degeneration of intervertebral disc at C5-C6 level   (+) Degeneration of intervertebral disc of thoracic region   (+) Degenerative cervical disc   (+) Herniated lumbar disc without myelopathy   (+) Lumbar stenosis   (+) Osteoarthritis   (+) Primary osteoarthritis of right wrist   (+) Right carpal tunnel syndrome      HEENT   (+) Chronic sinusitis      ID   (+) Deep incisional surgical site infection   (+) Wound infection after surgery       Clinical information reviewed:   Tobacco  Allergies  Meds   Med Hx  Surg Hx  OB Status  Fam Hx  Soc   Hx        NPO Detail:  NPO/Void Status  Carbohydrate Drink Given Prior to Surgery? : N  Date of Last Liquid: 10/07/24  Time of Last Liquid: 1100  Date of Last Solid: 10/06/24  Time of " Last Solid: 2330  Last Intake Type: Clear fluids  Time of Last Void: 1030         Physical Exam    Airway  Mallampati: II  TM distance: >3 FB  Neck ROM: full     Cardiovascular   Rhythm: regular  Rate: normal     Dental - normal exam     Pulmonary   Breath sounds clear to auscultation     Abdominal            Anesthesia Plan    History of general anesthesia?: yes  History of complications of general anesthesia?: no    ASA 2     regional     The patient is not a current smoker.    intravenous induction   Postoperative administration of opioids is intended.  Anesthetic plan and risks discussed with patient.    Plan discussed with CRNA.

## 2024-10-07 NOTE — ANESTHESIA POSTPROCEDURE EVALUATION
"Patient: Kaylan Woo \"Mariluz\"    Procedure Summary       Date: 10/07/24 Room / Location: [facility] 05 / [facility]    Anesthesia Start: 1234 Anesthesia Stop: 1341    Procedure: Decompression Median Nerve with Carpal Tunnel Release (Right: Hand) Diagnosis:       Right carpal tunnel syndrome      (Right carpal tunnel syndrome [G56.01])    Surgeons: Babak Cifuentes MD Responsible Provider: Agustin Vasquez MD    Anesthesia Type: regional ASA Status: 2            Anesthesia Type: regional    Vitals Value Taken Time   /88 10/07/24 1339   Temp 36.3 °C (97.3 °F) 10/07/24 1339   Pulse 78 10/07/24 1339   Resp 16 10/07/24 1339   SpO2 93 % 10/07/24 1339       Anesthesia Post Evaluation    Patient location during evaluation: PACU  Patient participation: complete - patient participated  Level of consciousness: awake and alert  Pain management: satisfactory to patient  Airway patency: patent  Cardiovascular status: acceptable  Respiratory status: acceptable  Hydration status: acceptable  Postoperative Nausea and Vomiting: none        No notable events documented.    "

## 2024-10-07 NOTE — OP NOTE
"Decompression Median Nerve with Carpal Tunnel Release (R) Operative Note     Date: 10/7/2024  OR Location: [facility] OR    Name: Kaylan Woo \"Mariluz\", : 1955, Age: 68 y.o., MRN: [id number], Sex: female    Diagnosis  Pre-op Diagnosis      * Right carpal tunnel syndrome [G56.01] Post-op Diagnosis     * Right carpal tunnel syndrome [G56.01]     Procedures  Decompression Median Nerve with Carpal Tunnel Release  [address] - [place] NEUROPLASTY &/TRANSPOS MEDIAN NRV CARPAL TUNNE      Surgeons   Dr. Babak Cifuentes MD    Resident/Fellow/Other Assistant:  Jermaine Stevens    Procedure Summary  Anesthesia: Regional Heartwell block with sedation  ASA: II  Anesthesia Staff: Anesthesiologist: Agustin Vasquez MD  CRNA: JAYDEN Landon-CRNA  Estimated Blood Loss: Less than 10 mL  Intra-op Medications:   Administrations occurring from 1315 to 1510 on 10/07/24:   Medication Name Total Dose   bupivacaine PF (Marcaine) 0.25 % (2.5 mg/mL) injection 6 mL              Anesthesia Record               Intraprocedure I/O Totals       None           Specimen: No specimens collected     Staff:   Circulator: Dennis Reeves Person: Sanket Reeves Person: Dennis Stokes Scrub: Jose G           Indications: Kaylan Woo \"Sarah" is an 68 y.o. female who is having surgery for Right carpal tunnel syndrome [G56.01].  She is identified preoperatively with her daughter.  Risk and benefits surgical invention once again reviewed at length with her and include but are not limited to risks of anesthesia, infection, bleeding, injury to adjacent structures, paralysis, paresthesias, dysfunction, deformity, scarring, recurrence, nonresolution of symptoms, possible need for further surgical intervention among others.  We have discussed the preoperative course, the operative procedure, and the postoperative course at length.  She understands all of our discussions.  She understands her current clinical situation and treatment options.  She wishes to proceed with " surgical intervention for right carpal tunnel release of the median nerve.  The consent is obtained.  The right hand is identified and marked preoperatively with a marking pen.  She received preoperative IV antibiotics.  SCDs are in place.  The preoperative safety checklist was performed.  She was then taken the operating placed in supine position on the operating table.    The patient was seen in the preoperative area. The risks, benefits, complications, treatment options, non-operative alternatives, expected recovery and outcomes were discussed with the patient. The possibilities of reaction to medication, pulmonary aspiration, injury to surrounding structures, bleeding, recurrent infection, the need for additional procedures, failure to diagnose a condition, and creating a complication requiring transfusion or operation were discussed with the patient. The patient concurred with the proposed plan, giving informed consent.  The site of surgery was properly noted/marked if necessary per policy. The patient has been actively warmed in preoperative area. Preoperative antibiotics have been ordered and given within 1 hours of incision. Venous thrombosis prophylaxis have been ordered including bilateral sequential compression devices    Procedure Details:     PREOP DIAGNOSIS: Right carpal tunnel syndrome.      POSTOP DIAGNOSIS: Right carpal tunnel syndrome.      PROCEDURE: The patient underwent right carpal tunnel release of the median  nerve at the wrist.      ASSISTANT: Jermaine Stevens          ANESTHESIA:  B[place]keye block with sedation.      1ST ASSISTANT:      EBL: Less than 10 mL.      CONDITION: Stable.      COMPLICATIONS: None.      SPECIMEN: None.      DETAILS OF PROCEDURE: The patient was identified preoperatively with her daughter risks and benefits of surgical intervention were once again reviewed with her.  Risks include anesthesia, infection, bleeding, injury to adjacent structures,  paralysis, paresthesias,  dysfunction, deformity, scarring, recurrence,  nonresolution of symptoms, possible need for further surgical interventions  among others.  She understands her current clinical situation.  She understands  all of her treatment options.  She wished to proceed with surgical  intervention.  The consent is obtained.  The right hand was appropriately identified and marked  preoperatively with a marking pen.  She received preoperative IV antibiotics.  SCDs were in place.  The appropriate preoperative safety checklist was  performed.  She was then taken to the operating room, placed in supine position  on the operating room table.  The Anesthesia Department then appropriately  performed May block anesthetic to the right upper extremity.  This involved  placement of dorsal right hand IV catheter, placement of proximal upper  extremity tourniquet, Esmarch exsanguination of right upper extremity,  inflation of the proximal upper extremity tourniquet, and instillation of the  anesthetic medication.  Once this was appropriately performed by the  Anesthesia Department, the right upper extremity was cleaned, prepped, and  draped in usual sterile fashion.  The entire procedure performed under loupe  magnification.  The appropriate mid central, longitudinal, palmar incision  site was marked with a marking pen.  The right hand was appropriately  positioned on the hand table and retracted with the aluminum hand retracting  system.  The appropriate time-out procedure was performed.  The incision was  then made with a scalpel.  Subcutaneous tissue was dissected with tenotomy  scissors.  Excellent hemostasis was maintained with electrocautery.  A small  Heiss retractor and Ragnell retractors were employed to retract the skin  subcutaneous tissue.  Further dissection with tenotomy scissors was performed.  The palmar aponeurosis was identified.  This was then incised mid centrally  and longitudinally with the scalpel.  Further dissection  with tenotomy  scissors was performed.  The transverse carpal ligament was identified.  This  was then incised mid centrally and longitudinally with the scalpel.  The  median nerve was identified directly underneath.  The mid central,  longitudinal, transverse carpal ligament incision was then extended distally  in a longitudinal fashion under direct vision with loupe magnification  employing the scalpel and tenotomy scissors.  Complete release from mid  central to distal was accomplished.  This was confirmed with the hemostat.  The mid central, longitudinal, transverse carpal ligament incision was then  extended in a longitudinal fashion proximally under direct vision with loupe  magnification employing the scalpel and tenotomy scissors.  Complete release  of the transverse carpal ligament was accomplished.  This was once again  confirmed with the hemostat.  The median nerve was further identified.  It was  briefly, gently, and meticulously dissected along its lateral margins.  No  other compressive pathologic entities were identified in the carpal tunnel.  The operative site area was then copiously irrigated with saline solution.  The operative site was then injected in local circumferential fashion with  approximately 6 mL of 2% plain lidocaine solution for local anesthetic effect.  The proximal upper extremity tourniquet was then released.  Total tourniquet  time was 30 minutes.  The entire hand and all 5 digits became pink, warm, and  had a capillary refill of 1 to 2 seconds upon tourniquet release.  Excellent  hemostasis was maintained with electrocautery.  Further copious irrigation  with saline solution was performed.  Excellent hemostasis was noted.  The  wound edges were then reapproximated with 1 intermittent horizontal mattress  suture 4-0 nylon and a few simple interrupted sutures of 4-0 nylon.  The  operative site area was further cleaned and dried.  The operative site was  then further injected in  local circumferential fashion with approximately 6 mL  of 0.25% plain Marcaine solution for prolonged postoperative pain relief.  The  right hand was further cleaned and dried.  Bacitracin, Xeroform, and  appropriate bulky protective sterile dressing was then fashioned and applied.  Distal tips of all 5 digits remained pink, warm, and had a capillary refill of  1 to 2 seconds after dressing application.  She tolerated the procedure very  well.  She awoke from anesthesia without difficulty and was transferred to the  recovery room in stable condition.    Dr. Babak Cifuentes MD      Complications:  None; patient tolerated the procedure well.    Disposition: PACU - hemodynamically stable.  Condition: stable     Tourniquet Times:     Total Tourniquet Time Documented:  Arm - Upper (Right) - 30 minutes  Total: Arm - Upper (Right) - 30 minutes      Findings: Right carpal tunnel syndrome    Attending Attestation: I was present and scrubbed for the entire procedure.    Babak Cifuentes  Phone Number: [phone]

## 2024-10-07 NOTE — TELEPHONE ENCOUNTER
Bilateral L3/4/5 RFA approved 10- to 2-.  Referral #[id number]    OK to schedule procedure approved as above.   Please note sides/levels approved and date range.   (If applicable, sides/levels approved may differ from those ordered)    To be scheduled with Dr. Avalos.

## 2024-10-17 NOTE — TELEPHONE ENCOUNTER
Pt called In that clearance from Dr had to be submitted to Pharmacy stating that medication is needed. She stated that pharmacy will fax over any documentation that they will need to have completed.

## 2024-10-20 DIAGNOSIS — M47.817 LUMBOSACRAL SPONDYLOSIS WITHOUT MYELOPATHY: ICD-10-CM

## 2024-10-21 RX ORDER — GABAPENTIN 600 MG/1
600 TABLET ORAL 3 TIMES DAILY
Qty: 90 TABLET | Refills: 0 | OUTPATIENT
Start: 2024-10-21

## 2024-10-30 ENCOUNTER — LAB (OUTPATIENT)
Dept: LAB | Facility: LAB | Age: 69
End: 2024-10-30
Payer: MEDICARE

## 2024-10-30 ENCOUNTER — APPOINTMENT (OUTPATIENT)
Dept: PRIMARY CARE | Facility: CLINIC | Age: 69
End: 2024-10-30
Payer: MEDICARE

## 2024-10-30 ENCOUNTER — HOSPITAL ENCOUNTER (OUTPATIENT)
Dept: RADIOLOGY | Facility: CLINIC | Age: 69
Discharge: HOME | End: 2024-10-30
Payer: MEDICARE

## 2024-10-30 VITALS
HEART RATE: 80 BPM | TEMPERATURE: 98.1 F | BODY MASS INDEX: 44.16 KG/M2 | SYSTOLIC BLOOD PRESSURE: 136 MMHG | DIASTOLIC BLOOD PRESSURE: 76 MMHG | HEIGHT: 62 IN | OXYGEN SATURATION: 95 % | WEIGHT: 240 LBS

## 2024-10-30 DIAGNOSIS — G89.29 CHRONIC BILATERAL LOW BACK PAIN WITH BILATERAL SCIATICA: ICD-10-CM

## 2024-10-30 DIAGNOSIS — I11.0 HYPERTENSIVE HEART DISEASE WITH HEART FAILURE: ICD-10-CM

## 2024-10-30 DIAGNOSIS — J96.91 RESPIRATORY FAILURE WITH HYPOXIA, UNSPECIFIED CHRONICITY (MULTI): ICD-10-CM

## 2024-10-30 DIAGNOSIS — Z79.4 TYPE 2 DIABETES MELLITUS WITHOUT COMPLICATION, WITH LONG-TERM CURRENT USE OF INSULIN (MULTI): ICD-10-CM

## 2024-10-30 DIAGNOSIS — J44.89 CHRONIC OBSTRUCTIVE ASTHMA WITH STATUS ASTHMATICUS (MULTI): ICD-10-CM

## 2024-10-30 DIAGNOSIS — F39 MOOD DISORDER (CMS-HCC): ICD-10-CM

## 2024-10-30 DIAGNOSIS — M54.41 CHRONIC BILATERAL LOW BACK PAIN WITH BILATERAL SCIATICA: ICD-10-CM

## 2024-10-30 DIAGNOSIS — E11.9 TYPE 2 DIABETES MELLITUS WITHOUT COMPLICATION, WITH LONG-TERM CURRENT USE OF INSULIN (MULTI): ICD-10-CM

## 2024-10-30 DIAGNOSIS — J45.909 MODERATE ASTHMA, UNSPECIFIED WHETHER COMPLICATED, UNSPECIFIED WHETHER PERSISTENT (HHS-HCC): ICD-10-CM

## 2024-10-30 DIAGNOSIS — R94.31 PROLONGED Q-T INTERVAL ON ECG: ICD-10-CM

## 2024-10-30 DIAGNOSIS — Z00.00 WELL ADULT EXAM: ICD-10-CM

## 2024-10-30 DIAGNOSIS — E55.9 VITAMIN D DEFICIENCY: ICD-10-CM

## 2024-10-30 DIAGNOSIS — Z12.31 SCREENING MAMMOGRAM FOR BREAST CANCER: ICD-10-CM

## 2024-10-30 DIAGNOSIS — Z00.00 WELL ADULT EXAM: Primary | ICD-10-CM

## 2024-10-30 DIAGNOSIS — M25.50 ARTHRALGIA, UNSPECIFIED JOINT: ICD-10-CM

## 2024-10-30 DIAGNOSIS — J45.902 CHRONIC OBSTRUCTIVE ASTHMA WITH STATUS ASTHMATICUS (MULTI): ICD-10-CM

## 2024-10-30 DIAGNOSIS — Z23 VACCINE FOR STREPTOCOCCUS PNEUMONIAE AND INFLUENZA: ICD-10-CM

## 2024-10-30 DIAGNOSIS — F11.99 OPIOID USE, UNSPECIFIED WITH UNSPECIFIED OPIOID-INDUCED DISORDER (MULTI): ICD-10-CM

## 2024-10-30 DIAGNOSIS — M54.42 CHRONIC BILATERAL LOW BACK PAIN WITH BILATERAL SCIATICA: ICD-10-CM

## 2024-10-30 DIAGNOSIS — I79.8 OTHER DISORDERS OF ARTERIES, ARTERIOLES AND CAPILLARIES IN DISEASES CLASSIFIED ELSEWHERE: ICD-10-CM

## 2024-10-30 DIAGNOSIS — K76.0 FATTY LIVER: ICD-10-CM

## 2024-10-30 DIAGNOSIS — E53.8 VITAMIN B12 DEFICIENCY: ICD-10-CM

## 2024-10-30 LAB
25(OH)D3 SERPL-MCNC: 46 NG/ML (ref 30–100)
ALBUMIN SERPL BCP-MCNC: 4 G/DL (ref 3.4–5)
ALP SERPL-CCNC: 110 U/L (ref 33–136)
ALT SERPL W P-5'-P-CCNC: 13 U/L (ref 7–45)
ANION GAP SERPL CALC-SCNC: 15 MMOL/L (ref 10–20)
AST SERPL W P-5'-P-CCNC: 13 U/L (ref 9–39)
BASOPHILS # BLD AUTO: 0.16 X10*3/UL (ref 0–0.1)
BASOPHILS NFR BLD AUTO: 1.4 %
BILIRUB SERPL-MCNC: 0.5 MG/DL (ref 0–1.2)
BUN SERPL-MCNC: 15 MG/DL (ref 6–23)
CALCIUM SERPL-MCNC: 9.5 MG/DL (ref 8.6–10.3)
CHLORIDE SERPL-SCNC: 100 MMOL/L (ref 98–107)
CHOLEST SERPL-MCNC: 270 MG/DL (ref 0–199)
CHOLESTEROL/HDL RATIO: 5
CO2 SERPL-SCNC: 30 MMOL/L (ref 21–32)
CREAT SERPL-MCNC: 0.89 MG/DL (ref 0.5–1.05)
EGFRCR SERPLBLD CKD-EPI 2021: 71 ML/MIN/1.73M*2
EOSINOPHIL # BLD AUTO: 0.28 X10*3/UL (ref 0–0.7)
EOSINOPHIL NFR BLD AUTO: 2.5 %
ERYTHROCYTE [DISTWIDTH] IN BLOOD BY AUTOMATED COUNT: 14.6 % (ref 11.5–14.5)
GLUCOSE SERPL-MCNC: 201 MG/DL (ref 74–99)
HCT VFR BLD AUTO: 38.7 % (ref 36–46)
HDLC SERPL-MCNC: 54.4 MG/DL
HGB BLD-MCNC: 12 G/DL (ref 12–16)
IMM GRANULOCYTES # BLD AUTO: 0.05 X10*3/UL (ref 0–0.7)
IMM GRANULOCYTES NFR BLD AUTO: 0.4 % (ref 0–0.9)
LDLC SERPL CALC-MCNC: 156 MG/DL
LYMPHOCYTES # BLD AUTO: 2.92 X10*3/UL (ref 1.2–4.8)
LYMPHOCYTES NFR BLD AUTO: 25.6 %
MCH RBC QN AUTO: 26.7 PG (ref 26–34)
MCHC RBC AUTO-ENTMCNC: 31 G/DL (ref 32–36)
MCV RBC AUTO: 86 FL (ref 80–100)
MONOCYTES # BLD AUTO: 0.77 X10*3/UL (ref 0.1–1)
MONOCYTES NFR BLD AUTO: 6.7 %
NEUTROPHILS # BLD AUTO: 7.24 X10*3/UL (ref 1.2–7.7)
NEUTROPHILS NFR BLD AUTO: 63.4 %
NON HDL CHOLESTEROL: 216 MG/DL (ref 0–149)
NRBC BLD-RTO: 0 /100 WBCS (ref 0–0)
PLATELET # BLD AUTO: 407 X10*3/UL (ref 150–450)
POTASSIUM SERPL-SCNC: 4.5 MMOL/L (ref 3.5–5.3)
PROT SERPL-MCNC: 6.3 G/DL (ref 6.4–8.2)
RBC # BLD AUTO: 4.5 X10*6/UL (ref 4–5.2)
SODIUM SERPL-SCNC: 140 MMOL/L (ref 136–145)
TRIGL SERPL-MCNC: 299 MG/DL (ref 0–149)
TSH SERPL-ACNC: 1.33 MIU/L (ref 0.44–3.98)
VIT B12 SERPL-MCNC: 280 PG/ML (ref 211–911)
VLDL: 60 MG/DL (ref 0–40)
WBC # BLD AUTO: 11.4 X10*3/UL (ref 4.4–11.3)

## 2024-10-30 PROCEDURE — 80053 COMPREHEN METABOLIC PANEL: CPT

## 2024-10-30 PROCEDURE — 85025 COMPLETE CBC W/AUTO DIFF WBC: CPT

## 2024-10-30 PROCEDURE — G0446 INTENS BEHAVE THER CARDIO DX: HCPCS | Performed by: INTERNAL MEDICINE

## 2024-10-30 PROCEDURE — 84443 ASSAY THYROID STIM HORMONE: CPT

## 2024-10-30 PROCEDURE — 1157F ADVNC CARE PLAN IN RCRD: CPT | Performed by: INTERNAL MEDICINE

## 2024-10-30 PROCEDURE — 1036F TOBACCO NON-USER: CPT | Performed by: INTERNAL MEDICINE

## 2024-10-30 PROCEDURE — 93880 EXTRACRANIAL BILAT STUDY: CPT

## 2024-10-30 PROCEDURE — G0444 DEPRESSION SCREEN ANNUAL: HCPCS | Performed by: INTERNAL MEDICINE

## 2024-10-30 PROCEDURE — 82306 VITAMIN D 25 HYDROXY: CPT

## 2024-10-30 PROCEDURE — 90662 IIV NO PRSV INCREASED AG IM: CPT | Performed by: INTERNAL MEDICINE

## 2024-10-30 PROCEDURE — 1159F MED LIST DOCD IN RCRD: CPT | Performed by: INTERNAL MEDICINE

## 2024-10-30 PROCEDURE — G0008 ADMIN INFLUENZA VIRUS VAC: HCPCS | Performed by: INTERNAL MEDICINE

## 2024-10-30 PROCEDURE — 3051F HG A1C>EQUAL 7.0%<8.0%: CPT | Performed by: INTERNAL MEDICINE

## 2024-10-30 PROCEDURE — 3050F LDL-C >= 130 MG/DL: CPT | Performed by: INTERNAL MEDICINE

## 2024-10-30 PROCEDURE — 4010F ACE/ARB THERAPY RXD/TAKEN: CPT | Performed by: INTERNAL MEDICINE

## 2024-10-30 PROCEDURE — 3075F SYST BP GE 130 - 139MM HG: CPT | Performed by: INTERNAL MEDICINE

## 2024-10-30 PROCEDURE — 99214 OFFICE O/P EST MOD 30 MIN: CPT | Performed by: INTERNAL MEDICINE

## 2024-10-30 PROCEDURE — 3078F DIAST BP <80 MM HG: CPT | Performed by: INTERNAL MEDICINE

## 2024-10-30 PROCEDURE — 36415 COLL VENOUS BLD VENIPUNCTURE: CPT

## 2024-10-30 PROCEDURE — 83036 HEMOGLOBIN GLYCOSYLATED A1C: CPT

## 2024-10-30 PROCEDURE — 82607 VITAMIN B-12: CPT

## 2024-10-30 PROCEDURE — 80061 LIPID PANEL: CPT

## 2024-10-30 PROCEDURE — 1123F ACP DISCUSS/DSCN MKR DOCD: CPT | Performed by: INTERNAL MEDICINE

## 2024-10-30 PROCEDURE — 3008F BODY MASS INDEX DOCD: CPT | Performed by: INTERNAL MEDICINE

## 2024-10-30 RX ORDER — KETOCONAZOLE 20 MG/ML
SHAMPOO, SUSPENSION TOPICAL
COMMUNITY
Start: 2024-01-10

## 2024-10-30 RX ORDER — TIZANIDINE HYDROCHLORIDE 4 MG/1
4 CAPSULE, GELATIN COATED ORAL 3 TIMES DAILY PRN
Qty: 90 CAPSULE | Refills: 0 | Status: SHIPPED | OUTPATIENT
Start: 2024-10-30

## 2024-10-31 PROBLEM — R06.02 SHORTNESS OF BREATH: Status: RESOLVED | Noted: 2023-04-03 | Resolved: 2024-10-31

## 2024-10-31 PROBLEM — J96.91 RESPIRATORY FAILURE WITH HYPOXIA, UNSPECIFIED CHRONICITY (MULTI): Status: RESOLVED | Noted: 2024-10-30 | Resolved: 2024-10-31

## 2024-10-31 PROBLEM — J45.909 ASTHMA, MODERATE (HHS-HCC): Status: RESOLVED | Noted: 2023-04-03 | Resolved: 2024-10-31

## 2024-10-31 PROBLEM — R09.02 OXYGEN DESATURATION: Status: RESOLVED | Noted: 2023-03-24 | Resolved: 2024-10-31

## 2024-10-31 PROBLEM — R51.9 HEADACHE: Status: RESOLVED | Noted: 2023-04-03 | Resolved: 2024-10-31

## 2024-10-31 LAB
EST. AVERAGE GLUCOSE BLD GHB EST-MCNC: 166 MG/DL
HBA1C MFR BLD: 7.4 %

## 2024-11-04 DIAGNOSIS — I10 PRIMARY HYPERTENSION: ICD-10-CM

## 2024-11-04 RX ORDER — POTASSIUM CHLORIDE 20 MEQ/1
20 TABLET, EXTENDED RELEASE ORAL DAILY
Qty: 90 TABLET | Refills: 0 | Status: SHIPPED | OUTPATIENT
Start: 2024-11-04

## 2024-11-07 DIAGNOSIS — Z79.4 TYPE 2 DIABETES MELLITUS WITH OTHER SPECIFIED COMPLICATION, WITH LONG-TERM CURRENT USE OF INSULIN: ICD-10-CM

## 2024-11-07 DIAGNOSIS — E11.69 TYPE 2 DIABETES MELLITUS WITH OTHER SPECIFIED COMPLICATION, WITH LONG-TERM CURRENT USE OF INSULIN: ICD-10-CM

## 2024-11-07 RX ORDER — INSULIN LISPRO 100 [IU]/ML
INJECTION, SOLUTION INTRAVENOUS; SUBCUTANEOUS
Qty: 90 ML | Refills: 1 | Status: SHIPPED | OUTPATIENT
Start: 2024-11-07

## 2024-11-07 NOTE — TELEPHONE ENCOUNTER
Pt stated PA was approved for Humalog but medication was never received by the pharmacy. Called and confirmed with the pharmacy it was never received, they are all out and new is prescription needed. Med request attached

## 2024-11-11 DIAGNOSIS — I10 HYPERTENSION, UNSPECIFIED TYPE: ICD-10-CM

## 2024-11-11 DIAGNOSIS — I10 PRIMARY HYPERTENSION: ICD-10-CM

## 2024-11-11 RX ORDER — DILTIAZEM HYDROCHLORIDE 240 MG/1
240 CAPSULE, COATED, EXTENDED RELEASE ORAL DAILY
Qty: 90 CAPSULE | Refills: 0 | Status: SHIPPED | OUTPATIENT
Start: 2024-11-11

## 2024-11-13 ENCOUNTER — TELEPHONE (OUTPATIENT)
Dept: PRIMARY CARE | Facility: CLINIC | Age: 69
End: 2024-11-13
Payer: COMMERCIAL

## 2024-11-13 NOTE — TELEPHONE ENCOUNTER
[facility] asking if you can addend recent appt notes needs diabetic treatment plan in note   Please advise         Fax # [phone]

## 2024-11-19 DIAGNOSIS — G56.02 LEFT CARPAL TUNNEL SYNDROME: Primary | ICD-10-CM

## 2024-12-02 ENCOUNTER — APPOINTMENT (OUTPATIENT)
Dept: RADIOLOGY | Facility: CLINIC | Age: 69
End: 2024-12-02
Payer: MEDICARE

## 2024-12-05 DIAGNOSIS — M47.817 LUMBOSACRAL SPONDYLOSIS WITHOUT MYELOPATHY: ICD-10-CM

## 2024-12-05 RX ORDER — GABAPENTIN 600 MG/1
600 TABLET ORAL 3 TIMES DAILY
Qty: 90 TABLET | Refills: 0 | OUTPATIENT
Start: 2024-12-05

## 2024-12-05 NOTE — TELEPHONE ENCOUNTER
Spoke to patient and informed her that Dr Tripathi is the one that she needs contact for this refill.

## 2024-12-06 DIAGNOSIS — Z00.6 RESEARCH EXAM: ICD-10-CM

## 2024-12-06 RX ORDER — ALBUTEROL SULFATE 0.83 MG/ML
3 SOLUTION RESPIRATORY (INHALATION) ONCE
OUTPATIENT
Start: 2024-12-06 | End: 2024-12-06

## 2024-12-06 RX ORDER — ALBUTEROL SULFATE 90 UG/1
4 INHALANT RESPIRATORY (INHALATION) ONCE
OUTPATIENT
Start: 2024-12-06

## 2024-12-08 DIAGNOSIS — Z79.4 TYPE 2 DIABETES MELLITUS WITHOUT COMPLICATION, WITH LONG-TERM CURRENT USE OF INSULIN (MULTI): ICD-10-CM

## 2024-12-08 DIAGNOSIS — E11.9 TYPE 2 DIABETES MELLITUS WITHOUT COMPLICATION, WITH LONG-TERM CURRENT USE OF INSULIN (MULTI): ICD-10-CM

## 2024-12-09 RX ORDER — INSULIN DEGLUDEC 100 U/ML
INJECTION, SOLUTION SUBCUTANEOUS
Qty: 15 ML | Refills: 0 | Status: SHIPPED | OUTPATIENT
Start: 2024-12-09

## 2024-12-11 ENCOUNTER — HOSPITAL ENCOUNTER (OUTPATIENT)
Dept: RADIOLOGY | Facility: CLINIC | Age: 69
Discharge: HOME | End: 2024-12-11
Payer: MEDICARE

## 2024-12-11 VITALS — WEIGHT: 240 LBS | BODY MASS INDEX: 44.16 KG/M2 | HEIGHT: 62 IN

## 2024-12-11 DIAGNOSIS — Z12.31 SCREENING MAMMOGRAM FOR BREAST CANCER: ICD-10-CM

## 2024-12-11 PROCEDURE — 77067 SCR MAMMO BI INCL CAD: CPT

## 2024-12-11 PROCEDURE — 77067 SCR MAMMO BI INCL CAD: CPT | Performed by: RADIOLOGY

## 2024-12-11 PROCEDURE — 77063 BREAST TOMOSYNTHESIS BI: CPT | Performed by: RADIOLOGY

## 2024-12-13 ENCOUNTER — TELEPHONE (OUTPATIENT)
Age: 69
End: 2024-12-13

## 2024-12-13 NOTE — TELEPHONE ENCOUNTER
Psychology evaluation reviewed from 12/3/2024 by Byron Casillas, PhD, patient is an adequate candidate for proposed procedure.          -Please schedule office visit with me to discuss spinal cord stimulation and vertebrogenic pain treatment options

## 2024-12-16 ENCOUNTER — PRE-ADMISSION TESTING (OUTPATIENT)
Dept: PREADMISSION TESTING | Facility: HOSPITAL | Age: 69
End: 2024-12-16
Payer: COMMERCIAL

## 2024-12-18 ENCOUNTER — PATIENT MESSAGE (OUTPATIENT)
Dept: PRIMARY CARE | Facility: CLINIC | Age: 69
End: 2024-12-18
Payer: COMMERCIAL

## 2024-12-19 RX ORDER — SPIRONOLACTONE 25 MG/1
1 TABLET ORAL
COMMUNITY
Start: 2024-12-06

## 2024-12-19 ASSESSMENT — DUKE ACTIVITY SCORE INDEX (DASI)
CAN YOU DO LIGHT WORK AROUND THE HOUSE LIKE DUSTING OR WASHING DISHES: YES
CAN YOU WALK A BLOCK OR TWO ON LEVEL GROUND: YES
CAN YOU DO MODERATE WORK AROUND THE HOUSE LIKE VACUUMING, SWEEPING FLOORS OR CARRYING GROCERIES: NO
CAN YOU DO YARD WORK LIKE RAKING LEAVES, WEEDING OR PUSHING A MOWER: NO
CAN YOU PARTICIPATE IN MODERATE RECREATIONAL ACTIVITIES LIKE GOLF, BOWLING, DANCING, DOUBLES TENNIS OR THROWING A BASEBALL OR FOOTBALL: NO
DASI METS SCORE: 4
CAN YOU HAVE SEXUAL RELATIONS: NO
CAN YOU RUN A SHORT DISTANCE: NO
CAN YOU DO HEAVY WORK AROUND THE HOUSE LIKE SCRUBBING FLOORS OR LIFTING AND MOVING HEAVY FURNITURE: NO
CAN YOU TAKE CARE OF YOURSELF (EAT, DRESS, BATHE, OR USE TOILET): YES
CAN YOU WALK INDOORS, SUCH AS AROUND YOUR HOUSE: YES
CAN YOU CLIMB A FLIGHT OF STAIRS OR WALK UP A HILL: NO
TOTAL_SCORE: 9.95
CAN YOU PARTICIPATE IN STRENOUS SPORTS LIKE SWIMMING, SINGLES TENNIS, FOOTBALL, BASKETBALL, OR SKIING: NO

## 2024-12-19 ASSESSMENT — ENCOUNTER SYMPTOMS
NECK NEGATIVE: 1
CONSTITUTIONAL NEGATIVE: 1
ENDOCRINE COMMENTS: DIABETES MELLITUS

## 2024-12-19 ASSESSMENT — LIFESTYLE VARIABLES: SMOKING_STATUS: NONSMOKER

## 2024-12-19 ASSESSMENT — ACTIVITIES OF DAILY LIVING (ADL): ADL_SCORE: 1

## 2024-12-19 NOTE — H&P (VIEW-ONLY)
"CPM/PAT Evaluation       Name: Kaylan Woo (Kaylan Woo \"Mariluz\")  /Age: 1955/69 y.o.     In-Person       Chief Complaint: left carpal tunnel    HPI  This is a 70 yo with Pmhx of COPD, GERD, Schwab's esophagus, HLD, obesity, HTN, DM, and left side carpal tunnel syndrome.  Patient states she had right carpal tunnel repair done on right hand last October and is doing well.  She has significant discomfort in left hand and numbness in the first 3 fingers.  Intermittent significant pain.  She denies recent fever or chills.      Past Medical History:   Diagnosis Date    Adverse effect of anesthesia     Arthritis     Asthma     Schwab's esophagus     Body mass index (BMI)40.0-44.9, adult 2021    BMI 40.0-44.9, adult    COPD (chronic obstructive pulmonary disease) (Multi)     COVID-19 2021    Pneumonia due to COVID-19 virus    Deficiency of other specified B group vitamins 2021    Vitamin B 12 deficiency    Depression     GERD (gastroesophageal reflux disease)     Hard to intubate     Hiatal hernia     Hyperlipidemia     Hypertension     Obesity     Personal history of other drug therapy 10/25/2018    History of influenza vaccination    Personal history of other medical treatment     History of nuclear stress test    PVD (peripheral vascular disease) (CMS-HCC)     Pyothorax without fistula (Multi) 10/17/2022    Empyema    Skin cancer     Sleep apnea     no cpap    Solitary pulmonary nodule 2021    Lung nodule    Type 2 diabetes mellitus     with insulin       Past Surgical History:   Procedure Laterality Date    BREAST BIOPSY Right     Right benign bx 35 years ago    CARPAL TUNNEL RELEASE Right         CATARACT EXTRACTION      COLONOSCOPY      LUMBAR LAMINECTOMY      OTHER SURGICAL HISTORY  2021    Thoracic discectomy    OTHER SURGICAL HISTORY  01/10/2022    Neck surgery    OTHER SURGICAL HISTORY  2021    Colonoscopy    OTHER SURGICAL HISTORY  10/06/2020    Hand " surgery    OTHER SURGICAL HISTORY  10/06/2020    Lumpectomy    OTHER SURGICAL HISTORY  10/06/2020     section    OTHER SURGICAL HISTORY Bilateral 10/06/2020    Knee replacement    OTHER SURGICAL HISTORY  10/06/2020    Tonsillectomy    OTHER SURGICAL HISTORY  2019    Hysterectomy total abdominal with removal of both ovaries    TONSILLECTOMY      TOTAL KNEE ARTHROPLASTY Bilateral     TRIGGER FINGER RELEASE      UPPER GASTROINTESTINAL ENDOSCOPY         Patient  has no history on file for sexual activity.    Family History   Problem Relation Name Age of Onset    Alzheimer's disease Mother      Atrial fibrillation Mother      Lupus Mother      Hypertension Mother      Arthritis Father      Other (rheumatic disease) Father      Charcot-Charisse-Tooth disease Father      Charcot-Charisse-Tooth disease Sister      Diabetes Sister      Hypertension Sister      Hypertension Brother         Allergies   Allergen Reactions    Celecoxib Shortness of breath and Rash    Rofecoxib Shortness of breath and Rash    Sulfa (Sulfonamide Antibiotics) Anaphylaxis    Sulfasalazine Anaphylaxis    Sulfonylureas Anaphylaxis    Sulfur Anaphylaxis    Aspartame Other and Headache    Buprenorphine Other     dizziness    Diet Foods Other     migraine headache. Allergy to aspartame only. Able to tolerate sucralose (splenda).    Ezetimibe Other     Couldn't walk    Iodides Other    Metformin Other     Severe GI SE    Nalidixic Acid Unknown     Pt does not think she is allergic to this    Nsaids (Non-Steroidal Anti-Inflammatory Drug) Unknown     Pt denies allergy    Pyrazolones Other     Pt does not know if allergy    Rivaroxaban Hives    Salicylates Unknown     Pt states she is not allergic to    Shellfish Containing Products Nausea/vomiting     scallops    Shellfish Derived Nausea/vomiting     scallops    Statins-Hmg-Coa Reductase Inhibitors Other    Thiazides Unknown     Pt denies allergy    Cyclobenzaprine Rash    Latex Swelling and Rash     Valdecoxib Swelling and Rash       Prior to Admission medications    Medication Sig Start Date End Date Taking? Authorizing Provider   spironolactone (Aldactone) 25 mg tablet Take 1 tablet (25 mg) by mouth early in the morning.. 12/6/24  Yes Historical Provider, MD   aspirin 81 mg EC tablet Take 1 tablet (81 mg) by mouth once daily.    Historical Provider, MD   bumetanide (Bumex) 0.5 mg tablet Take 1 tablet (0.5 mg) by mouth once daily. Take with 1 mg daily 5/24/24 5/24/25  Radha Holt MD   bumetanide (Bumex) 1 mg tablet Take 1 tablet (1 mg) by mouth once daily. Take with 0.5mg daily 7/12/24 7/12/25  Radha Holt MD   cholecalciferol (Vitamin D-3) 50 mcg (2,000 unit) capsule Take by mouth.  Patient taking differently: Take by mouth. 5000 international units 7/13/22   Historical Provider, MD   dilTIAZem CD (Cardizem CD) 240 mg 24 hr capsule TAKE 1 CAPSULE BY MOUTH ONCE DAILY 11/11/24   Nohemy Velasquez, APRN-CNP   etodolac (Lodine) 400 mg tablet Take 1 tablet (400 mg) by mouth 2 times a day. 12/18/23   Radha Holt MD   FreeStyle Ana María sensor system (FreeStyle Ana María 2 Sensor) kit 1 Cartridge every 14 days 6/2/23   JAYDEN Fraga-CNP   gabapentin (Neurontin) 600 mg tablet TAKE 1 TABLET BY MOUTH THREE TIMES DAILY FOR 30 DAYS    Historical Provider, MD   insulin lispro (HumaLOG KwikPen Insulin) 100 unit/mL injection Uad per sliding scale 11/7/24   Radha Holt MD   ketoconazole (NIZOral) 2 % shampoo Wash scalp once to twice a week. Leave on for 3-5 minutes then rinse 1/10/24   Historical Provider, MD   L. acidophilus/Bifid. animalis 32 billion cell capsule Take 1 capsule by mouth once daily. 1/10/22   Historical Provider, MD   losartan (Cozaar) 50 mg tablet Take 1 tablet (50 mg) by mouth 2 times a day. 7/12/24   Radha Holt MD   montelukast (Singulair) 10 mg tablet Take 1 tablet (10 mg) by mouth once daily. 7/12/24   Radha Holt MD   omeprazole  "(PriLOSEC) 40 mg DR capsule Take 1 capsule (40 mg) by mouth once daily. 2/12/24   Radha Holt MD   Ozempic 0.25 mg or 0.5 mg (2 mg/3 mL) pen injector Inject 0.25ml into the skin once weekly 9/27/24   Radha Holt MD   pen needle, diabetic 32 gauge x 5/32\" needle Use with daily glucose testing 7/6/23   MONY Fraga   potassium chloride CR 20 mEq ER tablet Take 1 tablet (20 mEq) by mouth once daily. DO NOT CRUSH CHEW OR SPLIT 11/4/24   MONY Peng   tiZANidine (Zanaflex) 4 mg capsule Take 1 capsule (4 mg) by mouth 3 times a day as needed for muscle spasms. 10/30/24   Radha Holt MD   Tresiba FlexTouch U-100 100 unit/mL (3 mL) injection INJECT 40 units SUBCUTANEOUSLY (UNDER THE SKIN) EVERY NIGHT AT BEDTIME 12/9/24   MONY Fraga   True Metrix Glucose Test Strip strip Use to check blood sugar twice daily 8/20/24   Radha Holt MD   venlafaxine XR (Effexor-XR) 150 mg 24 hr capsule Take 1 capsule (150 mg total) by mouth daily with breakfast for 30 days. 7/12/24   Radha Holt MD   venlafaxine XR (Effexor-XR) 75 mg 24 hr capsule Take 1 capsule (75 mg) by mouth once daily. Take with food. 7/11/24   MONY Fraga   Ventolin HFA 90 mcg/actuation inhaler Ventolin ade 2 puffs q4h prn 11/29/23   Radha Holt MD   VITAMIN B COMPLEX ORAL Take 1 tablet by mouth once daily in the morning. 1/10/22   Historical Provider, MD SERRANO ROS:   Constitutional:   neg    Neuro/Psych:    History of depression  Eyes:    Hx of cataract surgery   use of corrective lenses  Ears:   neg    Nose:    Hx of deviated septum  Mouth:   neg    Throat:   neg    Neck:    Short neck  Limited range of motion  No carotid bruits auscultated  neg    Cardio:    History of hypertension  Respiratory:    Has JEAN-PAUL unable to tolerate CPAP  History of COPD pt reports from COVID not due to history of smoking  Endocrine:    Diabetes mellitus  GI:    History of " Schwab's esophagus  History of GERD  :   neg    Musculoskeletal:    See HPI    Had right carpal tunnel release October 2024  Pt reports low back disc herniation-  uses a Rolator walker to get around    Pt had laminectomy 2/6/2023  Still has low back pain  Hematologic:   neg    Skin:  neg        Physical Exam  Constitutional:       Appearance: Normal appearance.   HENT:      Head: Normocephalic and atraumatic.      Nose: Nose normal.      Mouth/Throat:      Mouth: Mucous membranes are moist.   Eyes:      Pupils: Pupils are equal, round, and reactive to light.   Neck:      Comments: Short neck  Limited range of motion  No carotid bruits auscultated  Cardiovascular:      Rate and Rhythm: Normal rate and regular rhythm.   Pulmonary:      Effort: Pulmonary effort is normal.      Breath sounds: Normal breath sounds.   Abdominal:      General: Bowel sounds are normal.      Palpations: Abdomen is soft.      Comments: Obese   Musculoskeletal:      Comments: No lower extremity edema   Skin:     General: Skin is warm and dry.   Neurological:      General: No focal deficit present.      Mental Status: She is alert and oriented to person, place, and time.   Psychiatric:         Mood and Affect: Mood normal.         Behavior: Behavior normal.     Airway: limited  Anesthesia:  Hx of difficult to intubate                            No hx of N/V  Teeth: intact    Testing/Diagnostic:   Encounter Date: 07/29/24   ECG 12 lead (Clinic Performed)   Result Value    Ventricular Rate 88    Atrial Rate 88    AR Interval 150    QRS Duration 84    QT Interval 394    QTC Calculation(Bazett) 476    P Axis 48    R Axis 21    T Axis 88    QRS Count 14    Q Onset 220    P Onset 145    P Offset 191    T Offset 417    QTC Fredericia 447    Narrative    Normal sinus rhythm  Prolonged QT  Abnormal ECG  When compared with ECG of 30-JAN-2021 17:12,  Premature atrial complexes are no longer Present  Confirmed by Uriel Figueroa ([id number]) on 8/4/2024 6:57:54  PM          Recent Results (from the past 12 weeks)   POCT GLUCOSE    Collection Time: 10/07/24 11:52 AM   Result Value Ref Range    POCT Glucose 159 (H) 74 - 99 mg/dL   Hemoglobin A1C    Collection Time: 10/30/24 11:01 AM   Result Value Ref Range    Hemoglobin A1C 7.4 (H) See comment %    Estimated Average Glucose 166 Not Established mg/dL   Comprehensive Metabolic Panel    Collection Time: 10/30/24 11:01 AM   Result Value Ref Range    Glucose 201 (H) 74 - 99 mg/dL    Sodium 140 136 - 145 mmol/L    Potassium 4.5 3.5 - 5.3 mmol/L    Chloride 100 98 - 107 mmol/L    Bicarbonate 30 21 - 32 mmol/L    Anion Gap 15 10 - 20 mmol/L    Urea Nitrogen 15 6 - 23 mg/dL    Creatinine 0.89 0.50 - 1.05 mg/dL    eGFR 71 >60 mL/min/1.73m*2    Calcium 9.5 8.6 - 10.3 mg/dL    Albumin 4.0 3.4 - 5.0 g/dL    Alkaline Phosphatase 110 33 - 136 U/L    Total Protein 6.3 (L) 6.4 - 8.2 g/dL    AST 13 9 - 39 U/L    Bilirubin, Total 0.5 0.0 - 1.2 mg/dL    ALT 13 7 - 45 U/L   TSH with reflex to Free T4 if abnormal    Collection Time: 10/30/24 11:01 AM   Result Value Ref Range    Thyroid Stimulating Hormone 1.33 0.44 - 3.98 mIU/L   Vitamin D 25-Hydroxy,Total (for eval of Vitamin D levels)    Collection Time: 10/30/24 11:01 AM   Result Value Ref Range    Vitamin D, 25-Hydroxy, Total 46 30 - 100 ng/mL   Vitamin B12    Collection Time: 10/30/24 11:01 AM   Result Value Ref Range    Vitamin B12 280 211 - 911 pg/mL   Lipid Panel    Collection Time: 10/30/24 11:01 AM   Result Value Ref Range    Cholesterol 270 (H) 0 - 199 mg/dL    HDL-Cholesterol 54.4 mg/dL    Cholesterol/HDL Ratio 5.0     LDL Calculated 156 (H) <=99 mg/dL    VLDL 60 (H) 0 - 40 mg/dL    Triglycerides 299 (H) 0 - 149 mg/dL    Non HDL Cholesterol 216 (H) 0 - 149 mg/dL   CBC and Auto Differential    Collection Time: 10/30/24 11:01 AM   Result Value Ref Range    WBC 11.4 (H) 4.4 - 11.3 x10*3/uL    nRBC 0.0 0.0 - 0.0 /100 WBCs    RBC 4.50 4.00 - 5.20 x10*6/uL    Hemoglobin 12.0 12.0 - 16.0  "g/dL    Hematocrit 38.7 36.0 - 46.0 %    MCV 86 80 - 100 fL    MCH 26.7 26.0 - 34.0 pg    MCHC 31.0 (L) 32.0 - 36.0 g/dL    RDW 14.6 (H) 11.5 - 14.5 %    Platelets 407 150 - 450 x10*3/uL    Neutrophils % 63.4 40.0 - 80.0 %    Immature Granulocytes %, Automated 0.4 0.0 - 0.9 %    Lymphocytes % 25.6 13.0 - 44.0 %    Monocytes % 6.7 2.0 - 10.0 %    Eosinophils % 2.5 0.0 - 6.0 %    Basophils % 1.4 0.0 - 2.0 %    Neutrophils Absolute 7.24 1.20 - 7.70 x10*3/uL    Immature Granulocytes Absolute, Automated 0.05 0.00 - 0.70 x10*3/uL    Lymphocytes Absolute 2.92 1.20 - 4.80 x10*3/uL    Monocytes Absolute 0.77 0.10 - 1.00 x10*3/uL    Eosinophils Absolute 0.28 0.00 - 0.70 x10*3/uL    Basophils Absolute 0.16 (H) 0.00 - 0.10 x10*3/uL     Patient Specialist/PCP: Cardiologist is Dr. Hernandez/PCP Dr. Holt    Visit Vitals  /67   Pulse 85   Temp 36.8 °C (98.2 °F) (Temporal)   Resp 20   Ht 1.562 m (5' 1.5\")   Wt 110 kg (242 lb 8.1 oz)   LMP  (LMP Unknown)   SpO2 96%   BMI 45.08 kg/m²   OB Status Hysterectomy   Smoking Status Former   BSA 2.18 m²       DASI Risk Score      Flowsheet Row Pre-Admission Testing from 12/20/2024 in [facility] Pre-Admission Testing from 9/24/2024 in [facility]   Can you take care of yourself (eat, dress, bathe, or use toilet)?  2.75 filed at 12/19/2024 0917 2.75 filed at 09/23/2024 1131   Can you walk indoors, such as around your house? 1.75 filed at 12/19/2024 0917 1.75 filed at 09/23/2024 1131   Can you walk a block or two on level ground?  2.75 filed at 12/19/2024 0917 2.75 filed at 09/23/2024 1131   Can you climb a flight of stairs or walk up a hill? 0 filed at 12/19/2024 0917 0 filed at 09/23/2024 1131   Can you run a short distance? 0 filed at 12/19/2024 0917 0 filed at 09/23/2024 1131   Can you do light work around the house like dusting or washing dishes? 2.7 filed at 12/19/2024 0917 2.7 filed at 09/23/2024 1131   Can you do moderate work around the house like " vacuuming, sweeping floors or carrying groceries? 0 filed at 12/19/2024 0917 0 filed at 09/23/2024 1131   Can you do heavy work around the house like scrubbing floors or lifting and moving heavy furniture?  0 filed at 12/19/2024 0917 0 filed at 09/23/2024 1131   Can you do yard work like raking leaves, weeding or pushing a mower? 0 filed at 12/19/2024 0917 0 filed at 09/23/2024 1131   Can you have sexual relations? 0 filed at 12/19/2024 0917 5.25 filed at 09/23/2024 1131   Can you participate in moderate recreational activities like golf, bowling, dancing, doubles tennis or throwing a baseball or football? 0 filed at 12/19/2024 0917 0 filed at 09/23/2024 1131   Can you participate in strenous sports like swimming, singles tennis, football, basketball, or skiing? 0 filed at 12/19/2024 0917 0 filed at 09/23/2024 1131   DASI SCORE 9.95 filed at 12/19/2024 0917 15.2 filed at 09/23/2024 1131   METS Score (Will be calculated only when all the questions are answered) 4 filed at 12/19/2024 0917 4.6 filed at 09/23/2024 1131          Radhai DVT Assessment      Flowsheet Row [facility] Testing [facility] [place] [facility]   DVT Score 8 filed at 12/19/2024 0914 12 filed at 09/23/2024 1126   Medical Factors COPD filed at 12/19/2024 0914 --   Surgical Factors Major surgery planned, including arthroscopic and laproscopic (1-2 hours) filed at 12/19/2024 0914 --   BMI 41-50 (Morbid obesity) filed at 12/19/2024 0914 41-50 (Morbid obesity) filed at 09/23/2024 1126   RETIRED: Current Status -- Minor surgery planned, COPD filed at 09/23/2024 1126   RETIRED: History -- Prior major surgery, COPD, Pneumonia, Previous malignancy filed at 09/23/2024 1126   RETIRED: Age -- 60-75 years filed at 09/23/2024 1126          Modified Frailty Index      Flowsheet Row Pre-Admission Testing [facility] from  9/24/2024 in [facility]   Non-independent functional status (problems with dressing, bathing, personal grooming, or cooking) 0 filed at 12/19/2024 0918 0 filed at 09/23/2024 1138   History of diabetes mellitus  0.0909 filed at 12/19/2024 0918 0.0909 filed at 09/23/2024 1138   History of COPD 0.0909 filed at 12/19/2024 0918 0.0909 filed at 09/23/2024 1138   History of CHF No filed at 12/19/2024 0918 No filed at 09/23/2024 1138   History of MI 0 filed at 12/19/2024 0918 0 filed at 09/23/2024 1138   History of Percutaneous Coronary Intervention, Cardiac Surgery, or Angina No filed at 12/19/2024 0918 No filed at 09/23/2024 1138   Hypertension requiring the use of medication  0.0909 filed at 12/19/2024 0918 0.0909 filed at 09/23/2024 1138   Peripheral vascular disease 0 filed at 12/19/2024 0918 0 filed at 09/23/2024 1138   Impaired sensorium (cognitive impairement or loss, clouding, or delirium) 0 filed at 12/19/2024 0918 0 filed at 09/23/2024 1138   TIA or CVA withouy residual deficit 0 filed at 12/19/2024 0918 0 filed at 09/23/2024 1138   Cerebrovascular accident with deficit 0 filed at 12/19/2024 0918 0 filed at 09/23/2024 1138   Modified Frailty Index Calculator .2727 filed at 12/19/2024 0918 .2727 filed at 09/23/2024 1138          CHADS2 Stroke Risk  Current as of 4 hours ago        N/A 3 to 100%: High Risk   2 to < 3%: Medium Risk   0 to < 2%: Low Risk     Last Change: N/A          This score determines the patient's risk of having a stroke if the patient has atrial fibrillation.        This score is not applicable to this patient. Components are not calculated.          Revised Cardiac Risk Index      Flowsheet Row Pre-Admission Testing from 12/20/2024 in [facility] Pre-Admission Testing from 9/24/2024 in [facility]   High-Risk Surgery (Intraperitoneal, Intrathoracic,Suprainguinal vascular) 0 filed at 12/19/2024 0918 0 filed at 09/23/2024 1132   History of ischemic  heart disease (History of MI, History of positive exercuse test, Current chest paint considered due to myocardial ischemia, Use of nitrate therapy, ECG with pathological Q Waves) 0 filed at 12/19/2024 0918 0 filed at 09/23/2024 1132   History of congestive heart failure (pulmonary edemia, bilateral rales or S3 gallop, Paroxysmal nocturnal dyspnea, CXR showing pulmonary vascular redistribution) 0 filed at 12/19/2024 0918 0 filed at 09/23/2024 1132   History of cerebrovascular disease (Prior TIA or stroke) 0 filed at 12/19/2024 0918 0 filed at 09/23/2024 1132   Pre-operative insulin treatment 1 filed at 12/19/2024 0918 1 filed at 09/23/2024 1132   Pre-operative creatinine>2 mg/dl 0 filed at 12/19/2024 0918 0 filed at 09/23/2024 1132   Revised Cardiac Risk Calculator 1 filed at 12/19/2024 0918 1 filed at 09/23/2024 1132          Apfel Simplified Score      Flowsheet Row Pre-Admission Testing from 12/20/2024 in [facility] Pre-Admission Testing from 9/24/2024 in [facility]   Smoking status 1 filed at 12/19/2024 0918 1 filed at 09/23/2024 1132   History of motion sickness or PONV  0 filed at 12/19/2024 0918 0 filed at 09/23/2024 1132   Use of postoperative opioids 0 filed at 12/19/2024 0918 0 filed at 09/23/2024 1132   Gender - Female 1=Yes filed at 12/19/2024 0918 1=Yes filed at 09/23/2024 1132   Apfel Simplified Score Calculator 2 filed at 12/19/2024 0918 2 filed at 09/23/2024 1132          Risk Analysis Index Results This Encounter         12/19/2024 0918             Do you live in a place other than your own home?: 0    When did you begin living in the place you are currently residing?: Greater than one year ago    Any kidney failure, kidney not working well, or seeing a kidney doctor (nephrologist)? If yes, was this for kidney stones or another problem?: 0 No    Any history of chronic (long-term) congestive heart failure (CHF)?: 0 No    Any shortness of breath when resting?: 0 No     In the past five years, have you been diagnosed with or treated for cancer?: No    During the last 3 months has it become difficult for you to remember things or organize your thoughts?: 0 No    Have you lost weight of 10 pounds or more in the past 3 months without trying?: 0 No    Do you have any loss of appetitie?: 0 No    Getting Around (Mobility): 1 Needs help from cane, walker, or scooter    Eatin Can plan and prepare own meals    Toiletin Can use toilet without any help    Personal Hygiene (Bathing, Hand Washing, Changing Clothes): 0 Can shower or bathe without any help    DIAZ Cancer History: Patient does not indicate history of cancer    Total Risk Analysis Index Score Without Cancer: 21    Total Risk Analysis Index Score: 21          Stop Bang Score      Flowsheet Row Pre-Admission Testing from 2024 in [facility] Admission (Discharged) from 10/7/2024 in [facility] OR with Babak Cifuentes MD   Do you snore loudly? 1 filed at 202415 1 filed at 10/07/2024 1156   Do you often feel tired or fatigued after your sleep? 1 filed at 2024 1 filed at 10/07/2024 1156   Has anyone ever observed you stop breathing in your sleep? 0 filed at 202415 0 filed at 10/07/2024 1156   Do you have or are you being treated for high blood pressure? 1 filed at 202415 1 filed at 10/07/2024 1156   Recent BMI (Calculated) 44.6 filed at 202415 45 filed at 10/07/2024 1156   Is BMI greater than 35 kg/m2? 1=Yes filed at 2024 1=Yes filed at 10/07/2024 115   Age older than 50 years old? 1=Yes filed at 202415 1=Yes filed at 10/07/2024 1156   Is your neck circumference greater than 17 inches (Male) or 16 inches (Female)? 1 filed at 202415 1 filed at 10/07/2024 1156   Gender - Male 0=No filed at 2024 0915 0=No filed at 10/07/2024 1156   STOP-BANG Total Score 6 filed at 2024 0915 6 filed at 10/07/2024 1156           Prodigy: High Risk  Total Score: 11              Prodigy Age Score      Prodigy Previous Opioid Use Score           ARISCAT Score for Postoperative Pulmonary Complications      Flowsheet Row Pre-Admission Testing from 12/20/2024 in [facility]   Age, years  3 filed at 12/19/2024 0919   Preoperative SpO2 8 filed at 12/19/2024 0919   Respiratory infection in the last month Either upper or lower (i.e., URI, bronchitis, pneumonia), with fever and antibiotic treatment 0 filed at 12/19/2024 0919   Preoperative anemoa (Hgb less than 10 g/dl) 0 filed at 12/19/2024 0919   Surgical incision  0 filed at 12/19/2024 0919   Duration of surgery  0 filed at 12/19/2024 0919   Emergency Procedure  0 filed at 12/19/2024 0919   ARISCAT Total Score  11 filed at 12/19/2024 0919          Darvin Perioperative Risk for Myocardial Infarction or Cardiac Arrest (NEO)      Flowsheet Row Pre-Admission Testing from 12/20/2024 in [facility]   Age 1.36 filed at 12/19/2024 0919   Functional Status  0 filed at 12/19/2024 0919   ASA Class  -3.29 filed at 12/19/2024 0919   Creatinine 0 filed at 12/19/2024 0919   Type of Procedure  0.54 filed at 12/19/2024 0919   NEO Total Score  -6.66 filed at 12/19/2024 0919   NEO % 0.13 filed at 12/19/2024 0919            Assessment and Plan:   Plan for OR on 12/30 for   Decompression Median Nerve with Carpal Tunnel Release [30663 (CPT®)] Left [facility]   Due to Left carpal tunnel syndrome   Most recent labs enclosed  HEENT/Airway:  no significant findings on chart review or clinical presentation    Cardiovascular:  no significant findings on chart review or clinical presentation  DASI  Duke Activity Status Index (DASI)  DASI Score: 9.95   MET Score: 4  RCRI: 1 point, 6.0% risk for postoperative MACE   NEO: .13% risk for postoperative MACE      Pulmonary:    Patient with a history of JEAN-PAUL does not tolerate CPAP  Patient reports COPD due to history of severe COVID in the past     Preoperative deep breathing educational handout provided to patient.  ARISCAT:   11   points which is a low (1.6%) risk of in-hospital post-op pulmonary complications   STOP BAN  points which is a high risk for moderate to severe JEAN-PAUL    Renal: no significant findings on chart review or clinical presentation    Endocrine:    Insulin-dependent diabetic last A1c was 7.4  Hematologic:   no significant findings on chart review or clinical presentation  Preoperative DVT educational handout provided to patient.  Caprini Score:  8  points which is a high risk of perioperative VTE    Gastrointestinal:   no significant findings on chart review or clinical presentation  Apfel: 2 points 39% risk for post operative N/V    Infectious disease:  no significant findings on chart review or clinical presentation     Musculoskeletal: Patient's had previous back surgery and continues with chronic low back pain    Neuro: No history of seizures or strokes  The patient is at an increased risk for perioperative stroke secondary to HTN, HLD, and female sex .

## 2024-12-19 NOTE — CPM/PAT H&P
"CPM/PAT Evaluation       Name: Kaylan Woo (Kaylan Woo \"Mariluz\")  /Age: 1955/69 y.o.     In-Person       Chief Complaint: left carpal tunnel    HPI  This is a 68 yo with Pmhx of COPD, GERD, Schwab's esophagus, HLD, obesity, HTN, DM, and left side carpal tunnel syndrome.  Patient states she had right carpal tunnel repair done on right hand last October and is doing well.  She has significant discomfort in left hand and numbness in the first 3 fingers.  Intermittent significant pain.  She denies recent fever or chills.      Past Medical History:   Diagnosis Date    Adverse effect of anesthesia     Arthritis     Asthma     Schwab's esophagus     Body mass index (BMI)40.0-44.9, adult 2021    BMI 40.0-44.9, adult    COPD (chronic obstructive pulmonary disease) (Multi)     COVID-19 2021    Pneumonia due to COVID-19 virus    Deficiency of other specified B group vitamins 2021    Vitamin B 12 deficiency    Depression     GERD (gastroesophageal reflux disease)     Hard to intubate     Hiatal hernia     Hyperlipidemia     Hypertension     Obesity     Personal history of other drug therapy 10/25/2018    History of influenza vaccination    Personal history of other medical treatment     History of nuclear stress test    PVD (peripheral vascular disease) (CMS-HCC)     Pyothorax without fistula (Multi) 10/17/2022    Empyema    Skin cancer     Sleep apnea     no cpap    Solitary pulmonary nodule 2021    Lung nodule    Type 2 diabetes mellitus     with insulin       Past Surgical History:   Procedure Laterality Date    BREAST BIOPSY Right     Right benign bx 35 years ago    CARPAL TUNNEL RELEASE Right         CATARACT EXTRACTION      COLONOSCOPY      LUMBAR LAMINECTOMY      OTHER SURGICAL HISTORY  2021    Thoracic discectomy    OTHER SURGICAL HISTORY  01/10/2022    Neck surgery    OTHER SURGICAL HISTORY  2021    Colonoscopy    OTHER SURGICAL HISTORY  10/06/2020    Hand " surgery    OTHER SURGICAL HISTORY  10/06/2020    Lumpectomy    OTHER SURGICAL HISTORY  10/06/2020     section    OTHER SURGICAL HISTORY Bilateral 10/06/2020    Knee replacement    OTHER SURGICAL HISTORY  10/06/2020    Tonsillectomy    OTHER SURGICAL HISTORY  2019    Hysterectomy total abdominal with removal of both ovaries    TONSILLECTOMY      TOTAL KNEE ARTHROPLASTY Bilateral     TRIGGER FINGER RELEASE      UPPER GASTROINTESTINAL ENDOSCOPY         Patient  has no history on file for sexual activity.    Family History   Problem Relation Name Age of Onset    Alzheimer's disease Mother      Atrial fibrillation Mother      Lupus Mother      Hypertension Mother      Arthritis Father      Other (rheumatic disease) Father      Charcot-Charisse-Tooth disease Father      Charcot-Charisse-Tooth disease Sister      Diabetes Sister      Hypertension Sister      Hypertension Brother         Allergies   Allergen Reactions    Celecoxib Shortness of breath and Rash    Rofecoxib Shortness of breath and Rash    Sulfa (Sulfonamide Antibiotics) Anaphylaxis    Sulfasalazine Anaphylaxis    Sulfonylureas Anaphylaxis    Sulfur Anaphylaxis    Aspartame Other and Headache    Buprenorphine Other     dizziness    Diet Foods Other     migraine headache. Allergy to aspartame only. Able to tolerate sucralose (splenda).    Ezetimibe Other     Couldn't walk    Iodides Other    Metformin Other     Severe GI SE    Nalidixic Acid Unknown     Pt does not think she is allergic to this    Nsaids (Non-Steroidal Anti-Inflammatory Drug) Unknown     Pt denies allergy    Pyrazolones Other     Pt does not know if allergy    Rivaroxaban Hives    Salicylates Unknown     Pt states she is not allergic to    Shellfish Containing Products Nausea/vomiting     scallops    Shellfish Derived Nausea/vomiting     scallops    Statins-Hmg-Coa Reductase Inhibitors Other    Thiazides Unknown     Pt denies allergy    Cyclobenzaprine Rash    Latex Swelling and Rash     Valdecoxib Swelling and Rash       Prior to Admission medications    Medication Sig Start Date End Date Taking? Authorizing Provider   spironolactone (Aldactone) 25 mg tablet Take 1 tablet (25 mg) by mouth early in the morning.. 12/6/24  Yes Historical Provider, MD   aspirin 81 mg EC tablet Take 1 tablet (81 mg) by mouth once daily.    Historical Provider, MD   bumetanide (Bumex) 0.5 mg tablet Take 1 tablet (0.5 mg) by mouth once daily. Take with 1 mg daily 5/24/24 5/24/25  Radha Holt MD   bumetanide (Bumex) 1 mg tablet Take 1 tablet (1 mg) by mouth once daily. Take with 0.5mg daily 7/12/24 7/12/25  Radha Holt MD   cholecalciferol (Vitamin D-3) 50 mcg (2,000 unit) capsule Take by mouth.  Patient taking differently: Take by mouth. 5000 international units 7/13/22   Historical Provider, MD   dilTIAZem CD (Cardizem CD) 240 mg 24 hr capsule TAKE 1 CAPSULE BY MOUTH ONCE DAILY 11/11/24   Nohemy Velasquez, APRN-CNP   etodolac (Lodine) 400 mg tablet Take 1 tablet (400 mg) by mouth 2 times a day. 12/18/23   Radha Holt MD   FreeStyle Ana María sensor system (FreeStyle Ana María 2 Sensor) kit 1 Cartridge every 14 days 6/2/23   JAYDEN Fraga-CNP   gabapentin (Neurontin) 600 mg tablet TAKE 1 TABLET BY MOUTH THREE TIMES DAILY FOR 30 DAYS    Historical Provider, MD   insulin lispro (HumaLOG KwikPen Insulin) 100 unit/mL injection Uad per sliding scale 11/7/24   Radha Holt MD   ketoconazole (NIZOral) 2 % shampoo Wash scalp once to twice a week. Leave on for 3-5 minutes then rinse 1/10/24   Historical Provider, MD   L. acidophilus/Bifid. animalis 32 billion cell capsule Take 1 capsule by mouth once daily. 1/10/22   Historical Provider, MD   losartan (Cozaar) 50 mg tablet Take 1 tablet (50 mg) by mouth 2 times a day. 7/12/24   Radha Holt MD   montelukast (Singulair) 10 mg tablet Take 1 tablet (10 mg) by mouth once daily. 7/12/24   Radha Holt MD   omeprazole  "(PriLOSEC) 40 mg DR capsule Take 1 capsule (40 mg) by mouth once daily. 2/12/24   Radha Holt MD   Ozempic 0.25 mg or 0.5 mg (2 mg/3 mL) pen injector Inject 0.25ml into the skin once weekly 9/27/24   Radha Holt MD   pen needle, diabetic 32 gauge x 5/32\" needle Use with daily glucose testing 7/6/23   MONY Fraga   potassium chloride CR 20 mEq ER tablet Take 1 tablet (20 mEq) by mouth once daily. DO NOT CRUSH CHEW OR SPLIT 11/4/24   MONY Peng   tiZANidine (Zanaflex) 4 mg capsule Take 1 capsule (4 mg) by mouth 3 times a day as needed for muscle spasms. 10/30/24   Radha Holt MD   Tresiba FlexTouch U-100 100 unit/mL (3 mL) injection INJECT 40 units SUBCUTANEOUSLY (UNDER THE SKIN) EVERY NIGHT AT BEDTIME 12/9/24   MONY Fraga   True Metrix Glucose Test Strip strip Use to check blood sugar twice daily 8/20/24   Radha Holt MD   venlafaxine XR (Effexor-XR) 150 mg 24 hr capsule Take 1 capsule (150 mg total) by mouth daily with breakfast for 30 days. 7/12/24   Radha Holt MD   venlafaxine XR (Effexor-XR) 75 mg 24 hr capsule Take 1 capsule (75 mg) by mouth once daily. Take with food. 7/11/24   MONY Fraga   Ventolin HFA 90 mcg/actuation inhaler Ventolin aed 2 puffs q4h prn 11/29/23   Radha Holt MD   VITAMIN B COMPLEX ORAL Take 1 tablet by mouth once daily in the morning. 1/10/22   Historical Provider, MD SERRANO ROS:   Constitutional:   neg    Neuro/Psych:    History of depression  Eyes:    Hx of cataract surgery   use of corrective lenses  Ears:   neg    Nose:    Hx of deviated septum  Mouth:   neg    Throat:   neg    Neck:    Short neck  Limited range of motion  No carotid bruits auscultated  neg    Cardio:    History of hypertension  Respiratory:    Has JEAN-PAUL unable to tolerate CPAP  History of COPD pt reports from COVID not due to history of smoking  Endocrine:    Diabetes mellitus  GI:    History of " Schwab's esophagus  History of GERD  :   neg    Musculoskeletal:    See HPI    Had right carpal tunnel release October 2024  Pt reports low back disc herniation-  uses a Rolator walker to get around    Pt had laminectomy 2/6/2023  Still has low back pain  Hematologic:   neg    Skin:  neg        Physical Exam  Constitutional:       Appearance: Normal appearance.   HENT:      Head: Normocephalic and atraumatic.      Nose: Nose normal.      Mouth/Throat:      Mouth: Mucous membranes are moist.   Eyes:      Pupils: Pupils are equal, round, and reactive to light.   Neck:      Comments: Short neck  Limited range of motion  No carotid bruits auscultated  Cardiovascular:      Rate and Rhythm: Normal rate and regular rhythm.   Pulmonary:      Effort: Pulmonary effort is normal.      Breath sounds: Normal breath sounds.   Abdominal:      General: Bowel sounds are normal.      Palpations: Abdomen is soft.      Comments: Obese   Musculoskeletal:      Comments: No lower extremity edema   Skin:     General: Skin is warm and dry.   Neurological:      General: No focal deficit present.      Mental Status: She is alert and oriented to person, place, and time.   Psychiatric:         Mood and Affect: Mood normal.         Behavior: Behavior normal.     Airway: limited  Anesthesia:  Hx of difficult to intubate                            No hx of N/V  Teeth: intact    Testing/Diagnostic:   Encounter Date: 07/29/24   ECG 12 lead (Clinic Performed)   Result Value    Ventricular Rate 88    Atrial Rate 88    KY Interval 150    QRS Duration 84    QT Interval 394    QTC Calculation(Bazett) 476    P Axis 48    R Axis 21    T Axis 88    QRS Count 14    Q Onset 220    P Onset 145    P Offset 191    T Offset 417    QTC Fredericia 447    Narrative    Normal sinus rhythm  Prolonged QT  Abnormal ECG  When compared with ECG of 30-JAN-2021 17:12,  Premature atrial complexes are no longer Present  Confirmed by Uriel Figueroa ([id number]) on 8/4/2024 6:57:54  PM          Recent Results (from the past 12 weeks)   POCT GLUCOSE    Collection Time: 10/07/24 11:52 AM   Result Value Ref Range    POCT Glucose 159 (H) 74 - 99 mg/dL   Hemoglobin A1C    Collection Time: 10/30/24 11:01 AM   Result Value Ref Range    Hemoglobin A1C 7.4 (H) See comment %    Estimated Average Glucose 166 Not Established mg/dL   Comprehensive Metabolic Panel    Collection Time: 10/30/24 11:01 AM   Result Value Ref Range    Glucose 201 (H) 74 - 99 mg/dL    Sodium 140 136 - 145 mmol/L    Potassium 4.5 3.5 - 5.3 mmol/L    Chloride 100 98 - 107 mmol/L    Bicarbonate 30 21 - 32 mmol/L    Anion Gap 15 10 - 20 mmol/L    Urea Nitrogen 15 6 - 23 mg/dL    Creatinine 0.89 0.50 - 1.05 mg/dL    eGFR 71 >60 mL/min/1.73m*2    Calcium 9.5 8.6 - 10.3 mg/dL    Albumin 4.0 3.4 - 5.0 g/dL    Alkaline Phosphatase 110 33 - 136 U/L    Total Protein 6.3 (L) 6.4 - 8.2 g/dL    AST 13 9 - 39 U/L    Bilirubin, Total 0.5 0.0 - 1.2 mg/dL    ALT 13 7 - 45 U/L   TSH with reflex to Free T4 if abnormal    Collection Time: 10/30/24 11:01 AM   Result Value Ref Range    Thyroid Stimulating Hormone 1.33 0.44 - 3.98 mIU/L   Vitamin D 25-Hydroxy,Total (for eval of Vitamin D levels)    Collection Time: 10/30/24 11:01 AM   Result Value Ref Range    Vitamin D, 25-Hydroxy, Total 46 30 - 100 ng/mL   Vitamin B12    Collection Time: 10/30/24 11:01 AM   Result Value Ref Range    Vitamin B12 280 211 - 911 pg/mL   Lipid Panel    Collection Time: 10/30/24 11:01 AM   Result Value Ref Range    Cholesterol 270 (H) 0 - 199 mg/dL    HDL-Cholesterol 54.4 mg/dL    Cholesterol/HDL Ratio 5.0     LDL Calculated 156 (H) <=99 mg/dL    VLDL 60 (H) 0 - 40 mg/dL    Triglycerides 299 (H) 0 - 149 mg/dL    Non HDL Cholesterol 216 (H) 0 - 149 mg/dL   CBC and Auto Differential    Collection Time: 10/30/24 11:01 AM   Result Value Ref Range    WBC 11.4 (H) 4.4 - 11.3 x10*3/uL    nRBC 0.0 0.0 - 0.0 /100 WBCs    RBC 4.50 4.00 - 5.20 x10*6/uL    Hemoglobin 12.0 12.0 - 16.0  "g/dL    Hematocrit 38.7 36.0 - 46.0 %    MCV 86 80 - 100 fL    MCH 26.7 26.0 - 34.0 pg    MCHC 31.0 (L) 32.0 - 36.0 g/dL    RDW 14.6 (H) 11.5 - 14.5 %    Platelets 407 150 - 450 x10*3/uL    Neutrophils % 63.4 40.0 - 80.0 %    Immature Granulocytes %, Automated 0.4 0.0 - 0.9 %    Lymphocytes % 25.6 13.0 - 44.0 %    Monocytes % 6.7 2.0 - 10.0 %    Eosinophils % 2.5 0.0 - 6.0 %    Basophils % 1.4 0.0 - 2.0 %    Neutrophils Absolute 7.24 1.20 - 7.70 x10*3/uL    Immature Granulocytes Absolute, Automated 0.05 0.00 - 0.70 x10*3/uL    Lymphocytes Absolute 2.92 1.20 - 4.80 x10*3/uL    Monocytes Absolute 0.77 0.10 - 1.00 x10*3/uL    Eosinophils Absolute 0.28 0.00 - 0.70 x10*3/uL    Basophils Absolute 0.16 (H) 0.00 - 0.10 x10*3/uL     Patient Specialist/PCP: Cardiologist is Dr. Hernandez/PCP Dr. Holt    Visit Vitals  /67   Pulse 85   Temp 36.8 °C (98.2 °F) (Temporal)   Resp 20   Ht 1.562 m (5' 1.5\")   Wt 110 kg (242 lb 8.1 oz)   LMP  (LMP Unknown)   SpO2 96%   BMI 45.08 kg/m²   OB Status Hysterectomy   Smoking Status Former   BSA 2.18 m²       DASI Risk Score      Flowsheet Row Pre-Admission Testing from 12/20/2024 in [facility] Pre-Admission Testing from 9/24/2024 in [facility]   Can you take care of yourself (eat, dress, bathe, or use toilet)?  2.75 filed at 12/19/2024 0917 2.75 filed at 09/23/2024 1131   Can you walk indoors, such as around your house? 1.75 filed at 12/19/2024 0917 1.75 filed at 09/23/2024 1131   Can you walk a block or two on level ground?  2.75 filed at 12/19/2024 0917 2.75 filed at 09/23/2024 1131   Can you climb a flight of stairs or walk up a hill? 0 filed at 12/19/2024 0917 0 filed at 09/23/2024 1131   Can you run a short distance? 0 filed at 12/19/2024 0917 0 filed at 09/23/2024 1131   Can you do light work around the house like dusting or washing dishes? 2.7 filed at 12/19/2024 0917 2.7 filed at 09/23/2024 1131   Can you do moderate work around the house like " vacuuming, sweeping floors or carrying groceries? 0 filed at 12/19/2024 0917 0 filed at 09/23/2024 1131   Can you do heavy work around the house like scrubbing floors or lifting and moving heavy furniture?  0 filed at 12/19/2024 0917 0 filed at 09/23/2024 1131   Can you do yard work like raking leaves, weeding or pushing a mower? 0 filed at 12/19/2024 0917 0 filed at 09/23/2024 1131   Can you have sexual relations? 0 filed at 12/19/2024 0917 5.25 filed at 09/23/2024 1131   Can you participate in moderate recreational activities like golf, bowling, dancing, doubles tennis or throwing a baseball or football? 0 filed at 12/19/2024 0917 0 filed at 09/23/2024 1131   Can you participate in strenous sports like swimming, singles tennis, football, basketball, or skiing? 0 filed at 12/19/2024 0917 0 filed at 09/23/2024 1131   DASI SCORE 9.95 filed at 12/19/2024 0917 15.2 filed at 09/23/2024 1131   METS Score (Will be calculated only when all the questions are answered) 4 filed at 12/19/2024 0917 4.6 filed at 09/23/2024 1131          Radhai DVT Assessment      Flowsheet Row Pre-Admission Testing from 12/20/2024 in [facility] Pre-Admission Testing from 9/24/2024 in [facility]   DVT Score 8 filed at 12/19/2024 0914 12 filed at 09/23/2024 1126   Medical Factors COPD filed at 12/19/2024 0914 --   Surgical Factors Major surgery planned, including arthroscopic and laproscopic (1-2 hours) filed at 12/19/2024 0914 --   BMI 41-50 (Morbid obesity) filed at 12/19/2024 0914 41-50 (Morbid obesity) filed at 09/23/2024 1126   RETIRED: Current Status -- Minor surgery planned, COPD filed at 09/23/2024 1126   RETIRED: History -- Prior major surgery, COPD, Pneumonia, Previous malignancy filed at 09/23/2024 1126   RETIRED: Age -- 60-75 years filed at 09/23/2024 1126          Modified Frailty Index      Flowsheet Row Pre-Admission Testing from 12/20/2024 in [facility] Pre-Admission Testing from  9/24/2024 in [facility]   Non-independent functional status (problems with dressing, bathing, personal grooming, or cooking) 0 filed at 12/19/2024 0918 0 filed at 09/23/2024 1138   History of diabetes mellitus  0.0909 filed at 12/19/2024 0918 0.0909 filed at 09/23/2024 1138   History of COPD 0.0909 filed at 12/19/2024 0918 0.0909 filed at 09/23/2024 1138   History of CHF No filed at 12/19/2024 0918 No filed at 09/23/2024 1138   History of MI 0 filed at 12/19/2024 0918 0 filed at 09/23/2024 1138   History of Percutaneous Coronary Intervention, Cardiac Surgery, or Angina No filed at 12/19/2024 0918 No filed at 09/23/2024 1138   Hypertension requiring the use of medication  0.0909 filed at 12/19/2024 0918 0.0909 filed at 09/23/2024 1138   Peripheral vascular disease 0 filed at 12/19/2024 0918 0 filed at 09/23/2024 1138   Impaired sensorium (cognitive impairement or loss, clouding, or delirium) 0 filed at 12/19/2024 0918 0 filed at 09/23/2024 1138   TIA or CVA withouy residual deficit 0 filed at 12/19/2024 0918 0 filed at 09/23/2024 1138   Cerebrovascular accident with deficit 0 filed at 12/19/2024 0918 0 filed at 09/23/2024 1138   Modified Frailty Index Calculator .2727 filed at 12/19/2024 0918 .2727 filed at 09/23/2024 1138          CHADS2 Stroke Risk  Current as of 4 hours ago        N/A 3 to 100%: High Risk   2 to < 3%: Medium Risk   0 to < 2%: Low Risk     Last Change: N/A          This score determines the patient's risk of having a stroke if the patient has atrial fibrillation.        This score is not applicable to this patient. Components are not calculated.          Revised Cardiac Risk Index      Flowsheet Row Pre-Admission Testing from 12/20/2024 in [facility] Pre-Admission Testing from 9/24/2024 in [facility]   High-Risk Surgery (Intraperitoneal, Intrathoracic,Suprainguinal vascular) 0 filed at 12/19/2024 0918 0 filed at 09/23/2024 1132   History of ischemic  heart disease (History of MI, History of positive exercuse test, Current chest paint considered due to myocardial ischemia, Use of nitrate therapy, ECG with pathological Q Waves) 0 filed at 12/19/2024 0918 0 filed at 09/23/2024 1132   History of congestive heart failure (pulmonary edemia, bilateral rales or S3 gallop, Paroxysmal nocturnal dyspnea, CXR showing pulmonary vascular redistribution) 0 filed at 12/19/2024 0918 0 filed at 09/23/2024 1132   History of cerebrovascular disease (Prior TIA or stroke) 0 filed at 12/19/2024 0918 0 filed at 09/23/2024 1132   Pre-operative insulin treatment 1 filed at 12/19/2024 0918 1 filed at 09/23/2024 1132   Pre-operative creatinine>2 mg/dl 0 filed at 12/19/2024 0918 0 filed at 09/23/2024 1132   Revised Cardiac Risk Calculator 1 filed at 12/19/2024 0918 1 filed at 09/23/2024 1132          Apfel Simplified Score      Flowsheet Row Pre-Admission Testing from 12/20/2024 in [facility] Pre-Admission Testing from 9/24/2024 in [facility]   Smoking status 1 filed at 12/19/2024 0918 1 filed at 09/23/2024 1132   History of motion sickness or PONV  0 filed at 12/19/2024 0918 0 filed at 09/23/2024 1132   Use of postoperative opioids 0 filed at 12/19/2024 0918 0 filed at 09/23/2024 1132   Gender - Female 1=Yes filed at 12/19/2024 0918 1=Yes filed at 09/23/2024 1132   Apfel Simplified Score Calculator 2 filed at 12/19/2024 0918 2 filed at 09/23/2024 1132          Risk Analysis Index Results This Encounter         12/19/2024 0918             Do you live in a place other than your own home?: 0    When did you begin living in the place you are currently residing?: Greater than one year ago    Any kidney failure, kidney not working well, or seeing a kidney doctor (nephrologist)? If yes, was this for kidney stones or another problem?: 0 No    Any history of chronic (long-term) congestive heart failure (CHF)?: 0 No    Any shortness of breath when resting?: 0 No     In the past five years, have you been diagnosed with or treated for cancer?: No    During the last 3 months has it become difficult for you to remember things or organize your thoughts?: 0 No    Have you lost weight of 10 pounds or more in the past 3 months without trying?: 0 No    Do you have any loss of appetitie?: 0 No    Getting Around (Mobility): 1 Needs help from cane, walker, or scooter    Eatin Can plan and prepare own meals    Toiletin Can use toilet without any help    Personal Hygiene (Bathing, Hand Washing, Changing Clothes): 0 Can shower or bathe without any help    DIAZ Cancer History: Patient does not indicate history of cancer    Total Risk Analysis Index Score Without Cancer: 21    Total Risk Analysis Index Score: 21          Stop Bang Score      Flowsheet Row Pre-Admission Testing from 2024 in [facility] Admission (Discharged) from 10/7/2024 in [facility] OR with Babak Cifuentes MD   Do you snore loudly? 1 filed at 202415 1 filed at 10/07/2024 1156   Do you often feel tired or fatigued after your sleep? 1 filed at 2024 1 filed at 10/07/2024 1156   Has anyone ever observed you stop breathing in your sleep? 0 filed at 202415 0 filed at 10/07/2024 1156   Do you have or are you being treated for high blood pressure? 1 filed at 202415 1 filed at 10/07/2024 1156   Recent BMI (Calculated) 44.6 filed at 202415 45 filed at 10/07/2024 1156   Is BMI greater than 35 kg/m2? 1=Yes filed at 2024 1=Yes filed at 10/07/2024 115   Age older than 50 years old? 1=Yes filed at 202415 1=Yes filed at 10/07/2024 1156   Is your neck circumference greater than 17 inches (Male) or 16 inches (Female)? 1 filed at 202415 1 filed at 10/07/2024 1156   Gender - Male 0=No filed at 2024 0915 0=No filed at 10/07/2024 1156   STOP-BANG Total Score 6 filed at 2024 0915 6 filed at 10/07/2024 1156           Prodigy: High Risk  Total Score: 11              Prodigy Age Score      Prodigy Previous Opioid Use Score           ARISCAT Score for Postoperative Pulmonary Complications      Flowsheet Row Pre-Admission Testing from 12/20/2024 in [facility]   Age, years  3 filed at 12/19/2024 0919   Preoperative SpO2 8 filed at 12/19/2024 0919   Respiratory infection in the last month Either upper or lower (i.e., URI, bronchitis, pneumonia), with fever and antibiotic treatment 0 filed at 12/19/2024 0919   Preoperative anemoa (Hgb less than 10 g/dl) 0 filed at 12/19/2024 0919   Surgical incision  0 filed at 12/19/2024 0919   Duration of surgery  0 filed at 12/19/2024 0919   Emergency Procedure  0 filed at 12/19/2024 0919   ARISCAT Total Score  11 filed at 12/19/2024 0919          Darvin Perioperative Risk for Myocardial Infarction or Cardiac Arrest (NEO)      Flowsheet Row Pre-Admission Testing from 12/20/2024 in [facility]   Age 1.34 filed at 12/19/2024 0919   Functional Status  0 filed at 12/19/2024 0919   ASA Class  -3.29 filed at 12/19/2024 0919   Creatinine 0 filed at 12/19/2024 0919   Type of Procedure  0.54 filed at 12/19/2024 0919   NEO Total Score  -6.66 filed at 12/19/2024 0919   NEO % 0.13 filed at 12/19/2024 0919            Assessment and Plan:   Plan for OR on 12/30 for   Decompression Median Nerve with Carpal Tunnel Release [10965 (CPT®)] Left Regional   Due to Left carpal tunnel syndrome   Most recent labs enclosed  HEENT/Airway:  no significant findings on chart review or clinical presentation    Cardiovascular:  no significant findings on chart review or clinical presentation  DASI  Duke Activity Status Index (DASI)  DASI Score: 9.95   MET Score: 4  RCRI: 1 point, 6.0% risk for postoperative MACE   NEO: .13% risk for postoperative MACE      Pulmonary:    Patient with a history of JEAN-PAUL does not tolerate CPAP  Patient reports COPD due to history of severe COVID in the past     Preoperative deep breathing educational handout provided to patient.  ARISCAT:   11   points which is a low (1.6%) risk of in-hospital post-op pulmonary complications   STOP BAN  points which is a high risk for moderate to severe JEAN-PAUL    Renal: no significant findings on chart review or clinical presentation    Endocrine:    Insulin-dependent diabetic last A1c was 7.4  Hematologic:   no significant findings on chart review or clinical presentation  Preoperative DVT educational handout provided to patient.  Caprini Score:  8  points which is a high risk of perioperative VTE    Gastrointestinal:   no significant findings on chart review or clinical presentation  Apfel: 2 points 39% risk for post operative N/V    Infectious disease:  no significant findings on chart review or clinical presentation     Musculoskeletal: Patient's had previous back surgery and continues with chronic low back pain    Neuro: No history of seizures or strokes  The patient is at an increased risk for perioperative stroke secondary to HTN, HLD, and female sex .

## 2024-12-20 ENCOUNTER — PRE-ADMISSION TESTING (OUTPATIENT)
Dept: PREADMISSION TESTING | Facility: HOSPITAL | Age: 69
End: 2024-12-20
Payer: MEDICARE

## 2024-12-20 VITALS
HEIGHT: 62 IN | TEMPERATURE: 98.2 F | BODY MASS INDEX: 44.63 KG/M2 | WEIGHT: 242.51 LBS | RESPIRATION RATE: 20 BRPM | DIASTOLIC BLOOD PRESSURE: 67 MMHG | HEART RATE: 85 BPM | OXYGEN SATURATION: 96 % | SYSTOLIC BLOOD PRESSURE: 135 MMHG

## 2024-12-20 PROCEDURE — 99202 OFFICE O/P NEW SF 15 MIN: CPT | Performed by: NURSE PRACTITIONER

## 2024-12-20 ASSESSMENT — PAIN - FUNCTIONAL ASSESSMENT: PAIN_FUNCTIONAL_ASSESSMENT: 0-10

## 2024-12-20 NOTE — PREPROCEDURE INSTRUCTIONS
Medication List            Accurate as of December 20, 2024  9:45 AM. Always use your most recent med list.                aspirin 81 mg EC tablet  Additional Medication Adjustments for Surgery: Take last dose 7 days before surgery     * bumetanide 0.5 mg tablet  Commonly known as: Bumex  Take 1 tablet (0.5 mg) by mouth once daily. Take with 1 mg daily  Medication Adjustments for Surgery: Do Not take on the morning of surgery     * bumetanide 1 mg tablet  Commonly known as: Bumex  Take 1 tablet (1 mg) by mouth once daily. Take with 0.5mg daily  Medication Adjustments for Surgery: Do Not take on the morning of surgery     cholecalciferol 50 mcg (2,000 unit) capsule  Commonly known as: Vitamin D-3  Additional Medication Adjustments for Surgery: Take last dose 7 days before surgery     dilTIAZem  mg 24 hr capsule  Commonly known as: Cardizem CD  TAKE 1 CAPSULE BY MOUTH ONCE DAILY  Medication Adjustments for Surgery: Take/Use as prescribed     etodolac 400 mg tablet  Commonly known as: Lodine  Take 1 tablet (400 mg) by mouth 2 times a day.  Additional Medication Adjustments for Surgery: Take last dose 7 days before surgery     FreeStyle Ana María 2 Sensor kit  Generic drug: flash glucose sensor kit  1 Cartridge every 14 days     gabapentin 600 mg tablet  Commonly known as: Neurontin  Medication Adjustments for Surgery: Take/Use as prescribed     insulin lispro 100 unit/mL injection  Commonly known as: HumaLOG KwikPen Insulin  Uad per sliding scale  Medication Adjustments for Surgery: Do Not take on the morning of surgery     ketoconazole 2 % shampoo  Commonly known as: NIZOral  Medication Adjustments for Surgery: Take/Use as prescribed     L. acidophilus/Bifid. animalis 32 billion cell capsule  Medication Adjustments for Surgery: Do Not take on the morning of surgery     losartan 50 mg tablet  Commonly known as: Cozaar  Take 1 tablet (50 mg) by mouth 2 times a day.  Medication Adjustments for Surgery: Take last  "dose 1 day (24 hours) before surgery     montelukast 10 mg tablet  Commonly known as: Singulair  Take 1 tablet (10 mg) by mouth once daily.  Medication Adjustments for Surgery: Take/Use as prescribed     omeprazole 40 mg DR capsule  Commonly known as: PriLOSEC  Take 1 capsule (40 mg) by mouth once daily.  Medication Adjustments for Surgery: Take/Use as prescribed     Ozempic 0.25 mg or 0.5 mg (2 mg/3 mL) pen injector  Generic drug: semaglutide  Inject 0.25ml into the skin once weekly  Additional Medication Adjustments for Surgery: Take last dose 7 days before surgery     pen needle, diabetic 32 gauge x 5/32\" needle  Use with daily glucose testing     potassium chloride CR 20 mEq ER tablet  Commonly known as: Klor-Con M20  Take 1 tablet (20 mEq) by mouth once daily. DO NOT CRUSH CHEW OR SPLIT  Medication Adjustments for Surgery: Take/Use as prescribed     spironolactone 25 mg tablet  Commonly known as: Aldactone  Medication Adjustments for Surgery: Take/Use as prescribed     tiZANidine 4 mg capsule  Commonly known as: Zanaflex  Take 1 capsule (4 mg) by mouth 3 times a day as needed for muscle spasms.  Medication Adjustments for Surgery: Take/Use as prescribed     Tresiba FlexTouch U-100 100 unit/mL (3 mL) injection  Generic drug: insulin degludec  INJECT 40 units SUBCUTANEOUSLY (UNDER THE SKIN) EVERY NIGHT AT BEDTIME  Additional Medication Adjustments for Surgery: Take half of your usual dose the night before     True Metrix Glucose Test Strip strip  Generic drug: blood sugar diagnostic  Use to check blood sugar twice daily     * venlafaxine XR 75 mg 24 hr capsule  Commonly known as: Effexor-XR  Take 1 capsule (75 mg) by mouth once daily. Take with food.  Medication Adjustments for Surgery: Take/Use as prescribed     * venlafaxine  mg 24 hr capsule  Commonly known as: Effexor-XR  Take 1 capsule (150 mg total) by mouth daily with breakfast for 30 days.  Medication Adjustments for Surgery: Take/Use as " prescribed     Ventolin HFA 90 mcg/actuation inhaler  Generic drug: albuterol  Ventolin ade 2 puffs q4h prn  Medication Adjustments for Surgery: Take/Use as prescribed     VITAMIN B COMPLEX ORAL  Additional Medication Adjustments for Surgery: Take last dose 7 days before surgery           * This list has 4 medication(s) that are the same as other medications prescribed for you. Read the directions carefully, and ask your doctor or other care provider to review them with you.                                      PRE-OPERATIVE INSTRUCTIONS    You will receive notification one business day prior to your procedure to confirm your arrival time. It is important that you answer your phone and/or check your messages during this time. If you do not hear from the surgery center by 5 pm. the day before your procedure, please call [phone].     Please enter the building through the Outpatient entrance and take the elevator off the lobby to the 2nd floor then check in at the Outpatient Surgery desk on the 2nd floor.    INSTRUCTIONS:  Talk to your surgeon for instructions if you should stop your aspirin, blood thinner, or diabetes medicines.  DO NOT take any multivitamins or over the counter supplements for 7-10 days before surgery.  If not being admitted, you must have an adult immediately available to drive you home after surgery. We also highly recommend you have someone stay with you for the entire day and night of your surgery.  For children having surgery, a parent or legal guardian must accompany them to the surgery center. If this is not possible, please call [phone] to make additional arrangements.  For adults who are unable to consent or make medical decisions for themselves, a legal guardian or Power of  must accompany them to the surgery center. If this is not possible, please call [phone] to make additional arrangements.  Wear comfortable, loose fitting clothing.  All jewelry and piercings must  be removed. If you are unable to remove an item or have a dermal piercing, please be sure to tell the nurse when you arrive for surgery.  Nail polish and make-up must be removed.  Avoid smoking or consuming alcohol for 24 hours before surgery.  To help prevent infection, please take a shower/bath and wash your hair the night before and/or morning of surgery (or follow other specific bathing instructions provided).    Preoperative Fasting Guidelines    Why must I stop eating and drinking near surgery time?  With sedation, food or liquid in your stomach can enter your lungs causing serious complications  Increases nausea and vomiting    When do I need to stop eating and drinking before my surgery?  Do not eat any solid food after midnight the night before your surgery/procedure unless otherwise instructed by your surgeon.   You may have up to 13.5 ounces of clear liquid until TWO hours before your instructed arrival time to the hospital.  This includes water, black tea/coffee, (no milk or cream) apple juice, and electrolyte drinks (Gatorade).   You may chew gum until TWO hours before your surgery/procedure      If applicable, notify your surgeons office immediately of any new skin changes that occur to the surgical limb.      If you have any questions or concerns, please call Pre-Admission Testing at [phone].     Home Preoperative Antibacterial Shower with Chlorhexidine gluconate (CHG)     What is a home preoperative antibacterial shower?  This shower is a way of cleaning the skin with a germ killing solution before surgery. The solution contains chlorhexidine gluconate, commonly known as CHG. CHG is a skin cleanser with germ killing ability. Let your doctor know if you are allergic to chlorhexidine.    Why do I need to take a preoperative antibacterial shower?  Skin is not sterile. It is best to try to make your skin as free of germs as possible before surgery. Proper cleansing with a germ killing soap before  surgery can lower the number of germs on your skin. This helps to reduce the risk of infection at the surgical site. Following the instructions listed below will help you prepare your skin for surgery.    How do I use the solution?  Begin using your CHG soap the night before and again the morning of your procedure.   Do not shave the day before or day of surgery.  Remove all jewelry until after surgery. Take off rings and take out all body-piercing jewelry.  Wash your face and hair with normal soap and shampoo before you use the CHG soap.  Apply the CHG solution to a clean wet washcloth. Move away from the water to avoid premature rinsing of the CHG soap as you are applying. Firmly lather your entire body from the neck down. Do not use CHG on your face, eyes, ears, or genitals.   Pay special attention to the area where your incisions will be located.  Do not scrub your skin too hard.  It is important to allow the CHG soap to sit on your skin for 3-5 minutes.  Rinse the solution off your body completely. Do not wash with your normal soap after the CHG soap solution.  Pat yourself dry with a clean, soft towel.  Do not apply powders, lotions or deodorants as these might block how the CHG soap works.   Dress in clean clothing.  Be sure to sleep with clean, freshly laundered sheets.  Be aware that CHG can cause stains on fabric. Rinse your washcloth and other linens that have contact with CHG completely. Use only non-chlorine detergents to launder the items used.

## 2024-12-30 ENCOUNTER — HOSPITAL ENCOUNTER (OUTPATIENT)
Facility: HOSPITAL | Age: 69
Setting detail: OUTPATIENT SURGERY
Discharge: HOME | End: 2024-12-30
Payer: MEDICARE

## 2024-12-30 ENCOUNTER — ANESTHESIA (OUTPATIENT)
Dept: OPERATING ROOM | Facility: HOSPITAL | Age: 69
End: 2024-12-30
Payer: MEDICARE

## 2024-12-30 ENCOUNTER — ANESTHESIA EVENT (OUTPATIENT)
Dept: OPERATING ROOM | Facility: HOSPITAL | Age: 69
End: 2024-12-30
Payer: MEDICARE

## 2024-12-30 VITALS
TEMPERATURE: 98.4 F | BODY MASS INDEX: 43.79 KG/M2 | SYSTOLIC BLOOD PRESSURE: 136 MMHG | HEIGHT: 62 IN | OXYGEN SATURATION: 95 % | HEART RATE: 85 BPM | WEIGHT: 238 LBS | RESPIRATION RATE: 20 BRPM | DIASTOLIC BLOOD PRESSURE: 68 MMHG

## 2024-12-30 DIAGNOSIS — G56.02 LEFT CARPAL TUNNEL SYNDROME: ICD-10-CM

## 2024-12-30 DIAGNOSIS — G89.18 POST-OPERATIVE PAIN: ICD-10-CM

## 2024-12-30 DIAGNOSIS — Z79.2 PROPHYLACTIC ANTIBIOTIC: Primary | ICD-10-CM

## 2024-12-30 PROCEDURE — 3700000001 HC GENERAL ANESTHESIA TIME - INITIAL BASE CHARGE

## 2024-12-30 PROCEDURE — 88304 TISSUE EXAM BY PATHOLOGIST: CPT | Mod: TC,STJLAB

## 2024-12-30 PROCEDURE — 3600000003 HC OR TIME - INITIAL BASE CHARGE - PROCEDURE LEVEL THREE

## 2024-12-30 PROCEDURE — 2500000004 HC RX 250 GENERAL PHARMACY W/ HCPCS (ALT 636 FOR OP/ED)

## 2024-12-30 PROCEDURE — 3600000008 HC OR TIME - EACH INCREMENTAL 1 MINUTE - PROCEDURE LEVEL THREE

## 2024-12-30 PROCEDURE — 2500000005 HC RX 250 GENERAL PHARMACY W/O HCPCS

## 2024-12-30 PROCEDURE — 7100000009 HC PHASE TWO TIME - INITIAL BASE CHARGE

## 2024-12-30 PROCEDURE — 2720000007 HC OR 272 NO HCPCS

## 2024-12-30 PROCEDURE — 88305 TISSUE EXAM BY PATHOLOGIST: CPT | Performed by: STUDENT IN AN ORGANIZED HEALTH CARE EDUCATION/TRAINING PROGRAM

## 2024-12-30 PROCEDURE — 2500000004 HC RX 250 GENERAL PHARMACY W/ HCPCS (ALT 636 FOR OP/ED): Mod: JZ

## 2024-12-30 PROCEDURE — 7100000010 HC PHASE TWO TIME - EACH INCREMENTAL 1 MINUTE

## 2024-12-30 PROCEDURE — 2500000004 HC RX 250 GENERAL PHARMACY W/ HCPCS (ALT 636 FOR OP/ED): Performed by: ANESTHESIOLOGIST ASSISTANT

## 2024-12-30 PROCEDURE — 3700000002 HC GENERAL ANESTHESIA TIME - EACH INCREMENTAL 1 MINUTE

## 2024-12-30 PROCEDURE — 88304 TISSUE EXAM BY PATHOLOGIST: CPT | Performed by: STUDENT IN AN ORGANIZED HEALTH CARE EDUCATION/TRAINING PROGRAM

## 2024-12-30 RX ORDER — OXYCODONE AND ACETAMINOPHEN 5; 325 MG/1; MG/1
1 TABLET ORAL EVERY 6 HOURS PRN
Qty: 12 TABLET | Refills: 0 | Status: SHIPPED | OUTPATIENT
Start: 2024-12-30 | End: 2025-01-02

## 2024-12-30 RX ORDER — LIDOCAINE HYDROCHLORIDE 10 MG/ML
0.1 INJECTION, SOLUTION INFILTRATION; PERINEURAL ONCE
Status: DISCONTINUED | OUTPATIENT
Start: 2024-12-30 | End: 2024-12-30 | Stop reason: HOSPADM

## 2024-12-30 RX ORDER — FENTANYL CITRATE 50 UG/ML
25 INJECTION, SOLUTION INTRAMUSCULAR; INTRAVENOUS EVERY 5 MIN PRN
Status: DISCONTINUED | OUTPATIENT
Start: 2024-12-30 | End: 2024-12-30 | Stop reason: HOSPADM

## 2024-12-30 RX ORDER — MEPERIDINE HYDROCHLORIDE 50 MG/ML
12.5 INJECTION INTRAMUSCULAR; INTRAVENOUS; SUBCUTANEOUS EVERY 10 MIN PRN
Status: DISCONTINUED | OUTPATIENT
Start: 2024-12-30 | End: 2024-12-30 | Stop reason: HOSPADM

## 2024-12-30 RX ORDER — BACITRACIN ZINC 500 UNIT/G
OINTMENT IN PACKET (EA) TOPICAL AS NEEDED
Status: DISCONTINUED | OUTPATIENT
Start: 2024-12-30 | End: 2024-12-30 | Stop reason: HOSPADM

## 2024-12-30 RX ORDER — LIDOCAINE HYDROCHLORIDE 20 MG/ML
INJECTION, SOLUTION INFILTRATION; PERINEURAL AS NEEDED
Status: DISCONTINUED | OUTPATIENT
Start: 2024-12-30 | End: 2024-12-30 | Stop reason: HOSPADM

## 2024-12-30 RX ORDER — CEFAZOLIN SODIUM 2 G/100ML
2 INJECTION, SOLUTION INTRAVENOUS ONCE
Status: COMPLETED | OUTPATIENT
Start: 2024-12-30 | End: 2024-12-30

## 2024-12-30 RX ORDER — ALBUTEROL SULFATE 0.83 MG/ML
2.5 SOLUTION RESPIRATORY (INHALATION) ONCE AS NEEDED
Status: DISCONTINUED | OUTPATIENT
Start: 2024-12-30 | End: 2024-12-30 | Stop reason: HOSPADM

## 2024-12-30 RX ORDER — BUPIVACAINE HYDROCHLORIDE 2.5 MG/ML
INJECTION, SOLUTION EPIDURAL; INFILTRATION; INTRACAUDAL AS NEEDED
Status: DISCONTINUED | OUTPATIENT
Start: 2024-12-30 | End: 2024-12-30 | Stop reason: HOSPADM

## 2024-12-30 RX ORDER — MIDAZOLAM HYDROCHLORIDE 1 MG/ML
INJECTION, SOLUTION INTRAMUSCULAR; INTRAVENOUS AS NEEDED
Status: DISCONTINUED | OUTPATIENT
Start: 2024-12-30 | End: 2024-12-30

## 2024-12-30 RX ORDER — ONDANSETRON HYDROCHLORIDE 2 MG/ML
4 INJECTION, SOLUTION INTRAVENOUS ONCE AS NEEDED
Status: DISCONTINUED | OUTPATIENT
Start: 2024-12-30 | End: 2024-12-30 | Stop reason: HOSPADM

## 2024-12-30 RX ORDER — DOXYCYCLINE 100 MG/1
100 CAPSULE ORAL 2 TIMES DAILY
Qty: 20 CAPSULE | Refills: 0 | Status: SHIPPED | OUTPATIENT
Start: 2024-12-30 | End: 2025-01-09

## 2024-12-30 RX ORDER — PROPOFOL 10 MG/ML
INJECTION, EMULSION INTRAVENOUS CONTINUOUS PRN
Status: DISCONTINUED | OUTPATIENT
Start: 2024-12-30 | End: 2024-12-30

## 2024-12-30 RX ORDER — LABETALOL HYDROCHLORIDE 5 MG/ML
5 INJECTION, SOLUTION INTRAVENOUS ONCE AS NEEDED
Status: DISCONTINUED | OUTPATIENT
Start: 2024-12-30 | End: 2024-12-30 | Stop reason: HOSPADM

## 2024-12-30 RX ORDER — SODIUM CHLORIDE, SODIUM LACTATE, POTASSIUM CHLORIDE, CALCIUM CHLORIDE 600; 310; 30; 20 MG/100ML; MG/100ML; MG/100ML; MG/100ML
100 INJECTION, SOLUTION INTRAVENOUS CONTINUOUS
Status: DISCONTINUED | OUTPATIENT
Start: 2024-12-30 | End: 2024-12-30 | Stop reason: HOSPADM

## 2024-12-30 RX ORDER — ACETAMINOPHEN 325 MG/1
650 TABLET ORAL EVERY 4 HOURS PRN
Status: DISCONTINUED | OUTPATIENT
Start: 2024-12-30 | End: 2024-12-30 | Stop reason: HOSPADM

## 2024-12-30 RX ORDER — LIDOCAINE HYDROCHLORIDE 5 MG/ML
INJECTION, SOLUTION INFILTRATION; INTRAVENOUS AS NEEDED
Status: DISCONTINUED | OUTPATIENT
Start: 2024-12-30 | End: 2024-12-30

## 2024-12-30 RX ORDER — ESMOLOL HYDROCHLORIDE 10 MG/ML
INJECTION INTRAVENOUS AS NEEDED
Status: DISCONTINUED | OUTPATIENT
Start: 2024-12-30 | End: 2024-12-30

## 2024-12-30 RX ADMIN — MIDAZOLAM 2 MG: 1 INJECTION INTRAMUSCULAR; INTRAVENOUS at 12:16

## 2024-12-30 RX ADMIN — ESMOLOL HYDROCHLORIDE 40 MG: 100 INJECTION, SOLUTION INTRAVENOUS at 13:11

## 2024-12-30 RX ADMIN — PROPOFOL 50 MG: 10 INJECTION, EMULSION INTRAVENOUS at 12:20

## 2024-12-30 RX ADMIN — SODIUM CHLORIDE, POTASSIUM CHLORIDE, SODIUM LACTATE AND CALCIUM CHLORIDE: 600; 310; 30; 20 INJECTION, SOLUTION INTRAVENOUS at 12:12

## 2024-12-30 RX ADMIN — LIDOCAINE HYDROCHLORIDE 50 ML: 5 INJECTION, SOLUTION INFILTRATION; INTRAVENOUS at 12:23

## 2024-12-30 RX ADMIN — CEFAZOLIN SODIUM 2 G: 2 INJECTION, SOLUTION INTRAVENOUS at 12:16

## 2024-12-30 RX ADMIN — PROPOFOL 100 MCG/KG/MIN: 10 INJECTION, EMULSION INTRAVENOUS at 12:19

## 2024-12-30 SDOH — HEALTH STABILITY: MENTAL HEALTH: CURRENT SMOKER: 0

## 2024-12-30 ASSESSMENT — PAIN - FUNCTIONAL ASSESSMENT
PAIN_FUNCTIONAL_ASSESSMENT: 0-10

## 2024-12-30 ASSESSMENT — PAIN SCALES - GENERAL
PAINLEVEL_OUTOF10: 0 - NO PAIN
PAINLEVEL_OUTOF10: 6
PAINLEVEL_OUTOF10: 0 - NO PAIN

## 2024-12-30 NOTE — OP NOTE
"Decompression Median Nerve with Carpal Tunnel Release (L) Operative Note     Date: 2024  OR Location: [facility] OR    Name: Kaylan Woo \"Mariluz\", : 1955, Age: 69 y.o., MRN: [id number], Sex: female    Diagnosis  Pre-op Diagnosis      * Left carpal tunnel syndrome [G56.02]  Painful left palm mass and active painful left middle finger trigger finger (M65.332) Post-op Diagnosis     * Left carpal tunnel syndrome [G56.02]  Painful left palm mass and active painful left middle finger trigger finger (M65.332)     Procedures  Decompression Median Nerve with Carpal Tunnel Release  54781 - DC NEUROPLASTY &/TRANSPOS MEDIAN NRV CARPAL TUNNE  Excision left palm mass/fibrous nodule and left middle finger trigger finger release possible flexor synovectomy  63003  Surgeons   Dr. Babak Cifuentes MD    Resident/Fellow/Other Assistant:  Jermaine Stevens  Staff:   Circulator: Taryn  Circulator: Lizzette Reeves Person: Sanket Reeves Person: Dennis    Anesthesia Staff: Anesthesiologist: Dennis Greenfield DO  C-AA: JACQUELINE Davis    Procedure Summary  Anesthesia: Regional, Monitor Anesthesia Care [place] block with sedation ASA: ASA status not filed in the log.  Estimated Blood Loss: Less than 10 mL  Intra-op Medications:   Administrations occurring from 1220 to 1415 on 24:   Medication Name Total Dose   bupivacaine PF 0.25 % (Marcaine) 0.25 % (2.5 mg/mL) injection 8 mL   lidocaine (Xylocaine) 20 mg/mL (2 %) injection 8 mL   bacitracin ointment 1 Application   esmolol (Brevibloc) injection 40 mg   lidocaine PF (Xylocaine) injection 0.5 % 50 mL   propofol (Diprivan) injection 10 mg/mL 171.5 mg              Anesthesia Record               Intraprocedure I/O Totals       None           Specimen:   ID Type Source Tests Collected by Time   1 : A-1 PULLEY LEFT MIDDLE FINGER Tissue TENDON/TENDON SHEATH SURGICAL PATHOLOGY EXAM Babak Cifuentes MD 2024 1257   2 : FLEXOR SYNOVIUM FDS/FDP LEFT MIDDLE Tissue SYNOVIUM SURGICAL " "PATHOLOGY EXAM Babak Cifuentes MD 12/30/2024 1255   3 : FIBROUS NODULE LEFT PALM Tissue SOFT TISSUE RESECTION SURGICAL PATHOLOGY EXAM Babak Cifuentes MD 12/30/2024 1257                   Indications: Kaylan Woo \"Mariluz\" is an 69 y.o. female who is having surgery for Left carpal tunnel syndrome [G56.02].  She is identified preoperatively with her daughter.  Risk and benefits surgical invention once again reviewed at length with her.  She has noted painful enlarging left palm mass with active painful triggering and dysfunction of the left middle finger in the palm over the head of the third metacarpal A1 pulley flexor tendon system.  She would like to pursue surgical intervention for this issue today.  Significant discussion is undertaken with her and this can be accommodated with her surgical procedure.  Significant discussion concerning risk and benefits of surgical intervention including but not limited to risks of anesthesia, infection, bleeding, injury to adjacent structures, paralysis, paresthesias, dysfunction, deformity, scarring, recurrence, nonresolution of symptoms, possible need for further surgical inventions among others have been discussed at length with her.  She understands all of our discussions.  She understands her current clinical situation and treatment options.  She wished to proceed with surgical intervention.  The appropriate consent is obtained for left carpal tunnel release, excision left palm mass, left middle finger trigger finger release and possible flexor synovectomy.  The consent is obtained.  The left hand is appropriate identified and marked preoperatively with a marking pen for left carpal tunnel release and excision left palm mass/left middle finger trigger finger release.  She received preoperative IV antibiotics.  SCDs are in place.  The preoperative safety checklist was performed.  She was then taken the operating placed in supine position on the operating room " table.    The patient was seen in the preoperative area. The risks, benefits, complications, treatment options, non-operative alternatives, expected recovery and outcomes were discussed with the patient. The possibilities of reaction to medication, pulmonary aspiration, injury to surrounding structures, bleeding, recurrent infection, the need for additional procedures, failure to diagnose a condition, and creating a complication requiring transfusion or operation were discussed with the patient. The patient concurred with the proposed plan, giving informed consent.  The site of surgery was properly noted/marked if necessary per policy. The patient has been actively warmed in preoperative area. Preoperative antibiotics have been ordered and given within 1 hours of incision. Venous thrombosis prophylaxis have been ordered including bilateral sequential compression devices    Procedure Details:  PREOP DIAGNOSIS:  1.  Left carpal tunnel syndrome.  2.  Painful enlarging left palm mass and active painful left middle finger trigger finger      POSTOP DIAGNOSIS:  Left carpal tunnel syndrome.  Excision left palm mass/fibrous nodule and left middle finger trigger finger release of thickened A1 pulley and excision flexor synovium of FDS and FDP tendons      PROCEDURE:  1.  The patient underwent left carpal tunnel release of the median  nerve at the wrist.  2.  Excision left palm mass/fibrous nodule and left middle finger trigger finger release of the A1 pulley and flexor synovectomy of the FDS and FDP tendons in the palm    SURGEON:  Pj Cifuentes M.D.      ASSISTANT: Jermaine Stevens      ANESTHESIA:  Deanne block with sedation.      EBL: Less than 10 mL.      CONDITION: Stable.      COMPLICATIONS: None.      SPECIMEN: Sent to pathology      DETAILS OF PROCEDURE:  She is identified preoperatively with her daughter.  Risk and benefits surgical invention once again reviewed at length with her.  She has noted painful enlarging left palm  mass with active painful triggering and dysfunction of the left middle finger in the palm over the head of the third metacarpal A1 pulley flexor tendon system.  She would like to pursue surgical intervention for this issue today.  Significant discussion is undertaken with her and this can be accommodated with her surgical procedure.  Significant discussion concerning risk and benefits of surgical intervention including but not limited to risks of anesthesia, infection, bleeding, injury to adjacent structures, paralysis, paresthesias, dysfunction, deformity, scarring, recurrence, nonresolution of symptoms, possible need for further surgical inventions among others have been discussed at length with her.  She understands all of our discussions.  She understands her current clinical situation and treatment options.  She wished to proceed with surgical intervention.  The appropriate consent is obtained for left carpal tunnel release, excision left palm mass, left middle finger trigger finger release and possible flexor synovectomy.  The consent is obtained.  The left hand is appropriate identified and marked preoperatively with a marking pen for left carpal tunnel release and excision left palm mass/left middle finger trigger finger release.  She received preoperative IV antibiotics.  SCDs are in place.  The preoperative safety checklist was performed.  She was then taken the operating placed in supine position on the operating room table.The Anesthesia Department then appropriately  performed Walsh block anesthetic to the left upper extremity.  This involved  placement of dorsal left hand IV catheter, placement of proximal upper  extremity tourniquet, Esmarch exsanguination of left upper extremity,  inflation of the proximal upper extremity tourniquet, and instillation of the  anesthetic medication.  Once this was appropriately performed by the  Anesthesia Department, the left upper extremity was cleaned, prepped,  and  draped in usual sterile fashion.  The entire procedure performed under loupe  magnification.  The appropriate mid central, longitudinal, palmar incision  site was marked with a marking pen for the left carpal tunnel release and the appropriate angled Montez type incision site in the left palm is marked for excision of the left palm mass and left middle finger trigger finger operative procedure.  The left hand was appropriately  positioned on the hand table and retracted with the aluminum hand retracting  system.  The appropriate time-out procedure was performed.  I began with the carpal tunnel release operative procedure.  The incision was  then made with a scalpel.  Subcutaneous tissue was dissected with tenotomy  scissors.  Excellent hemostasis was maintained with electrocautery.  A small  Heiss retractor and Ragnell retractors were employed to retract the skin  subcutaneous tissue.  Further dissection with tenotomy scissors was performed.  The palmar aponeurosis was identified.  This was then incised mid centrally  and longitudinally with the scalpel.  Further dissection with tenotomy  scissors was performed.  The transverse carpal ligament was identified.  This  was then incised mid centrally and longitudinally with the scalpel.  The  median nerve was identified directly underneath.  The mid central,  longitudinal, transverse carpal ligament incision was then extended distally  in a longitudinal fashion under direct vision with loupe magnification  employing the scalpel and tenotomy scissors.  Complete release from mid  central to distal was accomplished.  This was confirmed with the hemostat.  The mid central, longitudinal, transverse carpal ligament incision was then  extended in a longitudinal fashion proximally under direct vision with loupe  magnification employing the scalpel and tenotomy scissors.  Complete release  of the transverse carpal ligament was accomplished.  This was once again  confirmed  with the hemostat.  The median nerve was further identified.  It was  briefly, gently, and meticulously dissected along its lateral margins.  No  other compressive pathologic entities were identified in the carpal tunnel.  The operative site area was then copiously irrigated with saline solution and covered with saline soaked Ray-Gisselle sponge.    Next I turned my attention to excision of the left palm mass and the left middle finger trigger finger release operative procedure.  The appropriate angled Montez-type incision  site was marked in the palm over the head of the third metacarpal A1 pulley  flexor tendon system to the left middle finger.  The appropriate incisions were  then made with a scalpel.  Subcutaneous tissue was dissected with tenotomy  scissors.  Further dissection with tenotomy scissors was performed.  Excellent  hemostasis was maintained with electrocautery.  The appropriate flap was  dissected, raised, elevated, and retracted with the small Heiss retractor.  The painful enlarging left palm mass was identified and clinically appeared as fibrous nodule involving the palmar fascia.  This was circumferentially dissected under direct vision with loupe magnification and excised, removed, passed off table to be sent to pathology for further evaluation.  Further dissection with tenotomy scissors was performed.  The radial and ulnar neurovascular structures were identified.  These were  briefly, gently, meticulously dissected in both proximal and distal directions  and then retracted with Ragnell retractors to maintain them free from injury  throughout the procedure.  The A1 pulley flexor tendon system to the left  Middle finger was further identified.  The A1 pulley clinically appeared very  thickened and fibrotic.  This was then incised mid centrally and  longitudinally with the scalpel.  This was extended in longitudinal fashion  both proximal and distal directions under direct vision with  loupe  magnification employing the scalpel and tenotomy scissors.  Complete release  of the A1 pulley was accomplished.  This was confirmed with the hemostat.  The  A2 pulley was confirmed to remain intact with the hemostat.  A section of the  cut edge of the transected A1 pulley was then sharply transected, excised,  passed off table, and sent to Pathology for further evaluation.  Excellent  hemostasis was maintained with electrocautery.  Further copious irrigation  with saline solution was performed.  The flexor tendons were then  individually retracted with the hemostat as the digit was placed through  passive range of motion.  There was significant thickened flexor synovium encasing and tethering the flexor tendons.  This was grasped with a hemostat dissected from each flexor tendon, excised, removed, passed off table to be sent to pathology for further evaluation.  No other pathologic entities involving either flexor tendon were identified.  The flexor tendons were then placed back into anatomic  position.  The digit was placed through further passive range of motion.  No  further triggering was identified.  No other pathologic entities were  identified involving either flexor tendon or the operative site at this time.  The operative sites were for then injected in local circumferential fashion with  approximately 4 mL of 2% plain lidocaine solution for local anesthetic effect  For a total use of 8 cc  The proximal upper extremity tourniquet was then released.  Total tourniquet  time was 42 minutes.  The entire hand and all 5 digits became pink, warm, and  had a capillary refill of 1 to 2 seconds upon tourniquet release.  Excellent  hemostasis was maintained with electrocautery.  Further copious irrigation  with saline solution was performed.  Excellent hemostasis was noted at each operative site.  The carpal tunnel release operative site was further examined, the sponge was removed, and the  wound edges were  then reapproximated with 1 intermittent horizontal mattress  suture 4-0 nylon and a few simple interrupted sutures of 4-0 nylon.  The left palm operative site was closed with 1 intermittent horizontal mattress suture and a few simple interrupted sutures of 4-0 nylon.  operative site area was further cleaned and dried.  The operative sites were then further injected in local circumferential fashion with approximately 4 mL  of 0.25% plain Marcaine solution each for total use of 8 cc for prolonged postoperative pain relief.  The  left hand was further cleaned and dried.  Bacitracin, Xeroform, and  appropriate bulky protective sterile dressing was then fashioned and applied.  Distal tips of all 5 digits remained pink, warm, and had a capillary refill of  1 to 2 seconds after dressing application.  She tolerated the procedure very  well.  She awoke from anesthesia without difficulty and was transferred to the  recovery room in stable condition.    Dr. Babak Cifuentes MD      Complications:  None; patient tolerated the procedure well.    Disposition: PACU - hemodynamically stable.  Condition: stable     Tourniquet Times:     Total Tourniquet Time Documented:  Arm - Upper (Left) - 42 minutes  Total: Arm - Upper (Left) - 42 minutes      Findings: Left carpal tunnel syndrome and painful left palm mass and active painful left middle finger trigger finger with thickened A1 pulley and thickened flexor synovium of the FDS and FDP tendons      Attending Attestation: I was present and scrubbed for the entire procedure.    Babak Cifuentes  Phone Number: [phone]

## 2024-12-30 NOTE — ANESTHESIA PREPROCEDURE EVALUATION
"Patient: Kaylan Woo \"Mariluz\"    Procedure Information       Anesthesia Start Date/Time: 12/30/24 1212    Procedure: Decompression Median Nerve with Carpal Tunnel Release (Left) - 60 min    Location: [facility] OR 05 / [facility]    Surgeons: Babak Cifuentes MD            Relevant Problems   Cardiac   (+) Abnormal EKG   (+) Arteriosclerotic coronary artery disease   (+) Hypertension   (+) Mixed hyperlipidemia      Pulmonary   (+) Chronic obstructive asthma with status asthmaticus (Multi)   (+) Dyspnea on exertion      Neuro   (+) Anxiety   (+) Brachial neurit or radiculit due to displace of cervic intervert disc   (+) Brachial neuritis or radiculitis   (+) Herniation of cervical intervertebral disc with radiculopathy   (+) Left carpal tunnel syndrome   (+) New daily persistent headache   (+) Opioid use, unspecified with unspecified opioid-induced disorder (Multi)   (+) Right carpal tunnel syndrome      GI   (+) Chronic GERD   (+) Dysphagia, unspecified      Liver   (+) Fatty liver      Endocrine   (+) Severe obesity (Multi)   (+) Thyromegaly   (+) Type 2 diabetes mellitus, with long-term current use of insulin      Hematology   (+) Anemia   (+) Iron deficiency anemia      Musculoskeletal   (+) Cervical spondylosis without myelopathy   (+) Degeneration of intervertebral disc at C5-C6 level   (+) Degeneration of intervertebral disc of thoracic region   (+) Degenerative cervical disc   (+) Herniated lumbar disc without myelopathy   (+) Left carpal tunnel syndrome   (+) Lumbar stenosis   (+) Osteoarthritis   (+) Primary osteoarthritis of right wrist   (+) Right carpal tunnel syndrome      HEENT   (+) Chronic sinusitis      ID   (+) Deep incisional surgical site infection   (+) Wound infection after surgery       Clinical information reviewed:    Allergies  Meds     OB Status           NPO Detail:  NPO/Void Status  Carbohydrate Drink Given Prior to Surgery? : N  Date of Last Liquid: 12/30/24  Time of Last Liquid: " 1000  Date of Last Solid: 12/30/24  Time of Last Solid: 0400  Last Intake Type: Clear fluids  Time of Last Void: 0930         Physical Exam    Airway  Mallampati: II  TM distance: >3 FB     Cardiovascular - normal exam  Rhythm: regular  Rate: normal     Dental - normal exam     Pulmonary - normal exam     Abdominal - normal exam  Abdomen: soft             Anesthesia Plan    History of general anesthesia?: yes  History of complications of general anesthesia?: no    ASA 3     MAC and regional     The patient is not a current smoker.  Patient was previously instructed to abstain from smoking on day of procedure.  Patient did not smoke on day of procedure.  Education provided regarding risk of obstructive sleep apnea.  intravenous induction   Postoperative administration of opioids is intended.  Anesthetic plan and risks discussed with patient.  Use of blood products discussed with patient who.    Plan discussed with CAA.

## 2024-12-30 NOTE — ANESTHESIA PROCEDURE NOTES
Peripheral Block    Patient location during procedure: OR  Start time: 12/30/2024 12:25 PM  End time: 12/30/2024 12:25 PM  Reason for block: at surgeon's request and post-op pain management  Staffing  Performed: JACQUELINE   Authorized by: Dennis Greenfield DO    Performed by: JACQUELINE Davis  Preanesthetic Checklist  Completed: patient identified, IV checked, site marked, risks and benefits discussed, surgical consent, monitors and equipment checked, pre-op evaluation and timeout performed   Timeout performed at:   Peripheral Block  Patient position: laying flat  Prep: alcohol swabs  Patient monitoring: heart rate, cardiac monitor and continuous pulse ox  Block type: Deanne block  Laterality: left  Injection technique: single-shot  Local infiltration: lidocaine  Infiltration strength: 0.5 %  Dose: 50 mL  Assessment  Injection assessment: negative aspiration for heme

## 2024-12-30 NOTE — DISCHARGE INSTRUCTIONS
Post-Operative Instructions    These discharge instructions provide you with general information on caring for yourself after you leave the hospital. Your doctor may also give you specific instructions. If you have any problems or questions after discharge, please call Dr. Cifuentes office [phone].  Home Going Instructions:  Take over-the-counter or prescription medications for pain as directed. Resume your usual medications at home.   Keep your hand raised (elevated) for 2-3 days on 4-5 pillows above the level of your heart as much possible even when sleeping. This keeps swelling down and helps with discomfort.  Gently move your fingers to prevent stiffness, DO NOT USE HAND. Straighten fingers to full extension. No gripping weights, grabbing, pushing, pulling, lifting or carrying.  Use hand for very minimal activity. Ex: Buttons, Zippers.  When showering cover hand with a plastic bag to keep the dressing clean, dry and intact.   DO NOT CHANGE DRESSING, Dr. Cifuentes will remove dressing in his office during the follow up visit in 3-4 days.  Call your Doctor at his office if:  You develop severe pain not relieved by medications.  You develop bleeding from your surgical site.  You have an oral temperature about 101°F.  You develop redness or swelling of the surgical site.  SEEK IMMEDIATE MEDICAL CARE IF: You have chest pain, difficulty breathy, and/or if you develop a reaction or side effects to medication given.  For Your Safety since you were given sedation/anesthesia and will be taking pain medication:  Someone should stay with you for 24 hours.  Change positions slowly.

## 2024-12-30 NOTE — ANESTHESIA POSTPROCEDURE EVALUATION
"Patient: Kaylan Woo \"Mariluz\"    Procedure Summary       Date: 12/30/24 Room / Location: [facility] OR 05 / [facility]    Anesthesia Start: 1212 Anesthesia Stop:     Procedure: Decompression Median Nerve with Carpal Tunnel Release (Left) Diagnosis:       Left carpal tunnel syndrome      (Left carpal tunnel syndrome [G56.02])    Surgeons: Babak Cifuentes MD Responsible Provider: Dennis Greenfield DO    Anesthesia Type: regional, MAC ASA Status: Not recorded            Anesthesia Type: regional, MAC    Vitals Value Taken Time   /76 12/30/24 1336   Temp 36.9 12/30/24 1336   Pulse 91 12/30/24 1336   Resp 17 12/30/24 1336   SpO2 99 12/30/24 1336       Anesthesia Post Evaluation    Patient location during evaluation: PACU  Patient participation: complete - patient participated  Level of consciousness: awake and alert  Pain management: satisfactory to patient  Airway patency: patent  Cardiovascular status: acceptable  Respiratory status: acceptable  Hydration status: acceptable  Postoperative Nausea and Vomiting: none        No notable events documented.    "

## 2025-01-07 LAB
LABORATORY COMMENT REPORT: NORMAL
PATH REPORT.FINAL DX SPEC: NORMAL
PATH REPORT.GROSS SPEC: NORMAL
PATH REPORT.RELEVANT HX SPEC: NORMAL
PATH REPORT.TOTAL CANCER: NORMAL

## 2025-01-09 DIAGNOSIS — Z79.4 TYPE 2 DIABETES MELLITUS WITH OTHER SPECIFIED COMPLICATION, WITH LONG-TERM CURRENT USE OF INSULIN: ICD-10-CM

## 2025-01-09 DIAGNOSIS — Z79.4 TYPE 2 DIABETES MELLITUS WITHOUT COMPLICATION, WITH LONG-TERM CURRENT USE OF INSULIN (MULTI): ICD-10-CM

## 2025-01-09 DIAGNOSIS — E11.9 TYPE 2 DIABETES MELLITUS WITHOUT COMPLICATION, WITH LONG-TERM CURRENT USE OF INSULIN (MULTI): ICD-10-CM

## 2025-01-09 DIAGNOSIS — E11.69 TYPE 2 DIABETES MELLITUS WITH OTHER SPECIFIED COMPLICATION, UNSPECIFIED WHETHER LONG TERM INSULIN USE (MULTI): ICD-10-CM

## 2025-01-09 DIAGNOSIS — E11.69 TYPE 2 DIABETES MELLITUS WITH OTHER SPECIFIED COMPLICATION, WITH LONG-TERM CURRENT USE OF INSULIN: ICD-10-CM

## 2025-01-10 RX ORDER — CALCIUM CITRATE/VITAMIN D3 200MG-6.25
TABLET ORAL
Qty: 100 STRIP | Refills: 3 | Status: SHIPPED | OUTPATIENT
Start: 2025-01-10

## 2025-01-10 RX ORDER — PEN NEEDLE, DIABETIC 30 GX3/16"
NEEDLE, DISPOSABLE MISCELLANEOUS
Qty: 100 EACH | Refills: 1 | Status: SHIPPED | OUTPATIENT
Start: 2025-01-10

## 2025-01-10 RX ORDER — INSULIN DEGLUDEC 100 U/ML
INJECTION, SOLUTION SUBCUTANEOUS
Qty: 15 ML | Refills: 0 | Status: SHIPPED | OUTPATIENT
Start: 2025-01-10

## 2025-01-10 NOTE — TELEPHONE ENCOUNTER
"Refill Request      Last OV with PCP 10/30/2024     Future Appointments       Date / Time Provider Department Dept Phone    1/30/2025 11:00 AM (Arrive by 10:45 AM) Cornelia Ball, APRN-CNP  [facility] [phone]    3/4/2025 11:00 AM [facility] PFT RM 4 [facility]     3/4/2025 12:00 PM [facility] RM 4 [facility]     3/4/2025 1:15 PM Eveline Cantu OT [facility] [phone]          Lab Results   Component Value Date    HGBA1C 7.4 (H) 10/30/2024     Lab Results   Component Value Date    CHOL 270 (H) 10/30/2024    CHOL 297 (H) 11/08/2023    CHOL 223 (H) 08/04/2023     Lab Results   Component Value Date    HDL 54.4 10/30/2024    HDL 53.8 11/08/2023    HDL 51.7 08/04/2023     Lab Results   Component Value Date    LDLCALC 156 (H) 10/30/2024    LDLCALC 201 (H) 11/08/2023     Lab Results   Component Value Date    TRIG 299 (H) 10/30/2024    TRIG 212 (H) 11/08/2023    TRIG 114 08/04/2023     No components found for: \"CHOLHDL\"  Lab Results   Component Value Date    TSH 1.33 10/30/2024     Lab Results   Component Value Date    GLUCOSE 201 (H) 10/30/2024    CALCIUM 9.5 10/30/2024     10/30/2024    K 4.5 10/30/2024    CO2 30 10/30/2024     10/30/2024    BUN 15 10/30/2024    CREATININE 0.89 10/30/2024       "

## 2025-01-13 ENCOUNTER — TELEPHONE (OUTPATIENT)
Dept: PRIMARY CARE | Facility: CLINIC | Age: 70
End: 2025-01-13
Payer: COMMERCIAL

## 2025-01-13 NOTE — TELEPHONE ENCOUNTER
Patient called stating she is holding off on ozempic due to constipation has an appt with DG in 2 weeks

## 2025-01-15 ENCOUNTER — OFFICE VISIT (OUTPATIENT)
Age: 70
End: 2025-01-15
Payer: MEDICARE

## 2025-01-15 VITALS
HEART RATE: 82 BPM | OXYGEN SATURATION: 97 % | HEIGHT: 62 IN | WEIGHT: 240 LBS | TEMPERATURE: 98 F | BODY MASS INDEX: 44.16 KG/M2

## 2025-01-15 DIAGNOSIS — Z98.890 HISTORY OF LUMBOSACRAL SPINE SURGERY: ICD-10-CM

## 2025-01-15 DIAGNOSIS — G56.03 BILATERAL CARPAL TUNNEL SYNDROME: ICD-10-CM

## 2025-01-15 DIAGNOSIS — G89.18 POSTOPERATIVE PAIN AFTER SPINAL SURGERY: ICD-10-CM

## 2025-01-15 DIAGNOSIS — Z51.81 ENCOUNTER FOR MONITORING OPIOID MAINTENANCE THERAPY: ICD-10-CM

## 2025-01-15 DIAGNOSIS — M47.817 LUMBOSACRAL SPONDYLOSIS WITHOUT MYELOPATHY: Primary | ICD-10-CM

## 2025-01-15 DIAGNOSIS — M47.812 CERVICAL SPONDYLOSIS WITHOUT MYELOPATHY: ICD-10-CM

## 2025-01-15 DIAGNOSIS — Z79.891 ENCOUNTER FOR MONITORING OPIOID MAINTENANCE THERAPY: ICD-10-CM

## 2025-01-15 DIAGNOSIS — F11.90 CHRONIC, CONTINUOUS USE OF OPIOIDS: ICD-10-CM

## 2025-01-15 PROCEDURE — 99214 OFFICE O/P EST MOD 30 MIN: CPT | Performed by: PHYSICAL MEDICINE & REHABILITATION

## 2025-01-15 PROCEDURE — 1090F PRES/ABSN URINE INCON ASSESS: CPT | Performed by: PHYSICAL MEDICINE & REHABILITATION

## 2025-01-15 PROCEDURE — 1036F TOBACCO NON-USER: CPT | Performed by: PHYSICAL MEDICINE & REHABILITATION

## 2025-01-15 PROCEDURE — 3017F COLORECTAL CA SCREEN DOC REV: CPT | Performed by: PHYSICAL MEDICINE & REHABILITATION

## 2025-01-15 PROCEDURE — 99215 OFFICE O/P EST HI 40 MIN: CPT | Performed by: PHYSICAL MEDICINE & REHABILITATION

## 2025-01-15 PROCEDURE — G8400 PT W/DXA NO RESULTS DOC: HCPCS | Performed by: PHYSICAL MEDICINE & REHABILITATION

## 2025-01-15 PROCEDURE — G8427 DOCREV CUR MEDS BY ELIG CLIN: HCPCS | Performed by: PHYSICAL MEDICINE & REHABILITATION

## 2025-01-15 PROCEDURE — 1125F AMNT PAIN NOTED PAIN PRSNT: CPT | Performed by: PHYSICAL MEDICINE & REHABILITATION

## 2025-01-15 PROCEDURE — G8417 CALC BMI ABV UP PARAM F/U: HCPCS | Performed by: PHYSICAL MEDICINE & REHABILITATION

## 2025-01-15 PROCEDURE — 1159F MED LIST DOCD IN RCRD: CPT | Performed by: PHYSICAL MEDICINE & REHABILITATION

## 2025-01-15 PROCEDURE — 1123F ACP DISCUSS/DSCN MKR DOCD: CPT | Performed by: PHYSICAL MEDICINE & REHABILITATION

## 2025-01-15 PROCEDURE — 1160F RVW MEDS BY RX/DR IN RCRD: CPT | Performed by: PHYSICAL MEDICINE & REHABILITATION

## 2025-01-15 PROCEDURE — G2211 COMPLEX E/M VISIT ADD ON: HCPCS | Performed by: PHYSICAL MEDICINE & REHABILITATION

## 2025-01-15 RX ORDER — OXYCODONE HYDROCHLORIDE 5 MG/1
5 TABLET ORAL 2 TIMES DAILY PRN
Qty: 14 TABLET | Refills: 0 | Status: SHIPPED | OUTPATIENT
Start: 2025-01-15 | End: 2025-01-22

## 2025-01-15 ASSESSMENT — ENCOUNTER SYMPTOMS
DIARRHEA: 0
NAUSEA: 0
BACK PAIN: 1
CONSTIPATION: 0
SHORTNESS OF BREATH: 0

## 2025-01-15 NOTE — PROGRESS NOTES
Juli Brand  (1955)    1/15/2025    Subjective:     Juli Brand is 69 y.o. female who complains today of:    Chief Complaint   Patient presents with    Follow-up     Last seen by me 9/30/24: Daughter in law Jenifer whom she permits to be present during the history and exam. Right carpal tunnel release and now left carpal tunnel release with Dr Todd Amato Hand, complicated by infection, concern for reaction to stitches. Never saw ENT for [facility] evaluation. Obtaining Tizanidine 4 mg TID prn from Dr Tripathi. No other tests therapy or updates from other physicians, no ER visits. Gabapentin 600 mg TID and Tizanidine 4 mg TID, Lodine 400 mg BID, and Venlafaxine 150 mg daily help with remaining functional with chores, personal hygiene, remaining compliant with home exercise program, maintaining her quality of life, and performing activities of daily living. She obtains greater than 50% pain relief without any significant side effects. She is clear to avoid driving or operating heavy machinery or to perform any activities where she may harm herself or others while on pain medications.    Neck pain is a 3/10 on NRS pain scale. Gets to a 5/10. Her pain is located in both sides of the neck, left worse than right, she has pain into her left arm. It has been a constant ache for over 22 years. Neck pain is from a work-related injury. Worse with turning her neck. Better with rest and pain medicine. She has a history of anterior cervical discectomy and fusion C5-7. There are no other associated symptoms or contextual factors. She denies any new weakness, saddle anesthesia, bowel or bladder dysfunction, or falls.    Low back pain is a 7/10 on NRS pain scale. Gets to a 8/10. It is located in the left low back and goes into her left groin, hip and left toes are numb. Pain is worse with walking and activity. Pain is better with rest and pain medicines. It has been a constant ache for over 1 year. S/p

## 2025-01-16 DIAGNOSIS — Z79.4 TYPE 2 DIABETES MELLITUS WITH OTHER SPECIFIED COMPLICATION, WITH LONG-TERM CURRENT USE OF INSULIN: Primary | ICD-10-CM

## 2025-01-16 DIAGNOSIS — E11.69 TYPE 2 DIABETES MELLITUS WITH OTHER SPECIFIED COMPLICATION, WITH LONG-TERM CURRENT USE OF INSULIN: Primary | ICD-10-CM

## 2025-01-16 RX ORDER — INSULIN DEGLUDEC 100 U/ML
40 INJECTION, SOLUTION SUBCUTANEOUS NIGHTLY
Qty: 15 ML | Refills: 11 | Status: SHIPPED | OUTPATIENT
Start: 2025-01-16 | End: 2026-01-16

## 2025-01-17 ENCOUNTER — TELEPHONE (OUTPATIENT)
Dept: PRIMARY CARE | Facility: CLINIC | Age: 70
End: 2025-01-17
Payer: COMMERCIAL

## 2025-01-17 NOTE — TELEPHONE ENCOUNTER
Pt called in stating that her Tresiba FlexTouch U-100 100 unit/mL (3 mL) injection  Rx was wrongful discontinued and that she needs med sent into her pharmacy. I spoke with her and let her know that DG NP sent in insulin degludec (Tresiba FlexTouch) 100 unit/mL (3 mL) injection yesterday . Pt will check with her pharmacy and if any issues receiving her Rx that she will erasmo back.

## 2025-01-21 ENCOUNTER — ANESTHESIA (OUTPATIENT)
Dept: MRI IMAGING | Age: 70
End: 2025-01-21
Payer: COMMERCIAL

## 2025-01-21 ENCOUNTER — HOSPITAL ENCOUNTER (OUTPATIENT)
Dept: MRI IMAGING | Age: 70
Discharge: HOME | End: 2025-01-23
Attending: FAMILY MEDICINE
Payer: COMMERCIAL

## 2025-01-21 ENCOUNTER — ANESTHESIA EVENT (OUTPATIENT)
Dept: MRI IMAGING | Age: 70
End: 2025-01-21
Payer: COMMERCIAL

## 2025-01-21 VITALS
RESPIRATION RATE: 18 BRPM | DIASTOLIC BLOOD PRESSURE: 71 MMHG | BODY MASS INDEX: 43.79 KG/M2 | SYSTOLIC BLOOD PRESSURE: 151 MMHG | WEIGHT: 238 LBS | TEMPERATURE: 97.3 F | OXYGEN SATURATION: 97 % | HEIGHT: 62 IN | HEART RATE: 82 BPM

## 2025-01-21 DIAGNOSIS — M50.320 OTHER CERVICAL DISC DEGENERATION, MID-CERVICAL REGION, UNSPECIFIED LEVEL: ICD-10-CM

## 2025-01-21 LAB
GLUCOSE BLD-MCNC: 181 MG/DL (ref 70–99)
PERFORMED ON: ABNORMAL

## 2025-01-21 PROCEDURE — 72141 MRI NECK SPINE W/O DYE: CPT

## 2025-01-21 PROCEDURE — 2580000003 HC RX 258

## 2025-01-21 PROCEDURE — 6360000002 HC RX W HCPCS

## 2025-01-21 RX ORDER — ONDANSETRON 2 MG/ML
4 INJECTION INTRAMUSCULAR; INTRAVENOUS
Status: ACTIVE | OUTPATIENT
Start: 2025-01-21 | End: 2025-01-22

## 2025-01-21 RX ORDER — NALOXONE HYDROCHLORIDE 0.4 MG/ML
INJECTION, SOLUTION INTRAMUSCULAR; INTRAVENOUS; SUBCUTANEOUS PRN
Status: DISCONTINUED | OUTPATIENT
Start: 2025-01-21 | End: 2025-01-24 | Stop reason: HOSPADM

## 2025-01-21 RX ORDER — SODIUM CHLORIDE 0.9 % (FLUSH) 0.9 %
5-40 SYRINGE (ML) INJECTION PRN
Status: DISCONTINUED | OUTPATIENT
Start: 2025-01-21 | End: 2025-01-24 | Stop reason: HOSPADM

## 2025-01-21 RX ORDER — DEXTROSE MONOHYDRATE 100 MG/ML
INJECTION, SOLUTION INTRAVENOUS CONTINUOUS PRN
Status: DISCONTINUED | OUTPATIENT
Start: 2025-01-21 | End: 2025-01-24 | Stop reason: HOSPADM

## 2025-01-21 RX ORDER — MIDAZOLAM HYDROCHLORIDE 1 MG/ML
INJECTION, SOLUTION INTRAMUSCULAR; INTRAVENOUS
Status: DISCONTINUED | OUTPATIENT
Start: 2025-01-21 | End: 2025-01-21 | Stop reason: SDUPTHER

## 2025-01-21 RX ORDER — IPRATROPIUM BROMIDE AND ALBUTEROL SULFATE 2.5; .5 MG/3ML; MG/3ML
1 SOLUTION RESPIRATORY (INHALATION)
Status: ACTIVE | OUTPATIENT
Start: 2025-01-21 | End: 2025-01-22

## 2025-01-21 RX ORDER — GLUCAGON 1 MG/ML
1 KIT INJECTION PRN
Status: DISCONTINUED | OUTPATIENT
Start: 2025-01-21 | End: 2025-01-24 | Stop reason: HOSPADM

## 2025-01-21 RX ORDER — HYDRALAZINE HYDROCHLORIDE 20 MG/ML
10 INJECTION INTRAMUSCULAR; INTRAVENOUS
Status: DISCONTINUED | OUTPATIENT
Start: 2025-01-21 | End: 2025-01-24 | Stop reason: HOSPADM

## 2025-01-21 RX ORDER — LIDOCAINE HYDROCHLORIDE 20 MG/ML
INJECTION, SOLUTION INFILTRATION; PERINEURAL
Status: DISCONTINUED | OUTPATIENT
Start: 2025-01-21 | End: 2025-01-21 | Stop reason: SDUPTHER

## 2025-01-21 RX ORDER — LABETALOL HYDROCHLORIDE 5 MG/ML
10 INJECTION, SOLUTION INTRAVENOUS
Status: DISCONTINUED | OUTPATIENT
Start: 2025-01-21 | End: 2025-01-24 | Stop reason: HOSPADM

## 2025-01-21 RX ORDER — METOCLOPRAMIDE HYDROCHLORIDE 5 MG/ML
10 INJECTION INTRAMUSCULAR; INTRAVENOUS
Status: ACTIVE | OUTPATIENT
Start: 2025-01-21 | End: 2025-01-22

## 2025-01-21 RX ORDER — PROPOFOL 10 MG/ML
INJECTION, EMULSION INTRAVENOUS
Status: DISCONTINUED | OUTPATIENT
Start: 2025-01-21 | End: 2025-01-21 | Stop reason: SDUPTHER

## 2025-01-21 RX ORDER — SODIUM CHLORIDE 9 MG/ML
INJECTION, SOLUTION INTRAVENOUS PRN
Status: DISCONTINUED | OUTPATIENT
Start: 2025-01-21 | End: 2025-01-24 | Stop reason: HOSPADM

## 2025-01-21 RX ORDER — SODIUM CHLORIDE 9 MG/ML
INJECTION, SOLUTION INTRAVENOUS
Status: DISCONTINUED | OUTPATIENT
Start: 2025-01-21 | End: 2025-01-21 | Stop reason: SDUPTHER

## 2025-01-21 RX ORDER — SODIUM CHLORIDE 0.9 % (FLUSH) 0.9 %
5-40 SYRINGE (ML) INJECTION EVERY 12 HOURS SCHEDULED
Status: DISCONTINUED | OUTPATIENT
Start: 2025-01-21 | End: 2025-01-24 | Stop reason: HOSPADM

## 2025-01-21 RX ORDER — 0.9 % SODIUM CHLORIDE 0.9 %
500 INTRAVENOUS SOLUTION INTRAVENOUS ONCE
Status: DISCONTINUED | OUTPATIENT
Start: 2025-01-21 | End: 2025-01-24 | Stop reason: HOSPADM

## 2025-01-21 RX ADMIN — LIDOCAINE HYDROCHLORIDE 100 MG: 20 INJECTION, SOLUTION INFILTRATION; PERINEURAL at 13:50

## 2025-01-21 RX ADMIN — PROPOFOL 40 MCG/KG/MIN: 10 INJECTION, EMULSION INTRAVENOUS at 13:50

## 2025-01-21 RX ADMIN — SODIUM CHLORIDE: 9 INJECTION, SOLUTION INTRAVENOUS at 13:41

## 2025-01-21 RX ADMIN — PROPOFOL 30 MG: 10 INJECTION, EMULSION INTRAVENOUS at 13:50

## 2025-01-21 RX ADMIN — MIDAZOLAM HYDROCHLORIDE 2 MG: 1 INJECTION, SOLUTION INTRAMUSCULAR; INTRAVENOUS at 13:47

## 2025-01-21 ASSESSMENT — PAIN SCALES - GENERAL
PAINLEVEL_OUTOF10: 3
PAINLEVEL_OUTOF10: 3

## 2025-01-21 ASSESSMENT — PAIN - FUNCTIONAL ASSESSMENT: PAIN_FUNCTIONAL_ASSESSMENT: 0-10

## 2025-01-21 NOTE — ANESTHESIA POSTPROCEDURE EVALUATION
Department of Anesthesiology  Postprocedure Note    Patient: Juli Brand  MRN: [id number]  YOB: 1955  Date of evaluation: 1/21/2025    Procedure Summary       Date: 01/21/25 Room / Location: [facility]    Anesthesia Start: 1341 Anesthesia Stop: 1416    Procedure: MRI CERVICAL SPINE WO CONTRAST Diagnosis: Other cervical disc degeneration, mid-cervical region, unspecified level    Scheduled Providers:  Responsible Provider: Byron Koehler MD    Anesthesia Type: MAC ASA Status: 3            Anesthesia Type: No value filed.    Abdifatah Phase I: Abdifatah Score: 10    Abdifatah Phase II:      Anesthesia Post Evaluation    Patient location during evaluation: bedside  Patient participation: complete - patient participated  Level of consciousness: awake and awake and alert  Airway patency: patent  Nausea & Vomiting: no nausea and no vomiting  Cardiovascular status: blood pressure returned to baseline and hemodynamically stable  Respiratory status: acceptable  Hydration status: euvolemic  Pain management: adequate        No notable events documented.

## 2025-01-21 NOTE — ANESTHESIA PRE PROCEDURE
Department of Anesthesiology  Preprocedure Note       Name:  Juli Brand   Age:  69 y.o.  :  1955                                          MRN:  [id number]         Date:  2025      Surgeon: * No surgeons listed *    Procedure: * No procedures listed *    Medications prior to admission:   Prior to Admission medications    Medication Sig Start Date End Date Taking? Authorizing Provider   OZEMPIC, 0.25 OR 0.5 MG/DOSE, 2 MG/3ML SOPN Inject 0.25ml into the skin once weekly   Yes Martine Jarrett MD   oxyCODONE (ROXICODONE) 5 MG immediate release tablet Take 1 tablet by mouth 2 times daily as needed for Pain for up to 7 days. Max Daily Amount: 10 mg 1/15/25 1/22/25  Aleta Avalos MD   TRESIBA FLEXTOUCH 100 UNIT/ML SOPN INJECT 40 UNITS UNDER THE SKIN ONCE DAILY AT BEDTIME    Martine Jarrett MD   gabapentin (NEURONTIN) 600 MG tablet Take 1 tablet by mouth 3 times daily for 30 days. 24  Aleta Avalos MD   naproxen (NAPROSYN) 250 MG tablet Take 220 mg by mouth daily    Martine Jarrett MD   omeprazole (PRILOSEC) 40 MG delayed release capsule TAKE 1 CAPSULE BY MOUTH DAILY 10/9/23   Delores Tripathi DO   etodolac (LODINE) 400 MG tablet Take 1 tablet by mouth 2 times daily 23   Delores Tripathi DO   etodolac (LODINE) 200 MG capsule Take 1 capsule by mouth in the morning and at bedtime 23   Delores Tripathi DO   venlafaxine (EFFEXOR XR) 150 MG extended release capsule Take 1 capsule by mouth daily 23  Delores Tripathi DO   naloxone 4 MG/0.1ML LIQD nasal spray 1 spray by Nasal route as needed for Opioid Reversal 22   Aleta Avalos MD   bumetanide (BUMEX) 1 MG tablet Take 1 tablet by mouth daily    Martine Jarrett MD   dilTIAZem (CARDIZEM CD) 240 MG extended release capsule  20   Martine Jarrett MD   insulin glargine (LANTUS;BASAGLAR) 100 UNIT/ML injection pen Inject into the skin 19   Provider

## 2025-01-22 ENCOUNTER — TELEMEDICINE (OUTPATIENT)
Dept: PRIMARY CARE | Facility: CLINIC | Age: 70
End: 2025-01-22
Payer: MEDICARE

## 2025-01-22 ENCOUNTER — TELEPHONE (OUTPATIENT)
Dept: PRIMARY CARE | Facility: CLINIC | Age: 70
End: 2025-01-22

## 2025-01-22 DIAGNOSIS — F39 MOOD DISORDER (CMS-HCC): ICD-10-CM

## 2025-01-22 DIAGNOSIS — E66.01 CLASS 3 SEVERE OBESITY DUE TO EXCESS CALORIES WITHOUT SERIOUS COMORBIDITY WITH BODY MASS INDEX (BMI) OF 45.0 TO 49.9 IN ADULT: ICD-10-CM

## 2025-01-22 DIAGNOSIS — I50.9 HEART FAILURE, UNSPECIFIED HF CHRONICITY, UNSPECIFIED HEART FAILURE TYPE: ICD-10-CM

## 2025-01-22 DIAGNOSIS — J44.89 CHRONIC OBSTRUCTIVE ASTHMA WITH STATUS ASTHMATICUS (MULTI): ICD-10-CM

## 2025-01-22 DIAGNOSIS — E66.01 SEVERE OBESITY (MULTI): ICD-10-CM

## 2025-01-22 DIAGNOSIS — E66.813 CLASS 3 SEVERE OBESITY DUE TO EXCESS CALORIES WITHOUT SERIOUS COMORBIDITY WITH BODY MASS INDEX (BMI) OF 45.0 TO 49.9 IN ADULT: ICD-10-CM

## 2025-01-22 DIAGNOSIS — J45.902 CHRONIC OBSTRUCTIVE ASTHMA WITH STATUS ASTHMATICUS (MULTI): ICD-10-CM

## 2025-01-22 DIAGNOSIS — F11.99 OPIOID USE, UNSPECIFIED WITH UNSPECIFIED OPIOID-INDUCED DISORDER (MULTI): ICD-10-CM

## 2025-01-22 DIAGNOSIS — E11.69 TYPE 2 DIABETES MELLITUS WITH OTHER SPECIFIED COMPLICATION, UNSPECIFIED WHETHER LONG TERM INSULIN USE (MULTI): ICD-10-CM

## 2025-01-22 DIAGNOSIS — R94.4 DECREASED GFR: Primary | ICD-10-CM

## 2025-01-22 DIAGNOSIS — I11.0 HYPERTENSIVE HEART DISEASE WITH HEART FAILURE: ICD-10-CM

## 2025-01-22 DIAGNOSIS — I10 PRIMARY HYPERTENSION: ICD-10-CM

## 2025-01-22 DIAGNOSIS — J45.52 SEVERE PERSISTENT ASTHMA WITH STATUS ASTHMATICUS (MULTI): ICD-10-CM

## 2025-01-22 PROBLEM — R05.8 NONPRODUCTIVE COUGH: Status: RESOLVED | Noted: 2023-09-06 | Resolved: 2025-01-22

## 2025-01-22 PROCEDURE — 99214 OFFICE O/P EST MOD 30 MIN: CPT | Performed by: INTERNAL MEDICINE

## 2025-01-22 PROCEDURE — 1159F MED LIST DOCD IN RCRD: CPT | Performed by: INTERNAL MEDICINE

## 2025-01-22 PROCEDURE — 1157F ADVNC CARE PLAN IN RCRD: CPT | Performed by: INTERNAL MEDICINE

## 2025-01-22 PROCEDURE — 4010F ACE/ARB THERAPY RXD/TAKEN: CPT | Performed by: INTERNAL MEDICINE

## 2025-01-22 PROCEDURE — 1123F ACP DISCUSS/DSCN MKR DOCD: CPT | Performed by: INTERNAL MEDICINE

## 2025-01-22 PROCEDURE — G2211 COMPLEX E/M VISIT ADD ON: HCPCS | Performed by: INTERNAL MEDICINE

## 2025-01-22 NOTE — PROGRESS NOTES
"We attempted to have a virtual visit via both audio and video.  We experienced technical difficulties with the video portion of the visit part way through, but did complete the virtual visit by telephone.  Subjective   Patient ID: Kaylan Woo \"Mariluz\" is a 69 y.o. female who presents for Follow-up.  HPI  Saw dr jaeger  Inc cardizem doubled  And spironolactone  Wanted her to go on chol med   Also neck mri scheduled  Dr calderon  ? Airway during mri  Needs anesthesia for mri cannot lie flat  Stopped potassium  Stopped aldactone  Stopped prilosec  Stopped lodeine  On her own then dr jaeger said stop aldactone and dec bumex  Home bp 150/72   Neck is workers comp  I reviewed mr c spine w her      Review of Systems  Gen:  no fever  HEENT:  no trouble swallowing  CV:  no dyspnea, cyanosis  Lungs:  no shortness of breath  GI:  no constipation, no blood in stool  Vascular:  no edema  Neuro:   no weakness  Skin:  no rash  MS:no joint swelling  Gu:  no urinary complaints  All other systems have been reviewed and are negative for complaint    LMP  (LMP Unknown)   Objective   Physical Exam  Lab Results   Component Value Date    WBC 11.4 (H) 10/30/2024    HGB 12.0 10/30/2024    HCT 38.7 10/30/2024    MCV 86 10/30/2024     10/30/2024     Lab Results   Component Value Date    GLUCOSE 201 (H) 10/30/2024    CALCIUM 9.5 10/30/2024     10/30/2024    K 4.5 10/30/2024    CO2 30 10/30/2024     10/30/2024    BUN 15 10/30/2024    CREATININE 0.89 10/30/2024     Social History     Socioeconomic History    Marital status:    Tobacco Use    Smoking status: Former     Types: Cigarettes    Smokeless tobacco: Never   Vaping Use    Vaping status: Never Used   Substance and Sexual Activity    Alcohol use: Never     Comment: rarely    Drug use: Never     Family History   Problem Relation Name Age of Onset    Alzheimer's disease Mother      Atrial fibrillation Mother      Lupus Mother      Hypertension Mother      " Arthritis Father      Other (rheumatic disease) Father      Charcot-Charisse-Tooth disease Father      Charcot-Charisse-Tooth disease Sister      Diabetes Sister      Hypertension Sister      Hypertension Brother       Problem List Items Addressed This Visit       Type 2 diabetes mellitus with other specified complication, unspecified whether long term insulin use (Multi)    Overview     Metformin: severe GI SE  Januvia: $$  Ozempic: $$  Continue current medications.   No new symptoms,stable condition.   Follow up at least yearly.           Hypertension    Opioid use, unspecified with unspecified opioid-induced disorder (Multi)    Overview     Continue current medications.   No new symptoms,stable condition.   Follow up at least yearly.           Mood disorder (CMS-[facility])    Overview      Continue current medications.   No new symptoms,stable condition.   Follow up at least yearly.           Severe persistent asthma with status asthmaticus (Multi)    Overview     Last Assessment & Plan: Formatting of this note might be different from the original. Assessment: POA PLAN: SEE COPD POC  Continue current medications.   No new symptoms,stable condition.   Follow up at least yearly.           Severe obesity (Multi)    Overview       No new symptoms  stable condition.   Follow up at least yearly.           Hypertensive heart disease with heart failure    Overview     Continue current medications.   No new symptoms,stable condition.   Follow up at least yearly.           Chronic obstructive asthma with status asthmaticus (Multi)    Overview     Continue current medications.   No new symptoms,stable condition.   Follow up at least yearly.            Other Visit Diagnoses       Decreased GFR    -  Primary    Relevant Orders    Basic Metabolic Panel    Urinalysis with Reflex Microscopic    Albumin-Creatinine Ratio, Urine Random    Heart failure, unspecified HF chronicity, unspecified heart failure type        Continue current  medications.   No new symptoms,stable condition.   Follow up at least yearly.    Class 3 severe obesity due to excess calories without serious comorbidity with body mass index (BMI) of 45.0 to 49.9 in adult        Continue current medications.   No new symptoms,stable condition.   Follow up at least yearly.            Hold potassium   Stop aldactone  Hold etodolac  Go to 240 on cardizem  Check bp at home , call with numbers after one week   Seecoy gonzalez 1/30

## 2025-01-22 NOTE — TELEPHONE ENCOUNTER
Patient calling  States she called the other day   Hasn't heard back   Wants to know what she needs to do about her recent BMP result that her cardiologist ordered  Wants to do an MRI but isnt able to because her abnormal labs

## 2025-01-22 NOTE — TELEPHONE ENCOUNTER
We can add her on for a VV At 3:30 to discuss if you are okay with that??    Basic Metabolic Panel  Order: [id number]  Component  Ref Range & Units 5 d ago   Sodium  135 - 144 mEq/L 138   Potassium  3.4 - 4.9 mEq/L 5.7 High    Chloride  95 - 107 mEq/L 99   CO2  20 - 31 mEq/L 29   Anion Gap  9 - 15 mEq/L 10   Glucose  70 - 99 mg/dL 244 High    BUN  8 - 23 mg/dL 23   Creatinine  0.50 - 0.90 mg/dL 1.29 High    Est, Glom Filt Rate  >60 45.4 Low       Calcium  8.5 - 9.9 mg/dL

## 2025-01-23 ENCOUNTER — PATIENT MESSAGE (OUTPATIENT)
Dept: PRIMARY CARE | Facility: CLINIC | Age: 70
End: 2025-01-23
Payer: COMMERCIAL

## 2025-01-23 ENCOUNTER — TELEPHONE (OUTPATIENT)
Age: 70
End: 2025-01-23

## 2025-01-23 DIAGNOSIS — J45.909 ASTHMA, UNSPECIFIED ASTHMA SEVERITY, UNSPECIFIED WHETHER COMPLICATED, UNSPECIFIED WHETHER PERSISTENT (HHS-HCC): ICD-10-CM

## 2025-01-23 RX ORDER — ALBUTEROL SULFATE 90 UG/1
AEROSOL, METERED RESPIRATORY (INHALATION)
Qty: 8 G | Refills: 11 | Status: SHIPPED | OUTPATIENT
Start: 2025-01-23

## 2025-01-23 NOTE — TELEPHONE ENCOUNTER
Pt called she saw her family doctor Dr. Shila Fung who is recommending she does not get the RFA done. Pt was wondering if Dr. Avalos has seen the MRI results and after looking at them what does he suggest her doing. Should she hold off on getting the RFA or do it on 2/4/2025. Please Advise.

## 2025-01-25 ENCOUNTER — LAB (OUTPATIENT)
Dept: LAB | Facility: LAB | Age: 70
End: 2025-01-25
Payer: COMMERCIAL

## 2025-01-25 DIAGNOSIS — R94.4 DECREASED GFR: ICD-10-CM

## 2025-01-25 LAB
ANION GAP SERPL CALC-SCNC: 17 MMOL/L
APPEARANCE UR: CLEAR
BILIRUB UR STRIP.AUTO-MCNC: ABNORMAL MG/DL
BUN SERPL-MCNC: 32 MG/DL (ref 6–23)
CALCIUM SERPL-MCNC: 9.4 MG/DL (ref 8.6–10.3)
CHLORIDE SERPL-SCNC: 98 MMOL/L (ref 98–107)
CO2 SERPL-SCNC: 27 MMOL/L (ref 21–32)
COLOR UR: COLORLESS
CREAT SERPL-MCNC: 1.34 MG/DL (ref 0.5–1.05)
CREAT UR-MCNC: 36.1 MG/DL (ref 20–320)
EGFRCR SERPLBLD CKD-EPI 2021: 44 ML/MIN/1.73M*2
GLUCOSE SERPL-MCNC: 268 MG/DL (ref 74–99)
GLUCOSE UR STRIP.AUTO-MCNC: NORMAL MG/DL
KETONES UR STRIP.AUTO-MCNC: NEGATIVE MG/DL
LEUKOCYTE ESTERASE UR QL STRIP.AUTO: NEGATIVE
MICROALBUMIN UR-MCNC: <7 MG/L
MICROALBUMIN/CREAT UR: NORMAL MG/G{CREAT}
NITRITE UR QL STRIP.AUTO: NEGATIVE
PH UR STRIP.AUTO: 5.5 [PH]
POTASSIUM SERPL-SCNC: 4.7 MMOL/L (ref 3.5–5.3)
PROT UR STRIP.AUTO-MCNC: NEGATIVE MG/DL
RBC # UR STRIP.AUTO: NEGATIVE /UL
SODIUM SERPL-SCNC: 137 MMOL/L (ref 136–145)
SP GR UR STRIP.AUTO: 1.01
UROBILINOGEN UR STRIP.AUTO-MCNC: NORMAL MG/DL

## 2025-01-25 PROCEDURE — 82043 UR ALBUMIN QUANTITATIVE: CPT

## 2025-01-25 PROCEDURE — 80048 BASIC METABOLIC PNL TOTAL CA: CPT

## 2025-01-25 PROCEDURE — 81003 URINALYSIS AUTO W/O SCOPE: CPT

## 2025-01-25 PROCEDURE — 82570 ASSAY OF URINE CREATININE: CPT

## 2025-01-27 DIAGNOSIS — R94.4 DECREASED GFR: ICD-10-CM

## 2025-01-27 NOTE — TELEPHONE ENCOUNTER
PT CALLED TO ADD THAT SHE ALSO HAD LABS DONE AT [facility] AND  AND SHE WOULD LIKE DR. WHITE TO REVIEW THESE AS WELL AS THE MRI.

## 2025-01-30 ENCOUNTER — APPOINTMENT (OUTPATIENT)
Dept: PRIMARY CARE | Facility: CLINIC | Age: 70
End: 2025-01-30
Payer: COMMERCIAL

## 2025-02-03 ENCOUNTER — APPOINTMENT (OUTPATIENT)
Dept: PRIMARY CARE | Facility: CLINIC | Age: 70
End: 2025-02-03
Payer: COMMERCIAL

## 2025-02-03 VITALS
BODY MASS INDEX: 44.94 KG/M2 | TEMPERATURE: 99.3 F | WEIGHT: 244.2 LBS | SYSTOLIC BLOOD PRESSURE: 148 MMHG | DIASTOLIC BLOOD PRESSURE: 82 MMHG | HEIGHT: 62 IN | HEART RATE: 95 BPM | OXYGEN SATURATION: 92 %

## 2025-02-03 DIAGNOSIS — I10 PRIMARY HYPERTENSION: Primary | ICD-10-CM

## 2025-02-03 DIAGNOSIS — N17.9 AKI (ACUTE KIDNEY INJURY) (CMS-HCC): ICD-10-CM

## 2025-02-03 DIAGNOSIS — N18.9 CHRONIC KIDNEY DISEASE, UNSPECIFIED CKD STAGE: ICD-10-CM

## 2025-02-03 DIAGNOSIS — E11.69 TYPE 2 DIABETES MELLITUS WITH OTHER SPECIFIED COMPLICATION, UNSPECIFIED WHETHER LONG TERM INSULIN USE (MULTI): ICD-10-CM

## 2025-02-03 DIAGNOSIS — Z79.899 MEDICATION MANAGEMENT: ICD-10-CM

## 2025-02-03 RX ORDER — BUMETANIDE 0.5 MG/1
0.5 TABLET ORAL DAILY
Start: 2025-02-03 | End: 2026-02-03

## 2025-02-03 ASSESSMENT — PATIENT HEALTH QUESTIONNAIRE - PHQ9
SUM OF ALL RESPONSES TO PHQ9 QUESTIONS 1 AND 2: 0
1. LITTLE INTEREST OR PLEASURE IN DOING THINGS: NOT AT ALL
2. FEELING DOWN, DEPRESSED OR HOPELESS: NOT AT ALL

## 2025-02-03 NOTE — PROGRESS NOTES
Problem List Items Addressed This Visit          Medium    ROSEMARIE (acute kidney injury) (CMS-[facility])    Current Assessment & Plan     Component      Latest Ref Rng 10/30/2024 1/25/2025   GLUCOSE      74 - 99 mg/dL 201 (H)  268 (H)    SODIUM      136 - 145 mmol/L 140  137    POTASSIUM      3.5 - 5.3 mmol/L 4.5  4.7    CHLORIDE      98 - 107 mmol/L 100  98    Bicarbonate      21 - 32 mmol/L 30  27    Anion Gap      mmol/L 15  17    Blood Urea Nitrogen      6 - 23 mg/dL 15  32 (H)    Creatinine      0.50 - 1.05 mg/dL 0.89  1.34 (H)    EGFR      >60 mL/min/1.73m*2 71  44 (L)    Calcium      8.6 - 10.3 mg/dL 9.5  9.4    Albumin      3.4 - 5.0 g/dL 4.0     Alkaline Phosphatase      33 - 136 U/L 110     Total Protein      6.4 - 8.2 g/dL 6.3 (L)     AST      9 - 39 U/L 13     Bilirubin Total      0.0 - 1.2 mg/dL 0.5        ? Medication  She is scheduled with neph and for US renal   Pharmd also   Plan to recheck BMP per PCP orders 1-2 weeks          Hypertension - Primary    Overview     Scheduled with Dr Sepulveda in April 2025  She stopped aldactone with 1/25/25 ROSEMARIE labs          Current Assessment & Plan     Continue bumex 0.5 mg every day  Continue losartan 50 mg bid  Continue cardizem 240 mg every day  Recheck labs 1-2 weeks  2 week fu with Cornelia flores be VV         Relevant Medications    bumetanide (Bumex) 0.5 mg tablet    Other Relevant Orders    Referral to Clinical Pharmacy    Type 2 diabetes mellitus with other specified complication, unspecified whether long term insulin use (Multi)    Overview     Metformin: severe GI SE  Januvia: $$  Ozempic: $$  Continue current medications.   No new symptoms,stable condition.   Follow up at least yearly.           Current Assessment & Plan     Refer clin pharm med mgt and $$         Relevant Orders    Referral to Clinical Pharmacy     Other Visit Diagnoses       Medication management        Relevant Orders    Referral to Clinical Pharmacy             Subjective   Patient ID: Kaylan MCKEON  "Amna \"Mariluz\" is a 69 y.o. female who presents for Follow-up (3 month follow up /labs).  HPI  HTN  Saw cards 12/4/24  - increased cardizem to bid; she did not increase this dose   - added spiranolactone 25 mg every day; she started it and then stopped with 1/25/25 labs  Last dose aldactone at least 1 week ago    Has been taking bumex 1 mg  Losartan 50 mg bid  Cardizem 240 mg every day     She borrowed her friend's cuff for readings  - manual  - she's a nurse  Her cuff broke  She is checking readings when she takes meds      Component      Latest Ref Rng 10/30/2024 1/25/2025   WBC      4.4 - 11.3 x10*3/uL 11.4 (H)     nRBC      0.0 - 0.0 /100 WBCs 0.0     RBC      4.00 - 5.20 x10*6/uL 4.50     HEMOGLOBIN      12.0 - 16.0 g/dL 12.0     HEMATOCRIT      36.0 - 46.0 % 38.7     MCV      80 - 100 fL 86     MCH      26.0 - 34.0 pg 26.7     MCHC      32.0 - 36.0 g/dL 31.0 (L)     RED CELL DISTRIBUTION WIDTH      11.5 - 14.5 % 14.6 (H)     Platelets      150 - 450 x10*3/uL 407     Neutrophils %      40.0 - 80.0 % 63.4     Immature Granulocytes %, Automated      0.0 - 0.9 % 0.4     Lymphocytes %      13.0 - 44.0 % 25.6     Monocytes %      2.0 - 10.0 % 6.7     Eosinophils %      0.0 - 6.0 % 2.5     Basophils %      0.0 - 2.0 % 1.4     Neutrophils Absolute      1.20 - 7.70 x10*3/uL 7.24     Immature Granulocytes Absolute, Automated      0.00 - 0.70 x10*3/uL 0.05     Lymphocytes Absolute      1.20 - 4.80 x10*3/uL 2.92     Monocytes Absolute      0.10 - 1.00 x10*3/uL 0.77     Eosinophils Absolute      0.00 - 0.70 x10*3/uL 0.28     Basophils Absolute      0.00 - 0.10 x10*3/uL 0.16 (H)     GLUCOSE      74 - 99 mg/dL 201 (H)  268 (H)    SODIUM      136 - 145 mmol/L 140  137    POTASSIUM      3.5 - 5.3 mmol/L 4.5  4.7    CHLORIDE      98 - 107 mmol/L 100  98    Bicarbonate      21 - 32 mmol/L 30  27    Anion Gap      mmol/L 15  17    Blood Urea Nitrogen      6 - 23 mg/dL 15  32 (H)    Creatinine      0.50 - 1.05 mg/dL 0.89  1.34 " (H)    EGFR      >60 mL/min/1.73m*2 71  44 (L)    Calcium      8.6 - 10.3 mg/dL 9.5  9.4    Albumin      3.4 - 5.0 g/dL 4.0     Alkaline Phosphatase      33 - 136 U/L 110     Total Protein      6.4 - 8.2 g/dL 6.3 (L)     AST      9 - 39 U/L 13     Bilirubin Total      0.0 - 1.2 mg/dL 0.5     ALT      7 - 45 U/L 13     Color, Urine      Light-Yellow, Yellow, Dark-Yellow   Colorless ! (N)    Appearance, Urine      Clear   Clear    Specific Gravity, Urine      1.005 - 1.035   1.011    pH, Urine      5.0, 5.5, 6.0, 6.5, 7.0, 7.5, 8.0   5.5    Protein, Urine      NEGATIVE, 10 (TRACE), 20 (TRACE) mg/dL  NEGATIVE    Glucose, Urine      Normal mg/dL  Normal    Blood, Urine      NEGATIVE   NEGATIVE    Ketones, Urine      NEGATIVE mg/dL  NEGATIVE    Bilirubin, Urine      NEGATIVE   0.5 (1+) !    Urobilinogen, Urine      Normal mg/dL  Normal    Nitrite, Urine      NEGATIVE   NEGATIVE    Leukocyte Esterase, Urine      NEGATIVE   NEGATIVE    CHOLESTEROL      0 - 199 mg/dL 270 (H)     HDL CHOLESTEROL      mg/dL 54.4     Cholesterol/HDL Ratio 5.0     LDL Calculated      <=99 mg/dL 156 (H)     VLDL      0 - 40 mg/dL 60 (H)     TRIGLYCERIDES      0 - 149 mg/dL 299 (H)     Non HDL Cholesterol      0 - 149 mg/dL 216 (H)     Albumin, Urine Random      Not established mg/L  <7.0    Creatinine, Urine Random      20.0 - 320.0 mg/dL  36.1    Albumin/Creatinine Ratio  --    Hemoglobin A1C      See comment % 7.4 (H)     Estimated Average Glucose      Not Established mg/dL 166     Thyroid Stimulating Hormone      0.44 - 3.98 mIU/L 1.33     Vitamin D, 25-Hydroxy, Total      30 - 100 ng/mL 46     Vitamin B12      211 - 911 pg/mL 280        KIRAN note:  She still appears dehydrated on these labs  Put in nephrology referral  Stay on lower dose of diuretics  is she having any edema?  Stay off lodine  Repeat bmp in one week  Also do kidney us , format order    Review of Systems   All other systems reviewed and are negative.      BP Readings from Last  "3 Encounters:   02/03/25 148/82   12/30/24 136/68   12/20/24 135/67      Wt Readings from Last 3 Encounters:   02/03/25 111 kg (244 lb 3.2 oz)   12/30/24 108 kg (238 lb)   12/20/24 110 kg (242 lb 8.1 oz)      BMI:   Estimated body mass index is 44.66 kg/m² as calculated from the following:    Height as of this encounter: 1.575 m (5' 2\").    Weight as of this encounter: 111 kg (244 lb 3.2 oz).    Objective   Physical Exam  Constitutional:       General: She is not in acute distress.  HENT:      Head: Normocephalic and atraumatic.      Nose: Nose normal.      Mouth/Throat:      Mouth: Mucous membranes are moist.   Eyes:      Extraocular Movements: Extraocular movements intact.      Conjunctiva/sclera: Conjunctivae normal.   Neck:      Vascular: No carotid bruit.   Cardiovascular:      Rate and Rhythm: Normal rate and regular rhythm.      Pulses: Normal pulses.   Pulmonary:      Effort: Pulmonary effort is normal.      Breath sounds: Normal breath sounds.   Musculoskeletal:      Cervical back: Normal range of motion and neck supple.      Comments: Using rolator    Lymphadenopathy:      Cervical: No cervical adenopathy.   Skin:     General: Skin is warm and dry.   Neurological:      General: No focal deficit present.      Mental Status: She is alert.   Psychiatric:         Mood and Affect: Mood normal.       "

## 2025-02-03 NOTE — ASSESSMENT & PLAN NOTE
Component      Latest Ref Rng 10/30/2024 1/25/2025   GLUCOSE      74 - 99 mg/dL 201 (H)  268 (H)    SODIUM      136 - 145 mmol/L 140  137    POTASSIUM      3.5 - 5.3 mmol/L 4.5  4.7    CHLORIDE      98 - 107 mmol/L 100  98    Bicarbonate      21 - 32 mmol/L 30  27    Anion Gap      mmol/L 15  17    Blood Urea Nitrogen      6 - 23 mg/dL 15  32 (H)    Creatinine      0.50 - 1.05 mg/dL 0.89  1.34 (H)    EGFR      >60 mL/min/1.73m*2 71  44 (L)    Calcium      8.6 - 10.3 mg/dL 9.5  9.4    Albumin      3.4 - 5.0 g/dL 4.0     Alkaline Phosphatase      33 - 136 U/L 110     Total Protein      6.4 - 8.2 g/dL 6.3 (L)     AST      9 - 39 U/L 13     Bilirubin Total      0.0 - 1.2 mg/dL 0.5        ? Medication  She is scheduled with neph and for US renal   Pharmd also   Plan to recheck BMP per PCP orders 1-2 weeks

## 2025-02-03 NOTE — ASSESSMENT & PLAN NOTE
Continue bumex 0.5 mg every day  Continue losartan 50 mg bid  Continue cardizem 240 mg every day  Recheck labs 1-2 weeks  2 week fu with Cornelia can be VV

## 2025-02-06 ENCOUNTER — APPOINTMENT (OUTPATIENT)
Dept: RADIOLOGY | Facility: CLINIC | Age: 70
End: 2025-02-06
Payer: MEDICARE

## 2025-02-06 NOTE — PROGRESS NOTES
"  Clinical Pharmacy Appointment    Patient ID: Kaylan Woo \"Mariluz\" is a 69 y.o. female who presents for No chief complaint on file..    Pt is here for First appointment.     Referring Provider: Cornelia Ball APRN-*  PCP: Radha Holt MD   Last visit with PCP: 2/3/2025 (CNP)   Next visit with PCP: 2/17/2025      Subjective     Interval History  Between October 2024 and January 2025, severe decline in kidney function (ROSEMARIE on CKD)- spironolactone was held although still on ARB, etodolac and loop diuretic    HPI  DIABETES MELLITUS TYPE 2:    Diagnosed with diabetes:  ***. Known diabetic complications: ***.  Does patient follow with Endocrinology: {YES/NO:06887}  Last optometry exam: ***  Most recent visit in Podiatry: ***-- patient {DM reports/denies:52964} sores or cuts on feet today      Current diabetic medications include:  Tresiba 40 units  Humalog sliding scale  Ozempic 0.25 mg weekly    Clarifications to above regimen: ***  Adverse Effects: ***    Past diabetic medications include: sulfonylureas (anaphylaxis to sulfa), metformin (GI side effects)    Glucose Readings:  Glucometer/CGM Type: ***  Patient tests BG *** times per day    Current home BG readings (mg/dL): ***     Any episodes of hypoglycemia? {YES/NO/NA:18084}.  Did patient treat episode of hypoglycemia appropriately? {YES/NO/NA:72873}  Does the patient have a prescription for ready-to-use Glucagon? {Diabetes Hypoglycemia:68163}    Hyperglycemia symptoms: ***    Lifestyle:  Diet: *** meals/day. ***  BK: ***  LN: ***  DN: ***  Snacks: ***  Drinks: ***  Physical Activity: ***  Tobacco history: ***    Secondary Prevention:  Statin? No- previous side effects  ACE-I/ARB? Yes  Aspirin? Yes    Pertinent PMH Review:  PMH of Pancreatitis: {YES,NO:94455}  PMH of Retinopathy: {YES,NO:70204}  PMH of Urinary Tract Infections: {YES,NO:28175}  PMH of MTC: {YES,NO:76484}  UACR/EGFR in last year?: {YES,NO:19361}  Albumin/Creatinine Ratio   Date Value " Ref Range Status   01/25/2025   Final     Comment:     One or more analytes used in this calculation is outside of the analytical measurement range. Calculation cannot be performed.     Albumin/Creatine Ratio   Date Value Ref Range Status   06/01/2022 23.7 0.0 - 30.0 ug/mg crt Final       Immunizations:  Influenza? Yes  COVID? Yes  Pneumonia? Yes  Shingles? Yes  Hepatitis B? No     HYPERTENSION ASSESSMENT  Medication Therapy  Current Medications:   Losartan 50 twice daily  Diltiazem 240 mg daily  Bumetanide 0.5 mg daily    Previous Medications: spironolactone (stopped for ROSEMARIE    Clarifications to above regimen: ***   Adverse Effects: ***     Home BP Monitoring  BP Cuff Type: {BP Monitor Type:00466}  Cuff Validated? {Yes/No (describe yes):57531}    Current home BP readings: ***     BP Readings from Last 3 Encounters:   02/03/25 148/82   12/30/24 136/68   12/20/24 135/67       Lifestyle  See above    Sodium Intake:  {salt intake :77842}    Risk Assessment  Major Risk Factors Include:  {CVD Risk Factors:20900}  The 10-year ASCVD risk score (Kb HOLLINGSWORTH, et al., 2019) is: 25%    Values used to calculate the score:      Age: 69 years      Sex: Female      Is Non- : No      Diabetic: Yes      Tobacco smoker: No      Systolic Blood Pressure: 148 mmHg      Is BP treated: Yes      HDL Cholesterol: 54.4 mg/dL      Total Cholesterol: 270 mg/dL  Known target organ damage:  {HTN Target Organ Damage:12843}    Secondary Prevention  On Statin?: No, previous side effects  Immunizations Needed: RSV and Hepatitis B Series  Tobacco Use: {Smoking Status:11419}     CHRONIC KIDNEY DISEASE ASSESSMENT  Does patient see nephrology? {YES/DATE/NO:42012}    Current medications include:  Losartan 50 mg twice daily    Clarifications to above regimen: ***  Adverse Effects: ***    Past medications include: ***    Stage: 3b (eGFR 30-44)  Albuminuria: A1 (<30 mg/g)  ACE/ARB: Yes, losartan 50 mg twice daily  SGLT2i? No, cost is  prohibitive- would be advantageous given comorbidities    Medication Review  {CKD Drug Dosin}  The following medications increase risk of ROSEMARIE and should be closely monitored:  {CKDMEDRISK:61274}    Hypertension History:  HTN diagnosis: yes- see above    Hyperlipidemia History:  Diagnosis? yes  Current Regimen  N/a  At goal? no  Current LDL: 156  Current T    Diabetes History:  Diagnosis? yes- see above    Lab Review  Electrolytes: {WNL?:65304}  Alk phos: Within normal limits (10/2024)  Sodium bicarb: Within normal limits (2025)  PTH: {WNL?:87425}  Hgb: {WNL?:98090}  Albuminuria:   Albumin/Creatinine Ratio   Date Value Ref Range Status   2025   Final     Comment:     One or more analytes used in this calculation is outside of the analytical measurement range. Calculation cannot be performed.     Albumin/Creatine Ratio   Date Value Ref Range Status   2022 23.7 0.0 - 30.0 ug/mg crt Final        Medication Reconciliation:  Changed:   Added:   Discontinued:     Drug Interactions  The following drug interactions were noted:    Aspirin and etodolac - additive bleed risk and possible decreased efficacy; increased bleed risk of venlafaxine as well  Additive nephrotoxicity risk of NSAIDs, bumetanide, and losartan  Tizanidine may be less effective while on venlafaxine    Medication System Management  Patient's preferred pharmacy: ***  Adherence/Organization: ***  Affordability/Accessibility: DPP4 and GLP-1 are expensive      Objective   Allergies   Allergen Reactions    Celecoxib Shortness of breath and Rash    Rofecoxib Shortness of breath and Rash    Sulfa (Sulfonamide Antibiotics) Anaphylaxis    Sulfasalazine Anaphylaxis    Sulfonylureas Anaphylaxis    Sulfur Anaphylaxis    Aspartame Other and Headache    Buprenorphine Other     dizziness    Diet Foods Other     migraine headache. Allergy to aspartame only. Able to tolerate sucralose (splenda).    Ezetimibe Other     Couldn't walk    Iodides Other  "   Metformin Other     Severe GI SE    Nalidixic Acid Unknown     Pt does not think she is allergic to this    Nsaids (Non-Steroidal Anti-Inflammatory Drug) Unknown     Pt denies allergy    Pyrazolones Other     Pt does not know if allergy    Rivaroxaban Hives    Salicylates Unknown     Pt states she is not allergic to    Shellfish Containing Products Nausea/vomiting     scallops    Shellfish Derived Nausea/vomiting     scallops    Statins-Hmg-Coa Reductase Inhibitors Other    Thiazides Unknown     Pt denies allergy    Cyclobenzaprine Rash    Latex Swelling and Rash    Valdecoxib Swelling and Rash     Social History     Social History Narrative    Not on file      Medication Review  Current Outpatient Medications   Medication Instructions    aspirin 81 mg, Daily    bumetanide (BUMEX) 0.5 mg, oral, Daily    cholecalciferol (Vitamin D-3) 50 mcg (2,000 unit) capsule Take by mouth.    dilTIAZem CD (CARDIZEM CD) 240 mg, oral, Daily    etodolac (LODINE) 400 mg, oral, 2 times daily    FreeStyle Ana María sensor system (FreeStyle Ana María 2 Sensor) kit 1 Cartridge every 14 days    gabapentin (Neurontin) 600 mg tablet TAKE 1 TABLET BY MOUTH THREE TIMES DAILY FOR 30 DAYS    insulin degludec (TRESIBA FLEXTOUCH) 40 Units, subcutaneous, Nightly, Take as directed per insulin instructions.    insulin lispro (HumaLOG KwikPen Insulin) 100 unit/mL injection Uad per sliding scale    ketoconazole (NIZOral) 2 % shampoo Wash scalp once to twice a week. Leave on for 3-5 minutes then rinse    L. acidophilus/Bifid. animalis 32 billion cell capsule 1 capsule, Daily    losartan (COZAAR) 50 mg, oral, 2 times daily    montelukast (SINGULAIR) 10 mg, oral, Daily    omeprazole (PRILOSEC) 40 mg, oral, Daily    Ozempic 0.25 mg or 0.5 mg (2 mg/3 mL) pen injector Inject 0.25ml into the skin once weekly    pen needle, diabetic (BD Michelle 2nd Gen Pen Needle) 32 gauge x 5/32\" needle Use with daily glucose testing    potassium chloride CR 20 mEq ER tablet 20 mEq, " oral, Daily, DO NOT CRUSH CHEW OR SPLIT    tiZANidine (ZANAFLEX) 4 mg, oral, 3 times daily PRN    True Metrix Glucose Test Strip strip use to check blood sugar TWICE DAILY    venlafaxine XR (Effexor-XR) 150 mg 24 hr capsule Take 1 capsule (150 mg total) by mouth daily with breakfast for 30 days.    venlafaxine XR (EFFEXOR-XR) 75 mg, oral, Daily, Take with food.    Ventolin HFA 90 mcg/actuation inhaler Ventolin ade 2 puffs q4h prn    VITAMIN B COMPLEX ORAL 1 tablet, Every morning      Vitals  BP Readings from Last 2 Encounters:   02/03/25 148/82   12/30/24 136/68     BMI Readings from Last 1 Encounters:   02/03/25 44.66 kg/m²      Labs  A1C  Lab Results   Component Value Date    HGBA1C 7.4 (H) 10/30/2024    HGBA1C 7.3 (H) 07/29/2024    HGBA1C 8.8 (H) 11/08/2023     BMP  Lab Results   Component Value Date    CALCIUM 9.4 01/25/2025     01/25/2025    K 4.7 01/25/2025    CO2 27 01/25/2025    CL 98 01/25/2025    BUN 32 (H) 01/25/2025    CREATININE 1.34 (H) 01/25/2025    EGFR 44 (L) 01/25/2025     LFTs  Lab Results   Component Value Date    ALT 13 10/30/2024    AST 13 10/30/2024    ALKPHOS 110 10/30/2024    BILITOT 0.5 10/30/2024     FLP  Lab Results   Component Value Date    TRIG 299 (H) 10/30/2024    CHOL 270 (H) 10/30/2024    LDLF 149 (H) 08/04/2023    LDLCALC 156 (H) 10/30/2024    HDL 54.4 10/30/2024     Urine Microalbumin  Lab Results   Component Value Date    MICROALBCREA  01/25/2025      Comment:      One or more analytes used in this calculation is outside of the analytical measurement range. Calculation cannot be performed.     Weight Management  Wt Readings from Last 3 Encounters:   02/03/25 111 kg (244 lb 3.2 oz)   12/30/24 108 kg (238 lb)   12/20/24 110 kg (242 lb 8.1 oz)      There is no height or weight on file to calculate BMI.     Assessment/Plan   Problem List Items Addressed This Visit    None      Clinical Pharmacist follow-up: ***, Telehealth visit    Continue all meds under the continuation of  care with the referring provider and clinical pharmacy team.    Thank you,  Iwona Salazar, PharmD  Clinical Pharmacist  [phone]    Verbal consent to manage patient's drug therapy was obtained from the patient. They were informed they may decline to participate or withdraw from participation in pharmacy services at any time.

## 2025-02-07 ENCOUNTER — APPOINTMENT (OUTPATIENT)
Dept: PHARMACY | Facility: HOSPITAL | Age: 70
End: 2025-02-07
Payer: COMMERCIAL

## 2025-02-09 DIAGNOSIS — F39 MOOD DISORDER (CMS-HCC): ICD-10-CM

## 2025-02-10 ENCOUNTER — APPOINTMENT (OUTPATIENT)
Dept: PHARMACY | Facility: HOSPITAL | Age: 70
End: 2025-02-10
Payer: COMMERCIAL

## 2025-02-10 DIAGNOSIS — E11.69 TYPE 2 DIABETES MELLITUS WITH OTHER SPECIFIED COMPLICATION, WITH LONG-TERM CURRENT USE OF INSULIN: ICD-10-CM

## 2025-02-10 DIAGNOSIS — N18.9 CHRONIC KIDNEY DISEASE, UNSPECIFIED CKD STAGE: ICD-10-CM

## 2025-02-10 DIAGNOSIS — E11.69 TYPE 2 DIABETES MELLITUS WITH OTHER SPECIFIED COMPLICATION, UNSPECIFIED WHETHER LONG TERM INSULIN USE (MULTI): ICD-10-CM

## 2025-02-10 DIAGNOSIS — Z79.4 TYPE 2 DIABETES MELLITUS WITH OTHER SPECIFIED COMPLICATION, WITH LONG-TERM CURRENT USE OF INSULIN: ICD-10-CM

## 2025-02-10 DIAGNOSIS — Z79.899 MEDICATION MANAGEMENT: ICD-10-CM

## 2025-02-10 DIAGNOSIS — I10 PRIMARY HYPERTENSION: ICD-10-CM

## 2025-02-10 RX ORDER — TIRZEPATIDE 2.5 MG/.5ML
2.5 INJECTION, SOLUTION SUBCUTANEOUS WEEKLY
Qty: 2 ML | Refills: 1 | Status: SHIPPED | OUTPATIENT
Start: 2025-02-10

## 2025-02-10 RX ORDER — ACETAMINOPHEN 500 MG
1000 TABLET ORAL EVERY 6 HOURS PRN
COMMUNITY

## 2025-02-10 RX ORDER — INSULIN DEGLUDEC 100 U/ML
32 INJECTION, SOLUTION SUBCUTANEOUS NIGHTLY
Qty: 15 ML | Refills: 11 | Status: SHIPPED | OUTPATIENT
Start: 2025-02-10 | End: 2026-02-10

## 2025-02-10 RX ORDER — VENLAFAXINE HYDROCHLORIDE 75 MG/1
CAPSULE, EXTENDED RELEASE ORAL
Qty: 30 CAPSULE | Refills: 6 | Status: SHIPPED | OUTPATIENT
Start: 2025-02-10

## 2025-02-10 NOTE — ASSESSMENT & PLAN NOTE
Patient's goal A1c is < 7%.  Is pt at goal? No, last 7.2%  Patient's SMBGs are elevated especially after meals, with occasional overnight lows.     Rationale for plan: The patient previously had constipation to Ozempic. However, she would like medication with weight loss and cardiac benefits. Willing to try Mounjaro and see if she will tolerate this. Given history of lows on Ozempic and a few lows in the past week overnight, will decrease Tresiba when she starts Mounjaro to 32 units. With CKD, would like to try SGLT2i as well, however will wait for BMP to result before trying this. The patient believes she may qualify for PAP. Specifics about program sent to email for her to consider in addition to our conversation today.    Medication Changes:  CONTINUE  Sliding scale insulin lispro  STOP  Ozempic (constipation)  START  Mounjaro 2.5 mg weekly  DECREASE  Tresiba to 32 units daily    Future Considerations:  Can consider fixed dose insulin lispro if needed for mealtime control  Plan to wean insulin as able to titrate Mounjaro  SGLT2i if ROSEMARIE appropriately resolves    Monitoring and Education:   Continue monitoring via Ana María CGM  Libreview instructions provided to the patient  Provided counseling on Mounjaro therapy including mechanism, administration, benefits, and side effects

## 2025-02-10 NOTE — PROGRESS NOTES
"  Clinical Pharmacy Appointment    Patient ID: Kaylan Woo \"Mariluz\" is a 69 y.o. female who presents for Diabetes, Hypertension, and Chronic Kidney Disease.    Pt is here for First appointment.     Referring Provider: Cornelia Ball APRN-*  PCP: Radha Holt MD   Last visit with PCP: 2/3/2025 (CNP)   Next visit with PCP: 2/17/2025      Subjective     Interval History  Between October 2024 and January 2025, severe decline in kidney function (ROSEMARIE on CKD)- spironolactone was held although still on ARB, etodolac and loop diuretic  Decreased bumetanide, held etodolac since labs resulted, holding potassium  Does say got labs Friday - just waiting for results to come back    HPI  DIABETES MELLITUS TYPE 2:    Diagnosed with diabetes:  over 10 years. Known diabetic complications: nephropathy, neuropathy.  Does patient follow with Endocrinology: No  Last optometry exam: to be discussed  Podiatry: patient denies sores or cuts on feet today      Current diabetic medications include:  Tresiba 40 units  Humalog sliding scale (200, 3 units; 250 5 units)  Ozempic 0.25 mg weekly (stopped due to constipation)    Clarifications to above regimen: takes 37 units of Tresiba  Adverse Effects: constipation to Ozempic     Past diabetic medications include: sulfonylureas (anaphylaxis to sulfa), metformin (GI side effects), Januvia (not effective)    Glucose Readings:  Glucometer/CGM Type: Ana María CGM; does have back-up fingerstick  Patient tests BG continuously    Current home BG readings (mg/dL): when on Ozempic- was getting low glucose overnight    Now is seeing more spikes after food, high 200s to low 300s      mg/dL fasting    Any episodes of hypoglycemia? Yes, in past week, does get overnight .  Did patient treat episode of hypoglycemia appropriately? Yes, carbs with juelly, then peanut butter to maintain  Does the patient have a prescription for ready-to-use Glucagon? No good candidate if able to " afford    Hyperglycemia symptoms: none reported at this time    Lifestyle:  Diet:   Snacks: will eat before bed if glucose <120 (chicken fajita)  Drinks: iced tea, small can of Dr. Pepper to correct sugar some times; cannot do diet dsoda due to aspartame allergy  Physical Activity: likes to swim in the summer  Tobacco history: denies    Secondary Prevention:  Statin? No- previous side effects  ACE-I/ARB? Yes  Aspirin? Yes    Pertinent PMH Review:  PMH of Pancreatitis: No  PMH of Retinopathy: No  PMH of Urinary Tract Infections: to be reassessed  PMH of MTC: No  UACR/EGFR in last year?: Yes  Albumin/Creatinine Ratio   Date Value Ref Range Status   01/25/2025   Final     Comment:     One or more analytes used in this calculation is outside of the analytical measurement range. Calculation cannot be performed.     Albumin/Creatine Ratio   Date Value Ref Range Status   06/01/2022 23.7 0.0 - 30.0 ug/mg crt Final       Immunizations:  Influenza? Yes  COVID? Yes  Pneumonia? Yes  Shingles? Yes  Hepatitis B? No     HYPERTENSION ASSESSMENT  Medication Therapy  Current Medications:   Losartan 50 twice daily  Diltiazem 240 mg daily  Bumetanide 0.5 mg daily    Previous Medications: spironolactone (stopped for ROSEMARIE)    Clarifications to above regimen: none   Adverse Effects: potential ROSEMARIE     Home BP Monitoring  BP Cuff Type: None- hers in broken, the manual is borrowed now    Current home BP readings: not recently taking     Last 120s/70s mmHg    No dizziness- headaches but attributes to dry air    BP Readings from Last 3 Encounters:   02/03/25 148/82   12/30/24 136/68   12/20/24 135/67       Lifestyle  See above    Sodium Intake:  Will re-address at next visit    Risk Assessment  Major Risk Factors Include:  Hypertension  Obesity (BMI >30)  Dyslipidemia  Diabetes mellitus  Age > 55 (men) or > 65 (women)  Sleep apnea  The 10-year ASCVD risk score (Kb HOLLINGSWORTH, et al., 2019) is: 25%    Values used to calculate the score:      Age: 69  years      Sex: Female      Is Non- : No      Diabetic: Yes      Tobacco smoker: No      Systolic Blood Pressure: 148 mmHg      Is BP treated: Yes      HDL Cholesterol: 54.4 mg/dL      Total Cholesterol: 270 mg/dL  Known target organ damage:  Chronic Heart Failure  Left ventricular hypertrophy  CKD (w/ recent ROSEMARIE)    Secondary Prevention  On Statin?: No, previous side effects  Immunizations Needed: RSV and Hepatitis B Series  Tobacco Use: non-smoker     CHRONIC KIDNEY DISEASE ASSESSMENT  Does patient see nephrology? Yes    Date: scheduled 4/10/2025    Current medications include:  Losartan 50 mg twice daily    Clarifications to above regimen: none  Adverse Effects: none    Stage: 3b (eGFR 30-44)  Albuminuria: A1 (<30 mg/g)  ACE/ARB: Yes, losartan 50 mg twice daily  SGLT2i? No, cost is prohibitive- would be advantageous given comorbidities, needs to wait given recent ROSEMARIE    Medication Review  The following medications were identified as inappropriate per current kidney function:    Gabapentin dosing appropriate for old kidney function; with recent ROSEMARIE, dose should be limited to maximum 900 mg per day  Etodolac to be used with caution  Tizanidine to be used with caution during ROSEMARIE as renally eliminated  Though no dose adjustments required, in ROSEMARIE ARB and diuretic use should be considered  The following medications increase risk of ROSEMARIE and should be closely monitored:  ARB, Diuretics, and NSAIDs (previous aldosterone antagonist)    Hypertension History:  HTN diagnosis: yes- see above    Hyperlipidemia History:  Diagnosis? yes  Current Regimen  N/a  At goal? no  Current LDL: 156  Current T    Diabetes History:  Diagnosis? yes- see above    Lab Review  Electrolytes: Within normal limits (2025)  Alk phos: Within normal limits (10/2024)  Sodium bicarb: Within normal limits (2025)  PTH: Need updated labs, last vit D was normal in 10/2024  Hgb: Within normal limits (10/2024)  Albuminuria:  (not concerning for albuminuria)  Albumin/Creatinine Ratio   Date Value Ref Range Status   01/25/2025   Final     Comment:     One or more analytes used in this calculation is outside of the analytical measurement range. Calculation cannot be performed.     Albumin/Creatine Ratio   Date Value Ref Range Status   06/01/2022 23.7 0.0 - 30.0 ug/mg crt Final        Medication Reconciliation:  Changed: Tresiba is 37 units, not 40 units; otherwise only changes are held medications    Drug Interactions  The following drug interactions were noted:    Aspirin and etodolac - additive bleed risk and possible decreased efficacy; increased bleed risk of venlafaxine as well  Additive nephrotoxicity risk of NSAIDs, bumetanide, and losartan  Tizanidine may be less effective while on venlafaxine    Medication System Management  Patient's preferred pharmacy: [facility]  Adherence/Organization: appropriate; gabapentin is 2-3 times per day  Affordability/Accessibility: Is on low income currently, brand name medications can be expensive     Patient Assistance Screening (VAF)  Patient verbally reports monthly or yearly income which is less than 400% federal poverty level  Application may be considered for Mounjaro, SGLT2i, cholesterol medications  Patient aware this process may take up to 2 weeks once income documents have been sent to the team.  If approved, medication must be filled through [facility] pharmacy and may be picked up or mailed to patient.   If approved, medication will be billed through insurance, and patient assistance team will pay the copay. This will result in a $0 copay for the patient.  Counseled patient on mechanism of action, side effects, contraindications, and what to do if the patient misses a dose. All patients questions were answered.        Objective   Allergies   Allergen Reactions    Celecoxib Shortness of breath and Rash    Rofecoxib Shortness of breath and Rash    Sulfa (Sulfonamide Antibiotics)  Anaphylaxis    Sulfasalazine Anaphylaxis    Sulfonylureas Anaphylaxis    Sulfur Anaphylaxis    Aspartame Other and Headache    Buprenorphine Other     dizziness    Diet Foods Other     migraine headache. Allergy to aspartame only. Able to tolerate sucralose (splenda).    Ezetimibe Other     Couldn't walk    Iodides Other    Metformin Other     Severe GI SE    Nalidixic Acid Unknown     Pt does not think she is allergic to this    Nsaids (Non-Steroidal Anti-Inflammatory Drug) Unknown     Pt denies allergy    Pyrazolones Other     Pt does not know if allergy    Rivaroxaban Hives    Salicylates Unknown     Pt states she is not allergic to    Shellfish Containing Products Nausea/vomiting     scallops    Shellfish Derived Nausea/vomiting     scallops    Statins-Hmg-Coa Reductase Inhibitors Other    Thiazides Unknown     Pt denies allergy    Cyclobenzaprine Rash    Latex Swelling and Rash    Valdecoxib Swelling and Rash     Social History     Social History Narrative    Not on file      Medication Review  Current Outpatient Medications   Medication Instructions    acetaminophen (TYLENOL) 1,000 mg, Every 6 hours PRN    aspirin 81 mg, Daily    bumetanide (BUMEX) 0.5 mg, oral, Daily    cholecalciferol (Vitamin D-3) 50 mcg (2,000 unit) capsule Take by mouth.    dilTIAZem CD (CARDIZEM CD) 240 mg, oral, Daily    etodolac (LODINE) 400 mg, oral, 2 times daily    FreeStyle Ana María sensor system (FreeStyle Ana María 2 Sensor) kit 1 Cartridge every 14 days    gabapentin (Neurontin) 600 mg tablet TAKE 1 TABLET BY MOUTH THREE TIMES DAILY FOR 30 DAYS    insulin degludec (TRESIBA FLEXTOUCH) 32 Units, subcutaneous, Nightly, Take as directed per insulin instructions.    insulin lispro (HumaLOG KwikPen Insulin) 100 unit/mL injection Uad per sliding scale    ketoconazole (NIZOral) 2 % shampoo Wash scalp once to twice a week. Leave on for 3-5 minutes then rinse    L. acidophilus/Bifid. animalis 32 billion cell capsule 1 capsule, Daily    losartan  "(COZAAR) 50 mg, oral, 2 times daily    montelukast (SINGULAIR) 10 mg, oral, Daily    Mounjaro 2.5 mg, subcutaneous, Weekly    omeprazole (PRILOSEC) 40 mg, oral, Daily    pen needle, diabetic (BD Michelle 2nd Gen Pen Needle) 32 gauge x 5/32\" needle Use with daily glucose testing    potassium chloride CR 20 mEq ER tablet 20 mEq, oral, Daily, DO NOT CRUSH CHEW OR SPLIT    tiZANidine (ZANAFLEX) 4 mg, oral, 3 times daily PRN    True Metrix Glucose Test Strip strip use to check blood sugar TWICE DAILY    venlafaxine XR (Effexor-XR) 150 mg 24 hr capsule Take 1 capsule (150 mg total) by mouth daily with breakfast for 30 days.    venlafaxine XR (Effexor-XR) 75 mg 24 hr capsule TAKE 1 CAPSULE(75 MG) BY MOUTH DAILY WITH FOOD    Ventolin HFA 90 mcg/actuation inhaler Ventolin ade 2 puffs q4h prn    VITAMIN B COMPLEX ORAL 1 tablet, Every morning      Vitals  BP Readings from Last 2 Encounters:   02/03/25 148/82   12/30/24 136/68     BMI Readings from Last 1 Encounters:   02/03/25 44.66 kg/m²      Labs  A1C  Lab Results   Component Value Date    HGBA1C 7.4 (H) 10/30/2024    HGBA1C 7.3 (H) 07/29/2024    HGBA1C 8.8 (H) 11/08/2023     BMP  Lab Results   Component Value Date    CALCIUM 9.4 02/07/2025     02/07/2025    K 4.4 02/07/2025    CO2 29 02/07/2025    CL 99 02/07/2025    BUN 13 02/07/2025    CREATININE 0.92 02/07/2025    EGFR 68 02/07/2025     LFTs  Lab Results   Component Value Date    ALT 13 10/30/2024    AST 13 10/30/2024    ALKPHOS 110 10/30/2024    BILITOT 0.5 10/30/2024     FLP  Lab Results   Component Value Date    TRIG 299 (H) 10/30/2024    CHOL 270 (H) 10/30/2024    LDLF 149 (H) 08/04/2023    LDLCALC 156 (H) 10/30/2024    HDL 54.4 10/30/2024     Urine Microalbumin  Lab Results   Component Value Date    MICROALBCREA  01/25/2025      Comment:      One or more analytes used in this calculation is outside of the analytical measurement range. Calculation cannot be performed.     Weight Management  Wt Readings from Last 3 " Encounters:   02/03/25 111 kg (244 lb 3.2 oz)   12/30/24 108 kg (238 lb)   12/20/24 110 kg (242 lb 8.1 oz)      There is no height or weight on file to calculate BMI.     Assessment/Plan   Problem List Items Addressed This Visit       Type 2 diabetes mellitus with other specified complication, unspecified whether long term insulin use (Multi)     Patient's goal A1c is < 7%.  Is pt at goal? No, last 7.2%  Patient's SMBGs are elevated especially after meals, with occasional overnight lows.     Rationale for plan: The patient previously had constipation to Ozempic. However, she would like medication with weight loss and cardiac benefits. Willing to try Mounjaro and see if she will tolerate this. Given history of lows on Ozempic and a few lows in the past week overnight, will decrease Tresiba when she starts Mounjaro to 32 units. With CKD, would like to try SGLT2i as well, however will wait for BMP to result before trying this. The patient believes she may qualify for PAP. Specifics about program sent to email for her to consider in addition to our conversation today.    Medication Changes:  CONTINUE  Sliding scale insulin lispro  STOP  Ozempic (constipation)  START  Mounjaro 2.5 mg weekly  DECREASE  Tresiba to 32 units daily    Future Considerations:  Can consider fixed dose insulin lispro if needed for mealtime control  Plan to wean insulin as able to titrate Mounjaro  SGLT2i if ROSEMARIE appropriately resolves    Monitoring and Education:   Continue monitoring via Ana María CGM  Libreview instructions provided to the patient  Provided counseling on Mounjaro therapy including mechanism, administration, benefits, and side effects         Relevant Medications    insulin degludec (Tresiba FlexTouch) 100 unit/mL (3 mL) injection    tirzepatide (Mounjaro) 2.5 mg/0.5 mL pen injector    Other Relevant Orders    Referral to Clinical Pharmacy    Hypertension     No recent home readings- patient states her last home reading over 1 week  ago was controlled 128/78 mmHg. She states she did get labs drawn on Friday- awaiting results from [facility].          Relevant Orders    Referral to Clinical Pharmacy     Other Visit Diagnoses       Chronic kidney disease, unspecified CKD stage        Gabapentin dose should be reduced if results are consistent to last BMP. Nephrotoxins include diuretic, ARB, NSAID.    Relevant Medications    tirzepatide (Mounjaro) 2.5 mg/0.5 mL pen injector    Other Relevant Orders    Referral to Clinical Pharmacy    Medication management        Relevant Orders    Referral to Clinical Pharmacy    Type 2 diabetes mellitus with other specified complication, with long-term current use of insulin        Relevant Medications    insulin degludec (Tresiba FlexTouch) 100 unit/mL (3 mL) injection            Clinical Pharmacist follow-up: 3/3 11AM, Telehealth visit    Continue all meds under the continuation of care with the referring provider and clinical pharmacy team.    Thank you,  Iwona Salazar, PharmD  Clinical Pharmacist  [phone]    Verbal consent to manage patient's drug therapy was obtained from the patient. They were informed they may decline to participate or withdraw from participation in pharmacy services at any time.

## 2025-02-10 NOTE — ASSESSMENT & PLAN NOTE
No recent home readings- patient states her last home reading over 1 week ago was controlled 128/78 mmHg. She states she did get labs drawn on Friday- awaiting results from [facility].

## 2025-02-11 ENCOUNTER — TELEPHONE (OUTPATIENT)
Dept: PRIMARY CARE | Facility: CLINIC | Age: 70
End: 2025-02-11
Payer: COMMERCIAL

## 2025-02-11 NOTE — TELEPHONE ENCOUNTER
Patient lvm stating she received dental work today. And they prescribed her amoxicillin for the infection before she is to see a oral surgeon. Patient is asking if okay to take amoxicillin due to previous kidney issues   Please advise

## 2025-02-12 ENCOUNTER — TELEPHONE (OUTPATIENT)
Dept: PRIMARY CARE | Facility: CLINIC | Age: 70
End: 2025-02-12
Payer: COMMERCIAL

## 2025-02-12 NOTE — TELEPHONE ENCOUNTER
Patient called asking if she should still take bumex 0.5 mg  Per pcp she should continue taking and repeat bmp 2-3 weeks   patient verbally understood and acknowledged

## 2025-02-13 ENCOUNTER — TELEPHONE (OUTPATIENT)
Dept: PRIMARY CARE | Facility: CLINIC | Age: 70
End: 2025-02-13
Payer: COMMERCIAL

## 2025-02-13 NOTE — TELEPHONE ENCOUNTER
Patient left  stating that she is scheduled with Cornelia on Monday for BP fu but stated she does not get her cuff until tomorrow 2/14. She wants to know if she should reschedule to the end of the week so that she has more readings. Please advise

## 2025-02-17 ENCOUNTER — APPOINTMENT (OUTPATIENT)
Dept: PRIMARY CARE | Facility: CLINIC | Age: 70
End: 2025-02-17
Payer: COMMERCIAL

## 2025-02-21 DIAGNOSIS — R53.83 TIRED: ICD-10-CM

## 2025-02-21 DIAGNOSIS — R53.83 OTHER FATIGUE: ICD-10-CM

## 2025-02-21 DIAGNOSIS — E11.69 TYPE 2 DIABETES MELLITUS WITH OTHER SPECIFIED COMPLICATION, WITH LONG-TERM CURRENT USE OF INSULIN: ICD-10-CM

## 2025-02-21 DIAGNOSIS — Z79.4 TYPE 2 DIABETES MELLITUS WITH OTHER SPECIFIED COMPLICATION, WITH LONG-TERM CURRENT USE OF INSULIN: ICD-10-CM

## 2025-02-21 RX ORDER — INSULIN DEGLUDEC 100 U/ML
32 INJECTION, SOLUTION SUBCUTANEOUS NIGHTLY
Qty: 15 ML | Refills: 3 | OUTPATIENT
Start: 2025-02-21 | End: 2026-02-21

## 2025-02-21 NOTE — TELEPHONE ENCOUNTER
"Refill Request      Last OV with PCP 2/3/2025     Future Appointments       Date / Time Provider Department Dept Phone    2/24/2025 1:15 PM (Arrive by 1:00 PM) FRIEDA [id number] ULTRASOUND 1 [facility] [phone]    2/28/2025 1:00 PM (Arrive by 12:45 PM) Cornelia Ball, APRN-CNP [facility]  Arrive at: Your Home [phone]    3/3/2025 11:00 AM (Arrive by 10:45 AM) Iwona Salazar, PharmD [facility]  Arrive at: Your Home [phone]    3/4/2025 11:00 AM [facility] BOL6F PFT RM 4 [facility]     3/4/2025 12:00 PM [facility] BOL6F PFT RM 4 [facility]     3/4/2025 1:15 PM Eveline Cnatu OT [facility] [phone]    4/30/2025 1:30 PM Jose Sepulveda MD [facility]  [phone]          Lab Results   Component Value Date    HGBA1C 7.4 (H) 10/30/2024     Lab Results   Component Value Date    CHOL 270 (H) 10/30/2024    CHOL 297 (H) 11/08/2023    CHOL 223 (H) 08/04/2023     Lab Results   Component Value Date    HDL 54.4 10/30/2024    HDL 53.8 11/08/2023    HDL 51.7 08/04/2023     Lab Results   Component Value Date    LDLCALC 156 (H) 10/30/2024    LDLCALC 201 (H) 11/08/2023     Lab Results   Component Value Date    TRIG 299 (H) 10/30/2024    TRIG 212 (H) 11/08/2023    TRIG 114 08/04/2023     No components found for: \"CHOLHDL\"  Lab Results   Component Value Date    TSH 1.33 10/30/2024     Lab Results   Component Value Date    GLUCOSE 294 (H) 02/07/2025    CALCIUM 9.4 02/07/2025     02/07/2025    K 4.4 02/07/2025    CO2 29 02/07/2025    CL 99 02/07/2025    BUN 13 02/07/2025    CREATININE 0.92 02/07/2025       "

## 2025-02-22 DIAGNOSIS — I10 PRIMARY HYPERTENSION: ICD-10-CM

## 2025-02-24 ENCOUNTER — HOSPITAL ENCOUNTER (OUTPATIENT)
Dept: RADIOLOGY | Facility: CLINIC | Age: 70
Discharge: HOME | End: 2025-02-24
Payer: MEDICARE

## 2025-02-24 DIAGNOSIS — R94.4 DECREASED GFR: ICD-10-CM

## 2025-02-24 PROCEDURE — 76770 US EXAM ABDO BACK WALL COMP: CPT

## 2025-02-24 PROCEDURE — 76770 US EXAM ABDO BACK WALL COMP: CPT | Performed by: STUDENT IN AN ORGANIZED HEALTH CARE EDUCATION/TRAINING PROGRAM

## 2025-02-24 RX ORDER — POTASSIUM CHLORIDE 20 MEQ/1
20 TABLET, EXTENDED RELEASE ORAL DAILY
Qty: 90 TABLET | Refills: 0 | Status: SHIPPED | OUTPATIENT
Start: 2025-02-24 | End: 2025-02-27 | Stop reason: ALTCHOICE

## 2025-02-25 DIAGNOSIS — E11.69 TYPE 2 DIABETES MELLITUS WITH OTHER SPECIFIED COMPLICATION, UNSPECIFIED WHETHER LONG TERM INSULIN USE (MULTI): ICD-10-CM

## 2025-02-25 DIAGNOSIS — Z00.6 RESEARCH STUDY PATIENT: Primary | ICD-10-CM

## 2025-02-25 DIAGNOSIS — F39 MOOD DISORDER (CMS-HCC): ICD-10-CM

## 2025-02-25 LAB
ANION GAP SERPL CALCULATED.4IONS-SCNC: 12 MMOL/L (CALC) (ref 7–17)
BASOPHILS # BLD AUTO: 106 CELLS/UL (ref 0–200)
BASOPHILS NFR BLD AUTO: 1 %
BUN SERPL-MCNC: 15 MG/DL (ref 7–25)
BUN/CREAT SERPL: NORMAL (CALC) (ref 6–22)
CALCIUM SERPL-MCNC: 9.6 MG/DL (ref 8.6–10.4)
CHLORIDE SERPL-SCNC: 100 MMOL/L (ref 98–110)
CO2 SERPL-SCNC: 29 MMOL/L (ref 20–32)
CREAT SERPL-MCNC: 0.83 MG/DL (ref 0.5–1.05)
EGFRCR SERPLBLD CKD-EPI 2021: 77 ML/MIN/1.73M2
EOSINOPHIL # BLD AUTO: 254 CELLS/UL (ref 15–500)
EOSINOPHIL NFR BLD AUTO: 2.4 %
ERYTHROCYTE [DISTWIDTH] IN BLOOD BY AUTOMATED COUNT: 13.8 % (ref 11–15)
GLUCOSE SERPL-MCNC: 122 MG/DL (ref 65–139)
HCT VFR BLD AUTO: 35.8 % (ref 35–45)
HGB BLD-MCNC: 11.3 G/DL (ref 11.7–15.5)
LYMPHOCYTES # BLD AUTO: 2820 CELLS/UL (ref 850–3900)
LYMPHOCYTES NFR BLD AUTO: 26.6 %
MCH RBC QN AUTO: 26.5 PG (ref 27–33)
MCHC RBC AUTO-ENTMCNC: 31.6 G/DL (ref 32–36)
MCV RBC AUTO: 83.8 FL (ref 80–100)
MONOCYTES # BLD AUTO: 859 CELLS/UL (ref 200–950)
MONOCYTES NFR BLD AUTO: 8.1 %
NEUTROPHILS # BLD AUTO: 6561 CELLS/UL (ref 1500–7800)
NEUTROPHILS NFR BLD AUTO: 61.9 %
PLATELET # BLD AUTO: 423 THOUSAND/UL (ref 140–400)
PMV BLD REES-ECKER: 10.3 FL (ref 7.5–12.5)
POTASSIUM SERPL-SCNC: 4.6 MMOL/L (ref 3.5–5.3)
RBC # BLD AUTO: 4.27 MILLION/UL (ref 3.8–5.1)
SODIUM SERPL-SCNC: 141 MMOL/L (ref 135–146)
WBC # BLD AUTO: 10.6 THOUSAND/UL (ref 3.8–10.8)

## 2025-02-25 RX ORDER — VENLAFAXINE HYDROCHLORIDE 150 MG/1
CAPSULE, EXTENDED RELEASE ORAL
Qty: 30 CAPSULE | Refills: 6 | Status: SHIPPED | OUTPATIENT
Start: 2025-02-25

## 2025-02-25 NOTE — TELEPHONE ENCOUNTER
"Refill Request      Last OV with PCP 2/3/2025     Future Appointments       Date / Time Provider Department Dept Phone    2/28/2025 1:00 PM (Arrive by 12:45 PM) Cornelia Ball, JAYDEN-CNP  [facility]  Arrive at: Your Home [phone]    3/3/2025 11:00 AM (Arrive by 10:45 AM) Iwona Salazar, PharmD [facility]  Arrive at: Your Home [phone]    3/4/2025 11:00 AM [facility] BOL6F PFT RM 4 [facility]     3/4/2025 12:00 PM [facility] BOL6F PFT RM 4 [facility]     3/4/2025 1:15 PM Eveline Cantu OT [facility] [phone]    4/30/2025 1:30 PM Jose Sepulveda MD [facility] [address] [phone]          Lab Results   Component Value Date    HGBA1C 7.4 (H) 10/30/2024     Lab Results   Component Value Date    CHOL 270 (H) 10/30/2024    CHOL 297 (H) 11/08/2023    CHOL 223 (H) 08/04/2023     Lab Results   Component Value Date    HDL 54.4 10/30/2024    HDL 53.8 11/08/2023    HDL 51.7 08/04/2023     Lab Results   Component Value Date    LDLCALC 156 (H) 10/30/2024    LDLCALC 201 (H) 11/08/2023     Lab Results   Component Value Date    TRIG 299 (H) 10/30/2024    TRIG 212 (H) 11/08/2023    TRIG 114 08/04/2023     No components found for: \"CHOLHDL\"  Lab Results   Component Value Date    TSH 1.33 10/30/2024     Lab Results   Component Value Date    GLUCOSE 122 02/24/2025    CALCIUM 9.6 02/24/2025     02/24/2025    K 4.6 02/24/2025    CO2 29 02/24/2025     02/24/2025    BUN 15 02/24/2025    CREATININE 0.83 02/24/2025       "

## 2025-02-26 DIAGNOSIS — B37.0 THRUSH: ICD-10-CM

## 2025-02-27 DIAGNOSIS — N18.9 CHRONIC KIDNEY DISEASE, UNSPECIFIED CKD STAGE: ICD-10-CM

## 2025-02-27 DIAGNOSIS — I10 PRIMARY HYPERTENSION: ICD-10-CM

## 2025-02-27 RX ORDER — NYSTATIN 100000 [USP'U]/ML
SUSPENSION ORAL
Qty: 140 ML | Refills: 2 | Status: SHIPPED | OUTPATIENT
Start: 2025-02-27

## 2025-02-27 RX ORDER — BUMETANIDE 0.5 MG/1
TABLET ORAL
Qty: 90 TABLET | Refills: 3 | Status: SHIPPED | OUTPATIENT
Start: 2025-02-27

## 2025-02-27 NOTE — TELEPHONE ENCOUNTER
"Refill Request      Last OV with PCP 2/3/2025     Future Appointments       Date / Time Provider Department Dept Phone    2/28/2025 1:00 PM (Arrive by 12:45 PM) Cornelia Ball, JAYDEN-CNP  [facility]  Arrive at: Your Home [phone]    3/3/2025 11:00 AM (Arrive by 10:45 AM) Iwona Salazar, PharmD [facility]  Arrive at: Your Home [phone]    3/4/2025 10:00 AM LAB RESEARCH PERSONNEL [facility] COLLECTION SITE VIRTUAL     3/4/2025 11:00 AM [facility] BOL6F PFT RM 4 [facility]     3/4/2025 12:00 PM [facility] BOL6F PFT RM 4 [facility]     3/4/2025 1:15 PM Eveline Cantu OT [facility] [phone]    4/30/2025 1:30 PM Jose Sepulveda MD [facility] [address] [phone]          Lab Results   Component Value Date    HGBA1C 7.4 (H) 10/30/2024     Lab Results   Component Value Date    CHOL 270 (H) 10/30/2024    CHOL 297 (H) 11/08/2023    CHOL 223 (H) 08/04/2023     Lab Results   Component Value Date    HDL 54.4 10/30/2024    HDL 53.8 11/08/2023    HDL 51.7 08/04/2023     Lab Results   Component Value Date    LDLCALC 156 (H) 10/30/2024    LDLCALC 201 (H) 11/08/2023     Lab Results   Component Value Date    TRIG 299 (H) 10/30/2024    TRIG 212 (H) 11/08/2023    TRIG 114 08/04/2023     No components found for: \"CHOLHDL\"  Lab Results   Component Value Date    TSH 1.33 10/30/2024     Lab Results   Component Value Date    GLUCOSE 122 02/24/2025    CALCIUM 9.6 02/24/2025     02/24/2025    K 4.6 02/24/2025    CO2 29 02/24/2025     02/24/2025    BUN 15 02/24/2025    CREATININE 0.83 02/24/2025     "

## 2025-02-27 NOTE — TELEPHONE ENCOUNTER
Pt called in regarding medication . Pt inquiring if she should hold potassium still because of kidney function. New Rx was just sent in by SF NP. Pt also requested refill on her Bumex. That was formatted by SHIMA IZQUIERDO . She also had concern with medication prescribed by Cardiology. She will reach out to them regarding that med.     Please advise on potassium

## 2025-02-28 ENCOUNTER — APPOINTMENT (OUTPATIENT)
Dept: PRIMARY CARE | Facility: CLINIC | Age: 70
End: 2025-02-28
Payer: COMMERCIAL

## 2025-03-03 ENCOUNTER — APPOINTMENT (OUTPATIENT)
Dept: PRIMARY CARE | Facility: CLINIC | Age: 70
End: 2025-03-03
Payer: COMMERCIAL

## 2025-03-03 ENCOUNTER — APPOINTMENT (OUTPATIENT)
Dept: PHARMACY | Facility: HOSPITAL | Age: 70
End: 2025-03-03
Payer: COMMERCIAL

## 2025-03-03 DIAGNOSIS — I10 PRIMARY HYPERTENSION: ICD-10-CM

## 2025-03-03 DIAGNOSIS — E11.69 TYPE 2 DIABETES MELLITUS WITH OTHER SPECIFIED COMPLICATION, UNSPECIFIED WHETHER LONG TERM INSULIN USE (MULTI): Primary | ICD-10-CM

## 2025-03-03 DIAGNOSIS — N18.9 CHRONIC KIDNEY DISEASE, UNSPECIFIED CKD STAGE: ICD-10-CM

## 2025-03-03 DIAGNOSIS — E11.69 TYPE 2 DIABETES MELLITUS WITH OTHER SPECIFIED COMPLICATION, UNSPECIFIED WHETHER LONG TERM INSULIN USE (MULTI): ICD-10-CM

## 2025-03-03 DIAGNOSIS — Z79.899 MEDICATION MANAGEMENT: ICD-10-CM

## 2025-03-03 DIAGNOSIS — I11.0 HYPERTENSIVE HEART DISEASE WITH HEART FAILURE: ICD-10-CM

## 2025-03-03 DIAGNOSIS — E78.2 MIXED HYPERLIPIDEMIA: ICD-10-CM

## 2025-03-03 DIAGNOSIS — I1A.0 RESISTANT HYPERTENSION: ICD-10-CM

## 2025-03-03 RX ORDER — DEXAMETHASONE 1 MG/1
1 TABLET ORAL ONCE
Qty: 1 TABLET | Refills: 0 | Status: SHIPPED | OUTPATIENT
Start: 2025-03-03 | End: 2025-03-03

## 2025-03-03 RX ORDER — VALSARTAN 160 MG/1
160 TABLET ORAL 2 TIMES DAILY
Qty: 180 TABLET | Refills: 3 | Status: SHIPPED | OUTPATIENT
Start: 2025-03-03 | End: 2026-03-03

## 2025-03-03 ASSESSMENT — PATIENT HEALTH QUESTIONNAIRE - PHQ9
SUM OF ALL RESPONSES TO PHQ9 QUESTIONS 1 AND 2: 0
SUM OF ALL RESPONSES TO PHQ9 QUESTIONS 1 AND 2: 0
1. LITTLE INTEREST OR PLEASURE IN DOING THINGS: NOT AT ALL
2. FEELING DOWN, DEPRESSED OR HOPELESS: NOT AT ALL
1. LITTLE INTEREST OR PLEASURE IN DOING THINGS: NOT AT ALL
2. FEELING DOWN, DEPRESSED OR HOPELESS: NOT AT ALL

## 2025-03-03 NOTE — PROGRESS NOTES
"An audio telecommunication system which permits real time communications between the patient (at the originating site) and provider (at the distance site) was utilized to provide this telehealth visit.  Verbal consent was obtained from the patient for this telehealth visit.       Subjective   Patient ID: Kaylan Woo \"Mariluz\" is a 69 y.o. female who presents for Follow-up (Discuss concerns ).    Average BP of 30 is 153/77  Highest is 175/92  Lowest 128/63  Is supposed to see Derick end of April  Is thinking of switching to private place, can be seen sooner  Pharmacist mentioned switching from losartan to valsartan  On multiple BP medications        Review of Systems  ROS completely negative except what was mentioned in the HPI.  Problem List, surgical, social, and family histories which were reviewed and updated as necessary within the EMR. I also personally reviewed the notes, labs, and imaging that pertained to what was documented or discussed in the HPI.    Objective   Physical exam not completed for this telephone visit    LMP  (LMP Unknown)     Assessment/Plan    Problem List Items Addressed This Visit       Hypertension    Overview     Scheduled with Dr Sepulveda in April 2025  She stopped aldactone with 1/25/25 ROSEMARIE labs          Current Assessment & Plan     BP uncontrolled at home  Needs cuff calibration at next visit  Switch losartan to valsartan  Has appt with neph in April  Complete DMST due to resistant hypertension, DM2 on insulin, morbid obesity         Relevant Medications    dexAMETHasone (Decadron) 1 mg tablet    valsartan (Diovan) 160 mg tablet    Other Relevant Orders    Cortisol AM    Dexamethasone    Hypertensive heart disease with heart failure    Overview     Continue current medications.   No new symptoms,stable condition.   Follow up at least yearly.           Relevant Medications    dexAMETHasone (Decadron) 1 mg tablet    valsartan (Diovan) 160 mg tablet    Other Relevant Orders    " Cortisol AM    Dexamethasone    Mixed hyperlipidemia    Relevant Medications    dexAMETHasone (Decadron) 1 mg tablet    Other Relevant Orders    Cortisol AM    Dexamethasone    Type 2 diabetes mellitus with other specified complication, unspecified whether long term insulin use (Multi) - Primary    Overview     Metformin: severe GI SE  Januvia: $$  Ozempic: $$/constipation  On insulin, mounjaro           Current Assessment & Plan     Complete DMST  Increase hydration to counteract constipation  May use miralax prn         Relevant Medications    dexAMETHasone (Decadron) 1 mg tablet    Other Relevant Orders    Cortisol AM    Dexamethasone

## 2025-03-03 NOTE — Clinical Note
Schedule routine fu in 3 mo, 40 mins (always) IN OFFICE ONLY.  Does not have VV capabilities.    Schedule CPE with PCP (last done 10/2024)  To note: lisa asked her to be reschedule Friday to today in office due to lack of connection, but was scheduled VV again.
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
## 2025-03-04 ENCOUNTER — APPOINTMENT (OUTPATIENT)
Dept: LAB | Facility: LAB | Age: 70
End: 2025-03-04
Payer: COMMERCIAL

## 2025-03-04 ENCOUNTER — HOSPITAL ENCOUNTER (OUTPATIENT)
Dept: RESPIRATORY THERAPY | Facility: HOSPITAL | Age: 70
Discharge: HOME | End: 2025-03-04
Payer: MEDICARE

## 2025-03-04 ENCOUNTER — EVALUATION (OUTPATIENT)
Dept: OCCUPATIONAL THERAPY | Facility: HOSPITAL | Age: 70
End: 2025-03-04
Payer: COMMERCIAL

## 2025-03-04 ENCOUNTER — TELEPHONE (OUTPATIENT)
Dept: PRIMARY CARE | Facility: CLINIC | Age: 70
End: 2025-03-04

## 2025-03-04 DIAGNOSIS — Z00.6 RESEARCH STUDY PATIENT: Primary | ICD-10-CM

## 2025-03-04 DIAGNOSIS — Z00.6 RESEARCH EXAM: ICD-10-CM

## 2025-03-04 LAB
ALBUMIN SERPL BCP-MCNC: 4.4 G/DL (ref 3.4–5)
ALP SERPL-CCNC: 108 U/L (ref 33–136)
ALT SERPL W P-5'-P-CCNC: 11 U/L (ref 7–45)
ANION GAP SERPL CALC-SCNC: 14 MMOL/L (ref 10–20)
APPEARANCE UR: ABNORMAL
AST SERPL W P-5'-P-CCNC: 10 U/L (ref 9–39)
BILIRUB SERPL-MCNC: 0.3 MG/DL (ref 0–1.2)
BILIRUB UR STRIP.AUTO-MCNC: NEGATIVE MG/DL
BUN SERPL-MCNC: 14 MG/DL (ref 6–23)
CALCIUM SERPL-MCNC: 10 MG/DL (ref 8.6–10.6)
CHLORIDE SERPL-SCNC: 101 MMOL/L (ref 98–107)
CHOLEST SERPL-MCNC: 259 MG/DL (ref 0–199)
CHOLESTEROL/HDL RATIO: 4.7
CO2 SERPL-SCNC: 31 MMOL/L (ref 21–32)
COLOR UR: YELLOW
CREAT SERPL-MCNC: 0.94 MG/DL (ref 0.5–1.05)
D DIMER PPP FEU-MCNC: 849 NG/ML FEU
EGFRCR SERPLBLD CKD-EPI 2021: 67 ML/MIN/1.73M*2
EST. AVERAGE GLUCOSE BLD GHB EST-MCNC: 183 MG/DL
GLUCOSE SERPL-MCNC: 151 MG/DL (ref 74–99)
GLUCOSE UR STRIP.AUTO-MCNC: NORMAL MG/DL
HBA1C MFR BLD: 8 %
HDLC SERPL-MCNC: 55.6 MG/DL
KETONES UR STRIP.AUTO-MCNC: NEGATIVE MG/DL
LDLC SERPL CALC-MCNC: 154 MG/DL
LEUKOCYTE ESTERASE UR QL STRIP.AUTO: NEGATIVE
MGC ASCENT PFT - FEV1 - POST: 1.15
MGC ASCENT PFT - FEV1 - PRE: 1.03
MGC ASCENT PFT - FEV1 - PREDICTED: 1.94
MGC ASCENT PFT - FVC - POST: 1.76
MGC ASCENT PFT - FVC - PRE: 1.67
MGC ASCENT PFT - FVC - PREDICTED: 2.44
NITRITE UR QL STRIP.AUTO: NEGATIVE
NON HDL CHOLESTEROL: 203 MG/DL (ref 0–149)
PH UR STRIP.AUTO: 5.5 [PH]
POTASSIUM SERPL-SCNC: 4.5 MMOL/L (ref 3.5–5.3)
PROT SERPL-MCNC: 6.8 G/DL (ref 6.4–8.2)
PROT UR STRIP.AUTO-MCNC: ABNORMAL MG/DL
RBC # UR STRIP.AUTO: NEGATIVE MG/DL
RBC #/AREA URNS AUTO: NORMAL /HPF
SODIUM SERPL-SCNC: 141 MMOL/L (ref 136–145)
SP GR UR STRIP.AUTO: 1.02
TRIGL SERPL-MCNC: 245 MG/DL (ref 0–149)
UROBILINOGEN UR STRIP.AUTO-MCNC: NORMAL MG/DL
VLDL: 49 MG/DL (ref 0–40)
WBC #/AREA URNS AUTO: NORMAL /HPF

## 2025-03-04 PROCEDURE — 80061 LIPID PANEL: CPT | Performed by: INTERNAL MEDICINE

## 2025-03-04 PROCEDURE — 85379 FIBRIN DEGRADATION QUANT: CPT | Performed by: INTERNAL MEDICINE

## 2025-03-04 PROCEDURE — 81001 URINALYSIS AUTO W/SCOPE: CPT | Performed by: INTERNAL MEDICINE

## 2025-03-04 PROCEDURE — 83036 HEMOGLOBIN GLYCOSYLATED A1C: CPT | Performed by: INTERNAL MEDICINE

## 2025-03-04 PROCEDURE — 97750 PHYSICAL PERFORMANCE TEST: CPT | Mod: GO

## 2025-03-04 PROCEDURE — 80053 COMPREHEN METABOLIC PANEL: CPT | Performed by: INTERNAL MEDICINE

## 2025-03-04 NOTE — PROGRESS NOTES
"Reason for Visit:   Patient is participating in a research study as per specific study detail below .   Diagnosis code Z00.6  Award: [id number]  PTAEO: [id number]  IRB #: [id number]     Study short name: RECOVER.   Type of Visit:   occupational therapy.   -----------------------------------   This note is created in accordance with Standard Operating Procedures for Research Studies at [facility] which can be found on the intranet at: [email or web address]          Strength measured in pounds (remove hand and wrist jewelry, subject seated with back, pelvis, and knees as close to 90 deg as possible, participant completes two warm-up exercises shaking both hands three times and bending and stretching all fingers three times). \"For the test, I will ask you to squeeze this hand  as hard as you can while you remain seated. You will hold your hand so that it's not touching your body and squeeze the handle. I want you to sit tall and try not to lean when you squeeze. You will take a breath in, then blow out while you squeeze. You will squeeze as hard as you can until you can't squeeze any harder. Like this. (Do the squeeze demo). We will test each hand 3 times.\"    Right Test 1 - 20  Right Test 2 - 28  Right Test 3 - 27     Left Test 1 - 10  Left Test 2 - 14  Left Test 3 - 15      Method 1 set up: wooden treatment table with knee in appx 90 deg flexion, small wedge bolster placed at posterior aspect of distal thigh to minimize posterior thigh discomfort, and a gait belt is used to stabilize the thighs to the table. Participant keeps arms crossed during testing to isolate the quad muscle. A foam pad is placed across the anterior shin, in a location consistent with the isokinetic dynamometer (appx 5 cm prox to lateral malleolus) with the HHD secured against the leg of the table. A small elastic wrap is positioned " "between the table leg and calf mm to minimize belt slack. Participant performs a warm up consisting of three isometric contractions at 50% effort, 75% effort, and 100% effort with 1 minute rest between each contraction. After the warm-up, participant completes three maximal isometric contractions, holding for approximately 3 seconds, with one minute rest between each contraction. Test dominant limb first.   Method 2 set up: This setup procedure should be followed if an appropriate examination table is not available for Method 1. Participants should be seated upright with the leg vertical and the knee in approximately 90 degrees of flexion to decrease the influence of gravity during testing. Participants are not allowed to lean back in the chair while being tested. The counterforce to the knee extension force is applied by the beatriz through the HHD, which is perpendicular and just proximal to the malleoli.     Therapist stabilized  Left quadriceps lever arm length: 37 cm  Left Test 1 - 157.0  Left Test 2 - 174.8  Left Test 3 - 161.0    Therapist stabilized  Right quadriceps lever arm length: 37 cm  Right Test 1 - 154.3  Right Test 2 - 150.3  Right Test 3 - 174.4      Timed Up and Go (TUG) measured in seconds (participant sits in a standard arm chair with their back against the chair and arms resting on the chair's arms. A line is marked 10 ft away). Instruct the patient \"When I say Go, I want you to stand up from the chair, walk to the line on the floor at your normal pace, turn, walk back to the chair at your normal pace, and sit down again.\" The upper extremities should not be on the assistive device but the device should be nearby if needed. Demonstrate the test to the participant. The stopwatch starts when the participant is told to go and stops when his/her buttocks touch the seat.     Practice Trial - unable due to back pain  Test 1 -       "

## 2025-03-04 NOTE — ASSESSMENT & PLAN NOTE
Patient's goal A1c is < 7%.  Is pt at goal? No, 7.4%  Patient's SMBGs are improved on Mounjaro.     Rationale for plan: The patient was feeling some significant constipation even after switch from Ozempic to Mounjaro. Did try dulcolax x2 and had large bowel movement in response to this. She is willing to keep on the current dose and see if side effects improve with time given her sugars have been responding well. Encouraged hydration, as did CNP during same-day visit.    Medication Changes:  CONTINUE  Tresiba 32 units  Mounjaro 2.5 mg weekly  Humalog sliding scale    Future Considerations:  Can consider fixed dose Humalog if constipation does not improve with time and hydration  Consider SGLT2i if she is able to sign up for patient assistance program to assist with cost    Monitoring and Education:   Continue monitoring using Ana María- call Rico for replacement sensor after MRI

## 2025-03-04 NOTE — ASSESSMENT & PLAN NOTE
ASSESSMENT OF SMBP MEASUREMENTS  Average: 150s  mmHg    Patient's goal BP < 130/80.   Rationale for plan: The patient previously had ROSEMARIE after increasing multiple medications and adding spironolactone all at once from cardiology. She has returned to most of her baseline medications and her kidney function has shown improvement since. She is noting she has high blood pressure. Given sulfa allergy, cautious to add a thiazide. She is on maximum losartan, but valsartan maximum dose is considered more potent. Plan to switch losartan to valsartan to maximize benefit from first-line ARB class. Previously had ACE cough. Already on calcium channel blocker. Can retry addition of spironolactone at lower dose without simultaneous other medication changes in future if needed. Otherwise, alpha blocker may be next class tried.  CrCl cannot be calculated (Unknown ideal weight.).    Medication Changes:  CONTINUE  Diltiazem 240 mg  Bumetanide 0.5 mg  STOP  Losartan 50 mg twice daily  START  Valsartan 160 twice daily    Future Considerations:  Consider retry spironolactone versus add alpha blockade    Monitoring and Education Discussed:  Eat right: Your diet should be rich in fruits, vegetables, potassium, and low-fat dairy products. You should also reduce your intake of sodium and fats, particularly saturated fats.  Maintain a healthy weight: Try to achieve and maintain a healthy weight. If you are unsure what this means for you, please contact our clinic.  Exercise: Try to get at least 30 minutes of aerobic exercise every day.  Moderate your alcohol consumption: Limit your alcohol intake to one drink per day.  Monitor your blood pressure: You should check your blood pressure regularly. Notify our clinic if your blood pressure readings are consistently higher than recommended.

## 2025-03-04 NOTE — TELEPHONE ENCOUNTER
----- Message from Nohemy Velasquez sent at 3/3/2025 10:53 PM EST -----  Schedule routine fu in 3 mo, 40 mins (always) IN OFFICE ONLY.  Does not have VV capabilities.      Schedule CPE with PCP (last done 10/2024)    To note: lisa asked her to be reschedule Friday to today in office due to lack of connection, but was scheduled VV again.   Good luck with your scheduled surgery.

## 2025-03-04 NOTE — ASSESSMENT & PLAN NOTE
BP uncontrolled at home  Needs cuff calibration at next visit  Switch losartan to valsartan  Has appt with neph in April  Complete DMST due to resistant hypertension, DM2 on insulin, morbid obesity

## 2025-03-05 LAB
CREAT SERPL-MCNC: 0.85 MG/DL (ref 0.59–1.01)
CYSTATIN C SERPL-MCNC: 1.46 MG/L (ref 0.61–0.95)
EGFRCR-CYS SERPLBLD CKD-EPI 2021: 56 ML/MIN/{1.73_M2}

## 2025-03-11 DIAGNOSIS — Z00.6 RESEARCH STUDY PATIENT: ICD-10-CM

## 2025-03-17 ENCOUNTER — TELEPHONE (OUTPATIENT)
Dept: PRIMARY CARE | Facility: CLINIC | Age: 70
End: 2025-03-17
Payer: COMMERCIAL

## 2025-03-17 NOTE — TELEPHONE ENCOUNTER
Patient left voicemail inquiring about her [facility] she called our office 3 times about this and has not been done   I have not received a call regarding this  I called her pharmacy and spoke to darren who confirmed that the rx was received   Called patient back and informed her the rx was sent in on 02/10/25  I instructed patient to reach out to her pharmacy and if anything else was needed to give us a call

## 2025-03-20 DIAGNOSIS — N18.9 CHRONIC KIDNEY DISEASE, UNSPECIFIED CKD STAGE: ICD-10-CM

## 2025-03-24 ENCOUNTER — APPOINTMENT (OUTPATIENT)
Dept: PHARMACY | Facility: HOSPITAL | Age: 70
End: 2025-03-24
Payer: COMMERCIAL

## 2025-03-24 DIAGNOSIS — Z79.899 MEDICATION MANAGEMENT: ICD-10-CM

## 2025-03-24 DIAGNOSIS — E11.69 TYPE 2 DIABETES MELLITUS WITH OTHER SPECIFIED COMPLICATION, UNSPECIFIED WHETHER LONG TERM INSULIN USE (MULTI): ICD-10-CM

## 2025-03-24 DIAGNOSIS — N18.9 CHRONIC KIDNEY DISEASE, UNSPECIFIED CKD STAGE: ICD-10-CM

## 2025-03-24 DIAGNOSIS — I10 PRIMARY HYPERTENSION: ICD-10-CM

## 2025-03-24 LAB
MGC ASCENT PFT - FEV1 - POST: 1.15
MGC ASCENT PFT - FEV1 - PRE: 1.03
MGC ASCENT PFT - FEV1 - PREDICTED: 1.94
MGC ASCENT PFT - FVC - POST: 1.76
MGC ASCENT PFT - FVC - PRE: 1.67
MGC ASCENT PFT - FVC - PREDICTED: 2.44

## 2025-03-24 RX ORDER — HYDRALAZINE HYDROCHLORIDE 10 MG/1
10 TABLET, FILM COATED ORAL 2 TIMES DAILY
Qty: 60 TABLET | Refills: 2 | Status: SHIPPED | OUTPATIENT
Start: 2025-03-24 | End: 2026-03-24

## 2025-03-24 NOTE — PROGRESS NOTES
"  Clinical Pharmacy Appointment    Patient ID: Kaylan Woo \"Mariluz\" is a 69 y.o. female who presents for Hypertension, Chronic Kidney Disease, and Diabetes.    Pt is here for Follow Up appointment.     Referring Provider: Cornelia Ball APRN-*  PCP: Radha Holt MD   Last visit with PCP: 3/3/2025 (CNP)   Next visit with PCP: 6/5/2025      Subjective     Interval History  Confirms switching to valsartan after last visit  Feels like has a rash either from yeast or a reaction to her current laundry detergent    HPI  DIABETES MELLITUS TYPE 2:    Diagnosed with diabetes:  over 10 years. Known diabetic complications: nephropathy, neuropathy.  Does patient follow with Endocrinology: No  Last optometry exam: to be discussed  Podiatry: patient denies sores or cuts on feet today      Current diabetic medications include:  Tresiba 32 units  Humalog sliding scale (200, 3 units; 250 5 units)  Mounjaro 2.5 mg weekly    Clarifications to above regimen: none  Adverse Effects: improved constipation compared to last visit    Past diabetic medications include: sulfonylureas (anaphylaxis to sulfa), metformin (GI side effects), Januvia (not effective), Ozempic (constipation)    Glucose Readings:  Glucometer/CGM Type: Ana María CGM; does have back-up fingerstick  Patient tests BG continuously    Current home BG readings (mg/dL): her overnight sugars will fall quickly, eats to prevent lows  Her hour of dropping is about 4AM  Otherwise she is mainly staying in target range  Previous home BG readings: sometimes down to 70s-80s overnight and then eats to prevent going low  BG is better during the day, mostly staying less than 150 throughout the day    Any episodes of hypoglycemia? Yes, near lows at  .  Did patient treat episode of hypoglycemia appropriately? Yes, carbs to prevent overnight lows  Does the patient have a prescription for ready-to-use Glucagon? No - good candidate if able to afford    Hyperglycemia symptoms: " none reported at this time    Lifestyle:  Has added lots of lettuce to her diet- this has helped her move her bowels better    3/3:  Does feel like she is getting plenty of fluid although she is constipated    2/10/25:  Diet:   Snacks: will eat before bed if glucose <120 (chicken fajita)  Drinks: iced tea, small can of Dr. Pepper to correct sugar some times; cannot do diet soda due to aspartame allergy  Physical Activity: likes to swim in the summer  Tobacco history: denies    Secondary Prevention:  Statin? No- previous side effects  ACE-I/ARB? Yes  Aspirin? Yes    Pertinent PMH Review:  PMH of Pancreatitis: No  PMH of Retinopathy: No  PMH of Urinary Tract Infections: to be reassessed  PMH of MTC: No  UACR/EGFR in last year?: Yes  Albumin/Creatinine Ratio   Date Value Ref Range Status   01/25/2025   Final     Comment:     One or more analytes used in this calculation is outside of the analytical measurement range. Calculation cannot be performed.     Albumin/Creatine Ratio   Date Value Ref Range Status   06/01/2022 23.7 0.0 - 30.0 ug/mg crt Final       Immunizations:  Influenza? Yes  COVID? Yes  Pneumonia? Yes  Shingles? Yes  Hepatitis B? No     HYPERTENSION ASSESSMENT  Medication Therapy  Current Medications:   Valsartan 160 mg twice daily  Diltiazem 240 mg daily  Bumetanide 0.5 mg daily    Previous Medications: spironolactone (stopped for ROSEMARIE)    Clarifications to above regimen: none   Adverse Effects: potential ROSEMARIE     Home BP Monitoring  BP Cuff Type: Digital arm monitor-   New monitor as of 2/14- should bring in for validation    Current home BP readings: home Bps are still high 160-180 mmHg on new valsartan    Is having low grade headaches in the evening, also gets eye watering in the evening (thinks vision concerns contribute to the headaches)    Previous home BP readings: confirms has a new monitor  128-160 mmHg first week with monitor (63-82 diastolic)  Second week diastolic 74-88  Third week diastolic as  high as 92- lowest 63  Third week systolic as high as 170  Machine says overall average 153/77 mmHg    BP Readings from Last 3 Encounters:   02/03/25 148/82   12/30/24 136/68   12/20/24 135/67       Lifestyle  See above    Sodium Intake:  Will re-address at next visit    Risk Assessment  Major Risk Factors Include:  Hypertension  Obesity (BMI >30)  Dyslipidemia  Diabetes mellitus  Age > 55 (men) or > 65 (women)  Sleep apnea  The 10-year ASCVD risk score (Kb HOLLINGSWORTH, et al., 2019) is: 24.4%    Values used to calculate the score:      Age: 69 years      Sex: Female      Is Non- : No      Diabetic: Yes      Tobacco smoker: No      Systolic Blood Pressure: 148 mmHg      Is BP treated: Yes      HDL Cholesterol: 55.6 mg/dL      Total Cholesterol: 259 mg/dL  Known target organ damage:  Chronic Heart Failure  Left ventricular hypertrophy  CKD (w/ recent ROSEMARIE)    Secondary Prevention  On Statin?: No, previous side effects  Immunizations Needed: RSV and Hepatitis B Series  Tobacco Use: non-smoker     CHRONIC KIDNEY DISEASE ASSESSMENT  Does patient see nephrology? Yes    Date: scheduled 4/10/2025    Current medications include:  Valsartan 160 mg twice daily    Clarifications to above regimen: none  Adverse Effects: none    Stage: 2 (eGFR 60-89)- improved again, appears last was ROSEMARIE on CKD  Albuminuria: A1 (<30 mg/g)  ACE/ARB: Yes, valsartan 160 mg twice daily  SGLT2i? No, cost is prohibitive- would be advantageous given comorbidities, needs to wait given recent ROSEMARIE ; eGFR is now over 60, but may still be good for blood glucose and heart    Medication Review  The following medications were identified as inappropriate per current kidney function:    Gabapentin dosing is okay now that recovered from recent ROSEMARIE - if dips below 50, would recommend 900 mg daily maximum  Etodolac to be used with caution- currently holding  The following medications increase risk of ROSEMARIE and should be closely monitored:  ARB,  Diuretics, and NSAIDs (previous aldosterone antagonist)    Hypertension History:  HTN diagnosis: yes- see above    Hyperlipidemia History:  Diagnosis? yes  Current Regimen  N/a  At goal? no  Current LDL: 156  Current T    Diabetes History:  Diagnosis? yes- see above    Lab Review  Electrolytes: Within normal limits (3/2025)  Alk phos: Within normal limits (3/2025)  Sodium bicarb: Within normal limits (3/2025)  PTH: Need updated labs, last vit D was normal in 10/2024  Hgb:  slightly low at 11.3 (2025)  Albuminuria: (not concerning for albuminuria)  Albumin/Creatinine Ratio   Date Value Ref Range Status   2025   Final     Comment:     One or more analytes used in this calculation is outside of the analytical measurement range. Calculation cannot be performed.     Albumin/Creatine Ratio   Date Value Ref Range Status   2022 23.7 0.0 - 30.0 ug/mg crt Final        Medication Reconciliation:  Changed: no reported changes  Does mention still holding Lodine but taking rare ibuprofen for pain instead    Drug Interactions  The following drug interactions were noted:    Aspirin and etodolac - additive bleed risk and possible decreased efficacy; increased bleed risk of venlafaxine as well  Additive nephrotoxicity risk of NSAIDs, bumetanide, and losartan  Tizanidine may be less effective while on venlafaxine    Medication System Management  Patient's preferred pharmacy: [facility]  Adherence/Organization: appropriate; gabapentin is 2-3 times per day  Affordability/Accessibility: Is on low income currently, brand name medications can be expensive     Patient Assistance Screening (VAF)  Patient verbally reports monthly or yearly income which is less than 400% federal poverty level  Application may be considered for Mounjaro, SGLT2i, cholesterol medications  Patient aware this process may take up to 2 weeks once income documents have been sent to the team.  If approved, medication must be filled through   [facility] pharmacy and may be picked up or mailed to patient.   If approved, medication will be billed through insurance, and patient assistance team will pay the copay. This will result in a $0 copay for the patient.  Counseled patient on mechanism of action, side effects, contraindications, and what to do if the patient misses a dose. All patients questions were answered.        Objective   Allergies   Allergen Reactions    Celecoxib Shortness of breath and Rash    Rofecoxib Shortness of breath and Rash    Sulfa (Sulfonamide Antibiotics) Anaphylaxis    Sulfasalazine Anaphylaxis    Sulfonylureas Anaphylaxis    Sulfur Anaphylaxis    Aspartame Other and Headache    Buprenorphine Other     dizziness    Diet Foods Other     migraine headache. Allergy to aspartame only. Able to tolerate sucralose (splenda).    Ezetimibe Other     Couldn't walk    Iodides Other    Metformin Other     Severe GI SE    Nalidixic Acid Unknown     Pt does not think she is allergic to this    Nsaids (Non-Steroidal Anti-Inflammatory Drug) Unknown     Pt denies allergy    Pyrazolones Other     Pt does not know if allergy    Rivaroxaban Hives    Salicylates Unknown     Pt states she is not allergic to    Shellfish Containing Products Nausea/vomiting     scallops    Shellfish Derived Nausea/vomiting     scallops    Statins-Hmg-Coa Reductase Inhibitors Other    Thiazides Unknown     Pt denies allergy    Cyclobenzaprine Rash    Latex Swelling and Rash    Valdecoxib Swelling and Rash     Social History     Social History Narrative    Not on file      Medication Review  Current Outpatient Medications   Medication Instructions    acetaminophen (TYLENOL) 1,000 mg, Every 6 hours PRN    aspirin 81 mg, Daily    bumetanide (Bumex) 0.5 mg tablet Take 1 tablet (0.5 mg) by mouth once daily. Take with 1 mg daily    cholecalciferol (Vitamin D-3) 50 mcg (2,000 unit) capsule Take by mouth.    dexAMETHasone (DECADRON) 1 mg, oral, Once, Take at 11pm-12am night before  "blood draw    dilTIAZem CD (CARDIZEM CD) 240 mg, oral, Daily    etodolac (LODINE) 400 mg, oral, 2 times daily    FreeStyle Ana María sensor system (FreeStyle Ana María 2 Sensor) kit 1 Cartridge every 14 days    gabapentin (Neurontin) 600 mg tablet TAKE 1 TABLET BY MOUTH THREE TIMES DAILY FOR 30 DAYS    hydrALAZINE (APRESOLINE) 10 mg, oral, 2 times daily    insulin degludec (TRESIBA FLEXTOUCH) 32 Units, subcutaneous, Nightly, Take as directed per insulin instructions.    insulin lispro (HumaLOG KwikPen Insulin) 100 unit/mL injection Uad per sliding scale    ketoconazole (NIZOral) 2 % shampoo Wash scalp once to twice a week. Leave on for 3-5 minutes then rinse    L. acidophilus/Bifid. animalis 32 billion cell capsule 1 capsule, Daily    montelukast (SINGULAIR) 10 mg, oral, Daily    Mounjaro 2.5 mg, subcutaneous, Weekly    nystatin (Mycostatin) 100,000 unit/mL suspension Take 5 mL by mouth 4 times a day for 7 days. Swish in mouth and swallow.    omeprazole (PRILOSEC) 40 mg, oral, Daily    pen needle, diabetic (BD Michelle 2nd Gen Pen Needle) 32 gauge x 5/32\" needle Use with daily glucose testing    tiZANidine (ZANAFLEX) 4 mg, oral, 3 times daily PRN    True Metrix Glucose Test Strip strip use to check blood sugar TWICE DAILY    valsartan (DIOVAN) 160 mg, oral, 2 times daily    venlafaxine XR (Effexor-XR) 150 mg 24 hr capsule Take 1 capsule (150 mg total) by mouth daily with breakfast for 30 days.    venlafaxine XR (Effexor-XR) 75 mg 24 hr capsule TAKE 1 CAPSULE(75 MG) BY MOUTH DAILY WITH FOOD    Ventolin HFA 90 mcg/actuation inhaler Ventolin ade 2 puffs q4h prn    VITAMIN B COMPLEX ORAL 1 tablet, Every morning      Vitals  BP Readings from Last 2 Encounters:   02/03/25 148/82   12/30/24 136/68     BMI Readings from Last 1 Encounters:   02/03/25 44.66 kg/m²      Labs  A1C  Lab Results   Component Value Date    HGBA1C 8.0 (H) 03/04/2025    HGBA1C 7.4 (H) 10/30/2024    HGBA1C 7.3 (H) 07/29/2024     BMP  Lab Results   Component Value " Date    CALCIUM 10.0 03/04/2025     03/04/2025    K 4.5 03/04/2025    CO2 31 03/04/2025     03/04/2025    BUN 14 03/04/2025    CREATININE 0.94 03/04/2025    EGFR 67 03/04/2025     LFTs  Lab Results   Component Value Date    ALT 11 03/04/2025    AST 10 03/04/2025    ALKPHOS 108 03/04/2025    BILITOT 0.3 03/04/2025     FLP  Lab Results   Component Value Date    TRIG 245 (H) 03/04/2025    CHOL 259 (H) 03/04/2025    LDLF 149 (H) 08/04/2023    LDLCALC 154 (H) 03/04/2025    HDL 55.6 03/04/2025     Urine Microalbumin  Lab Results   Component Value Date    MICROALBCREA  01/25/2025      Comment:      One or more analytes used in this calculation is outside of the analytical measurement range. Calculation cannot be performed.     Weight Management  Wt Readings from Last 3 Encounters:   02/03/25 111 kg (244 lb 3.2 oz)   12/30/24 108 kg (238 lb)   12/20/24 110 kg (242 lb 8.1 oz)      There is no height or weight on file to calculate BMI.     Assessment/Plan   Problem List Items Addressed This Visit       Type 2 diabetes mellitus with other specified complication, unspecified whether long term insulin use (Multi)     Patient's goal A1c is < 7%.  Is pt at goal? No, last 8.0%  Patient's SMBGs are improved since starting Mounjaro.     Rationale for plan: The patient's constipation is significantly improved after incorporating more lettuce into her diet. She sometimes dips into near low glucose, but is able to eat to prevent low events. She stays in range throughout the day while awake. She did try self-lowering insulin for a few days, but this resulted in higher average glucose, so she resumed her current 32 unit dose. She previously has had significant side effects. She attempted to connect her glucose to clinic but unable to see on [employer]- sent email invitation link straight from [employer] site today for next visit. Can consider titration of Mounjaro and insulin at that time- mainly focused on elevated blood  pressure today.    Medication Changes:  CONTINUE  Mounjaro 2.5 mg weekly  Tresiba 32 units daily  Humalog sliding scale    Future Considerations:  Titrate insulin/Mounjaro as able  SGLT2i if applies for PAP  Consider Repatha or Praluent for cholesterol control    Monitoring and Education:   Continue checking glucose using Ana María CGM  To complete dexamethasone suppression labs shortly- educated that if done at same time as BMP, blood glucose will likely be higher than her true fasting glucose values         Relevant Orders    Referral to Clinical Pharmacy    Hypertension     ASSESSMENT OF SMBP MEASUREMENTS  Highest readin/80s mmHg  Lowest reading: 160/60s  mmHg  Average: ~180  mmHg    Patient's goal BP < 130/80.   Rationale for plan: The patient confirms switching losartan to valsartan and continuing her other medications (diltiazem and bumetanide). Despite the switch, her blood pressure remains elevated. Given sulfa allergy, cautious to add diuretics. She previously had ROSEMARIE to spironolactone and prefers not to rechallenge at this time, especially since she also had hyperkalemia with spironolactone. Given degree of blood pressure elevation, will plan to add twice daily hydralazine to therapy at this time and reassess in 2 weeks.  CrCl cannot be calculated (Patient's most recent lab result is older than the maximum 3 days allowed.).    Medication Changes:  CONTINUE  Valsartan 160 mg twice daily  Diltiazem 240 mg daily  Bumetanide 0.5 mg daily  START  Hydralazine 10 mg twice daily    Future Considerations:  Can consider rechallenging spironolactone if patient is agreeable  Doses of hydralazine and bumex can be further optimized  If BP control worse than previous control on losartan, can switch back to losartan    Monitoring and Education Discussed:  Eat right: Your diet should be rich in fruits, vegetables, potassium, and low-fat dairy products. You should also reduce your intake of sodium and fats, particularly  saturated fats.  Maintain a healthy weight: Try to achieve and maintain a healthy weight. If you are unsure what this means for you, please contact our clinic.  Exercise: Try to get at least 30 minutes of aerobic exercise every day.  Moderate your alcohol consumption: Limit your alcohol intake to one drink per day.  Monitor your blood pressure: You should check your blood pressure regularly. Notify our clinic if your blood pressure readings are consistently higher than recommended.          Relevant Medications    hydrALAZINE (Apresoline) 10 mg tablet    Other Relevant Orders    Referral to Clinical Pharmacy     Other Visit Diagnoses       Chronic kidney disease, unspecified CKD stage        Improved ROSEMARIE- no need for medication adjustments at this time    Relevant Orders    Referral to Clinical Pharmacy    Medication management        Relevant Orders    Referral to Clinical Pharmacy            Clinical Pharmacist follow-up: 4/7 11AM, Telehealth visit    Continue all meds under the continuation of care with the referring provider and clinical pharmacy team.    Thank you,  Iwona Salazar, PharmD  Clinical Pharmacist  [phone]    Verbal consent to manage patient's drug therapy was obtained from the patient. They were informed they may decline to participate or withdraw from participation in pharmacy services at any time.

## 2025-03-24 NOTE — ASSESSMENT & PLAN NOTE
Patient's goal A1c is < 7%.  Is pt at goal? No, last 8.0%  Patient's SMBGs are improved since starting Mounjaro.     Rationale for plan: The patient's constipation is significantly improved after incorporating more lettuce into her diet. She sometimes dips into near low glucose, but is able to eat to prevent low events. She stays in range throughout the day while awake. She did try self-lowering insulin for a few days, but this resulted in higher average glucose, so she resumed her current 32 unit dose. She previously has had significant side effects. She attempted to connect her glucose to clinic but unable to see on [employer]- sent email invitation link straight from [employer] site today for next visit. Can consider titration of Mounjaro and insulin at that time- mainly focused on elevated blood pressure today.    Medication Changes:  CONTINUE  Mounjaro 2.5 mg weekly  Tresiba 32 units daily  Humalog sliding scale    Future Considerations:  Titrate insulin/Mounjaro as able  SGLT2i if applies for PAP  Consider Repatha or Praluent for cholesterol control    Monitoring and Education:   Continue checking glucose using Ana María CGM  To complete dexamethasone suppression labs shortly- educated that if done at same time as BMP, blood glucose will likely be higher than her true fasting glucose values

## 2025-03-24 NOTE — ASSESSMENT & PLAN NOTE
ASSESSMENT OF SMBP MEASUREMENTS  Highest readin/80s mmHg  Lowest reading: 160/60s  mmHg  Average: ~180  mmHg    Patient's goal BP < 130/80.   Rationale for plan: The patient confirms switching losartan to valsartan and continuing her other medications (diltiazem and bumetanide). Despite the switch, her blood pressure remains elevated. Given sulfa allergy, cautious to add diuretics. She previously had ROSEMARIE to spironolactone and prefers not to rechallenge at this time, especially since she also had hyperkalemia with spironolactone. Given degree of blood pressure elevation, will plan to add twice daily hydralazine to therapy at this time and reassess in 2 weeks.  CrCl cannot be calculated (Patient's most recent lab result is older than the maximum 3 days allowed.).    Medication Changes:  CONTINUE  Valsartan 160 mg twice daily  Diltiazem 240 mg daily  Bumetanide 0.5 mg daily  START  Hydralazine 10 mg twice daily    Future Considerations:  Can consider rechallenging spironolactone if patient is agreeable  Doses of hydralazine and bumex can be further optimized  If BP control worse than previous control on losartan, can switch back to losartan    Monitoring and Education Discussed:  Eat right: Your diet should be rich in fruits, vegetables, potassium, and low-fat dairy products. You should also reduce your intake of sodium and fats, particularly saturated fats.  Maintain a healthy weight: Try to achieve and maintain a healthy weight. If you are unsure what this means for you, please contact our clinic.  Exercise: Try to get at least 30 minutes of aerobic exercise every day.  Moderate your alcohol consumption: Limit your alcohol intake to one drink per day.  Monitor your blood pressure: You should check your blood pressure regularly. Notify our clinic if your blood pressure readings are consistently higher than recommended.

## 2025-04-01 LAB
ANION GAP SERPL CALCULATED.4IONS-SCNC: 12 MMOL/L (CALC) (ref 7–17)
BUN SERPL-MCNC: 16 MG/DL (ref 7–25)
BUN/CREAT SERPL: NORMAL (CALC) (ref 6–22)
CALCIUM SERPL-MCNC: 9.4 MG/DL (ref 8.6–10.4)
CHLORIDE SERPL-SCNC: 102 MMOL/L (ref 98–110)
CO2 SERPL-SCNC: 27 MMOL/L (ref 20–32)
CREAT SERPL-MCNC: 0.87 MG/DL (ref 0.5–1.05)
EGFRCR SERPLBLD CKD-EPI 2021: 73 ML/MIN/1.73M2
GLUCOSE SERPL-MCNC: 128 MG/DL (ref 65–139)
POTASSIUM SERPL-SCNC: 4.6 MMOL/L (ref 3.5–5.3)
SODIUM SERPL-SCNC: 141 MMOL/L (ref 135–146)

## 2025-04-07 ENCOUNTER — APPOINTMENT (OUTPATIENT)
Dept: PHARMACY | Facility: HOSPITAL | Age: 70
End: 2025-04-07
Payer: COMMERCIAL

## 2025-04-07 DIAGNOSIS — E11.69 TYPE 2 DIABETES MELLITUS WITH OTHER SPECIFIED COMPLICATION, UNSPECIFIED WHETHER LONG TERM INSULIN USE (MULTI): ICD-10-CM

## 2025-04-07 DIAGNOSIS — N18.9 CHRONIC KIDNEY DISEASE, UNSPECIFIED CKD STAGE: ICD-10-CM

## 2025-04-07 DIAGNOSIS — Z79.899 MEDICATION MANAGEMENT: ICD-10-CM

## 2025-04-07 DIAGNOSIS — I10 PRIMARY HYPERTENSION: ICD-10-CM

## 2025-04-07 RX ORDER — LOSARTAN POTASSIUM 50 MG/1
50 TABLET ORAL 2 TIMES DAILY
Qty: 60 TABLET | Refills: 11 | Status: SHIPPED | OUTPATIENT
Start: 2025-04-07

## 2025-04-07 NOTE — ASSESSMENT & PLAN NOTE
ASSESSMENT OF SMBP MEASUREMENTS  BP only slightly improved since last visit, with average in 150s mmHg systolic. Controlled diastolic readings.    Patient's goal BP < 130/80.   Rationale for plan: The patient has seen a very mild improvement in blood pressure since starting hydralazine and does not report new side effects. She has similar blood pressure average to when she was on losartan and not on hydralazine. Control paradoxically worse on her valsartan. Given previous better response to losartan, will try to switch from valsartan back to losartan 50 mg twice daily and maintain current hydralazine. The patient will increase hydralazine if no benefit has been seen after 1 week of switching back to losartan.  CrCl cannot be calculated (Patient's most recent lab result is older than the maximum 3 days allowed.).    Medication Changes:  CONTINUE  Bumetanide 0.5 mg daily  Hydralazine 10 mg twice daily  Diltiazem 240 mg daily  INCREASE  Hydralazine to 20 mg twice daily if no improvement in 1 week  STOP  Valsartan 160 mg twice daily  START  Losartan 50 mg twice daily    Future Considerations:  Can increase hydralazine; consider re-challenging diuretics    Monitoring and Education Discussed:  Eat right: Your diet should be rich in fruits, vegetables, potassium, and low-fat dairy products. You should also reduce your intake of sodium and fats, particularly saturated fats.  Maintain a healthy weight: Try to achieve and maintain a healthy weight. If you are unsure what this means for you, please contact our clinic.  Exercise: Try to get at least 30 minutes of aerobic exercise every day.  Moderate your alcohol consumption: Limit your alcohol intake to one drink per day.  Monitor your blood pressure: You should check your blood pressure regularly. Notify our clinic if your blood pressure readings are consistently higher than recommended.

## 2025-04-07 NOTE — ASSESSMENT & PLAN NOTE
Near goal on current Ana María report. Given her previous side effects to Mounjaro, and worse side effects to Ozempic, will hold off on increasing dose at this time. Can consider at future visits.    CONTINUE:  Mounjaro 2.5 mg weekly  Tresiba 32 units nightly  Humalog sliding scale (not recently using)  Continue monitoring with Ana María CGM

## 2025-04-07 NOTE — PROGRESS NOTES
"  Clinical Pharmacy Appointment    Patient ID: Kaylan Woo \"Mariluz\" is a 69 y.o. female who presents for Diabetes, Chronic Kidney Disease, and Hypertension.    Pt is here for Follow Up appointment.     Referring Provider: Cornelia Ball APRN-*  PCP: Radha Holt MD   Last visit with PCP: 3/3/2025 (CNP)   Next visit with PCP: 6/5/2025      Subjective     Interval History  Choking episode this morning- she states this has happened to her well before ever trying Ozempic or Mounjaro    HPI  DIABETES MELLITUS TYPE 2:    Diagnosed with diabetes:  over 10 years. Known diabetic complications: nephropathy, neuropathy.  Does patient follow with Endocrinology: No  Last optometry exam: to be discussed  Podiatry: patient denies sores or cuts on feet today      Current diabetic medications include:  Tresiba 32 units  Humalog sliding scale (200, 3 units; 250 5 units)  Mounjaro 2.5 mg weekly    Clarifications to above regimen: has not recently been using sliding scale at all as before meals she is doing well; after meals typically the spike is less than an hour long  Adverse Effects: improved constipation compared to last visit    Past diabetic medications include: sulfonylureas (anaphylaxis to sulfa), metformin (GI side effects), Januvia (not effective), Ozempic (constipation)    Glucose Readings:  Glucometer/CGM Type: Ana María CGM; does have back-up fingerstick  Patient tests BG continuously    Current home BG readings (mg/dL):     Previous home BG readings: her overnight sugars will fall quickly, eats to prevent lows  Her hour of dropping is about 4AM  Otherwise she is mainly staying in target range    Any episodes of hypoglycemia? Yes, near lows at  .  Did patient treat episode of hypoglycemia appropriately? Yes, carbs to prevent overnight lows  Does the patient have a prescription for ready-to-use Glucagon? No - good candidate if able to afford    Hyperglycemia symptoms: none reported at this " time    Lifestyle:  Patient will often eat when she gets into the low 100s if she notices her Ana María is trending downward    3/24:  Has added lots of lettuce to her diet- this has helped her move her bowels better    3/3:  Does feel like she is getting plenty of fluid although she is constipated    2/10/25:  Diet:   Snacks: will eat before bed if glucose <120 (chicken fajita)  Drinks: iced tea, small can of Dr. Pepper to correct sugar some times; cannot do diet soda due to aspartame allergy  Physical Activity: likes to swim in the summer  Tobacco history: denies    Secondary Prevention:  Statin? No- previous side effects  ACE-I/ARB? Yes  Aspirin? Yes    Pertinent PMH Review:  PMH of Pancreatitis: No  PMH of Retinopathy: No  PMH of Urinary Tract Infections: to be reassessed  PMH of MTC: No  UACR/EGFR in last year?: Yes  Albumin/Creatinine Ratio   Date Value Ref Range Status   01/25/2025   Final     Comment:     One or more analytes used in this calculation is outside of the analytical measurement range. Calculation cannot be performed.     Albumin/Creatine Ratio   Date Value Ref Range Status   06/01/2022 23.7 0.0 - 30.0 ug/mg crt Final       Immunizations:  Influenza? Yes  COVID? Yes  Pneumonia? Yes  Shingles? Yes  Hepatitis B? No     HYPERTENSION ASSESSMENT  Medication Therapy  Current Medications:   Valsartan 160 mg twice daily  Diltiazem 240 mg daily  Bumetanide 0.5 mg daily  Hydralazine 10 mg twice daily    Previous Medications: spironolactone (stopped for ROSEMARIE), losartan (switched to valsartan)    Clarifications to above regimen: none   Adverse Effects: no concerns    Home BP Monitoring  BP Cuff Type: Digital arm monitor-   New monitor as of 2/14- should bring in for validation    Current home BP readings:   Average 153/74 for the past few weeks  Some in 160s, some in 130s; diastolic readings are mostly normal    3/24:  home Bps are still high 160-180 mmHg on new valsartan    Is having low grade headaches in the  evening, also gets eye watering in the evening (thinks vision concerns contribute to the headaches)    Previous home BP readings: confirms has a new monitor  128-160 mmHg first week with monitor (63-82 diastolic)  Second week diastolic 74-88  Third week diastolic as high as 92- lowest 63  Third week systolic as high as 170  Machine says overall average 153/77 mmHg    BP Readings from Last 3 Encounters:   02/03/25 148/82   12/30/24 136/68   12/20/24 135/67     Lifestyle  See above    Sodium Intake:  Will re-address at next visit    Risk Assessment  Major Risk Factors Include:  Hypertension  Obesity (BMI >30)  Dyslipidemia  Diabetes mellitus  Age > 55 (men) or > 65 (women)  Sleep apnea  The 10-year ASCVD risk score (Kb HOLLINGSWORTH, et al., 2019) is: 24.4%    Values used to calculate the score:      Age: 69 years      Sex: Female      Is Non- : No      Diabetic: Yes      Tobacco smoker: No      Systolic Blood Pressure: 148 mmHg      Is BP treated: Yes      HDL Cholesterol: 55.6 mg/dL      Total Cholesterol: 259 mg/dL  Known target organ damage:  Chronic Heart Failure  Left ventricular hypertrophy  CKD (w/ recent ROSEMARIE)    Secondary Prevention  On Statin?: No, previous side effects  Immunizations Needed: RSV and Hepatitis B Series  Tobacco Use: non-smoker     CHRONIC KIDNEY DISEASE ASSESSMENT  Does patient see nephrology? Yes    Date: scheduled 4/10/2025    Current medications include:  Valsartan 160 mg twice daily    Clarifications to above regimen: none  Adverse Effects: none    Stage: 2 (eGFR 60-89)- improved again, appears last was ROSEMARIE on CKD  Albuminuria: A1 (<30 mg/g)  ACE/ARB: Yes, valsartan 160 mg twice daily  SGLT2i? No, cost is prohibitive- would be advantageous given comorbidities, needs to wait given recent ROSEMARIE ; eGFR is now over 60, but may still be good for blood glucose and heart    Medication Review  The following medications were identified as inappropriate per current kidney function:     Gabapentin dosing is okay now that recovered from recent ROSEMARIE - if dips below 50, would recommend 900 mg daily maximum  Etodolac to be used with caution- currently holding  The following medications increase risk of ROSEMARIE and should be closely monitored:  ARB, Diuretics, and NSAIDs (previous aldosterone antagonist)    Hypertension History:  HTN diagnosis: yes- see above    Hyperlipidemia History:  Diagnosis? yes  Current Regimen  N/a  At goal? no  Current LDL: 156  Current T    Diabetes History:  Diagnosis? yes- see above    Lab Review  Electrolytes: Within normal limits (3/2025)  Alk phos: Within normal limits (3/2025)  Sodium bicarb: Within normal limits (3/2025)  PTH: Need updated labs, last vit D was normal in 10/2024  Hgb:  slightly low at 11.3 (2025)  Albuminuria: (not concerning for albuminuria)  Albumin/Creatinine Ratio   Date Value Ref Range Status   2025   Final     Comment:     One or more analytes used in this calculation is outside of the analytical measurement range. Calculation cannot be performed.     Albumin/Creatine Ratio   Date Value Ref Range Status   2022 23.7 0.0 - 30.0 ug/mg crt Final        Medication Reconciliation:  Changed: no reported changes  Does mention still holding Lodine but taking rare ibuprofen for pain instead    Drug Interactions  The following drug interactions were noted:    Aspirin and etodolac - additive bleed risk and possible decreased efficacy; increased bleed risk of venlafaxine as well  Additive nephrotoxicity risk of NSAIDs, bumetanide, and losartan  Tizanidine may be less effective while on venlafaxine    Medication System Management  Patient's preferred pharmacy: [facility]  Adherence/Organization: appropriate; gabapentin is 2-3 times per day  Affordability/Accessibility: Is on low income currently, brand name medications can be expensive     Patient Assistance Screening (VAF)  Patient verbally reports monthly or yearly income which is less than  400% federal poverty level  Application may be considered for Mounjaro, SGLT2i, cholesterol medications  Patient aware this process may take up to 2 weeks once income documents have been sent to the team.  If approved, medication must be filled through [facility] pharmacy and may be picked up or mailed to patient.   If approved, medication will be billed through insurance, and patient assistance team will pay the copay. This will result in a $0 copay for the patient.  Counseled patient on mechanism of action, side effects, contraindications, and what to do if the patient misses a dose. All patients questions were answered.        Objective   Allergies   Allergen Reactions    Celecoxib Shortness of breath and Rash    Rofecoxib Shortness of breath and Rash    Sulfa (Sulfonamide Antibiotics) Anaphylaxis    Sulfasalazine Anaphylaxis    Sulfonylureas Anaphylaxis    Sulfur Anaphylaxis    Aspartame Other and Headache    Buprenorphine Other     dizziness    Diet Foods Other     migraine headache. Allergy to aspartame only. Able to tolerate sucralose (splenda).    Ezetimibe Other     Couldn't walk    Iodides Other    Metformin Other     Severe GI SE    Nalidixic Acid Unknown     Pt does not think she is allergic to this    Nsaids (Non-Steroidal Anti-Inflammatory Drug) Unknown     Pt denies allergy    Pyrazolones Other     Pt does not know if allergy    Rivaroxaban Hives    Salicylates Unknown     Pt states she is not allergic to    Shellfish Containing Products Nausea/vomiting     scallops    Shellfish Derived Nausea/vomiting     scallops    Statins-Hmg-Coa Reductase Inhibitors Other    Thiazides Unknown     Pt denies allergy    Cyclobenzaprine Rash    Latex Swelling and Rash    Valdecoxib Swelling and Rash     Social History     Social History Narrative    Not on file      Medication Review  Current Outpatient Medications   Medication Instructions    acetaminophen (TYLENOL) 1,000 mg, Every 6 hours PRN    aspirin 81 mg,  "Daily    bumetanide (Bumex) 0.5 mg tablet Take 1 tablet (0.5 mg) by mouth once daily. Take with 1 mg daily    cholecalciferol (Vitamin D-3) 50 mcg (2,000 unit) capsule Take by mouth.    dexAMETHasone (DECADRON) 1 mg, oral, Once, Take at 11pm-12am night before blood draw    dilTIAZem CD (CARDIZEM CD) 240 mg, oral, Daily    etodolac (LODINE) 400 mg, oral, 2 times daily    FreeStyle Ana María sensor system (FreeStyle Ana María 2 Sensor) kit 1 Cartridge every 14 days    gabapentin (Neurontin) 600 mg tablet TAKE 1 TABLET BY MOUTH THREE TIMES DAILY FOR 30 DAYS    hydrALAZINE (APRESOLINE) 10 mg, oral, 2 times daily    insulin degludec (TRESIBA FLEXTOUCH) 32 Units, subcutaneous, Nightly, Take as directed per insulin instructions.    insulin lispro (HumaLOG KwikPen Insulin) 100 unit/mL injection Uad per sliding scale    ketoconazole (NIZOral) 2 % shampoo Wash scalp once to twice a week. Leave on for 3-5 minutes then rinse    L. acidophilus/Bifid. animalis 32 billion cell capsule 1 capsule, Daily    losartan (COZAAR) 50 mg, oral, 2 times daily    montelukast (SINGULAIR) 10 mg, oral, Daily    Mounjaro 2.5 mg, subcutaneous, Weekly    nystatin (Mycostatin) 100,000 unit/mL suspension Take 5 mL by mouth 4 times a day for 7 days. Swish in mouth and swallow.    omeprazole (PRILOSEC) 40 mg, oral, Daily    pen needle, diabetic (BD Michelle 2nd Gen Pen Needle) 32 gauge x 5/32\" needle Use with daily glucose testing    tiZANidine (ZANAFLEX) 4 mg, oral, 3 times daily PRN    True Metrix Glucose Test Strip strip use to check blood sugar TWICE DAILY    venlafaxine XR (Effexor-XR) 150 mg 24 hr capsule Take 1 capsule (150 mg total) by mouth daily with breakfast for 30 days.    venlafaxine XR (Effexor-XR) 75 mg 24 hr capsule TAKE 1 CAPSULE(75 MG) BY MOUTH DAILY WITH FOOD    Ventolin HFA 90 mcg/actuation inhaler Ventolin ade 2 puffs q4h prn    VITAMIN B COMPLEX ORAL 1 tablet, Every morning      Vitals  BP Readings from Last 2 Encounters:   02/03/25 148/82 "   12/30/24 136/68     BMI Readings from Last 1 Encounters:   02/03/25 44.66 kg/m²      Labs  A1C  Lab Results   Component Value Date    HGBA1C 8.0 (H) 03/04/2025    HGBA1C 7.4 (H) 10/30/2024    HGBA1C 7.3 (H) 07/29/2024     BMP  Lab Results   Component Value Date    CALCIUM 9.4 03/31/2025     03/31/2025    K 4.6 03/31/2025    CO2 27 03/31/2025     03/31/2025    BUN 16 03/31/2025    CREATININE 0.87 03/31/2025    EGFR 73 03/31/2025     LFTs  Lab Results   Component Value Date    ALT 11 03/04/2025    AST 10 03/04/2025    ALKPHOS 108 03/04/2025    BILITOT 0.3 03/04/2025     FLP  Lab Results   Component Value Date    TRIG 245 (H) 03/04/2025    CHOL 259 (H) 03/04/2025    LDLF 149 (H) 08/04/2023    LDLCALC 154 (H) 03/04/2025    HDL 55.6 03/04/2025     Urine Microalbumin  Lab Results   Component Value Date    MICROALBCREA  01/25/2025      Comment:      One or more analytes used in this calculation is outside of the analytical measurement range. Calculation cannot be performed.     Weight Management  Wt Readings from Last 3 Encounters:   02/03/25 111 kg (244 lb 3.2 oz)   12/30/24 108 kg (238 lb)   12/20/24 110 kg (242 lb 8.1 oz)      There is no height or weight on file to calculate BMI.     Assessment/Plan   Problem List Items Addressed This Visit       Type 2 diabetes mellitus with other specified complication, unspecified whether long term insulin use (Multi)     Near goal on current Ana María report. Given her previous side effects to Mounjaro, and worse side effects to Ozempic, will hold off on increasing dose at this time. Can consider at future visits.    CONTINUE:  Mounjaro 2.5 mg weekly  Tresiba 32 units nightly  Humalog sliding scale (not recently using)  Continue monitoring with Ana María CGM         Relevant Orders    Referral to Clinical Pharmacy    Hypertension     ASSESSMENT OF SMBP MEASUREMENTS  BP only slightly improved since last visit, with average in 150s mmHg systolic. Controlled diastolic  readings.    Patient's goal BP < 130/80.   Rationale for plan: The patient has seen a very mild improvement in blood pressure since starting hydralazine and does not report new side effects. She has similar blood pressure average to when she was on losartan and not on hydralazine. Control paradoxically worse on her valsartan. Given previous better response to losartan, will try to switch from valsartan back to losartan 50 mg twice daily and maintain current hydralazine. The patient will increase hydralazine if no benefit has been seen after 1 week of switching back to losartan.  CrCl cannot be calculated (Patient's most recent lab result is older than the maximum 3 days allowed.).    Medication Changes:  CONTINUE  Bumetanide 0.5 mg daily  Hydralazine 10 mg twice daily  Diltiazem 240 mg daily  INCREASE  Hydralazine to 20 mg twice daily if no improvement in 1 week  STOP  Valsartan 160 mg twice daily  START  Losartan 50 mg twice daily    Future Considerations:  Can increase hydralazine; consider re-challenging diuretics    Monitoring and Education Discussed:  Eat right: Your diet should be rich in fruits, vegetables, potassium, and low-fat dairy products. You should also reduce your intake of sodium and fats, particularly saturated fats.  Maintain a healthy weight: Try to achieve and maintain a healthy weight. If you are unsure what this means for you, please contact our clinic.  Exercise: Try to get at least 30 minutes of aerobic exercise every day.  Moderate your alcohol consumption: Limit your alcohol intake to one drink per day.  Monitor your blood pressure: You should check your blood pressure regularly. Notify our clinic if your blood pressure readings are consistently higher than recommended.          Relevant Medications    losartan (Cozaar) 50 mg tablet    Other Relevant Orders    Referral to Clinical Pharmacy     Other Visit Diagnoses       Chronic kidney disease, unspecified CKD stage        Improved ROSEMARIE- no  need for medication adjustments at this time    Relevant Medications    losartan (Cozaar) 50 mg tablet    Other Relevant Orders    Referral to Clinical Pharmacy    Medication management        Relevant Orders    Referral to Clinical Pharmacy          Clinical Pharmacist follow-up: 4/28 11AM, Telehealth visit    Continue all meds under the continuation of care with the referring provider and clinical pharmacy team.    Thank you,  Iwona Salazar, PharmD  Clinical Pharmacist  [phone]    Verbal consent to manage patient's drug therapy was obtained from the patient. They were informed they may decline to participate or withdraw from participation in pharmacy services at any time.

## 2025-04-08 DIAGNOSIS — N18.9 CHRONIC KIDNEY DISEASE, UNSPECIFIED CKD STAGE: ICD-10-CM

## 2025-04-08 DIAGNOSIS — E11.69 TYPE 2 DIABETES MELLITUS WITH OTHER SPECIFIED COMPLICATION, UNSPECIFIED WHETHER LONG TERM INSULIN USE (MULTI): ICD-10-CM

## 2025-04-08 RX ORDER — TIRZEPATIDE 2.5 MG/.5ML
2.5 INJECTION, SOLUTION SUBCUTANEOUS WEEKLY
Qty: 2 ML | Refills: 1 | Status: SHIPPED | OUTPATIENT
Start: 2025-04-08

## 2025-04-08 RX ORDER — TIRZEPATIDE 2.5 MG/.5ML
INJECTION, SOLUTION SUBCUTANEOUS
Qty: 2 ML | Refills: 1 | Status: SHIPPED | OUTPATIENT
Start: 2025-04-08 | End: 2025-04-08 | Stop reason: SDUPTHER

## 2025-04-08 NOTE — TELEPHONE ENCOUNTER
Pt called in requesting a refill on her Mounjaro 2.5 to be sent into [facility] in [place]. No weight or height is documented because Pt has not weighed herself . Pt stated she takes medication for Diabetes not weight loss.

## 2025-04-10 ENCOUNTER — APPOINTMENT (OUTPATIENT)
Dept: NEPHROLOGY | Facility: CLINIC | Age: 70
End: 2025-04-10
Payer: COMMERCIAL

## 2025-04-15 ENCOUNTER — TELEPHONE (OUTPATIENT)
Dept: PHARMACY | Facility: HOSPITAL | Age: 70
End: 2025-04-15
Payer: COMMERCIAL

## 2025-04-15 DIAGNOSIS — N18.9 CHRONIC KIDNEY DISEASE, UNSPECIFIED CKD STAGE: ICD-10-CM

## 2025-04-15 DIAGNOSIS — I10 PRIMARY HYPERTENSION: ICD-10-CM

## 2025-04-15 RX ORDER — HYDRALAZINE HYDROCHLORIDE 25 MG/1
25 TABLET, FILM COATED ORAL 2 TIMES DAILY
Qty: 60 TABLET | Refills: 11 | Status: SHIPPED | OUTPATIENT
Start: 2025-04-15 | End: 2026-04-15

## 2025-04-15 RX ORDER — LOSARTAN POTASSIUM 50 MG/1
50 TABLET ORAL 2 TIMES DAILY
Qty: 60 TABLET | Refills: 11 | Status: SHIPPED | OUTPATIENT
Start: 2025-04-15

## 2025-04-15 NOTE — TELEPHONE ENCOUNTER
Patient initially having trouble filling Tresiba but was able to get generic insulin degludec without issue.    She does mention blood pressure has remained elevated after switching back to losartan. Asked her to start taking hydralazine 20 mg per dose- will call in 25 mg tablets for her to  at her convenience as well. Given her recent very high readings on ARB and CCB and diuretic therapy, concern for possible secondary causes. Ordered secondary hypertension labs to assess.    Iwona Salazar, PharmD

## 2025-04-22 ENCOUNTER — APPOINTMENT (OUTPATIENT)
Dept: CARDIOLOGY | Facility: HOSPITAL | Age: 70
End: 2025-04-22
Payer: COMMERCIAL

## 2025-04-22 ENCOUNTER — TELEPHONE (OUTPATIENT)
Dept: PRIMARY CARE | Facility: CLINIC | Age: 70
End: 2025-04-22
Payer: COMMERCIAL

## 2025-04-22 LAB
ALDOST SERPL-MCNC: NORMAL NG/DL
ALDOST/RENIN PLAS-RTO: NORMAL {RATIO}
METANEPH FREE SERPL-MCNC: <25 PG/ML
METANEPHS SERPL-MCNC: 240 PG/ML
NORMETANEPH FREE SERPL-MCNC: 240 PG/ML
RENIN PLAS-CCNC: NORMAL NG/ML/H

## 2025-04-22 NOTE — TELEPHONE ENCOUNTER
Patient lvm stating she is unhappy with recent lab results and would to speak with pcp personally to discuss them.   Please advise

## 2025-04-23 ENCOUNTER — APPOINTMENT (OUTPATIENT)
Dept: PRIMARY CARE | Facility: CLINIC | Age: 70
End: 2025-04-23
Payer: COMMERCIAL

## 2025-04-28 ENCOUNTER — APPOINTMENT (OUTPATIENT)
Dept: PHARMACY | Facility: HOSPITAL | Age: 70
End: 2025-04-28
Payer: COMMERCIAL

## 2025-04-28 ENCOUNTER — APPOINTMENT (OUTPATIENT)
Dept: PRIMARY CARE | Facility: CLINIC | Age: 70
End: 2025-04-28
Payer: MEDICARE

## 2025-04-28 VITALS
HEART RATE: 87 BPM | BODY MASS INDEX: 44.42 KG/M2 | DIASTOLIC BLOOD PRESSURE: 71 MMHG | SYSTOLIC BLOOD PRESSURE: 136 MMHG | WEIGHT: 241.4 LBS | OXYGEN SATURATION: 94 % | TEMPERATURE: 98 F | HEIGHT: 62 IN

## 2025-04-28 DIAGNOSIS — N18.9 CHRONIC KIDNEY DISEASE, UNSPECIFIED CKD STAGE: ICD-10-CM

## 2025-04-28 DIAGNOSIS — I10 PRIMARY HYPERTENSION: ICD-10-CM

## 2025-04-28 DIAGNOSIS — I10 PRIMARY HYPERTENSION: Primary | ICD-10-CM

## 2025-04-28 DIAGNOSIS — R79.89 ELEVATED PLASMA METANEPHRINES: ICD-10-CM

## 2025-04-28 DIAGNOSIS — Z79.899 MEDICATION MANAGEMENT: ICD-10-CM

## 2025-04-28 DIAGNOSIS — K21.9 CHRONIC GERD: ICD-10-CM

## 2025-04-28 DIAGNOSIS — E11.69 TYPE 2 DIABETES MELLITUS WITH OTHER SPECIFIED COMPLICATION, UNSPECIFIED WHETHER LONG TERM INSULIN USE (MULTI): ICD-10-CM

## 2025-04-28 LAB
ALDOST SERPL-MCNC: 7 NG/DL
ALDOST/RENIN PLAS-RTO: 3.5 RATIO (ref 0.9–28.9)
METANEPH FREE SERPL-MCNC: <25 PG/ML
METANEPHS SERPL-MCNC: 240 PG/ML
NORMETANEPH FREE SERPL-MCNC: 240 PG/ML
RENIN PLAS-CCNC: 2.01 NG/ML/H (ref 0.25–5.82)

## 2025-04-28 PROCEDURE — 3008F BODY MASS INDEX DOCD: CPT | Performed by: NURSE PRACTITIONER

## 2025-04-28 PROCEDURE — 3078F DIAST BP <80 MM HG: CPT | Performed by: NURSE PRACTITIONER

## 2025-04-28 PROCEDURE — 99214 OFFICE O/P EST MOD 30 MIN: CPT | Performed by: NURSE PRACTITIONER

## 2025-04-28 PROCEDURE — 1123F ACP DISCUSS/DSCN MKR DOCD: CPT | Performed by: NURSE PRACTITIONER

## 2025-04-28 PROCEDURE — 4010F ACE/ARB THERAPY RXD/TAKEN: CPT | Performed by: NURSE PRACTITIONER

## 2025-04-28 PROCEDURE — 3052F HG A1C>EQUAL 8.0%<EQUAL 9.0%: CPT | Performed by: NURSE PRACTITIONER

## 2025-04-28 PROCEDURE — G2211 COMPLEX E/M VISIT ADD ON: HCPCS | Performed by: NURSE PRACTITIONER

## 2025-04-28 PROCEDURE — 1159F MED LIST DOCD IN RCRD: CPT | Performed by: NURSE PRACTITIONER

## 2025-04-28 PROCEDURE — 3050F LDL-C >= 130 MG/DL: CPT | Performed by: NURSE PRACTITIONER

## 2025-04-28 PROCEDURE — 1157F ADVNC CARE PLAN IN RCRD: CPT | Performed by: NURSE PRACTITIONER

## 2025-04-28 PROCEDURE — 3075F SYST BP GE 130 - 139MM HG: CPT | Performed by: NURSE PRACTITIONER

## 2025-04-28 PROCEDURE — 1036F TOBACCO NON-USER: CPT | Performed by: NURSE PRACTITIONER

## 2025-04-28 PROCEDURE — 1160F RVW MEDS BY RX/DR IN RCRD: CPT | Performed by: NURSE PRACTITIONER

## 2025-04-28 PROCEDURE — 3062F POS MACROALBUMINURIA REV: CPT | Performed by: NURSE PRACTITIONER

## 2025-04-28 RX ORDER — OMEPRAZOLE 20 MG/1
20 CAPSULE, DELAYED RELEASE ORAL DAILY
Qty: 30 CAPSULE | Refills: 11 | Status: SHIPPED | OUTPATIENT
Start: 2025-04-28 | End: 2026-04-28

## 2025-04-28 NOTE — ASSESSMENT & PLAN NOTE
ASSESSMENT OF SMBP MEASUREMENTS  Highest readin/81 mmHg  Lowest readin/63  mmHg  Average: ~160/76  mmHg  This BP cuff has not been validated- patient plans to bring to CNP visit today to compare to office cuff    Patient's goal BP < 130/80.   Rationale for plan: Mariluz confirms increasing hydralazine without significant improvement to her readings, although she did have one episode of dizziness after taking the increased dose. Besides this, she has not noticed recent symptoms of high or low blood pressure. Her lab results show borderline elevated metanephrines, may need 24-hour urine collection to confirm. Her aldosterone labs are still preliminary and not resulted. Given she will have her monitor validated in office, no adjustment until that visit this afternoon. If blood pressure remains elevated, can potentially increase bumetanide or hydralazine.  CrCl cannot be calculated (Patient's most recent lab result is older than the maximum 3 days allowed.).    Medication Changes:  CONTINUE  Hydralazine 25 mg twice daily  Losartan 50 mg twice daily  Diltiazem 240 mg daily  Bumetanide 0.5 mg daily    Future Considerations:  Would recommend 24-hr urine metanephrines to confirm if true elevation  Consider increase in bumex vs hydralazine if BP is truly elevated as on home monitor    Monitoring and Education Discussed:  Eat right: Your diet should be rich in fruits, vegetables, potassium, and low-fat dairy products. You should also reduce your intake of sodium and fats, particularly saturated fats.  Maintain a healthy weight: Try to achieve and maintain a healthy weight. If you are unsure what this means for you, please contact our clinic.  Exercise: Try to get at least 30 minutes of aerobic exercise every day.  Moderate your alcohol consumption: Limit your alcohol intake to one drink per day.  Monitor your blood pressure: You should check your blood pressure regularly. Notify our clinic if your blood pressure  readings are consistently higher than recommended.

## 2025-04-28 NOTE — PROGRESS NOTES
"  Clinical Pharmacy Appointment    Patient ID: Kaylan Woo \"Mariluz\" is a 69 y.o. female who presents for Diabetes and Hypertension.    Pt is here for Follow Up appointment.     Referring Provider: Cornelia Ball APRN-*  PCP: Radha Holt MD   Last visit with PCP: 3/3/2025 (CNP)   Next visit with PCP: today with CNP, then 6/5/2025      Subjective     Interval History  Metanephrines borderline high, can verify using 24-hour urine sample; only preliminary aldosterone levels returned  Did have only a few sips of coffee before testing  Does mention she is in neck pain quite often, has been for years  Stress with watching daughters dog with newly diagnosed diabetes    HPI  DIABETES MELLITUS TYPE 2:    Diagnosed with diabetes:  over 10 years. Known diabetic complications: nephropathy, neuropathy.  Does patient follow with Endocrinology: No  Last optometry exam: to be discussed  Podiatry: patient denies sores or cuts on feet today      Current diabetic medications include:  Tresiba 32 units  Humalog sliding scale (200, 3 units; 250 5 units)  Mounjaro 2.5 mg weekly    Clarifications to above regimen: has not recently been using sliding scale at all as before meals she is doing well; after meals typically the spike is less than an hour long  Adverse Effects: improved constipation compared to last visit, day after lower than rest of week    Past diabetic medications include: sulfonylureas (anaphylaxis to sulfa), metformin (GI side effects), Januvia (not effective), Ozempic (constipation)    Glucose Readings:  Glucometer/CGM Type: Ana María CGM; does have back-up fingerstick  Patient tests BG continuously    Current home BG readings (mg/dL):     Previous home BG readings:       Any episodes of hypoglycemia? Yes, near lows at  .  Did patient treat episode of hypoglycemia appropriately? Yes, carbs to prevent overnight lows  Does the patient have a prescription for ready-to-use Glucagon? No - good candidate if " able to afford    Hyperglycemia symptoms: none reported at this time    Lifestyle:  Patient will often eat when she gets into the low 100s if she notices her Ana María is trending downward    3/24:  Has added lots of lettuce to her diet- this has helped her move her bowels better    3/3:  Does feel like she is getting plenty of fluid although she is constipated    2/10/25:  Diet:   Snacks: will eat before bed if glucose <120 (chicken fajita)  Drinks: iced tea, small can of Dr. Pepper to correct sugar some times; cannot do diet soda due to aspartame allergy  Physical Activity: likes to swim in the summer  Tobacco history: denies    Secondary Prevention:  Statin? No- previous side effects  ACE-I/ARB? Yes  Aspirin? Yes    Pertinent PMH Review:  PMH of Pancreatitis: No  PMH of Retinopathy: No  PMH of Urinary Tract Infections: to be reassessed  PMH of MTC: No  UACR/EGFR in last year?: Yes  Albumin/Creatinine Ratio   Date Value Ref Range Status   01/25/2025   Final     Comment:     One or more analytes used in this calculation is outside of the analytical measurement range. Calculation cannot be performed.     Albumin/Creatine Ratio   Date Value Ref Range Status   06/01/2022 23.7 0.0 - 30.0 ug/mg crt Final       Immunizations:  Influenza? Yes  COVID? Yes  Pneumonia? Yes  Shingles? Yes  Hepatitis B? No     HYPERTENSION ASSESSMENT  Medication Therapy  Current Medications:   Losartan 50 mg twice daily  Diltiazem 240 mg daily  Bumetanide 0.5 mg daily  Hydralazine 25 mg twice daily    Previous Medications: spironolactone (stopped for ROSEMARIE)    Clarifications to above regimen: none   Adverse Effects: did have some dizziness once after 20 mg (2 tabs of 10) of hydralazine- resolved with some time    Home BP Monitoring  BP Cuff Type: Digital arm monitor-   New monitor as of 2/14- should bring in for validation    Current home BP readings: 1 normal reading in the last 2 weeks  167/73 mmHg  181/76  168/81  153/63  144/76    Previous home  BP readings:   Average 153/74 for the past few weeks  Some in 160s, some in 130s; diastolic readings are mostly normal    BP Readings from Last 3 Encounters:   02/03/25 148/82   12/30/24 136/68   12/20/24 135/67     Lifestyle  See above    Sodium Intake:  Will re-address at next visit    Risk Assessment  Major Risk Factors Include:  Hypertension  Obesity (BMI >30)  Dyslipidemia  Diabetes mellitus  Age > 55 (men) or > 65 (women)  Sleep apnea  The 10-year ASCVD risk score (Kb HOLLINGSWORTH, et al., 2019) is: 24.4%    Values used to calculate the score:      Age: 69 years      Sex: Female      Is Non- : No      Diabetic: Yes      Tobacco smoker: No      Systolic Blood Pressure: 148 mmHg      Is BP treated: Yes      HDL Cholesterol: 55.6 mg/dL      Total Cholesterol: 259 mg/dL  Known target organ damage:  Chronic Heart Failure  Left ventricular hypertrophy  CKD (w/ recent ROSEMARIE)    Secondary Prevention  On Statin?: No, previous side effects  Immunizations Needed: RSV and Hepatitis B Series  Tobacco Use: non-smoker     CHRONIC KIDNEY DISEASE ASSESSMENT  Does patient see nephrology? Yes    Date: scheduled 4/10/2025    Current medications include:  Losartan 50 mg twice daily    Clarifications to above regimen: none  Adverse Effects: none    Stage: 2 (eGFR 60-89)- improved again, appears last was ROSEMARIE on CKD  Albuminuria: A1 (<30 mg/g)  ACE/ARB: Yes, valsartan 160 mg twice daily  SGLT2i? No, cost is prohibitive- would be advantageous given comorbidities, needs to wait given recent ROSEMARIE ; eGFR is now over 60, but may still be good for blood glucose and heart    Medication Review  The following medications were identified as inappropriate per current kidney function:    Gabapentin dosing is okay now that recovered from recent ROSEMARIE - if dips below 50, would recommend 900 mg daily maximum  Etodolac to be used with caution- currently holding  The following medications increase risk of ROSEMARIE and should be closely  monitored:  ARB, Diuretics, and NSAIDs (previous aldosterone antagonist)    Hypertension History:  HTN diagnosis: yes- see above    Hyperlipidemia History:  Diagnosis? yes  Current Regimen  N/a  At goal? no  Current LDL: 156  Current T    Diabetes History:  Diagnosis? yes- see above    Lab Review  Electrolytes: Within normal limits (3/2025)  Alk phos: Within normal limits (3/2025)  Sodium bicarb: Within normal limits (3/2025)  PTH: Need updated labs, last vit D was normal in 10/2024  Hgb:  slightly low at 11.3 (2025)  Albuminuria: (not concerning for albuminuria)  Albumin/Creatinine Ratio   Date Value Ref Range Status   2025   Final     Comment:     One or more analytes used in this calculation is outside of the analytical measurement range. Calculation cannot be performed.     Albumin/Creatine Ratio   Date Value Ref Range Status   2022 23.7 0.0 - 30.0 ug/mg crt Final        Medication Reconciliation:  Changed: no reported changes  Does mention still holding Lodine but taking rare ibuprofen for pain instead    Drug Interactions  The following drug interactions were noted:    Aspirin and etodolac - additive bleed risk and possible decreased efficacy; increased bleed risk of venlafaxine as well  Additive nephrotoxicity risk of NSAIDs, bumetanide, and losartan  Tizanidine may be less effective while on venlafaxine    Medication System Management  Patient's preferred pharmacy: [facility]  Adherence/Organization: appropriate; gabapentin is 2-3 times per day  Affordability/Accessibility: Is on low income currently, brand name medications can be expensive     Patient Assistance Screening (VAF)  Patient verbally reports monthly or yearly income which is less than 400% federal poverty level  Application may be considered for Mounjaro, SGLT2i, cholesterol medications  Patient aware this process may take up to 2 weeks once income documents have been sent to the team.  If approved, medication must be filled  through [facility] pharmacy and may be picked up or mailed to patient.   If approved, medication will be billed through insurance, and patient assistance team will pay the copay. This will result in a $0 copay for the patient.  Counseled patient on mechanism of action, side effects, contraindications, and what to do if the patient misses a dose. All patients questions were answered.        Objective   Allergies   Allergen Reactions    Celecoxib Shortness of breath and Rash    Rofecoxib Shortness of breath and Rash    Sulfa (Sulfonamide Antibiotics) Anaphylaxis    Sulfasalazine Anaphylaxis    Sulfonylureas Anaphylaxis    Sulfur Anaphylaxis    Aspartame Other and Headache    Buprenorphine Other     dizziness    Diet Foods Other     migraine headache. Allergy to aspartame only. Able to tolerate sucralose (splenda).    Ezetimibe Other     Couldn't walk    Iodides Other    Metformin Other     Severe GI SE    Nalidixic Acid Unknown     Pt does not think she is allergic to this    Nsaids (Non-Steroidal Anti-Inflammatory Drug) Unknown     Pt denies allergy    Pyrazolones Other     Pt does not know if allergy    Rivaroxaban Hives    Salicylates Unknown     Pt states she is not allergic to    Shellfish Containing Products Nausea/vomiting     scallops    Shellfish Derived Nausea/vomiting     scallops    Statins-Hmg-Coa Reductase Inhibitors Other    Thiazides Unknown     Pt denies allergy    Cyclobenzaprine Rash    Latex Swelling and Rash    Valdecoxib Swelling and Rash     Social History     Social History Narrative    Not on file      Medication Review  Current Outpatient Medications   Medication Instructions    acetaminophen (TYLENOL) 1,000 mg, Every 6 hours PRN    aspirin 81 mg, Daily    bumetanide (Bumex) 0.5 mg tablet Take 1 tablet (0.5 mg) by mouth once daily. Take with 1 mg daily    cholecalciferol (Vitamin D-3) 50 mcg (2,000 unit) capsule Take by mouth.    dexAMETHasone (DECADRON) 1 mg, oral, Once, Take at 11pm-12am  "night before blood draw    dilTIAZem CD (CARDIZEM CD) 240 mg, oral, Daily    etodolac (LODINE) 400 mg, oral, 2 times daily    FreeStyle Ana María sensor system (FreeStyle Ana María 2 Sensor) kit 1 Cartridge every 14 days    gabapentin (Neurontin) 600 mg tablet TAKE 1 TABLET BY MOUTH THREE TIMES DAILY FOR 30 DAYS    hydrALAZINE (APRESOLINE) 25 mg, oral, 2 times daily    insulin degludec (TRESIBA FLEXTOUCH) 32 Units, subcutaneous, Nightly, Take as directed per insulin instructions.    insulin lispro (HumaLOG KwikPen Insulin) 100 unit/mL injection Uad per sliding scale    ketoconazole (NIZOral) 2 % shampoo Wash scalp once to twice a week. Leave on for 3-5 minutes then rinse    L. acidophilus/Bifid. animalis 32 billion cell capsule 1 capsule, Daily    losartan (COZAAR) 50 mg, oral, 2 times daily    montelukast (SINGULAIR) 10 mg, oral, Daily    Mounjaro 2.5 mg, subcutaneous, Weekly    nystatin (Mycostatin) 100,000 unit/mL suspension Take 5 mL by mouth 4 times a day for 7 days. Swish in mouth and swallow.    omeprazole (PRILOSEC) 40 mg, oral, Daily    pen needle, diabetic (BD Michelle 2nd Gen Pen Needle) 32 gauge x 5/32\" needle Use with daily glucose testing    tiZANidine (ZANAFLEX) 4 mg, oral, 3 times daily PRN    True Metrix Glucose Test Strip strip use to check blood sugar TWICE DAILY    venlafaxine XR (Effexor-XR) 150 mg 24 hr capsule Take 1 capsule (150 mg total) by mouth daily with breakfast for 30 days.    venlafaxine XR (Effexor-XR) 75 mg 24 hr capsule TAKE 1 CAPSULE(75 MG) BY MOUTH DAILY WITH FOOD    Ventolin HFA 90 mcg/actuation inhaler Ventolin ade 2 puffs q4h prn    VITAMIN B COMPLEX ORAL 1 tablet, Every morning      Vitals  BP Readings from Last 2 Encounters:   02/03/25 148/82   12/30/24 136/68     BMI Readings from Last 1 Encounters:   02/03/25 44.66 kg/m²      Labs  A1C  Lab Results   Component Value Date    HGBA1C 8.0 (H) 03/04/2025    HGBA1C 7.4 (H) 10/30/2024    HGBA1C 7.3 (H) 07/29/2024     [facility]  Lab Results "   Component Value Date    CALCIUM 9.4 2025     2025    K 4.6 2025    CO2 27 2025     2025    BUN 16 2025    CREATININE 0.87 2025    EGFR 73 2025     LFTs  Lab Results   Component Value Date    ALT 11 2025    AST 10 2025    ALKPHOS 108 2025    BILITOT 0.3 2025     FLP  Lab Results   Component Value Date    TRIG 245 (H) 2025    CHOL 259 (H) 2025    LDLF 149 (H) 2023    LDLCALC 154 (H) 2025    HDL 55.6 2025     Urine Microalbumin  Lab Results   Component Value Date    MICROALBCREA  2025      Comment:      One or more analytes used in this calculation is outside of the analytical measurement range. Calculation cannot be performed.     Weight Management  Wt Readings from Last 3 Encounters:   25 111 kg (244 lb 3.2 oz)   24 108 kg (238 lb)   24 110 kg (242 lb 8.1 oz)      There is no height or weight on file to calculate BMI.     Assessment/Plan   Problem List Items Addressed This Visit       Type 2 diabetes mellitus with other specified complication, unspecified whether long term insulin use (Multi)    Very near goal on Ana María sensors, she is feeling well on current medications. Given recent blood pressure concerns, will focus changes on blood pressure at this time and continue current DM medications. If stress is reduced, glucose may be at target without further DM medication adjustment.         Relevant Orders    Referral to Clinical Pharmacy    Hypertension    ASSESSMENT OF SMBP MEASUREMENTS  Highest readin/81 mmHg  Lowest readin/63  mmHg  Average: ~160/76  mmHg  This BP cuff has not been validated- patient plans to bring to CNP visit today to compare to office cuff    Patient's goal BP < 130/80.   Rationale for plan: Mariluz confirms increasing hydralazine without significant improvement to her readings, although she did have one episode of dizziness after taking the  increased dose. Besides this, she has not noticed recent symptoms of high or low blood pressure. Her lab results show borderline elevated metanephrines, may need 24-hour urine collection to confirm. Her aldosterone labs are still preliminary and not resulted. Given she will have her monitor validated in office, no adjustment until that visit this afternoon. If blood pressure remains elevated, can potentially increase bumetanide or hydralazine.  CrCl cannot be calculated (Patient's most recent lab result is older than the maximum 3 days allowed.).    Medication Changes:  CONTINUE  Hydralazine 25 mg twice daily  Losartan 50 mg twice daily  Diltiazem 240 mg daily  Bumetanide 0.5 mg daily    Future Considerations:  Would recommend 24-hr urine metanephrines to confirm if true elevation  Consider increase in bumex vs hydralazine if BP is truly elevated as on home monitor    Monitoring and Education Discussed:  Eat right: Your diet should be rich in fruits, vegetables, potassium, and low-fat dairy products. You should also reduce your intake of sodium and fats, particularly saturated fats.  Maintain a healthy weight: Try to achieve and maintain a healthy weight. If you are unsure what this means for you, please contact our clinic.  Exercise: Try to get at least 30 minutes of aerobic exercise every day.  Moderate your alcohol consumption: Limit your alcohol intake to one drink per day.  Monitor your blood pressure: You should check your blood pressure regularly. Notify our clinic if your blood pressure readings are consistently higher than recommended.          Relevant Orders    Referral to Clinical Pharmacy     Other Visit Diagnoses         Chronic kidney disease, unspecified CKD stage        Improved ROSEMARIE- no need for medication adjustments at this time      Medication management        Relevant Orders    Referral to Clinical Pharmacy            Clinical Pharmacist follow-up: 5/12 11:20AM, Telehealth visit    Continue all  meds under the continuation of care with the referring provider and clinical pharmacy team.    Thank you,  Iwona Salazar, PharmD  Clinical Pharmacist  [phone]    Verbal consent to manage patient's drug therapy was obtained from the patient. They were informed they may decline to participate or withdraw from participation in pharmacy services at any time.

## 2025-04-28 NOTE — PROGRESS NOTES
"  Problem List Items Addressed This Visit          Medium    Chronic GERD    Overview   On PPI         Current Assessment & Plan   Can try decreasing omeprazole from 40 mg every day to 20 mg every day          Relevant Medications    omeprazole (PriLOSEC) 20 mg DR capsule    Elevated plasma metanephrines    Overview   4/2025   NORMETANEPHRINE, FREE  <=148 pg/mL 240 High    TOTAL, FREE (MN + NMN)  <=205 pg/mL 240 High             Current Assessment & Plan   Additional results pending         Hypertension - Primary    Overview   Scheduled with Dr Sepulveda in April 2025  She stopped aldactone with 1/25/25 ROSEMARIE labs   4/28/25:  Office 136/71, HR 87  Home cuff 143/69, HR 87          Current Assessment & Plan   4/28/25 clin pharm:  Medication Changes:  CONTINUE  Hydralazine 25 mg twice daily  Losartan 50 mg twice daily  Diltiazem 240 mg daily  Bumetanide 0.5 mg daily     Future Considerations:  Would recommend 24-hr urine metanephrines to confirm if true elevation  Consider increase in bumex vs hydralazine if BP is truly elevated as on home monitor    No med changes today  Fu with Dr Sepulveda this week              Subjective   Patient ID: Kaylan Woo \"Mariluz\" is a 69 y.o. female who presents for Labs Only (Discuss labs/Check bp cuff/Office cuff/Home cuff).  HPI  Office 136/71, HR 87  Home cuff 143/69, HR 87     Losartan 50 mg bid  Diltiazem 240 mg 0600  Hydralazine 50 mg bid  Bumex 0.5 mg every day    Checking BP between 9361-0934  Then again 2000  Current home BP readings: 1 normal reading in the last 2 weeks  167/73 mmHg  181/76  168/81  153/63  144/76    Component  Ref Range & Units 10 d ago   METANEPHRINE, FREE  <=57 pg/mL <25   Comment:    This test was developed and its analytical performance  characteristics have been determined by [facility], [place]. It has  not been cleared or approved by the U.S. Food and Drug  Administration. This assay has been validated pursuant  to the CLIA " "regulations and is used for clinical  purposes.      NORMETANEPHRINE, FREE  <=148 pg/mL 240 High    TOTAL, FREE (MN + NMN)  <=205 pg/mL 240 High        Review of Systems   All other systems reviewed and are negative.      BP Readings from Last 3 Encounters:   04/28/25 136/71   02/03/25 148/82   12/30/24 136/68      Wt Readings from Last 3 Encounters:   04/28/25 109 kg (241 lb 6.4 oz)   02/03/25 111 kg (244 lb 3.2 oz)   12/30/24 108 kg (238 lb)      BMI:   Estimated body mass index is 44.15 kg/m² as calculated from the following:    Height as of this encounter: 1.575 m (5' 2\").    Weight as of this encounter: 109 kg (241 lb 6.4 oz).    Objective   Physical Exam  Constitutional:       General: She is not in acute distress.  HENT:      Head: Normocephalic and atraumatic.      Nose: Nose normal.      Mouth/Throat:      Mouth: Mucous membranes are moist.   Eyes:      Extraocular Movements: Extraocular movements intact.      Conjunctiva/sclera: Conjunctivae normal.   Neck:      Vascular: No carotid bruit.   Cardiovascular:      Rate and Rhythm: Normal rate and regular rhythm.      Pulses: Normal pulses.   Pulmonary:      Effort: Pulmonary effort is normal.      Breath sounds: Normal breath sounds.   Musculoskeletal:      Cervical back: Normal range of motion and neck supple.      Comments: Using rollator    Lymphadenopathy:      Cervical: No cervical adenopathy.   Skin:     General: Skin is warm and dry.   Neurological:      General: No focal deficit present.      Mental Status: She is alert.   Psychiatric:         Mood and Affect: Mood normal.       "

## 2025-04-28 NOTE — ASSESSMENT & PLAN NOTE
4/28/25 clin pharm:  Medication Changes:  CONTINUE  Hydralazine 25 mg twice daily  Losartan 50 mg twice daily  Diltiazem 240 mg daily  Bumetanide 0.5 mg daily     Future Considerations:  Would recommend 24-hr urine metanephrines to confirm if true elevation  Consider increase in bumex vs hydralazine if BP is truly elevated as on home monitor    No med changes today  Fu with Dr Sepulveda this week

## 2025-04-28 NOTE — ASSESSMENT & PLAN NOTE
Very near goal on Ana María sensors, she is feeling well on current medications. Given recent blood pressure concerns, will focus changes on blood pressure at this time and continue current DM medications. If stress is reduced, glucose may be at target without further DM medication adjustment.

## 2025-04-30 ENCOUNTER — APPOINTMENT (OUTPATIENT)
Dept: NEPHROLOGY | Facility: CLINIC | Age: 70
End: 2025-04-30
Payer: COMMERCIAL

## 2025-04-30 VITALS
HEIGHT: 62 IN | SYSTOLIC BLOOD PRESSURE: 150 MMHG | WEIGHT: 242.8 LBS | BODY MASS INDEX: 44.68 KG/M2 | DIASTOLIC BLOOD PRESSURE: 66 MMHG | HEART RATE: 86 BPM

## 2025-04-30 DIAGNOSIS — I10 ESSENTIAL HYPERTENSION: Primary | ICD-10-CM

## 2025-04-30 DIAGNOSIS — R94.4 DECREASED GFR: ICD-10-CM

## 2025-04-30 PROCEDURE — 3052F HG A1C>EQUAL 8.0%<EQUAL 9.0%: CPT | Performed by: INTERNAL MEDICINE

## 2025-04-30 PROCEDURE — 1157F ADVNC CARE PLAN IN RCRD: CPT | Performed by: INTERNAL MEDICINE

## 2025-04-30 PROCEDURE — 3078F DIAST BP <80 MM HG: CPT | Performed by: INTERNAL MEDICINE

## 2025-04-30 PROCEDURE — 99204 OFFICE O/P NEW MOD 45 MIN: CPT | Performed by: INTERNAL MEDICINE

## 2025-04-30 PROCEDURE — 3062F POS MACROALBUMINURIA REV: CPT | Performed by: INTERNAL MEDICINE

## 2025-04-30 PROCEDURE — 1036F TOBACCO NON-USER: CPT | Performed by: INTERNAL MEDICINE

## 2025-04-30 PROCEDURE — 3008F BODY MASS INDEX DOCD: CPT | Performed by: INTERNAL MEDICINE

## 2025-04-30 PROCEDURE — 1123F ACP DISCUSS/DSCN MKR DOCD: CPT | Performed by: INTERNAL MEDICINE

## 2025-04-30 PROCEDURE — 4010F ACE/ARB THERAPY RXD/TAKEN: CPT | Performed by: INTERNAL MEDICINE

## 2025-04-30 PROCEDURE — 3077F SYST BP >= 140 MM HG: CPT | Performed by: INTERNAL MEDICINE

## 2025-04-30 PROCEDURE — 3050F LDL-C >= 130 MG/DL: CPT | Performed by: INTERNAL MEDICINE

## 2025-04-30 PROCEDURE — 1159F MED LIST DOCD IN RCRD: CPT | Performed by: INTERNAL MEDICINE

## 2025-04-30 RX ORDER — CHLORTHALIDONE 25 MG/1
25 TABLET ORAL DAILY
Qty: 90 TABLET | Refills: 3 | Status: SHIPPED | OUTPATIENT
Start: 2025-04-30 | End: 2026-04-30

## 2025-04-30 RX ORDER — IRBESARTAN 300 MG/1
300 TABLET ORAL DAILY
Qty: 90 TABLET | Refills: 3 | Status: SHIPPED | OUTPATIENT
Start: 2025-04-30 | End: 2026-04-30

## 2025-04-30 NOTE — PROGRESS NOTES
"Kaylan Woo   69 y.o.      Vitals:    04/30/25 1326   Weight: 110 kg (242 lb 12.8 oz)      MRN/Room: [id number]/Room/bed info not found      History Of Present Illness  Kaylan Woo \"Sarah" is a 69 y.o. female presenting with high BP.     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 Numb fingers both hands), Personal history of other drug therapy (10/25/2018), Personal history of other medical treatment, PVD (peripheral vascular disease) ([facility]), Pyothorax without fistula (10/17/2022), Skin cancer, Sleep apnea, Solitary pulmonary nodule (09/20/2021), Thoracic disc disorder (Patient fell on me and dislocated left shoulder and [id number]), and Type 2 diabetes mellitus.    Surgical History  She has a past surgical history that includes Other surgical history (02/03/2021); Other surgical history (01/10/2022); Other surgical history (03/23/2021); Other surgical history (10/06/2020); Other surgical history (10/06/2020); Other surgical history (10/06/2020); Other surgical history (Bilateral, 10/06/2020); Other surgical history (10/06/2020); Other surgical history (08/09/2019); Total knee arthroplasty (Bilateral); Tonsillectomy; Lumbar " laminectomy; Cataract extraction; Trigger finger release; Colonoscopy; Upper gastrointestinal endoscopy; Carpal tunnel release (Right); Breast biopsy (Right); Breast lumpectomy (); Hysterectomy (); Oophorectomy (); Laminectomy (2023  L1-5); Lumbar discectomy (T10-11 ); Cervical fusion; Thoracic Fusion (T10-11); Anterior cervical discectomy w/ fusion; Replacement disc anterior lumbar spine (); Posterior cervical laminectomy;  section, low transverse (); Back surgery (,,,); Endometrial ablation (around age 47); Joint replacement ([employer] rep); and Spine surgery (,, ).     Social History  She reports that she has quit smoking. Her smoking use included cigarettes. She has never used smokeless tobacco. She reports that she does not drink alcohol and does not use drugs.    Family History  Family History[1]     Allergies  Celecoxib, Rofecoxib, Sulfa (sulfonamide antibiotics), Sulfasalazine, Sulfonylureas, Sulfur, Aspartame, Buprenorphine, Diet foods, Ezetimibe, Iodides, Metformin, Nalidixic acid, Nsaids (non-steroidal anti-inflammatory drug), Pyrazolones, Rivaroxaban, Salicylates, Shellfish containing products, Shellfish derived, Statins-hmg-coa reductase inhibitors, Thiazides, Cyclobenzaprine, Latex, and Valdecoxib      Meds:         Current Medications[2]    Physical Exam:        Vitals:    25 1326   BP: 150/66   Pulse: 86     General: The patient is awake, oriented, and is not in any distress.  Head and Neck: Normocephalic. No periorbital edema.  Eyes: Not icteric.   Respiratory: Symmetric chest expansion. No respiratory distress.  Skin: No maculopapular rash.  Musculoskeletal: No peripheral edema.  Neuro Exam: Speech is fluent. Moves extremities.        Blood Labs:  No results found for this or any previous visit (from the past 24 hours).   Lab Results   Component Value Date    GLUCOSE 128 2025    CALCIUM 9.4 2025      03/31/2025    K 4.6 03/31/2025    CO2 27 03/31/2025     03/31/2025    BUN 16 03/31/2025    CREATININE 0.87 03/31/2025       Imaging:  === 02/24/25 ===    US RENAL COMPLETE    - Impression -  1. No nephrolithiasis or hydronephrosis.    MACRO:  None      Signed by: Yash Alcazar 2/25/2025 6:16 AM  Dictation workstation:   [id number]      Assessment and Plan:  #1 hypertension.  Blood pressure is not under control.  She takes hydralazine twice a day which can fluctuate her blood pressure.  I stopped her hydralazine.  She is on losartan which I replace it with irbesartan and I also added chlorthalidone to her medications.  We discussed about low-salt diet.  Her kidney function is normal with last creatinine level which is 0.7.  Normal potassium and bicarb level.  She had a recent blood work for serum metanephrines which was normal but serum normetanephrine level was high.  I ordered an MIBG scan.    2.  Diabetes.  No proteinuria based on last spot urine albumin to creatinine ratio.    I will see her in about 2 months for follow-up    Jose Sepulveda MD  Senior Attending Physician  Director of Onco-Nephrology Program  Division of Nephrology & Hypertension  [facility]       [1]   Family History  Problem Relation Name Age of Onset    Alzheimer's disease Mother Bridgett T Weeton     Atrial fibrillation Mother Bridgett T Weeton     Lupus Mother Bridgett T Weeton     Hypertension Mother Bridgett T Weeton     Arthritis Mother Bridgett T Weeton     Hyperlipidemia Mother Bridgett T Weeton     Kidney disease Mother Bridgett T Weeton     Arthritis Father Christopher Holt Sr     Other (rheumatic disease) Father Christopher Holt Sr     Charcot-Charisse-Tooth disease Father Christopher Holt Sr     Hearing loss Father Christopher Holt Sr     Hernia Father Christopher Holt Sr     Hyperlipidemia Father Christopher Holt Sr     Dementia Father Christopher Holt Sr     Neuropathy Father Christopher Holt Sr     Charcot-Charisse-Tooth disease  Sister      Diabetes Sister      Hypertension Sister      Hypertension Brother      Cancer Maternal Grandfather Madan Trujillo -Colon and kidney     Colon cancer Maternal Grandfather Madan Trujillo -Colon and kidney     Cancer Maternal Grandmother Xi Trujillo -Ovarian     Ovarian cancer Maternal Grandmother Xi Trujillo -Ovarian     Arthritis Brother Christopher Holt Jr.     Hyperlipidemia Brother Christopher Holt Jr.     Hypertension Brother Christopher Holt Jr.     Dementia Brother Christopher Holt Jr.     Multiple sclerosis Brother Christopher Holt Jr.     Arthritis Sister Zonia Howie-Charcot Yuma tooth     Depression Sister Zonia Howie-Charcot Yuma tooth     Diabetes Sister Zonia Howie-Charcot Yuma tooth     Genetic Testing Sister Zonia Howie-Charcot Yuma tooth     Hyperlipidemia Sister Zonia Howie-Charcot Yuma tooth     Neuropathy Sister Zonia Howie-Charcot Yuma tooth     Asthma Son Link Woo     Depression Daughter Flakita Woo    [2]   Current Outpatient Medications   Medication Sig Dispense Refill    acetaminophen (Tylenol) 500 mg tablet Take 2 tablets (1,000 mg) by mouth every 6 hours if needed for mild pain (1 - 3) or moderate pain (4 - 6).      aspirin 81 mg EC tablet Take 1 tablet (81 mg) by mouth once daily.      bumetanide (Bumex) 0.5 mg tablet Take 1 tablet (0.5 mg) by mouth once daily. Take with 1 mg daily 90 tablet 3    cholecalciferol (Vitamin D-3) 50 mcg (2,000 unit) capsule Take by mouth.      dilTIAZem CD (Cardizem CD) 240 mg 24 hr capsule TAKE 1 CAPSULE BY MOUTH ONCE DAILY 90 capsule 0    FreeStyle Ana María sensor system (FreeStyle Ana María 2 Sensor) kit 1 Cartridge every 14 days 6 each 3    gabapentin (Neurontin) 600 mg tablet TAKE 1 TABLET BY MOUTH THREE TIMES DAILY FOR 30 DAYS      hydrALAZINE (Apresoline) 25 mg tablet Take 1 tablet (25 mg) by mouth 2 times a day. 60 tablet 11    insulin degludec (Tresiba FlexTouch) 100 unit/mL (3 mL) injection Inject 32 Units under the skin once daily at bedtime. Take as  "directed per insulin instructions. 15 mL 11    insulin lispro (HumaLOG KwikPen Insulin) 100 unit/mL injection Uad per sliding scale 90 mL 1    ketoconazole (NIZOral) 2 % shampoo Wash scalp once to twice a week. Leave on for 3-5 minutes then rinse      L. acidophilus/Bifid. animalis 32 billion cell capsule Take 1 capsule by mouth once daily.      losartan (Cozaar) 50 mg tablet Take 1 tablet (50 mg) by mouth 2 times a day. 60 tablet 11    montelukast (Singulair) 10 mg tablet Take 1 tablet (10 mg) by mouth once daily. 90 tablet 3    omeprazole (PriLOSEC) 20 mg DR capsule Take 1 capsule (20 mg) by mouth once daily. Do not crush or chew. 30 capsule 11    pen needle, diabetic (BD Michelle 2nd Gen Pen Needle) 32 gauge x 5/32\" needle Use with daily glucose testing 100 each 1    tirzepatide (Mounjaro) 2.5 mg/0.5 mL pen injector Inject 2.5 mg under the skin 1 (one) time per week. 2 mL 1    tiZANidine (Zanaflex) 4 mg capsule Take 1 capsule (4 mg) by mouth 3 times a day as needed for muscle spasms. 90 capsule 0    True Metrix Glucose Test Strip strip use to check blood sugar TWICE DAILY 100 strip 3    venlafaxine XR (Effexor-XR) 150 mg 24 hr capsule Take 1 capsule (150 mg total) by mouth daily with breakfast for 30 days. 30 capsule 6    venlafaxine XR (Effexor-XR) 75 mg 24 hr capsule TAKE 1 CAPSULE(75 MG) BY MOUTH DAILY WITH FOOD 30 capsule 6    Ventolin HFA 90 mcg/actuation inhaler Ventolin ade 2 puffs q4h prn 8 g 11    VITAMIN B COMPLEX ORAL Take 1 tablet by mouth once daily in the morning.      omeprazole (PriLOSEC) 40 mg DR capsule Take 1 capsule (40 mg) by mouth once daily. (Patient not taking: Reported on 4/30/2025) 90 capsule 3     No current facility-administered medications for this visit.     Facility-Administered Medications Ordered in Other Visits   Medication Dose Route Frequency Provider Last Rate Last Admin    fentaNYL PF (Sublimaze) injection    Continuous PRN JAYDEN Richter-CRNA        midazolam (Versed) " injection    Continuous PRN JAYDEN Richter-CRNA        propofol (Diprivan) injection    Continuous PRN JAYDEN Richter-CRNA

## 2025-05-01 ENCOUNTER — TELEPHONE (OUTPATIENT)
Dept: PRIMARY CARE | Facility: CLINIC | Age: 70
End: 2025-05-01
Payer: COMMERCIAL

## 2025-05-01 NOTE — TELEPHONE ENCOUNTER
Pt called in and left Vm. She was very upset regarding appointment with Derick . Pt left MyChart message as well that MV LPN forwarded to PCP and DG NP . She would like addressed as soon as possible. Héctor is aware and I will forward this message to him as well.     Referring to Pt's [employer] message sent 5/1/25 at 3:44 pm

## 2025-05-05 ENCOUNTER — TELEPHONE (OUTPATIENT)
Dept: PHARMACY | Facility: HOSPITAL | Age: 70
End: 2025-05-05
Payer: COMMERCIAL

## 2025-05-05 NOTE — TELEPHONE ENCOUNTER
Mariluz called today to express frustration with recent specialist visit with nephrology. He attempted to switch losartan to irbesartan (after previously trying valsartan with similar results). She has not seen a significant change in blood pressure on irbesartan. He also tried to add chlorthalidone which she did not  given severe sulfa allergy with history of reaction to hydrochlorothiazide. We have already been avoiding thiazides for this reason and agree with not starting chlorthalidone. Patient stopped hydralazine according to his recommendation. She is having what she describes as hearing her heartbeat this morning. Pulse and blood pressure are about what they had been previously. She will plan to increase bumetanide back to 1 mg for the next week to see if she tolerates this and is able to get blood pressure lower. Follow-up Monday 1PM (rescheduled due to conflicting imaging visit).    Iwona Salazar, CarolD     0 = understands/communicates without difficulty

## 2025-05-12 ENCOUNTER — APPOINTMENT (OUTPATIENT)
Dept: PHARMACY | Facility: HOSPITAL | Age: 70
End: 2025-05-12
Payer: MEDICARE

## 2025-05-12 ENCOUNTER — APPOINTMENT (OUTPATIENT)
Dept: PHARMACY | Facility: HOSPITAL | Age: 70
End: 2025-05-12
Payer: COMMERCIAL

## 2025-05-12 ENCOUNTER — HOSPITAL ENCOUNTER (OUTPATIENT)
Dept: RADIOLOGY | Facility: CLINIC | Age: 70
Discharge: HOME | End: 2025-05-12
Payer: MEDICARE

## 2025-05-12 DIAGNOSIS — E11.69 TYPE 2 DIABETES MELLITUS WITH OTHER SPECIFIED COMPLICATION, UNSPECIFIED WHETHER LONG TERM INSULIN USE (MULTI): ICD-10-CM

## 2025-05-12 DIAGNOSIS — Z79.899 MEDICATION MANAGEMENT: ICD-10-CM

## 2025-05-12 DIAGNOSIS — I10 ESSENTIAL HYPERTENSION: ICD-10-CM

## 2025-05-12 DIAGNOSIS — I10 PRIMARY HYPERTENSION: ICD-10-CM

## 2025-05-12 PROCEDURE — 76770 US EXAM ABDO BACK WALL COMP: CPT | Performed by: RADIOLOGY

## 2025-05-12 PROCEDURE — 76770 US EXAM ABDO BACK WALL COMP: CPT

## 2025-05-12 RX ORDER — HYDRALAZINE HYDROCHLORIDE 25 MG/1
25 TABLET, FILM COATED ORAL 2 TIMES DAILY
COMMUNITY

## 2025-05-12 NOTE — ASSESSMENT & PLAN NOTE
Blood pressure continues to be high at home. Highest systolic in 180s (no 200s) and highest diastolic 1 instance of 106. No readings quite meeting hypertensive emergency, and reviewed current symptoms and the patient has not had concerning changes. Bumex has resulted in more urination without a large impact on blood pressure. After discussion today, Mariluz will plan to resume her hydralazine 25 mg twice daily. The hydralazine did make an impact on a diastolic reading over the weekend. Mariluz had an ultrasound of kidneys this morning which is pending results. We reviewed symptoms which are concerning for hypertensive emergency when she should go to the ER.

## 2025-05-12 NOTE — PROGRESS NOTES
"  Clinical Pharmacy Appointment    Patient ID: Kaylan Woo \"Mariluz\" is a 69 y.o. female who presents for Diabetes, Hypertension, and Chronic Kidney Disease.    Pt is here for Follow Up appointment.     Referring Provider: Cornelia Ball APRN-*  PCP: Radha Holt MD   Last visit with PCP: 4/28/2025 (CNP)   Next visit with PCP: 6/5/2025    Subjective     Interval History  Recent nephrology appointment which was very frustrating, tried to add medication she has allergy to  Increased Bumex last week to try to combat blood pressures  Hydralazine stopped based on nephrology recommendations  Kidney ultrasound completed today    HPI  DIABETES MELLITUS TYPE 2:    Diagnosed with diabetes:  over 10 years. Known diabetic complications: nephropathy, neuropathy.  Does patient follow with Endocrinology: No  Last optometry exam: to be discussed  Podiatry: patient denies sores or cuts on feet today      Current diabetic medications include:  Tresiba 32 units  Humalog sliding scale (200, 3 units; 250 5 units)  Mounjaro 2.5 mg weekly    Clarifications to above regimen: has not recently been using sliding scale at all as before meals she is doing well; after meals typically the spike is less than an hour long  Adverse Effects: improved constipation compared to last visit, day after lower than rest of week    Past diabetic medications include: sulfonylureas (anaphylaxis to sulfa), metformin (GI side effects), Januvia (not effective), Ozempic (constipation)    Glucose Readings:  Glucometer/CGM Type: Ana María CGM; does have back-up fingerstick  Patient tests BG continuously    Current home BG readings (mg/dL):     Previous home BG readings:       Any episodes of hypoglycemia? Yes, near lows at .  Did patient treat episode of hypoglycemia appropriately? N/A  Does the patient have a prescription for ready-to-use Glucagon? No - good candidate if able to afford    Hyperglycemia symptoms: none reported at this " time    Lifestyle:  Patient will often eat when she gets into the low 100s if she notices her Ana María is trending downward    3/24:  Has added lots of lettuce to her diet- this has helped her move her bowels better    3/3:  Does feel like she is getting plenty of fluid although she is constipated    2/10/25:  Diet:   Snacks: will eat before bed if glucose <120 (chicken fajita)  Drinks: iced tea, small can of Dr. Pepper to correct sugar some times; cannot do diet soda due to aspartame allergy  Physical Activity: likes to swim in the summer  Tobacco history: denies    Secondary Prevention:  Statin? No- previous side effects  ACE-I/ARB? Yes  Aspirin? Yes    Pertinent PMH Review:  PMH of Pancreatitis: No  PMH of Retinopathy: No  PMH of Urinary Tract Infections: to be reassessed  PMH of MTC: No  UACR/EGFR in last year?: Yes  Albumin/Creatinine Ratio   Date Value Ref Range Status   01/25/2025   Final     Comment:     One or more analytes used in this calculation is outside of the analytical measurement range. Calculation cannot be performed.     Albumin/Creatine Ratio   Date Value Ref Range Status   06/01/2022 23.7 0.0 - 30.0 ug/mg crt Final       Immunizations:  Influenza? Yes  COVID? Yes  Pneumonia? Yes  Shingles? Yes  Hepatitis B? No     HYPERTENSION ASSESSMENT  Medication Therapy  Current Medications:   Irbesartan 300 mg daily  Diltiazem 240 mg daily  Bumetanide 1 mg daily    Previous Medications: spironolactone (stopped for ROSEMARIE), hydralazine (recently stopped by nephrology)    Clarifications to above regimen: none   Adverse Effects: did have some dizziness once after 20 mg (2 tabs of 10) of hydralazine- resolved with some time    Home BP Monitoring  BP Cuff Type: Digital arm monitor- validated in office and reads about 7 mmHg higher systolic    Current home BP readings: 106 diastolic the other day- responded to hydralazine dose down to 80s diastolic    Sometimes going into the 180s systolic    Previous home BP  readings:   167/73 mmHg  181/76  168/81  153/63  144/76    BP Readings from Last 3 Encounters:   04/30/25 150/66   04/28/25 136/71   02/03/25 148/82     Lifestyle  See above    Sodium Intake:  Will re-address at next visit    Risk Assessment  Major Risk Factors Include:  Hypertension  Obesity (BMI >30)  Dyslipidemia  Diabetes mellitus  Age > 55 (men) or > 65 (women)  Sleep apnea  The 10-year ASCVD risk score (Kb HOLLINGSWORTH, et al., 2019) is: 25%    Values used to calculate the score:      Age: 69 years      Sex: Female      Is Non- : No      Diabetic: Yes      Tobacco smoker: No      Systolic Blood Pressure: 150 mmHg      Is BP treated: Yes      HDL Cholesterol: 55.6 mg/dL      Total Cholesterol: 259 mg/dL  Known target organ damage:  Chronic Heart Failure  Left ventricular hypertrophy  CKD (w/ recent ROSEMARIE)    Secondary Prevention  On Statin?: No, previous side effects  Immunizations Needed: RSV and Hepatitis B Series  Tobacco Use: non-smoker     CHRONIC KIDNEY DISEASE ASSESSMENT  Does patient see nephrology? Yes    Date: scheduled 4/10/2025    Current medications include:  Irbesartan 300 mg daily    Clarifications to above regimen: none  Adverse Effects: none    Stage: 2 (eGFR 60-89)- improved again, appears last was ROSEMARIE on CKD  Albuminuria: A1 (<30 mg/g)  ACE/ARB: Yes, valsartan 160 mg twice daily  SGLT2i? No, cost is prohibitive- would be advantageous given comorbidities, needs to wait given recent ROSEMARIE; eGFR is now over 60, but may still be good for blood glucose and heart    Medication Review  The following medications were identified as inappropriate per current kidney function:    Gabapentin dosing is okay now that recovered from recent ROSEMARIE - if dips below 50, would recommend 900 mg daily maximum  Etodolac to be used with caution- currently holding  The following medications increase risk of ROSEMARIE and should be closely monitored:  ARB, Diuretics, and NSAIDs (previous aldosterone  antagonist)    Hypertension History:  HTN diagnosis: yes- see above    Hyperlipidemia History:  Diagnosis? yes  Current Regimen  N/a  At goal? no  Current LDL: 156  Current T    Diabetes History:  Diagnosis? yes- see above    Lab Review  Electrolytes: Within normal limits (3/2025)  Alk phos: Within normal limits (3/2025)  Sodium bicarb: Within normal limits (3/2025)  PTH: Need updated labs, last vit D was normal in 10/2024  Hgb: slightly low at 11.3 (2025)  Albuminuria: (not concerning for albuminuria)  Albumin/Creatinine Ratio   Date Value Ref Range Status   2025   Final     Comment:     One or more analytes used in this calculation is outside of the analytical measurement range. Calculation cannot be performed.     Albumin/Creatine Ratio   Date Value Ref Range Status   2022 23.7 0.0 - 30.0 ug/mg crt Final        Medication Reconciliation:  Changed: no reported changes  Does mention still holding Lodine but taking rare ibuprofen for pain instead    Drug Interactions  The following drug interactions were noted:    Aspirin and etodolac - additive bleed risk and possible decreased efficacy; increased bleed risk of venlafaxine as well  Additive nephrotoxicity risk of NSAIDs, bumetanide, and losartan  Tizanidine may be less effective while on venlafaxine    Medication System Management  Patient's preferred pharmacy: [facility]  Adherence/Organization: appropriate; gabapentin is 2-3 times per day  Affordability/Accessibility: Is on low income currently, brand name medications can be expensive     Patient Assistance Screening (VAF)  Patient verbally reports monthly or yearly income which is less than 400% federal poverty level  Application may be considered for Mounjaro, SGLT2i, cholesterol medications  Patient aware this process may take up to 2 weeks once income documents have been sent to the team.  If approved, medication must be filled through [facility] pharmacy and may be picked up or mailed to  patient.   If approved, medication will be billed through insurance, and patient assistance team will pay the copay. This will result in a $0 copay for the patient.  Counseled patient on mechanism of action, side effects, contraindications, and what to do if the patient misses a dose. All patients questions were answered.        Objective   Allergies   Allergen Reactions    Celecoxib Shortness of breath and Rash    Rofecoxib Shortness of breath and Rash    Sulfa (Sulfonamide Antibiotics) Anaphylaxis    Sulfasalazine Anaphylaxis    Sulfonylureas Anaphylaxis    Sulfur Anaphylaxis    Aspartame Other and Headache    Buprenorphine Other     dizziness    Diet Foods Other     migraine headache. Allergy to aspartame only. Able to tolerate sucralose (splenda).    Ezetimibe Other     Couldn't walk    Iodides Other    Metformin Other     Severe GI SE    Nalidixic Acid Unknown     Pt does not think she is allergic to this    Nsaids (Non-Steroidal Anti-Inflammatory Drug) Unknown     Pt denies allergy    Pyrazolones Other     Pt does not know if allergy    Rivaroxaban Hives    Salicylates Unknown     Pt states she is not allergic to    Shellfish Containing Products Nausea/vomiting     scallops    Shellfish Derived Nausea/vomiting     scallops    Statins-Hmg-Coa Reductase Inhibitors Other    Thiazides Unknown     Reaction to hydrochlorothiazide (cross-reacts with sulfa allergy)    Cyclobenzaprine Rash    Latex Swelling and Rash    Valdecoxib Swelling and Rash     Social History     Social History Narrative    Not on file      Medication Review  Current Outpatient Medications   Medication Instructions    acetaminophen (TYLENOL) 1,000 mg, Every 6 hours PRN    aspirin 81 mg, Daily    bumetanide (Bumex) 0.5 mg tablet Take 1 tablet (0.5 mg) by mouth once daily. Take with 1 mg daily    chlorthalidone (HYGROTON) 25 mg, oral, Daily    cholecalciferol (Vitamin D-3) 50 mcg (2,000 unit) capsule Take by mouth.    dilTIAZem CD (CARDIZEM CD)  "240 mg, oral, Daily    FreeStyle Ana María sensor system (FreeStyle Ana María 2 Sensor) kit 1 Cartridge every 14 days    gabapentin (Neurontin) 600 mg tablet TAKE 1 TABLET BY MOUTH THREE TIMES DAILY FOR 30 DAYS    hydrALAZINE (APRESOLINE) 25 mg, 2 times daily    insulin degludec (TRESIBA FLEXTOUCH) 32 Units, subcutaneous, Nightly, Take as directed per insulin instructions.    insulin lispro (HumaLOG KwikPen Insulin) 100 unit/mL injection Uad per sliding scale    irbesartan (AVAPRO) 300 mg, oral, Daily    ketoconazole (NIZOral) 2 % shampoo Wash scalp once to twice a week. Leave on for 3-5 minutes then rinse    L. acidophilus/Bifid. animalis 32 billion cell capsule 1 capsule, Daily    montelukast (SINGULAIR) 10 mg, oral, Daily    Mounjaro 2.5 mg, subcutaneous, Weekly    omeprazole (PRILOSEC) 40 mg, oral, Daily    omeprazole (PRILOSEC) 20 mg, oral, Daily, Do not crush or chew.    pen needle, diabetic (BD Michelle 2nd Gen Pen Needle) 32 gauge x 5/32\" needle Use with daily glucose testing    tiZANidine (ZANAFLEX) 4 mg, oral, 3 times daily PRN    True Metrix Glucose Test Strip strip use to check blood sugar TWICE DAILY    venlafaxine XR (Effexor-XR) 150 mg 24 hr capsule Take 1 capsule (150 mg total) by mouth daily with breakfast for 30 days.    venlafaxine XR (Effexor-XR) 75 mg 24 hr capsule TAKE 1 CAPSULE(75 MG) BY MOUTH DAILY WITH FOOD    Ventolin HFA 90 mcg/actuation inhaler Ventolin ade 2 puffs q4h prn    VITAMIN B COMPLEX ORAL 1 tablet, Every morning      Vitals  BP Readings from Last 2 Encounters:   04/30/25 150/66   04/28/25 136/71     BMI Readings from Last 1 Encounters:   04/30/25 44.41 kg/m²      Labs  A1C  Lab Results   Component Value Date    HGBA1C 8.0 (H) 03/04/2025    HGBA1C 7.4 (H) 10/30/2024    HGBA1C 7.3 (H) 07/29/2024     BMP  Lab Results   Component Value Date    CALCIUM 9.4 03/31/2025     03/31/2025    K 4.6 03/31/2025    CO2 27 03/31/2025     03/31/2025    BUN 16 03/31/2025    CREATININE 0.87 " 03/31/2025    EGFR 73 03/31/2025     LFTs  Lab Results   Component Value Date    ALT 11 03/04/2025    AST 10 03/04/2025    ALKPHOS 108 03/04/2025    BILITOT 0.3 03/04/2025     FLP  Lab Results   Component Value Date    TRIG 245 (H) 03/04/2025    CHOL 259 (H) 03/04/2025    LDLF 149 (H) 08/04/2023    LDLCALC 154 (H) 03/04/2025    HDL 55.6 03/04/2025     Urine Microalbumin  Lab Results   Component Value Date    MICROALBCREA  01/25/2025      Comment:      One or more analytes used in this calculation is outside of the analytical measurement range. Calculation cannot be performed.     Weight Management  Wt Readings from Last 3 Encounters:   04/30/25 110 kg (242 lb 12.8 oz)   04/28/25 109 kg (241 lb 6.4 oz)   02/03/25 111 kg (244 lb 3.2 oz)      There is no height or weight on file to calculate BMI.     Assessment/Plan   Problem List Items Addressed This Visit       Type 2 diabetes mellitus with other specified complication, unspecified whether long term insulin use (Multi)    Stable on Mounjaro- no changes given near goal and currently prioritizing uncontrolled BP.         Relevant Orders    Referral to Clinical Pharmacy    Hypertension    Blood pressure continues to be high at home. Highest systolic in 180s (no 200s) and highest diastolic 1 instance of 106. No readings quite meeting hypertensive emergency, and reviewed current symptoms and the patient has not had concerning changes. Bumex has resulted in more urination without a large impact on blood pressure. After discussion today, Mariluz will plan to resume her hydralazine 25 mg twice daily. The hydralazine did make an impact on a diastolic reading over the weekend. Mariluz had an ultrasound of kidneys this morning which is pending results. We reviewed symptoms which are concerning for hypertensive emergency when she should go to the ER.         Relevant Orders    Referral to Clinical Pharmacy     Other Visit Diagnoses         Medication management        Relevant  Orders    Referral to Clinical Pharmacy          Clinical Pharmacist follow-up: 5/19 11AM, Telehealth visit    Continue all meds under the continuation of care with the referring provider and clinical pharmacy team.    Thank you,  Iwona Salazar, PharmD  Clinical Pharmacist  [phone]    Verbal consent to manage patient's drug therapy was obtained from the patient. They were informed they may decline to participate or withdraw from participation in pharmacy services at any time.

## 2025-05-12 NOTE — Clinical Note
Highest reading in 180s- her cuff runs high so I suspect actual in 170s- reviewed when to go to ER, adding hydralazine back. 1- week checks at this point given how high she has been.

## 2025-05-13 ENCOUNTER — TELEPHONE (OUTPATIENT)
Dept: PRIMARY CARE | Facility: CLINIC | Age: 70
End: 2025-05-13
Payer: COMMERCIAL

## 2025-05-13 ENCOUNTER — TELEPHONE (OUTPATIENT)
Dept: NEPHROLOGY | Facility: CLINIC | Age: 70
End: 2025-05-13
Payer: COMMERCIAL

## 2025-05-13 LAB
ANION GAP SERPL CALCULATED.4IONS-SCNC: 11 MMOL/L (CALC) (ref 7–17)
BUN SERPL-MCNC: 12 MG/DL (ref 7–25)
BUN/CREAT SERPL: ABNORMAL (CALC) (ref 6–22)
CALCIUM SERPL-MCNC: 9.6 MG/DL (ref 8.6–10.4)
CHLORIDE SERPL-SCNC: 102 MMOL/L (ref 98–110)
CO2 SERPL-SCNC: 29 MMOL/L (ref 20–32)
CREAT SERPL-MCNC: 0.76 MG/DL (ref 0.5–1.05)
CREAT UR-MCNC: 136 MG/DL (ref 20–275)
EGFRCR SERPLBLD CKD-EPI 2021: 86 ML/MIN/1.73M2
GLUCOSE SERPL-MCNC: 206 MG/DL (ref 65–99)
POTASSIUM SERPL-SCNC: 4.4 MMOL/L (ref 3.5–5.3)
PROT UR-MCNC: 29 MG/DL (ref 5–24)
PROT/CREAT UR: 0.21 MG/MG CREAT (ref 0.02–0.18)
PROT/CREAT UR: 213 MG/G CREAT (ref 24–184)
SODIUM SERPL-SCNC: 142 MMOL/L (ref 135–146)

## 2025-05-13 NOTE — TELEPHONE ENCOUNTER
Pt called in regarding Nuclear Medicine test. Pt has cancelled appointment and is inquiring if the test is necessary, she is skeptical about doing it. She is inquiring if any other testing can be done instead , less radiation. She stated that she has a pregnant daughter in law and dogs that she is worried about exposing radiation to them. She is also concerned regarding the fact that she will have to use her daughters car from transportation- then her daughter could not use for 24-48 hrs.      Please advise

## 2025-05-13 NOTE — TELEPHONE ENCOUNTER
----- Message from Jose Sepulveda sent at 5/13/2025 10:25 AM EDT -----  Kidney function remains diminished but it is stable with no major change.    ----- Message -----  From: Interface, Radiology Results In  Sent: 5/12/2025   7:23 PM EDT  To: Jose Sepulveda MD

## 2025-05-13 NOTE — TELEPHONE ENCOUNTER
----- Message from Jose Sepulveda sent at 5/13/2025 10:25 AM EDT -----  Normal kidney function.  ----- Message -----  From: Peyman McPhy Results In  Sent: 5/13/2025   2:43 AM EDT  To: Jose Sepulveda MD

## 2025-05-14 ENCOUNTER — HOSPITAL ENCOUNTER (OUTPATIENT)
Dept: RADIOLOGY | Facility: HOSPITAL | Age: 70
End: 2025-05-14
Payer: MEDICARE

## 2025-05-15 ENCOUNTER — HOSPITAL ENCOUNTER (OUTPATIENT)
Dept: RADIOLOGY | Facility: HOSPITAL | Age: 70
End: 2025-05-15
Payer: MEDICARE

## 2025-05-19 ENCOUNTER — APPOINTMENT (OUTPATIENT)
Dept: PHARMACY | Facility: HOSPITAL | Age: 70
End: 2025-05-19
Payer: COMMERCIAL

## 2025-05-19 DIAGNOSIS — Z79.4 TYPE 2 DIABETES MELLITUS WITH OTHER SPECIFIED COMPLICATION, WITH LONG-TERM CURRENT USE OF INSULIN: ICD-10-CM

## 2025-05-19 DIAGNOSIS — E11.69 TYPE 2 DIABETES MELLITUS WITH OTHER SPECIFIED COMPLICATION, UNSPECIFIED WHETHER LONG TERM INSULIN USE (MULTI): ICD-10-CM

## 2025-05-19 DIAGNOSIS — Z79.899 MEDICATION MANAGEMENT: ICD-10-CM

## 2025-05-19 DIAGNOSIS — I10 PRIMARY HYPERTENSION: ICD-10-CM

## 2025-05-19 DIAGNOSIS — E11.69 TYPE 2 DIABETES MELLITUS WITH OTHER SPECIFIED COMPLICATION, WITH LONG-TERM CURRENT USE OF INSULIN: ICD-10-CM

## 2025-05-19 RX ORDER — INSULIN DEGLUDEC 100 U/ML
28 INJECTION, SOLUTION SUBCUTANEOUS NIGHTLY
Qty: 15 ML | Refills: 11 | Status: SHIPPED | OUTPATIENT
Start: 2025-05-19 | End: 2026-05-19

## 2025-05-19 RX ORDER — HYDRALAZINE HYDROCHLORIDE 25 MG/1
25 TABLET, FILM COATED ORAL 2 TIMES DAILY
Qty: 60 TABLET | Refills: 11 | Status: SHIPPED | OUTPATIENT
Start: 2025-05-19 | End: 2026-05-19

## 2025-05-19 RX ORDER — TIRZEPATIDE 5 MG/.5ML
5 INJECTION, SOLUTION SUBCUTANEOUS WEEKLY
Qty: 2 ML | Refills: 3 | Status: SHIPPED | OUTPATIENT
Start: 2025-05-19

## 2025-05-19 NOTE — PROGRESS NOTES
"  Clinical Pharmacy Appointment    Patient ID: Kaylan Woo \"Mariluz\" is a 69 y.o. female who presents for Diabetes and Hypertension.    Pt is here for Follow Up appointment.     Referring Provider: Cornelia Ball APRN-*  PCP: Radha Holt MD   Last visit with PCP: 4/28/2025 (CNP)   Next visit with PCP: 6/5/2025    Subjective     Interval History  Recent nephrology appointment which was very frustrating, tried to add medication she has allergy to  Last PharmD visit, re-added hydralazine as patient does notice response to that medication (nephrology had removed)  Did make an appointment with outside nephrology for June  Recently bruising more, especially her arms    HPI  DIABETES MELLITUS TYPE 2:    Diagnosed with diabetes:  over 10 years. Known diabetic complications: nephropathy, neuropathy.  Does patient follow with Endocrinology: No  Last optometry exam: to be discussed  Podiatry: patient denies sores or cuts on feet today      Current diabetic medications include:  Tresiba 32 units  Humalog sliding scale (200, 3 units; 250 5 units)  Mounjaro 2.5 mg weekly    Clarifications to above regimen: has not recently been using sliding scale at all as before meals she is doing well; after meals typically the spike is less than an hour long  Adverse Effects: improved constipation compared to last visit, day after lower than rest of week    Past diabetic medications include: sulfonylureas (anaphylaxis to sulfa), metformin (GI side effects), Januvia (not effective), Ozempic (constipation)    Glucose Readings:  Glucometer/CGM Type: Ana María CGM; does have back-up fingerstick  Patient tests BG continuously    Current home BG readings (mg/dL):     Previous home BG readings:        Any episodes of hypoglycemia? Yes, overnight Tuesday 13th to Wednesday the 14th.  Did patient treat episode of hypoglycemia appropriately? N/A  Does the patient have a prescription for ready-to-use Glucagon? No - good candidate if able to " afford    Hyperglycemia symptoms: none reported at this time    Lifestyle:  No lifestyle changes that Mariluz recalls for Tuesday- was low most of day  Did have sensor issues around that time which caused her to miss low alarms  She did still correct with carb foods    3/24:  Has added lots of lettuce to her diet- this has helped her move her bowels better    3/3:  Does feel like she is getting plenty of fluid although she is constipated    2/10/25:  Diet:   Snacks: will eat before bed if glucose <120 (chicken fajita)  Drinks: iced tea, small can of Dr. Pepper to correct sugar some times; cannot do diet soda due to aspartame allergy  Physical Activity: likes to swim in the summer  Tobacco history: denies    Secondary Prevention:  Statin? No- previous side effects  ACE-I/ARB? Yes  Aspirin? Yes    Pertinent PMH Review:  PMH of Pancreatitis: No  PMH of Retinopathy: No  PMH of Urinary Tract Infections: to be reassessed  PMH of MTC: No  UACR/EGFR in last year?: Yes  Albumin/Creatinine Ratio   Date Value Ref Range Status   01/25/2025   Final     Comment:     One or more analytes used in this calculation is outside of the analytical measurement range. Calculation cannot be performed.     Albumin/Creatine Ratio   Date Value Ref Range Status   06/01/2022 23.7 0.0 - 30.0 ug/mg crt Final       Immunizations:  Influenza? Yes  COVID? Yes  Pneumonia? Yes  Shingles? Yes  Hepatitis B? No     HYPERTENSION ASSESSMENT  Medication Therapy  Current Medications:   Irbesartan 300 mg daily  Diltiazem 240 mg daily  Bumetanide 1 mg daily  Hydralazine 25 mg twice daily    Previous Medications: spironolactone (stopped for ROSEMARIE), hydralazine (recently stopped by nephrology)    Clarifications to above regimen: none   Adverse Effects: did have some dizziness once last week, otherwise doing well    Home BP Monitoring  BP Cuff Type: Digital arm monitor- validated in office and reads about 7 mmHg higher systolic    Current home BP readings: has seen  improvement on hydralazine    152/74 this morning  152/68 yesterday  153/78 around noon Sunday    (Given monitor runs high, likely in mid-140s)      Previous home BP readings:   106 diastolic the other day- responded to hydralazine dose down to 80s diastolic    Sometimes going into the 180s systolic    BP Readings from Last 3 Encounters:   04/30/25 150/66   04/28/25 136/71   02/03/25 148/82     Lifestyle  See above    Sodium Intake:  Will re-address at next visit    Risk Assessment  Major Risk Factors Include:  Hypertension  Obesity (BMI >30)  Dyslipidemia  Diabetes mellitus  Age > 55 (men) or > 65 (women)  Sleep apnea  The 10-year ASCVD risk score (Kb HOLLINGSWORTH, et al., 2019) is: 25%    Values used to calculate the score:      Age: 69 years      Sex: Female      Is Non- : No      Diabetic: Yes      Tobacco smoker: No      Systolic Blood Pressure: 150 mmHg      Is BP treated: Yes      HDL Cholesterol: 55.6 mg/dL      Total Cholesterol: 259 mg/dL  Known target organ damage:  Chronic Heart Failure  Left ventricular hypertrophy  CKD (w/ recent ROSEMARIE)    Secondary Prevention  On Statin?: No, previous side effects  Immunizations Needed: RSV and Hepatitis B Series  Tobacco Use: non-smoker     Medication Reconciliation:  Changed: no reported changes  Does mention still holding Lodine but taking rare ibuprofen for pain instead    Drug Interactions  The following drug interactions were noted:    Aspirin and etodolac - additive bleed risk and possible decreased efficacy; increased bleed risk of venlafaxine as well  Additive nephrotoxicity risk of NSAIDs, bumetanide, and losartan  Tizanidine may be less effective while on venlafaxine    Medication System Management  Patient's preferred pharmacy: [facility] ([facility])  Adherence/Organization: appropriate; gabapentin is 2-3 times per day  Affordability/Accessibility: Is on low income currently, brand name medications can be expensive    Objective    Allergies   Allergen Reactions    Celecoxib Shortness of breath and Rash    Rofecoxib Shortness of breath and Rash    Sulfa (Sulfonamide Antibiotics) Anaphylaxis    Sulfasalazine Anaphylaxis    Sulfonylureas Anaphylaxis    Sulfur Anaphylaxis    Aspartame Other and Headache    Buprenorphine Other     dizziness    Diet Foods Other     migraine headache. Allergy to aspartame only. Able to tolerate sucralose (splenda).    Ezetimibe Other     Couldn't walk    Iodides Other    Metformin Other     Severe GI SE    Nalidixic Acid Unknown     Pt does not think she is allergic to this    Nsaids (Non-Steroidal Anti-Inflammatory Drug) Unknown     Pt denies allergy    Pyrazolones Other     Pt does not know if allergy    Rivaroxaban Hives    Salicylates Unknown     Pt states she is not allergic to    Shellfish Containing Products Nausea/vomiting     scallops    Shellfish Derived Nausea/vomiting     scallops    Statins-Hmg-Coa Reductase Inhibitors Other    Thiazides Unknown     Reaction to hydrochlorothiazide (cross-reacts with sulfa allergy)    Cyclobenzaprine Rash    Latex Swelling and Rash    Valdecoxib Swelling and Rash     Social History     Social History Narrative    Not on file      Medication Review  Current Outpatient Medications   Medication Instructions    acetaminophen (TYLENOL) 1,000 mg, Every 6 hours PRN    aspirin 81 mg, Daily    bumetanide (Bumex) 0.5 mg tablet Take 1 tablet (0.5 mg) by mouth once daily. Take with 1 mg daily    cholecalciferol (Vitamin D-3) 50 mcg (2,000 unit) capsule Take by mouth.    dilTIAZem CD (CARDIZEM CD) 240 mg, oral, Daily    FreeStyle Ana María sensor system (FreeStyle Ana María 2 Sensor) kit 1 Cartridge every 14 days    gabapentin (Neurontin) 600 mg tablet TAKE 1 TABLET BY MOUTH THREE TIMES DAILY FOR 30 DAYS    hydrALAZINE (APRESOLINE) 25 mg, oral, 2 times daily    insulin degludec (TRESIBA FLEXTOUCH) 28 Units, subcutaneous, Nightly, Take as directed per insulin instructions.    insulin lispro  "(HumaLOG KwikPen Insulin) 100 unit/mL injection Uad per sliding scale    irbesartan (AVAPRO) 300 mg, oral, Daily    ketoconazole (NIZOral) 2 % shampoo Wash scalp once to twice a week. Leave on for 3-5 minutes then rinse    L. acidophilus/Bifid. animalis 32 billion cell capsule 1 capsule, Daily    montelukast (SINGULAIR) 10 mg, oral, Daily    Mounjaro 5 mg, subcutaneous, Weekly    omeprazole (PRILOSEC) 40 mg, oral, Daily    omeprazole (PRILOSEC) 20 mg, oral, Daily, Do not crush or chew.    pen needle, diabetic (BD Michelle 2nd Gen Pen Needle) 32 gauge x 5/32\" needle Use with daily glucose testing    tiZANidine (ZANAFLEX) 4 mg, oral, 3 times daily PRN    True Metrix Glucose Test Strip strip use to check blood sugar TWICE DAILY    venlafaxine XR (Effexor-XR) 150 mg 24 hr capsule Take 1 capsule (150 mg total) by mouth daily with breakfast for 30 days.    venlafaxine XR (Effexor-XR) 75 mg 24 hr capsule TAKE 1 CAPSULE(75 MG) BY MOUTH DAILY WITH FOOD    Ventolin HFA 90 mcg/actuation inhaler Ventolin ade 2 puffs q4h prn    VITAMIN B COMPLEX ORAL 1 tablet, Every morning      Vitals  BP Readings from Last 2 Encounters:   04/30/25 150/66   04/28/25 136/71     BMI Readings from Last 1 Encounters:   04/30/25 44.41 kg/m²      Labs  A1C  Lab Results   Component Value Date    HGBA1C 8.0 (H) 03/04/2025    HGBA1C 7.4 (H) 10/30/2024    HGBA1C 7.3 (H) 07/29/2024     BMP  Lab Results   Component Value Date    CALCIUM 9.6 05/12/2025     05/12/2025    K 4.4 05/12/2025    CO2 29 05/12/2025     05/12/2025    BUN 12 05/12/2025    CREATININE 0.76 05/12/2025    EGFR 86 05/12/2025     LFTs  Lab Results   Component Value Date    ALT 11 03/04/2025    AST 10 03/04/2025    ALKPHOS 108 03/04/2025    BILITOT 0.3 03/04/2025     FLP  Lab Results   Component Value Date    TRIG 245 (H) 03/04/2025    CHOL 259 (H) 03/04/2025    LDLF 149 (H) 08/04/2023    LDLCALC 154 (H) 03/04/2025    HDL 55.6 03/04/2025     Urine Microalbumin  Lab Results "   Component Value Date    MICROALBCREA  2025      Comment:      One or more analytes used in this calculation is outside of the analytical measurement range. Calculation cannot be performed.     Weight Management  Wt Readings from Last 3 Encounters:   25 110 kg (242 lb 12.8 oz)   25 109 kg (241 lb 6.4 oz)   25 111 kg (244 lb 3.2 oz)      There is no height or weight on file to calculate BMI.     Assessment/Plan   Problem List Items Addressed This Visit       Type 2 diabetes mellitus with other specified complication, unspecified whether long term insulin use (Multi)    Patient's goal A1c is < 7%.  Is pt at goal? No, 8.0%  Patient's SMBGs recently went low this week. She also had issues with her sensor not alarming properly.     Rationale for plan: Mariluz did not note any differences in her routine this week but noted true lows on Ana María. Sensor was not alarming overnight, she did correct with carb intake when she woke up. She feels that she would like to see more appetite suppression from Mounjaro. She is willing to increase the dose to 5 mg weekly at this time. Will decrease insulin to 28 units when she does this to minimize further risk of lows.    Medication Changes:  CONTINUE  Humalog as needed  INCREASE  Mounjaro to 5 mg weekly  DECREASE  Tresiba to 28 units nightly    Future Considerations:  Titrate Mounjaro as willing/able    Monitoring and Education:   Continue monitoring with Ana María- reminded to check every 8 hours for best capture         Relevant Medications    tirzepatide (Mounjaro) 5 mg/0.5 mL pen injector    insulin degludec (Tresiba FlexTouch) 100 unit/mL (3 mL) pen    Other Relevant Orders    Referral to Clinical Pharmacy    Hypertension    ASSESSMENT OF SMBP MEASUREMENTS  Highest readin/78 mmHg  Lowest readin/68  mmHg  Average: 153/73  mmHg- about 145 when corrected for cuff accuracy    Patient's goal BP < 130/80.   Rationale for plan: Mariluz's blood pressure is much  improved from last week since resuming hydralazine. Given she has had some dizziness, will not further adjust hydralazine dose today. If blood pressure remains slightly elevated next visit, can consider dose increase to 50 mg versus frequency of three times daily.  CrCl cannot be calculated (Patient's most recent lab result is older than the maximum 3 days allowed.).    Medication Changes:  CONTINUE  Bumex 1 mg daily  Diltiazem 240 mg daily  Hydralazine 25 mg twice daily  Irbesartan 300 mg daily    Future Considerations:  Avoid thiazides due to allergy  Can consider re-challenging spironolactone    Monitoring and Education Discussed:  Eat right: Your diet should be rich in fruits, vegetables, potassium, and low-fat dairy products. You should also reduce your intake of sodium and fats, particularly saturated fats.  Maintain a healthy weight: Try to achieve and maintain a healthy weight. If you are unsure what this means for you, please contact our clinic.  Exercise: Try to get at least 30 minutes of aerobic exercise every day.  Moderate your alcohol consumption: Limit your alcohol intake to one drink per day.  Monitor your blood pressure: You should check your blood pressure regularly. Notify our clinic if your blood pressure readings are consistently higher than recommended.          Relevant Medications    hydrALAZINE (Apresoline) 25 mg tablet    Other Relevant Orders    Referral to Clinical Pharmacy     Other Visit Diagnoses         Medication management        Relevant Orders    Referral to Clinical Pharmacy      Type 2 diabetes mellitus with other specified complication, with long-term current use of insulin        Relevant Medications    insulin degludec (Tresiba FlexTouch) 100 unit/mL (3 mL) pen    Other Relevant Orders    Referral to Clinical Pharmacy            Clinical Pharmacist follow-up: 6/2 11AM, Telehealth visit    Continue all meds under the continuation of care with the referring provider and  clinical pharmacy team.    Thank you,  Iwona Salazar, PharmD  Clinical Pharmacist  [phone]    Verbal consent to manage patient's drug therapy was obtained from the patient. They were informed they may decline to participate or withdraw from participation in pharmacy services at any time.

## 2025-05-19 NOTE — ASSESSMENT & PLAN NOTE
Patient's goal A1c is < 7%.  Is pt at goal? No, 8.0%  Patient's SMBGs recently went low this week. She also had issues with her sensor not alarming properly.     Rationale for plan: Mariluz did not note any differences in her routine this week but noted true lows on Ana María. Sensor was not alarming overnight, she did correct with carb intake when she woke up. She feels that she would like to see more appetite suppression from Mounjaro. She is willing to increase the dose to 5 mg weekly at this time. Will decrease insulin to 28 units when she does this to minimize further risk of lows.    Medication Changes:  CONTINUE  Humalog as needed  INCREASE  Mounjaro to 5 mg weekly  DECREASE  Tresiba to 28 units nightly    Future Considerations:  Titrate Mounjaro as willing/able    Monitoring and Education:   Continue monitoring with Ana María- reminded to check every 8 hours for best capture

## 2025-05-19 NOTE — ASSESSMENT & PLAN NOTE
ASSESSMENT OF SMBP MEASUREMENTS  Highest readin/78 mmHg  Lowest readin/68  mmHg  Average: 153/73  mmHg- about 145 when corrected for cuff accuracy    Patient's goal BP < 130/80.   Rationale for plan: Mariluz's blood pressure is much improved from last week since resuming hydralazine. Given she has had some dizziness, will not further adjust hydralazine dose today. If blood pressure remains slightly elevated next visit, can consider dose increase to 50 mg versus frequency of three times daily.  CrCl cannot be calculated (Patient's most recent lab result is older than the maximum 3 days allowed.).    Medication Changes:  CONTINUE  Bumex 1 mg daily  Diltiazem 240 mg daily  Hydralazine 25 mg twice daily  Irbesartan 300 mg daily    Future Considerations:  Avoid thiazides due to allergy  Can consider re-challenging spironolactone    Monitoring and Education Discussed:  Eat right: Your diet should be rich in fruits, vegetables, potassium, and low-fat dairy products. You should also reduce your intake of sodium and fats, particularly saturated fats.  Maintain a healthy weight: Try to achieve and maintain a healthy weight. If you are unsure what this means for you, please contact our clinic.  Exercise: Try to get at least 30 minutes of aerobic exercise every day.  Moderate your alcohol consumption: Limit your alcohol intake to one drink per day.  Monitor your blood pressure: You should check your blood pressure regularly. Notify our clinic if your blood pressure readings are consistently higher than recommended.

## 2025-05-20 ENCOUNTER — APPOINTMENT (OUTPATIENT)
Dept: CARDIOLOGY | Facility: HOSPITAL | Age: 70
End: 2025-05-20
Payer: COMMERCIAL

## 2025-06-02 ENCOUNTER — APPOINTMENT (OUTPATIENT)
Dept: PHARMACY | Facility: HOSPITAL | Age: 70
End: 2025-06-02
Payer: COMMERCIAL

## 2025-06-02 ENCOUNTER — APPOINTMENT (OUTPATIENT)
Dept: PRIMARY CARE | Facility: CLINIC | Age: 70
End: 2025-06-02
Payer: COMMERCIAL

## 2025-06-02 DIAGNOSIS — E11.69 TYPE 2 DIABETES MELLITUS WITH OTHER SPECIFIED COMPLICATION, UNSPECIFIED WHETHER LONG TERM INSULIN USE (MULTI): ICD-10-CM

## 2025-06-02 DIAGNOSIS — Z79.899 MEDICATION MANAGEMENT: ICD-10-CM

## 2025-06-02 DIAGNOSIS — Z79.4 TYPE 2 DIABETES MELLITUS WITH OTHER SPECIFIED COMPLICATION, WITH LONG-TERM CURRENT USE OF INSULIN: ICD-10-CM

## 2025-06-02 DIAGNOSIS — E11.69 TYPE 2 DIABETES MELLITUS WITH OTHER SPECIFIED COMPLICATION, WITH LONG-TERM CURRENT USE OF INSULIN: ICD-10-CM

## 2025-06-02 DIAGNOSIS — I10 PRIMARY HYPERTENSION: ICD-10-CM

## 2025-06-02 RX ORDER — INSULIN DEGLUDEC 100 U/ML
25 INJECTION, SOLUTION SUBCUTANEOUS NIGHTLY
Qty: 15 ML | Refills: 11 | Status: SHIPPED | OUTPATIENT
Start: 2025-06-02 | End: 2026-06-02

## 2025-06-02 NOTE — PROGRESS NOTES
"  Clinical Pharmacy Appointment    Patient ID: Kaylan Woo \"Mariluz\" is a 69 y.o. female who presents for Diabetes and Hypertension.    Pt is here for Follow Up appointment.     Referring Provider: Cornelia Ball APRN-*  PCP: Radha Holt MD   Last visit with PCP: 4/28/2025 (CNP)   Next visit with PCP: 11/3/2025    Subjective     Interval History  Last visit, Mounjaro increased to 5 mg and Tresiba decreased to 28 units  Feels like first night after she takes Mounjaro she pees a lot, otherwise okay  Also 4 days of dry mouth  Went down to 25 units after overnight low and no issues since  Left leg recently swollen- feels resolved now after peeing recently  Mariluz says word of the day is \"frustrated\"  Knows this does not help BP  Saw orthopedics- he states previous surgeon put surgical cage lower than Mariluz's problem starts (C5 vs C3)  Also believes may be causing some problems with swallowing    HPI  DIABETES MELLITUS TYPE 2:    Diagnosed with diabetes:  over 10 years. Known diabetic complications: nephropathy, neuropathy.  Does patient follow with Endocrinology: No  Last optometry exam: to be discussed  Podiatry: patient denies sores or cuts on feet today      Current diabetic medications include:  Tresiba 25 units (for past 4-5 days after low Tuesday)  Humalog sliding scale (200, 3 units; 250 5 units)  Mounjaro 5 mg weekly    Clarifications to above regimen: has not recently been using sliding scale at all as before meals she is doing well; after meals typically the spike is less than an hour long  Adverse Effects: pees a lot night after shot    Past diabetic medications include: sulfonylureas (anaphylaxis to sulfa), metformin (GI side effects), Januvia (not effective), Ozempic (constipation)    Glucose Readings:  Glucometer/CGM Type: Ana María CGM; does have back-up fingerstick  Patient tests BG continuously    Current home BG readings (mg/dL):    Previous home BG readings:        Any episodes of " hypoglycemia? Yes, one episode into the 50s.  None since she further reduced insulin dose. Did patient treat episode of hypoglycemia appropriately? Yes, yudith  Does the patient have a prescription for ready-to-use Glucagon? No - good candidate if able to afford    Hyperglycemia symptoms: none reported at this time    Lifestyle:  Keeping hydrated especially day of/after Mounjaro because of increased urination  Recently taking collagen to try to help with pain- has not seen improvement yet but has not been taking long    Last visit:  No lifestyle changes that Mariluz recalls for Tuesday- was low most of day  Did have sensor issues around that time which caused her to miss low alarms  She did still correct with carb foods    3/24:  Has added lots of lettuce to her diet- this has helped her move her bowels better    3/3:  Does feel like she is getting plenty of fluid although she is constipated    2/10/25:  Diet:   Snacks: will eat before bed if glucose <120 (chicken fajita)  Drinks: iced tea, small can of Dr. Pepper to correct sugar some times; cannot do diet soda due to aspartame allergy  Physical Activity: likes to swim in the summer  Tobacco history: denies    Secondary Prevention:  Statin? No- previous side effects  ACE-I/ARB? Yes  Aspirin? Yes    Pertinent PMH Review:  PMH of Pancreatitis: No  PMH of Retinopathy: No  PMH of Urinary Tract Infections: to be reassessed  PMH of MTC: No  UACR/EGFR in last year?: Yes  Albumin/Creatinine Ratio   Date Value Ref Range Status   01/25/2025   Final     Comment:     One or more analytes used in this calculation is outside of the analytical measurement range. Calculation cannot be performed.     Albumin/Creatine Ratio   Date Value Ref Range Status   06/01/2022 23.7 0.0 - 30.0 ug/mg crt Final       Immunizations:  Influenza? Yes  COVID? Yes  Pneumonia? Yes  Shingles? Yes  Hepatitis B? No     HYPERTENSION ASSESSMENT  Medication Therapy  Current Medications:   Irbesartan 300 mg  daily  Diltiazem 240 mg daily  Bumetanide 1 mg daily  Hydralazine 25 mg twice daily    Previous Medications: spironolactone (stopped for ROSEMARIE), hydralazine (recently stopped by nephrology)    Clarifications to above regimen: none   Adverse Effects: did have some dizziness once last week, otherwise doing well    Home BP Monitoring  BP Cuff Type: Digital arm monitor- validated in office and reads about 7 mmHg higher systolic    Current home BP readings: 1 day of very good readings  Last night 156/71  Earlier in the week 127/62, 136/64  But otherwise 150s-160s have been common    Mid-afternoon tends to be best    Previous home BP readings:   152/74 this morning  152/68 yesterday  153/78 around noon Sunday    (Given monitor runs high, likely in mid-140s)    BP Readings from Last 3 Encounters:   04/30/25 150/66   04/28/25 136/71   02/03/25 148/82     Lifestyle  See above    Sodium Intake:  Will re-address at next visit    Risk Assessment  Major Risk Factors Include:  Hypertension  Obesity (BMI >30)  Dyslipidemia  Diabetes mellitus  Age > 55 (men) or > 65 (women)  Sleep apnea  The 10-year ASCVD risk score (Kb HOLLINGSWORTH, et al., 2019) is: 25%    Values used to calculate the score:      Age: 69 years      Sex: Female      Is Non- : No      Diabetic: Yes      Tobacco smoker: No      Systolic Blood Pressure: 150 mmHg      Is BP treated: Yes      HDL Cholesterol: 55.6 mg/dL      Total Cholesterol: 259 mg/dL  Known target organ damage:  Chronic Heart Failure  Left ventricular hypertrophy  CKD (w/ recent ROSEMARIE)    Secondary Prevention  On Statin?: No, previous side effects  Immunizations Needed: RSV and Hepatitis B Series  Tobacco Use: non-smoker     Medication Reconciliation:  Changed: no reported changes  Does mention still holding Lodine but taking rare ibuprofen for pain instead    Drug Interactions  The following drug interactions were noted:    Aspirin and etodolac - additive bleed risk and possible  decreased efficacy; increased bleed risk of venlafaxine as well  Additive nephrotoxicity risk of NSAIDs, bumetanide, and losartan  Tizanidine may be less effective while on venlafaxine    Medication System Management  Patient's preferred pharmacy: [facility] ([place] )  Adherence/Organization: appropriate; gabapentin is 2-3 times per day  Affordability/Accessibility: Is on low income currently, brand name medications can be expensive    Objective   Allergies   Allergen Reactions    Celecoxib Shortness of breath and Rash    Rofecoxib Shortness of breath and Rash    Sulfa (Sulfonamide Antibiotics) Anaphylaxis    Sulfasalazine Anaphylaxis    Sulfonylureas Anaphylaxis    Sulfur Anaphylaxis    Aspartame Other and Headache    Buprenorphine Other     dizziness    Diet Foods Other     migraine headache. Allergy to aspartame only. Able to tolerate sucralose (splenda).    Ezetimibe Other     Couldn't walk    Iodides Other    Metformin Other     Severe GI SE    Nalidixic Acid Unknown     Pt does not think she is allergic to this    Nsaids (Non-Steroidal Anti-Inflammatory Drug) Unknown     Pt denies allergy    Pyrazolones Other     Pt does not know if allergy    Rivaroxaban Hives    Salicylates Unknown     Pt states she is not allergic to    Shellfish Containing Products Nausea/vomiting     scallops    Shellfish Derived Nausea/vomiting     scallops    Statins-Hmg-Coa Reductase Inhibitors Other    Thiazides Unknown     Reaction to hydrochlorothiazide (cross-reacts with sulfa allergy)    Cyclobenzaprine Rash    Latex Swelling and Rash    Valdecoxib Swelling and Rash     Social History     Social History Narrative    Not on file      Medication Review  Current Outpatient Medications   Medication Instructions    acetaminophen (TYLENOL) 1,000 mg, Every 6 hours PRN    aspirin 81 mg, Daily    bumetanide (Bumex) 0.5 mg tablet Take 1 tablet (0.5 mg) by mouth once daily. Take with 1 mg daily    cholecalciferol (Vitamin D-3) 50 mcg  "(2,000 unit) capsule Take by mouth.    dilTIAZem CD (CARDIZEM CD) 240 mg, oral, Daily    FreeStyle Ana María sensor system (FreeStyle Ana María 2 Sensor) kit 1 Cartridge every 14 days    gabapentin (Neurontin) 600 mg tablet TAKE 1 TABLET BY MOUTH THREE TIMES DAILY FOR 30 DAYS    hydrALAZINE (APRESOLINE) 25 mg, oral, 2 times daily    insulin degludec (TRESIBA FLEXTOUCH) 25 Units, subcutaneous, Nightly, Take as directed per insulin instructions.    insulin lispro (HumaLOG KwikPen Insulin) 100 unit/mL injection Uad per sliding scale    irbesartan (AVAPRO) 300 mg, oral, Daily    ketoconazole (NIZOral) 2 % shampoo Wash scalp once to twice a week. Leave on for 3-5 minutes then rinse    L. acidophilus/Bifid. animalis 32 billion cell capsule 1 capsule, Daily    montelukast (SINGULAIR) 10 mg, oral, Daily    Mounjaro 5 mg, subcutaneous, Weekly    omeprazole (PRILOSEC) 40 mg, oral, Daily    omeprazole (PRILOSEC) 20 mg, oral, Daily, Do not crush or chew.    pen needle, diabetic (BD Michelle 2nd Gen Pen Needle) 32 gauge x 5/32\" needle Use with daily glucose testing    tiZANidine (ZANAFLEX) 4 mg, oral, 3 times daily PRN    True Metrix Glucose Test Strip strip use to check blood sugar TWICE DAILY    venlafaxine XR (Effexor-XR) 150 mg 24 hr capsule Take 1 capsule (150 mg total) by mouth daily with breakfast for 30 days.    venlafaxine XR (Effexor-XR) 75 mg 24 hr capsule TAKE 1 CAPSULE(75 MG) BY MOUTH DAILY WITH FOOD    Ventolin HFA 90 mcg/actuation inhaler Ventolin ade 2 puffs q4h prn    VITAMIN B COMPLEX ORAL 1 tablet, Every morning      Vitals  BP Readings from Last 2 Encounters:   04/30/25 150/66   04/28/25 136/71     BMI Readings from Last 1 Encounters:   04/30/25 44.41 kg/m²      Labs  A1C  Lab Results   Component Value Date    HGBA1C 8.0 (H) 03/04/2025    HGBA1C 7.4 (H) 10/30/2024    HGBA1C 7.3 (H) 07/29/2024     BMP  Lab Results   Component Value Date    CALCIUM 9.6 05/12/2025     05/12/2025    K 4.4 05/12/2025    CO2 29 " 05/12/2025     05/12/2025    BUN 12 05/12/2025    CREATININE 0.76 05/12/2025    EGFR 86 05/12/2025     LFTs  Lab Results   Component Value Date    ALT 11 03/04/2025    AST 10 03/04/2025    ALKPHOS 108 03/04/2025    BILITOT 0.3 03/04/2025     FLP  Lab Results   Component Value Date    TRIG 245 (H) 03/04/2025    CHOL 259 (H) 03/04/2025    LDLF 149 (H) 08/04/2023    LDLCALC 154 (H) 03/04/2025    HDL 55.6 03/04/2025     Urine Microalbumin  Lab Results   Component Value Date    MICROALBCREA  01/25/2025      Comment:      One or more analytes used in this calculation is outside of the analytical measurement range. Calculation cannot be performed.     Weight Management  Wt Readings from Last 3 Encounters:   04/30/25 110 kg (242 lb 12.8 oz)   04/28/25 109 kg (241 lb 6.4 oz)   02/03/25 111 kg (244 lb 3.2 oz)      There is no height or weight on file to calculate BMI.     Assessment/Plan   Problem List Items Addressed This Visit       Type 2 diabetes mellitus with other specified complication, unspecified whether long term insulin use (Multi)    Patient's goal A1c is < 7%.  Is pt at goal? No, last 8.0%  Patient's SMBGs are improving, with last GMI 7.1%.     Rationale for plan: Mariluz had a low episode on Tresiba 28 units and has since decreased to 25 units. After increasing to Mounjaro 5 mg and decreasing insulin, glucose control has improved. She has felt increased urination- may be from Bumex but she attributed to Mounjaro- encouraged her to see how she feels after next dose to determine if truly from Mounjaro.    Medication Changes:  CONTINUE  Mounjaro 5 mg weekly  Tresiba 25 units daily    Monitoring and Education:   Continue monitoring with Ana María 2+ sensors         Relevant Medications    insulin degludec (Tresiba FlexTouch) 100 unit/mL (3 mL) pen    Other Relevant Orders    Referral to Clinical Pharmacy    Hypertension    Some readings improved to goal of <130/80 mmHg, but most remain in 140s when corrected for BP  monitor inaccuracy. Mariluz is having some dry mouth, which she thinks may be from hydralazine although more likely culprit may be Bumex. We discussed that I would like to retry spironolactone as next medication. She sees outside nephrology next week and will discuss their thoughts on this. Will continue current medications until then. Next PharmD visit 6/16.    CONTINUE  Diltiazem 240 mg daily  Irbesartan 300 mg daily  Hydralazine 25 mg twice daily  Bumex 1 mg daily         Relevant Orders    Referral to Clinical Pharmacy     Other Visit Diagnoses         Medication management        Relevant Orders    Referral to Clinical Pharmacy      Type 2 diabetes mellitus with other specified complication, with long-term current use of insulin        Relevant Medications    insulin degludec (Tresiba FlexTouch) 100 unit/mL (3 mL) pen    Other Relevant Orders    Referral to Clinical Pharmacy          Clinical Pharmacist follow-up: 6/16 11AM, Telehealth visit    Continue all meds under the continuation of care with the referring provider and clinical pharmacy team.    Thank you,  Iwona Salazar, PharmD  Clinical Pharmacist  [phone]    Verbal consent to manage patient's drug therapy was obtained from the patient. They were informed they may decline to participate or withdraw from participation in pharmacy services at any time.

## 2025-06-02 NOTE — ASSESSMENT & PLAN NOTE
Patient's goal A1c is < 7%.  Is pt at goal? No, last 8.0%  Patient's SMBGs are improving, with last GMI 7.1%.     Rationale for plan: Mariluz had a low episode on Tresiba 28 units and has since decreased to 25 units. After increasing to Mounjaro 5 mg and decreasing insulin, glucose control has improved. She has felt increased urination- may be from Bumex but she attributed to Mounjaro- encouraged her to see how she feels after next dose to determine if truly from Mounjaro.    Medication Changes:  CONTINUE  Mounjaro 5 mg weekly  Tresiba 25 units daily    Monitoring and Education:   Continue monitoring with Ana María 2+ sensors

## 2025-06-02 NOTE — ASSESSMENT & PLAN NOTE
Some readings improved to goal of <130/80 mmHg, but most remain in 140s when corrected for BP monitor inaccuracy. Mariluz is having some dry mouth, which she thinks may be from hydralazine although more likely culprit may be Bumex. We discussed that I would like to retry spironolactone as next medication. She sees outside nephrology next week and will discuss their thoughts on this. Will continue current medications until then. Next PharmD visit 6/16.    CONTINUE  Diltiazem 240 mg daily  Irbesartan 300 mg daily  Hydralazine 25 mg twice daily  Bumex 1 mg daily

## 2025-06-05 ENCOUNTER — APPOINTMENT (OUTPATIENT)
Dept: PRIMARY CARE | Facility: CLINIC | Age: 70
End: 2025-06-05
Payer: COMMERCIAL

## 2025-06-09 ENCOUNTER — OFFICE VISIT (OUTPATIENT)
Age: 70
End: 2025-06-09
Payer: MEDICARE

## 2025-06-09 VITALS
OXYGEN SATURATION: 95 % | HEIGHT: 61 IN | DIASTOLIC BLOOD PRESSURE: 86 MMHG | TEMPERATURE: 98.3 F | SYSTOLIC BLOOD PRESSURE: 152 MMHG | HEART RATE: 79 BPM | WEIGHT: 242 LBS | BODY MASS INDEX: 45.69 KG/M2

## 2025-06-09 DIAGNOSIS — I10 PRIMARY HYPERTENSION: ICD-10-CM

## 2025-06-09 DIAGNOSIS — M43.22 FUSION OF SPINE OF CERVICAL REGION: ICD-10-CM

## 2025-06-09 DIAGNOSIS — M47.817 LUMBOSACRAL SPONDYLOSIS WITHOUT MYELOPATHY: Primary | ICD-10-CM

## 2025-06-09 DIAGNOSIS — Z51.81 ENCOUNTER FOR MONITORING OPIOID MAINTENANCE THERAPY: ICD-10-CM

## 2025-06-09 DIAGNOSIS — Z79.891 ENCOUNTER FOR MONITORING OPIOID MAINTENANCE THERAPY: ICD-10-CM

## 2025-06-09 DIAGNOSIS — F11.90 CHRONIC, CONTINUOUS USE OF OPIOIDS: ICD-10-CM

## 2025-06-09 DIAGNOSIS — Z98.890 HISTORY OF LUMBOSACRAL SPINE SURGERY: ICD-10-CM

## 2025-06-09 DIAGNOSIS — R60.0 EDEMA, PERIPHERAL: Primary | ICD-10-CM

## 2025-06-09 DIAGNOSIS — M43.24 FUSION OF SPINE OF THORACIC REGION: ICD-10-CM

## 2025-06-09 DIAGNOSIS — G56.03 BILATERAL CARPAL TUNNEL SYNDROME: ICD-10-CM

## 2025-06-09 DIAGNOSIS — M47.814 THORACIC SPONDYLOSIS WITHOUT MYELOPATHY: ICD-10-CM

## 2025-06-09 DIAGNOSIS — M47.812 CERVICAL SPONDYLOSIS WITHOUT MYELOPATHY: ICD-10-CM

## 2025-06-09 PROCEDURE — 1123F ACP DISCUSS/DSCN MKR DOCD: CPT | Performed by: PHYSICAL MEDICINE & REHABILITATION

## 2025-06-09 PROCEDURE — 1125F AMNT PAIN NOTED PAIN PRSNT: CPT | Performed by: PHYSICAL MEDICINE & REHABILITATION

## 2025-06-09 PROCEDURE — 1160F RVW MEDS BY RX/DR IN RCRD: CPT | Performed by: PHYSICAL MEDICINE & REHABILITATION

## 2025-06-09 PROCEDURE — 1159F MED LIST DOCD IN RCRD: CPT | Performed by: PHYSICAL MEDICINE & REHABILITATION

## 2025-06-09 PROCEDURE — G8417 CALC BMI ABV UP PARAM F/U: HCPCS | Performed by: PHYSICAL MEDICINE & REHABILITATION

## 2025-06-09 PROCEDURE — 99213 OFFICE O/P EST LOW 20 MIN: CPT | Performed by: PHYSICAL MEDICINE & REHABILITATION

## 2025-06-09 PROCEDURE — 1090F PRES/ABSN URINE INCON ASSESS: CPT | Performed by: PHYSICAL MEDICINE & REHABILITATION

## 2025-06-09 PROCEDURE — 3017F COLORECTAL CA SCREEN DOC REV: CPT | Performed by: PHYSICAL MEDICINE & REHABILITATION

## 2025-06-09 PROCEDURE — G8400 PT W/DXA NO RESULTS DOC: HCPCS | Performed by: PHYSICAL MEDICINE & REHABILITATION

## 2025-06-09 PROCEDURE — 99214 OFFICE O/P EST MOD 30 MIN: CPT | Performed by: PHYSICAL MEDICINE & REHABILITATION

## 2025-06-09 PROCEDURE — 1036F TOBACCO NON-USER: CPT | Performed by: PHYSICAL MEDICINE & REHABILITATION

## 2025-06-09 PROCEDURE — G8427 DOCREV CUR MEDS BY ELIG CLIN: HCPCS | Performed by: PHYSICAL MEDICINE & REHABILITATION

## 2025-06-09 PROCEDURE — G2211 COMPLEX E/M VISIT ADD ON: HCPCS | Performed by: PHYSICAL MEDICINE & REHABILITATION

## 2025-06-09 RX ORDER — BUMETANIDE 1 MG/1
1 TABLET ORAL DAILY
Qty: 30 TABLET | Refills: 11 | Status: SHIPPED | OUTPATIENT
Start: 2025-06-09 | End: 2026-06-09

## 2025-06-09 RX ORDER — OXYCODONE HYDROCHLORIDE 5 MG/1
5 TABLET ORAL 2 TIMES DAILY PRN
Qty: 14 TABLET | Refills: 0 | Status: SHIPPED | OUTPATIENT
Start: 2025-06-09 | End: 2025-06-16

## 2025-06-09 ASSESSMENT — ENCOUNTER SYMPTOMS
DIARRHEA: 0
NAUSEA: 0
CONSTIPATION: 0
SHORTNESS OF BREATH: 0
BACK PAIN: 1

## 2025-06-09 NOTE — PROGRESS NOTES
procedures at this time versus the risk of delaying the visits and procedures. It is not possible to know either the risk of delaying the visits or procedure or chance of getting an infection with perfect accuracy, but a joint decision was made between the patient and the physician to proceed at this time with the scheduled visits and procedures.    Advised her that any lab testing, imaging, or other diagnostic test results are best discussed in person in the office so that we can provide a clear explanation of their significance and best treatment based upon these results. It is her responsibility to make and keep a follow up appointment to discuss these test results in person to discuss the significance of the findings and appropriate follow-up steps. She expressed complete understanding and agreement with the entire plan as outlined above. Portions of this note may have been typed, auto-populated, dictated or transcribed by voice recognition resulting in errors, omissions, or close substitutions which may be missed despite careful proofreading. Please contact the author for any questions or concerns.    I am the primary provider for this patient's complex chronic pain condition that requires ongoing medical management. The nature of this condition and indicated treatment requires a longitudinal relationship and continual monitoring over time for appropriate safety and response. Shared goals were created and discussed including a reduction in pain intensity and improvement in ability to complete activities of daily living.       Follow up:  Return in about 2 months (around 8/9/2025) for reassessment of pain and symptoms.    Aleta Avalos MD

## 2025-06-16 ENCOUNTER — APPOINTMENT (OUTPATIENT)
Dept: PHARMACY | Facility: HOSPITAL | Age: 70
End: 2025-06-16
Payer: COMMERCIAL

## 2025-06-17 ENCOUNTER — TELEMEDICINE (OUTPATIENT)
Dept: PHARMACY | Facility: HOSPITAL | Age: 70
End: 2025-06-17
Payer: COMMERCIAL

## 2025-06-17 DIAGNOSIS — Z79.899 MEDICATION MANAGEMENT: ICD-10-CM

## 2025-06-17 DIAGNOSIS — Z79.4 TYPE 2 DIABETES MELLITUS WITH OTHER SPECIFIED COMPLICATION, WITH LONG-TERM CURRENT USE OF INSULIN: ICD-10-CM

## 2025-06-17 DIAGNOSIS — E11.69 TYPE 2 DIABETES MELLITUS WITH OTHER SPECIFIED COMPLICATION, WITH LONG-TERM CURRENT USE OF INSULIN: ICD-10-CM

## 2025-06-17 DIAGNOSIS — E11.69 TYPE 2 DIABETES MELLITUS WITH OTHER SPECIFIED COMPLICATION, UNSPECIFIED WHETHER LONG TERM INSULIN USE (MULTI): ICD-10-CM

## 2025-06-17 DIAGNOSIS — I10 PRIMARY HYPERTENSION: ICD-10-CM

## 2025-06-17 RX ORDER — TIRZEPATIDE 7.5 MG/.5ML
7.5 INJECTION, SOLUTION SUBCUTANEOUS WEEKLY
Qty: 2 ML | Refills: 2 | Status: SHIPPED | OUTPATIENT
Start: 2025-06-17

## 2025-06-17 RX ORDER — HYDRALAZINE HYDROCHLORIDE 25 MG/1
50 TABLET, FILM COATED ORAL 3 TIMES DAILY
Qty: 180 TABLET | Refills: 11 | Status: SHIPPED | OUTPATIENT
Start: 2025-06-17 | End: 2026-06-17

## 2025-06-17 NOTE — ASSESSMENT & PLAN NOTE
BP similar control, but improved towards end of last week. Mariluz will plan on following advice of new neph provider and increasing hydralazine to 50 mg three times daily. However, currently having severe diarrhea- advised that hydralazine can be held temporarily for low BP if dehydrated, and Bumex should not be taken until diarrhea resolves.

## 2025-06-17 NOTE — PROGRESS NOTES
"  Clinical Pharmacy Appointment    Patient ID: Kaylan Woo \"Mariluz\" is a 69 y.o. female who presents for Diabetes and Hypertension.    Pt is here for Follow Up appointment.     Referring Provider: Cornelia Ball APRN-*  PCP: Radha Holt MD   Last visit with PCP: 4/28/2025 (CNP)   Next visit with PCP: 11/3/2025    Subjective     Interval History  Mariluz saw new nephrologist- much better listener and feels better under his care  He encouraged increasing both hydralazine and Mounjaro doses which she is agreeable to  Rather than full-body scan, he is going to have her repeat labs  Today is having diarrhea- believes secondary to dairy intake yesterday  Will plan on increasing Mounjaro Sunday, but only if feeling better rest of week- will send in next strength since I will not be in to send next week    HPI  DIABETES MELLITUS TYPE 2:    Diagnosed with diabetes:  over 10 years. Known diabetic complications: nephropathy, neuropathy.  Does patient follow with Endocrinology: No  Last optometry exam: to be discussed  Podiatry: patient denies sores or cuts on feet today      Current diabetic medications include:  Tresiba 25 units   Mounjaro 5 mg weekly    Clarifications to above regimen: has not recently been using sliding scale at all as before meals she is doing well; after meals typically the spike is less than an hour long  Adverse Effects: pees a lot night after shot    Past diabetic medications include: sulfonylureas (anaphylaxis to sulfa), metformin (GI side effects), Januvia (not effective), Ozempic (constipation)    Glucose Readings:  Glucometer/CGM Type: Ana María CGM; does have back-up fingerstick  Patient tests BG continuously    Current home BG readings (mg/dL):    Previous home BG readings:        Any episodes of hypoglycemia? Yes, one episode into the 50s.  None since she further reduced insulin dose. Did patient treat episode of hypoglycemia appropriately? Yes, yudith  Does the patient have a " prescription for ready-to-use Glucagon? No - good candidate if able to afford    Hyperglycemia symptoms: none reported at this time    Lifestyle (early June):  Keeping hydrated especially day of/after Mounjaro because of increased urination  Recently taking collagen to try to help with pain- has not seen improvement yet but has not been taking long    April:  No lifestyle changes that Mariluz recalls for Tuesday- was low most of day  Did have sensor issues around that time which caused her to miss low alarms  She did still correct with carb foods    3/24:  Has added lots of lettuce to her diet- this has helped her move her bowels better    3/3:  Does feel like she is getting plenty of fluid although she is constipated    2/10/25:  Diet:   Snacks: will eat before bed if glucose <120 (chicken fajita)  Drinks: iced tea, small can of Dr. Pepper to correct sugar some times; cannot do diet soda due to aspartame allergy  Physical Activity: likes to swim in the summer  Tobacco history: denies    Secondary Prevention:  Statin? No- previous side effects  ACE-I/ARB? Yes  Aspirin? Yes    Pertinent PMH Review:  PMH of Pancreatitis: No  PMH of Retinopathy: No  PMH of Urinary Tract Infections: to be reassessed  PMH of MTC: No  UACR/EGFR in last year?: Yes  Albumin/Creatinine Ratio   Date Value Ref Range Status   01/25/2025   Final     Comment:     One or more analytes used in this calculation is outside of the analytical measurement range. Calculation cannot be performed.     Albumin/Creatine Ratio   Date Value Ref Range Status   06/01/2022 23.7 0.0 - 30.0 ug/mg crt Final       Immunizations:  Influenza? Yes  COVID? Yes  Pneumonia? Yes  Shingles? Yes  Hepatitis B? No     HYPERTENSION ASSESSMENT  Medication Therapy  Current Medications:   Irbesartan 300 mg daily  Diltiazem 240 mg daily  Bumetanide 1 mg daily  Hydralazine 50 mg three times daily (has not yet increased the dose)    Previous Medications: spironolactone (stopped for  ROSEMARIE), hydralazine (recently stopped by nephrology)    Clarifications to above regimen: none   Adverse Effects: did have some dizziness once last week, otherwise doing well    Home BP Monitoring  BP Cuff Type: Digital arm monitor- validated in office and reads about 7 mmHg higher systolic    Current home BP readings: normal for a few days last week    Previous home BP readings:   1 day of very good readings  Last night 156/71  Earlier in the week 127/62, 136/64  But otherwise 150s-160s have been common    Mid-afternoon tends to be best      BP Readings from Last 3 Encounters:   04/30/25 150/66   04/28/25 136/71   02/03/25 148/82     Lifestyle  See above    Sodium Intake:  Will re-address at next visit    Risk Assessment  Major Risk Factors Include:  Hypertension  Obesity (BMI >30)  Dyslipidemia  Diabetes mellitus  Age > 55 (men) or > 65 (women)  Sleep apnea  The 10-year ASCVD risk score (Kb HOLLINGSWORTH, et al., 2019) is: 25%    Values used to calculate the score:      Age: 69 years      Sex: Female      Is Non- : No      Diabetic: Yes      Tobacco smoker: No      Systolic Blood Pressure: 150 mmHg      Is BP treated: Yes      HDL Cholesterol: 55.6 mg/dL      Total Cholesterol: 259 mg/dL  Known target organ damage:  Chronic Heart Failure  Left ventricular hypertrophy  CKD (w/ recent ROSEMARIE)    Secondary Prevention  On Statin?: No, previous side effects  Immunizations Needed: RSV and Hepatitis B Series  Tobacco Use: non-smoker     Medication Reconciliation:  Changed: no reported changes  Does mention still holding Lodine but taking rare ibuprofen for pain instead    Drug Interactions  The following drug interactions were noted:    Aspirin and etodolac - additive bleed risk and possible decreased efficacy; increased bleed risk of venlafaxine as well  Additive nephrotoxicity risk of NSAIDs, bumetanide, and losartan  Tizanidine may be less effective while on venlafaxine    Medication System  Management  Patient's preferred pharmacy: [facility] ([place] [facility] )  Adherence/Organization: appropriate; gabapentin is 2-3 times per day  Affordability/Accessibility: Is on low income currently, brand name medications can be expensive    Objective   Allergies   Allergen Reactions    Celecoxib Shortness of breath and Rash    Rofecoxib Shortness of breath and Rash    Sulfa (Sulfonamide Antibiotics) Anaphylaxis    Sulfasalazine Anaphylaxis    Sulfonylureas Anaphylaxis    Sulfur Anaphylaxis    Aspartame Other and Headache    Buprenorphine Other     dizziness    Diet Foods Other     migraine headache. Allergy to aspartame only. Able to tolerate sucralose (splenda).    Ezetimibe Other     Couldn't walk    Iodides Other    Metformin Other     Severe GI SE    Nalidixic Acid Unknown     Pt does not think she is allergic to this    Nsaids (Non-Steroidal Anti-Inflammatory Drug) Unknown     Pt denies allergy    Pyrazolones Other     Pt does not know if allergy    Rivaroxaban Hives    Salicylates Unknown     Pt states she is not allergic to    Shellfish Containing Products Nausea/vomiting     scallops    Shellfish Derived Nausea/vomiting     scallops    Statins-Hmg-Coa Reductase Inhibitors Other    Thiazides Unknown     Reaction to hydrochlorothiazide (cross-reacts with sulfa allergy)    Cyclobenzaprine Rash    Latex Swelling and Rash    Valdecoxib Swelling and Rash     Social History     Social History Narrative    Not on file      Medication Review  Current Outpatient Medications   Medication Instructions    acetaminophen (TYLENOL) 1,000 mg, Every 6 hours PRN    aspirin 81 mg, Daily    bumetanide (BUMEX) 1 mg, oral, Daily    cholecalciferol (Vitamin D-3) 50 mcg (2,000 unit) capsule Take by mouth.    dilTIAZem CD (CARDIZEM CD) 240 mg, oral, Daily    FreeStyle Ana María sensor system (FreeStyle Ana María 2 Sensor) kit 1 Cartridge every 14 days    gabapentin (Neurontin) 600 mg tablet TAKE 1 TABLET BY MOUTH THREE TIMES DAILY FOR 30  "DAYS    hydrALAZINE (APRESOLINE) 50 mg, oral, 3 times daily    insulin degludec (TRESIBA FLEXTOUCH) 25 Units, subcutaneous, Nightly, Take as directed per insulin instructions.    insulin lispro (HumaLOG KwikPen Insulin) 100 unit/mL injection Uad per sliding scale    irbesartan (AVAPRO) 300 mg, oral, Daily    ketoconazole (NIZOral) 2 % shampoo Wash scalp once to twice a week. Leave on for 3-5 minutes then rinse    L. acidophilus/Bifid. animalis 32 billion cell capsule 1 capsule, Daily    montelukast (SINGULAIR) 10 mg, oral, Daily    Mounjaro 7.5 mg, subcutaneous, Weekly    omeprazole (PRILOSEC) 40 mg, oral, Daily    omeprazole (PRILOSEC) 20 mg, oral, Daily, Do not crush or chew.    pen needle, diabetic (BD Michelle 2nd Gen Pen Needle) 32 gauge x 5/32\" needle Use with daily glucose testing    tiZANidine (ZANAFLEX) 4 mg, oral, 3 times daily PRN    True Metrix Glucose Test Strip strip use to check blood sugar TWICE DAILY    venlafaxine XR (Effexor-XR) 150 mg 24 hr capsule Take 1 capsule (150 mg total) by mouth daily with breakfast for 30 days.    venlafaxine XR (Effexor-XR) 75 mg 24 hr capsule TAKE 1 CAPSULE(75 MG) BY MOUTH DAILY WITH FOOD    Ventolin HFA 90 mcg/actuation inhaler Ventolin ade 2 puffs q4h prn    VITAMIN B COMPLEX ORAL 1 tablet, Every morning      Vitals  BP Readings from Last 2 Encounters:   04/30/25 150/66   04/28/25 136/71     BMI Readings from Last 1 Encounters:   04/30/25 44.41 kg/m²      Labs  A1C  Lab Results   Component Value Date    HGBA1C 8.0 (H) 03/04/2025    HGBA1C 7.4 (H) 10/30/2024    HGBA1C 7.3 (H) 07/29/2024     BMP  Lab Results   Component Value Date    CALCIUM 9.6 05/12/2025     05/12/2025    K 4.4 05/12/2025    CO2 29 05/12/2025     05/12/2025    BUN 12 05/12/2025    CREATININE 0.76 05/12/2025    EGFR 86 05/12/2025     LFTs  Lab Results   Component Value Date    ALT 11 03/04/2025    AST 10 03/04/2025    ALKPHOS 108 03/04/2025    BILITOT 0.3 03/04/2025     FLP  Lab Results "   Component Value Date    TRIG 245 (H) 03/04/2025    CHOL 259 (H) 03/04/2025    LDLF 149 (H) 08/04/2023    LDLCALC 154 (H) 03/04/2025    HDL 55.6 03/04/2025     Urine Microalbumin  Lab Results   Component Value Date    MICROALBCREA  01/25/2025      Comment:      One or more analytes used in this calculation is outside of the analytical measurement range. Calculation cannot be performed.     Weight Management  Wt Readings from Last 3 Encounters:   04/30/25 110 kg (242 lb 12.8 oz)   04/28/25 109 kg (241 lb 6.4 oz)   02/03/25 111 kg (244 lb 3.2 oz)      There is no height or weight on file to calculate BMI.     Assessment/Plan   Problem List Items Addressed This Visit       Type 2 diabetes mellitus with other specified complication, unspecified whether long term insulin use (Multi)    Improved control recently but she and nephrologist are interested in increasing to 7.5 mg for her next dose of Mounjaro. Given current diarrhea, will likely advance next week. Her diarrhea is unlikely related to Mounjaro given timing of last dose, several days ago, as well as previously having more issues with constipation than diarrhea. Will follow-up on glucose with dose change on 7/2. Until then, advised to take 15 units of Tresiba versus 25 units while ill.         Relevant Medications    tirzepatide (Mounjaro) 7.5 mg/0.5 mL pen injector    Other Relevant Orders    Referral to Clinical Pharmacy    Hypertension    BP similar control, but improved towards end of last week. Mariluz will plan on following advice of new neph provider and increasing hydralazine to 50 mg three times daily. However, currently having severe diarrhea- advised that hydralazine can be held temporarily for low BP if dehydrated, and Bumex should not be taken until diarrhea resolves.         Relevant Medications    hydrALAZINE (Apresoline) 25 mg tablet    Other Relevant Orders    Referral to Clinical Pharmacy     Other Visit Diagnoses         Medication management         Relevant Orders    Referral to Clinical Pharmacy      Type 2 diabetes mellitus with other specified complication, with long-term current use of insulin        Relevant Medications    tirzepatide (Mounjaro) 7.5 mg/0.5 mL pen injector    Other Relevant Orders    Referral to Clinical Pharmacy          Clinical Pharmacist follow-up: 7/2 1PM, Telehealth visit    Continue all meds under the continuation of care with the referring provider and clinical pharmacy team.    Thank you,  Iwona Salazar, PharmD  Clinical Pharmacist  [phone]    Verbal consent to manage patient's drug therapy was obtained from the patient. They were informed they may decline to participate or withdraw from participation in pharmacy services at any time.

## 2025-06-17 NOTE — ASSESSMENT & PLAN NOTE
Improved control recently but she and nephrologist are interested in increasing to 7.5 mg for her next dose of Mounjaro. Given current diarrhea, will likely advance next week. Her diarrhea is unlikely related to Mounjaro given timing of last dose, several days ago, as well as previously having more issues with constipation than diarrhea. Will follow-up on glucose with dose change on 7/2. Until then, advised to take 15 units of Tresiba versus 25 units while ill.

## 2025-06-17 NOTE — TELEPHONE ENCOUNTER
"Refill Request      Last OV with PCP 4/28/2025     Future Appointments       Date / Time Provider Department Dept Phone    7/2/2025 1:00 PM (Arrive by 12:45 PM) Iwona Salazar, PharmD [facility]  Arrive at: Your Home [phone]    11/3/2025 1:00 PM (Arrive by 12:45 PM) Radha Holt MD  [facility] [phone]          Lab Results   Component Value Date    HGBA1C 8.0 (H) 03/04/2025     Lab Results   Component Value Date    CHOL 259 (H) 03/04/2025    CHOL 270 (H) 10/30/2024    CHOL 297 (H) 11/08/2023     Lab Results   Component Value Date    HDL 55.6 03/04/2025    HDL 54.4 10/30/2024    HDL 53.8 11/08/2023     Lab Results   Component Value Date    LDLCALC 154 (H) 03/04/2025    LDLCALC 156 (H) 10/30/2024    LDLCALC 201 (H) 11/08/2023     Lab Results   Component Value Date    TRIG 245 (H) 03/04/2025    TRIG 299 (H) 10/30/2024    TRIG 212 (H) 11/08/2023     No components found for: \"CHOLHDL\"  Lab Results   Component Value Date    TSH 1.33 10/30/2024     Lab Results   Component Value Date    GLUCOSE 206 (H) 05/12/2025    CALCIUM 9.6 05/12/2025     05/12/2025    K 4.4 05/12/2025    CO2 29 05/12/2025     05/12/2025    BUN 12 05/12/2025    CREATININE 0.76 05/12/2025     VITD25   Date/Time Value Ref Range Status   10/30/2024 11:01 AM 46 30 - 100 ng/mL Final         "

## 2025-06-18 RX ORDER — INSULIN DEGLUDEC 100 U/ML
25 INJECTION, SOLUTION SUBCUTANEOUS NIGHTLY
Qty: 15 ML | Refills: 11 | Status: SHIPPED | OUTPATIENT
Start: 2025-06-18 | End: 2026-06-18

## 2025-07-01 ENCOUNTER — APPOINTMENT (OUTPATIENT)
Dept: NEPHROLOGY | Facility: CLINIC | Age: 70
End: 2025-07-01
Payer: COMMERCIAL

## 2025-07-02 ENCOUNTER — APPOINTMENT (OUTPATIENT)
Dept: PHARMACY | Facility: HOSPITAL | Age: 70
End: 2025-07-02
Payer: COMMERCIAL

## 2025-07-14 ENCOUNTER — APPOINTMENT (OUTPATIENT)
Dept: PHARMACY | Facility: HOSPITAL | Age: 70
End: 2025-07-14
Payer: COMMERCIAL

## 2025-07-14 DIAGNOSIS — Z79.4 TYPE 2 DIABETES MELLITUS WITH OTHER SPECIFIED COMPLICATION, WITH LONG-TERM CURRENT USE OF INSULIN: ICD-10-CM

## 2025-07-14 DIAGNOSIS — I10 PRIMARY HYPERTENSION: ICD-10-CM

## 2025-07-14 DIAGNOSIS — E11.69 TYPE 2 DIABETES MELLITUS WITH OTHER SPECIFIED COMPLICATION, WITH LONG-TERM CURRENT USE OF INSULIN: ICD-10-CM

## 2025-07-14 DIAGNOSIS — Z79.899 MEDICATION MANAGEMENT: ICD-10-CM

## 2025-07-14 DIAGNOSIS — E11.69 TYPE 2 DIABETES MELLITUS WITH OTHER SPECIFIED COMPLICATION, UNSPECIFIED WHETHER LONG TERM INSULIN USE (MULTI): ICD-10-CM

## 2025-07-14 RX ORDER — INSULIN DEGLUDEC 100 U/ML
12 INJECTION, SOLUTION SUBCUTANEOUS NIGHTLY
Qty: 15 ML | Refills: 3 | Status: SHIPPED | OUTPATIENT
Start: 2025-07-14 | End: 2026-07-14

## 2025-07-14 NOTE — ASSESSMENT & PLAN NOTE
Current readings controlled with GMI of 6.5%. Discussed some nausea to current dose, had some Zofran at home which helped. Docusate and lettuce to ease constipation. Diarrhea was from acute illness and completely resolved on increased Mounjaro. Since she has had a few lows, will decrease Lantus even further to 10 units today. Advised her to report any vomiting or worsening symptoms, but she believes improving after repeat dose of 7.5 mg. Will discuss remaining on current dose versus decrease back to 5 mg at next visit depending on symptom progression. This is best glucose control she has seen recently and she wants to continue seeing this if possible.

## 2025-07-14 NOTE — ASSESSMENT & PLAN NOTE
ASSESSMENT OF SMBP MEASUREMENTS  Best readings at goal, but most still in 160-170 range    Patient's goal BP < 130/80.   Rationale for plan: Mariluz denies symptoms of high or low blood pressure. She does note dry mouth after midday hydralazine doses. Given this, hesitant to increase this. We discussed lifestyle in depth and she does not appear to have excessive sodium or caffeine intake. She also regularly walks and swims. We discussed next agent to try may be an alpha 1 antagonist such as terazosin- hesitant due to side effect of dizziness, as she has new grandchild she wants to be able to spend time with and had issues with dizziness when starting hydralazine. Given no readings in urgent level (180s)- she wishes to maintain current doses until she sees NP in 1 1/2 weeks.  CrCl cannot be calculated (Patient's most recent lab result is older than the maximum 3 days allowed.).

## 2025-07-14 NOTE — PROGRESS NOTES
"  Clinical Pharmacy Appointment    Patient ID: Kaylan Woo \"Mariluz\" is a 69 y.o. female who presents for Diabetes and Hypertension.    Pt is here for Follow Up appointment.     Referring Provider: Cornelia Ball APRN-*  PCP: Radha Holt MD   Last visit with PCP: 4/28/2025 (CNP)   Next visit with PCP: 7/23/2025, 11/3/2025    Subjective     Interval History  4th of July weekend had constipation and nausea- better now but still having nausea  Did take some Zofran that week which provided significant relief  Grandchild arrived last week    HPI  DIABETES MELLITUS TYPE 2:    Diagnosed with diabetes:  over 10 years. Known diabetic complications: nephropathy, neuropathy.  Does patient follow with Endocrinology: No  Last optometry exam: to be discussed  Podiatry: patient denies sores or cuts on feet today      Current diabetic medications include:  Tresiba 25 units   Has reduced to 12 units with Mounjaro increases  Mounjaro 7.5 mg weekly    Clarifications to above regimen: has not recently been using sliding scale at all as before meals she is doing well; after meals typically the spike is less than an hour long  Adverse Effects: nausea after injections, constipation   Getting better with each dose    Past diabetic medications include: sulfonylureas (anaphylaxis to sulfa), metformin (GI side effects), Januvia (not effective), Ozempic (constipation)    Glucose Readings:  Glucometer/CGM Type: Ana María CGM; does have back-up fingerstick  Patient tests BG continuously    Current home BG readings (mg/dL):      Previous home BG readings:        Any episodes of hypoglycemia? Yes, has decreased insulin in response.  None since she further reduced insulin dose, but does go down quickly- we will further reduce today. Did patient treat episode of hypoglycemia appropriately? Yes, jelly or nettie  Does the patient have a prescription for ready-to-use Glucagon? No - good candidate if able to afford    Hyperglycemia symptoms: " none reported at this time    Lifestyle:  Low appetite with Mounjaro and nausea- no desire to eat for the most part; no vomiting, is eating more salads  Looking to move to a living facility which will work better with her shoulder limitations, going through application questionnaire with Cornelia soon  Limits caffeine to 1 cup coffee per day  Has been avoiding Ramen lately due to salt content  Meals on wheels sometimes needs added salt but tries to limit how much she eats in the day  Not eating out  Salt shaker in kitchen, not on table    June:  Keeping hydrated especially day of/after Mounjaro because of increased urination  Recently taking collagen to try to help with pain- has not seen improvement yet but has not been taking long    April:  No lifestyle changes that Mariluz recalls for Tuesday- was low most of day  Did have sensor issues around that time which caused her to miss low alarms  She did still correct with carb foods    3/24:  Has added lots of lettuce to her diet- this has helped her move her bowels better    3/3:  Does feel like she is getting plenty of fluid although she is constipated    2/10/25:  Diet:   Snacks: will eat before bed if glucose <120 (chicken fajita)  Drinks: iced tea, small can of Dr. Pepper to correct sugar some times; cannot do diet soda due to aspartame allergy  Physical Activity: likes to swim in the summer  Tobacco history: denies    Secondary Prevention:  Statin? No- previous side effects  ACE-I/ARB? Yes  Aspirin? Yes    Pertinent PMH Review:  PMH of Pancreatitis: No  PMH of Retinopathy: No  PMH of Urinary Tract Infections: to be reassessed  PMH of MTC: No  UACR/EGFR in last year?: Yes  Albumin/Creatinine Ratio   Date Value Ref Range Status   01/25/2025   Final     Comment:     One or more analytes used in this calculation is outside of the analytical measurement range. Calculation cannot be performed.     Albumin/Creatine Ratio   Date Value Ref Range Status   06/01/2022 23.7 0.0 -  30.0 ug/mg crt Final       Immunizations:  Influenza? Yes  COVID? Yes  Pneumonia? Yes  Shingles? Yes  Hepatitis B? No     HYPERTENSION ASSESSMENT  Medication Therapy  Current Medications:   Irbesartan 300 mg daily  Diltiazem 240 mg daily  Bumetanide 1 mg daily  Hydralazine 50 mg three times daily     Previous Medications: spironolactone (stopped for ROSEMARIE), hydralazine (recently stopped by nephrology)    Clarifications to above regimen: none   Adverse Effects: did have some dizziness once last week, otherwise doing well    Home BP Monitoring  BP Cuff Type: Digital arm monitor- validated in office and reads about 7 mmHg higher systolic    Current home BP readings: 130/67 on days when she had lower food intake  Other days has still been high, 170s mmHg, 166/77 mmHg, 168/77 mmHg, 174/80 mmHg    Previous home BP readings:   1 day of very good readings  Last night 156/71  Earlier in the week 127/62, 136/64  But otherwise 150s-160s have been common    Mid-afternoon tends to be best      BP Readings from Last 3 Encounters:   04/30/25 150/66   04/28/25 136/71   02/03/25 148/82     Lifestyle  See above    Risk Assessment  Major Risk Factors Include:  Hypertension  Obesity (BMI >30)  Dyslipidemia  Diabetes mellitus  Age > 55 (men) or > 65 (women)  Sleep apnea  The 10-year ASCVD risk score (Kb HOLLINGSWORTH, et al., 2019) is: 25%    Values used to calculate the score:      Age: 69 years      Sex: Female      Is Non- : No      Diabetic: Yes      Tobacco smoker: No      Systolic Blood Pressure: 150 mmHg      Is BP treated: Yes      HDL Cholesterol: 55.6 mg/dL      Total Cholesterol: 259 mg/dL  Known target organ damage:  Chronic Heart Failure  Left ventricular hypertrophy  CKD (w/ recent ROSEMARIE)    Secondary Prevention  On Statin?: No, previous side effects  Immunizations Needed: RSV and Hepatitis B Series  Tobacco Use: non-smoker     Medication Reconciliation:  Changed: no reported changes  Does mention not taking  Bumex every day, trying not to get dehydrated helena on nausea days    Drug Interactions  The following drug interactions were noted:    Aspirin and etodolac - additive bleed risk and possible decreased efficacy; increased bleed risk of venlafaxine as well  Additive nephrotoxicity risk of NSAIDs, bumetanide, and losartan  Tizanidine may be less effective while on venlafaxine    Medication System Management  Patient's preferred pharmacy: [facility] ([place] )  Adherence/Organization: appropriate; gabapentin is 2-3 times per day  Affordability/Accessibility: Is on low income currently, brand name medications can be expensive    Objective   Allergies   Allergen Reactions    Celecoxib Shortness of breath and Rash    Rofecoxib Shortness of breath and Rash    Sulfa (Sulfonamide Antibiotics) Anaphylaxis    Sulfasalazine Anaphylaxis    Sulfonylureas Anaphylaxis    Sulfur Anaphylaxis    Aspartame Other and Headache    Buprenorphine Other     dizziness    Diet Foods Other     migraine headache. Allergy to aspartame only. Able to tolerate sucralose (splenda).    Ezetimibe Other     Couldn't walk    Iodides Other    Metformin Other     Severe GI SE    Nalidixic Acid Unknown     Pt does not think she is allergic to this    Nsaids (Non-Steroidal Anti-Inflammatory Drug) Unknown     Pt denies allergy    Pyrazolones Other     Pt does not know if allergy    Rivaroxaban Hives    Salicylates Unknown     Pt states she is not allergic to    Shellfish Containing Products Nausea/vomiting     scallops    Shellfish Derived Nausea/vomiting     scallops    Statins-Hmg-Coa Reductase Inhibitors Other    Thiazides Unknown     Reaction to hydrochlorothiazide (cross-reacts with sulfa allergy)    Cyclobenzaprine Rash    Latex Swelling and Rash    Valdecoxib Swelling and Rash     Social History     Social History Narrative    Not on file      Medication Review  Current Outpatient Medications   Medication Instructions    acetaminophen (TYLENOL)  "1,000 mg, Every 6 hours PRN    aspirin 81 mg, Daily    bumetanide (BUMEX) 1 mg, oral, Daily    cholecalciferol (Vitamin D-3) 50 mcg (2,000 unit) capsule Take by mouth.    dilTIAZem CD (CARDIZEM CD) 240 mg, oral, Daily    FreeStyle Ana María sensor system (FreeStyle Ana María 2 Sensor) kit 1 Cartridge every 14 days    gabapentin (Neurontin) 600 mg tablet TAKE 1 TABLET BY MOUTH THREE TIMES DAILY FOR 30 DAYS    hydrALAZINE (APRESOLINE) 50 mg, oral, 3 times daily    insulin degludec (TRESIBA FLEXTOUCH) 12 Units, subcutaneous, Nightly, Take as directed per insulin instructions.    insulin lispro (HumaLOG KwikPen Insulin) 100 unit/mL injection Uad per sliding scale    irbesartan (AVAPRO) 300 mg, oral, Daily    ketoconazole (NIZOral) 2 % shampoo Wash scalp once to twice a week. Leave on for 3-5 minutes then rinse    L. acidophilus/Bifid. animalis 32 billion cell capsule 1 capsule, Daily    montelukast (SINGULAIR) 10 mg, oral, Daily    Mounjaro 7.5 mg, subcutaneous, Weekly    omeprazole (PRILOSEC) 40 mg, oral, Daily    omeprazole (PRILOSEC) 20 mg, oral, Daily, Do not crush or chew.    pen needle, diabetic (BD Michelle 2nd Gen Pen Needle) 32 gauge x 5/32\" needle Use with daily glucose testing    tiZANidine (ZANAFLEX) 4 mg, oral, 3 times daily PRN    True Metrix Glucose Test Strip strip use to check blood sugar TWICE DAILY    venlafaxine XR (Effexor-XR) 150 mg 24 hr capsule Take 1 capsule (150 mg total) by mouth daily with breakfast for 30 days.    venlafaxine XR (Effexor-XR) 75 mg 24 hr capsule TAKE 1 CAPSULE(75 MG) BY MOUTH DAILY WITH FOOD    Ventolin HFA 90 mcg/actuation inhaler Ventolin ade 2 puffs q4h prn    VITAMIN B COMPLEX ORAL 1 tablet, Every morning      Vitals  BP Readings from Last 2 Encounters:   04/30/25 150/66   04/28/25 136/71     BMI Readings from Last 1 Encounters:   04/30/25 44.41 kg/m²      Labs  A1C  Lab Results   Component Value Date    HGBA1C 8.0 (H) 03/04/2025    HGBA1C 7.4 (H) 10/30/2024    HGBA1C 7.3 (H) " 07/29/2024     BMP  Lab Results   Component Value Date    CALCIUM 9.6 05/12/2025     05/12/2025    K 4.4 05/12/2025    CO2 29 05/12/2025     05/12/2025    BUN 12 05/12/2025    CREATININE 0.76 05/12/2025    EGFR 86 05/12/2025     LFTs  Lab Results   Component Value Date    ALT 11 03/04/2025    AST 10 03/04/2025    ALKPHOS 108 03/04/2025    BILITOT 0.3 03/04/2025     FLP  Lab Results   Component Value Date    TRIG 245 (H) 03/04/2025    CHOL 259 (H) 03/04/2025    LDLF 149 (H) 08/04/2023    LDLCALC 154 (H) 03/04/2025    HDL 55.6 03/04/2025     Urine Microalbumin  Lab Results   Component Value Date    MICROALBCREA  01/25/2025      Comment:      One or more analytes used in this calculation is outside of the analytical measurement range. Calculation cannot be performed.     Weight Management  Wt Readings from Last 3 Encounters:   04/30/25 110 kg (242 lb 12.8 oz)   04/28/25 109 kg (241 lb 6.4 oz)   02/03/25 111 kg (244 lb 3.2 oz)      There is no height or weight on file to calculate BMI.     Assessment/Plan   Problem List Items Addressed This Visit       Type 2 diabetes mellitus with other specified complication, unspecified whether long term insulin use (Multi)    Current readings controlled with GMI of 6.5%. Discussed some nausea to current dose, had some Zofran at home which helped. Docusate and lettuce to ease constipation. Diarrhea was from acute illness and completely resolved on increased Mounjaro. Since she has had a few lows, will decrease Lantus even further to 10 units today. Advised her to report any vomiting or worsening symptoms, but she believes improving after repeat dose of 7.5 mg. Will discuss remaining on current dose versus decrease back to 5 mg at next visit depending on symptom progression. This is best glucose control she has seen recently and she wants to continue seeing this if possible.         Relevant Medications    insulin degludec (Tresiba FlexTouch) 100 unit/mL (3 mL) pen     Other Relevant Orders    Referral to Clinical Pharmacy    Hypertension    ASSESSMENT OF SMBP MEASUREMENTS  Best readings at goal, but most still in 160-170 range    Patient's goal BP < 130/80.   Rationale for plan: Mariluz denies symptoms of high or low blood pressure. She does note dry mouth after midday hydralazine doses. Given this, hesitant to increase this. We discussed lifestyle in depth and she does not appear to have excessive sodium or caffeine intake. She also regularly walks and swims. We discussed next agent to try may be an alpha 1 antagonist such as terazosin- hesitant due to side effect of dizziness, as she has new grandchild she wants to be able to spend time with and had issues with dizziness when starting hydralazine. Given no readings in urgent level (180s)- she wishes to maintain current doses until she sees NP in 1 1/2 weeks.  CrCl cannot be calculated (Patient's most recent lab result is older than the maximum 3 days allowed.).           Relevant Orders    Referral to Clinical Pharmacy     Other Visit Diagnoses         Medication management        Relevant Orders    Referral to Clinical Pharmacy      Type 2 diabetes mellitus with other specified complication, with long-term current use of insulin        Relevant Medications    insulin degludec (Tresiba FlexTouch) 100 unit/mL (3 mL) pen          Clinical Pharmacist follow-up: 8/4 11:20AM, Telehealth visit    Continue all meds under the continuation of care with the referring provider and clinical pharmacy team.    Thank you,  Iwona Salazar, PharmD  Clinical Pharmacist  [phone]    Verbal consent to manage patient's drug therapy was obtained from the patient. They were informed they may decline to participate or withdraw from participation in pharmacy services at any time.

## 2025-07-23 ENCOUNTER — APPOINTMENT (OUTPATIENT)
Dept: PRIMARY CARE | Facility: CLINIC | Age: 70
End: 2025-07-23
Payer: MEDICARE

## 2025-07-23 VITALS
BODY MASS INDEX: 42.18 KG/M2 | DIASTOLIC BLOOD PRESSURE: 66 MMHG | WEIGHT: 229.2 LBS | SYSTOLIC BLOOD PRESSURE: 142 MMHG | OXYGEN SATURATION: 95 % | HEIGHT: 62 IN | TEMPERATURE: 98.8 F | HEART RATE: 86 BPM

## 2025-07-23 DIAGNOSIS — G56.01 RIGHT CARPAL TUNNEL SYNDROME: ICD-10-CM

## 2025-07-23 DIAGNOSIS — R20.0 NUMBNESS AND TINGLING IN BOTH HANDS: ICD-10-CM

## 2025-07-23 DIAGNOSIS — R53.81 DISABILITY AFFECTING DAILY LIVING: ICD-10-CM

## 2025-07-23 DIAGNOSIS — G56.02 LEFT CARPAL TUNNEL SYNDROME: ICD-10-CM

## 2025-07-23 DIAGNOSIS — R20.2 NUMBNESS AND TINGLING IN BOTH HANDS: ICD-10-CM

## 2025-07-23 DIAGNOSIS — R25.1 TREMOR: ICD-10-CM

## 2025-07-23 DIAGNOSIS — J44.89 CHRONIC OBSTRUCTIVE ASTHMA WITH STATUS ASTHMATICUS (MULTI): ICD-10-CM

## 2025-07-23 DIAGNOSIS — J45.902 CHRONIC OBSTRUCTIVE ASTHMA WITH STATUS ASTHMATICUS (MULTI): ICD-10-CM

## 2025-07-23 DIAGNOSIS — M48.061 SPINAL STENOSIS OF LUMBAR REGION, UNSPECIFIED WHETHER NEUROGENIC CLAUDICATION PRESENT: ICD-10-CM

## 2025-07-23 DIAGNOSIS — M53.2X6 LUMBAR SPINE INSTABILITY: ICD-10-CM

## 2025-07-23 DIAGNOSIS — Z98.890 STATUS POST CERVICAL DISCECTOMY: Primary | ICD-10-CM

## 2025-07-23 DIAGNOSIS — M51.34 DEGENERATION OF INTERVERTEBRAL DISC OF THORACIC REGION: Chronic | ICD-10-CM

## 2025-07-23 PROCEDURE — G2211 COMPLEX E/M VISIT ADD ON: HCPCS | Performed by: NURSE PRACTITIONER

## 2025-07-23 PROCEDURE — 4010F ACE/ARB THERAPY RXD/TAKEN: CPT | Performed by: NURSE PRACTITIONER

## 2025-07-23 PROCEDURE — 3078F DIAST BP <80 MM HG: CPT | Performed by: NURSE PRACTITIONER

## 2025-07-23 PROCEDURE — 3077F SYST BP >= 140 MM HG: CPT | Performed by: NURSE PRACTITIONER

## 2025-07-23 PROCEDURE — 3008F BODY MASS INDEX DOCD: CPT | Performed by: NURSE PRACTITIONER

## 2025-07-23 PROCEDURE — 99214 OFFICE O/P EST MOD 30 MIN: CPT | Performed by: NURSE PRACTITIONER

## 2025-07-23 PROCEDURE — 1160F RVW MEDS BY RX/DR IN RCRD: CPT | Performed by: NURSE PRACTITIONER

## 2025-07-23 PROCEDURE — 1159F MED LIST DOCD IN RCRD: CPT | Performed by: NURSE PRACTITIONER

## 2025-07-23 ASSESSMENT — PATIENT HEALTH QUESTIONNAIRE - PHQ9
SUM OF ALL RESPONSES TO PHQ9 QUESTIONS 1 AND 2: 1
2. FEELING DOWN, DEPRESSED OR HOPELESS: SEVERAL DAYS
1. LITTLE INTEREST OR PLEASURE IN DOING THINGS: NOT AT ALL

## 2025-07-23 NOTE — ASSESSMENT & PLAN NOTE
Unable to walk long distances wo assistance  Routinely uses rollator  Has handicap placard  Uses wheelchair prn

## 2025-07-23 NOTE — PROGRESS NOTES
Problem List Items Addressed This Visit          Medium    Chronic obstructive asthma with status asthmaticus (Multi)    Overview   Continue current medications.   No new symptoms,stable condition.   Follow up at least yearly.           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    No evidence of thoracic cord compression, intramedullary cord signal   abnormality, or cord volume loss.     Grossly similar appearance of nodule on the posterior segment of the   right lower lobe.  Please see prior chest CT for follow-up   recommendations.     Anatomic Thoracic/Lumbar Variant: None.  L4-5 is considered the level of   the iliac crest and assume there are 5 lumbar-type vertebrae.          Disability affecting daily living    Overview   Chronic health conditions impact her ADLs.  She is unable to reach overhead so therefore can't reach microwave, upper cabinets.  She requires a shower chair and raised toilet seat for safety. Doorway entrances need widened for access with rollator and/or wheelchair. Not only will these accommodations improve her quality of life, but will also improve her safety         Lumbar spine instability    Lumbar stenosis    Overview   4/11/24 CT:  No acute osseous abnormality of the lumbar spine. Postoperative  change of L1-L5 laminectomy and posterior decompression. There is  severe multilevel cervical spondylosis as described in further detail  above with multilevel lateral recess narrowing/effacement. There is  otherwise no significant spinal canal stenosis evident. There is  severe multilevel neural foraminal narrowing as detailed above.         Numbness and  "tingling in both hands    Overview   Bilat carpal tunnel   Stable, monitor         Status post cervical discectomy - Primary    Overview   7/2/25 CT:  Vertebrae: No acute fracture or traumatic malalignment. No aggressive osseous lesions. Status post C5-C7 ACDF with solid fusion of C6 and C7 vertebral bodies. The disc space between C5 and C6 remain visible. Fusion of the left C3-C5 articular masses. Large posterior projecting osteophytes at C3-C4. Posterior projecting osteophytes at C6-C7. Calcification and fusion of the anterior longitudinal ligament and osteophytes of C6-T3 keeping with diffuse idiopathic skeletal hyperostosis (DISH).          Tremor       Unprioritized    Left carpal tunnel syndrome    Right carpal tunnel syndrome        Subjective   Patient ID: Kaylan Woo \"Mariluz\" is a 69 y.o. female who presents for disability paper work (disability paper work).  HPI  F2F  Max Housing  Can't reach microwave about her stove  Can't reach into upper cabinets  Uses shower chair with arms  Raised toilet seat also needed  Needs to use \"grabbers\" to pick things up or reach things  Presently using rollator for ambulation  Uses wheelchair prn  With asthma unable to ambulate long distances  - has handicap placard    Review of Systems   All other systems reviewed and are negative.      BP Readings from Last 3 Encounters:   07/23/25 142/66   04/30/25 150/66   04/28/25 136/71      Wt Readings from Last 3 Encounters:   07/23/25 104 kg (229 lb 3.2 oz)   04/30/25 110 kg (242 lb 12.8 oz)   04/28/25 109 kg (241 lb 6.4 oz)      BMI:   Estimated body mass index is 41.92 kg/m² as calculated from the following:    Height as of this encounter: 1.575 m (5' 2\").    Weight as of this encounter: 104 kg (229 lb 3.2 oz).    Objective   Physical Exam  Constitutional:       General: She is not in acute distress.  HENT:      Head: Normocephalic and atraumatic.      Nose: Nose normal.      Mouth/Throat:      Mouth: Mucous membranes are " moist.     Eyes:      Extraocular Movements: Extraocular movements intact.      Conjunctiva/sclera: Conjunctivae normal.       Cardiovascular:      Rate and Rhythm: Normal rate and regular rhythm.      Pulses: Normal pulses.   Pulmonary:      Effort: Pulmonary effort is normal.      Breath sounds: Normal breath sounds.   Abdominal:      General: Bowel sounds are normal.     Musculoskeletal:      Cervical back: Normal range of motion and neck supple.      Comments: Using rollator      Skin:     General: Skin is warm and dry.     Neurological:      General: No focal deficit present.      Mental Status: She is alert.     Psychiatric:         Mood and Affect: Mood normal.

## 2025-08-04 ENCOUNTER — APPOINTMENT (OUTPATIENT)
Dept: PHARMACY | Facility: HOSPITAL | Age: 70
End: 2025-08-04
Payer: COMMERCIAL

## 2025-08-04 DIAGNOSIS — I10 PRIMARY HYPERTENSION: Primary | ICD-10-CM

## 2025-08-04 DIAGNOSIS — E11.69 TYPE 2 DIABETES MELLITUS WITH OTHER SPECIFIED COMPLICATION, UNSPECIFIED WHETHER LONG TERM INSULIN USE (MULTI): ICD-10-CM

## 2025-08-04 DIAGNOSIS — Z79.899 MEDICATION MANAGEMENT: ICD-10-CM

## 2025-08-04 RX ORDER — ONDANSETRON 4 MG/1
4 TABLET, FILM COATED ORAL EVERY 8 HOURS PRN
Qty: 20 TABLET | Refills: 1 | Status: SHIPPED | OUTPATIENT
Start: 2025-08-04

## 2025-08-04 NOTE — PROGRESS NOTES
"  Clinical Pharmacy Appointment    Patient ID: Kaylan Woo \"Mariluz\" is a 69 y.o. female who presents for Diabetes and Hypertension.    Pt is here for Follow Up appointment.     Referring Provider: Cornelia Ball APRN-*  PCP: Radha Holt MD   Last visit with PCP: 7/23/2025 (CNP)   Next visit with PCP: 11/3/2025    Subjective     Interval History  Saw Cornelia for verification of conditions visit to try to get her qualified for new living facility  Spending time with new ranulfo Jordan recently    HPI  DIABETES MELLITUS TYPE 2:    Diagnosed with diabetes:  over 10 years. Known diabetic complications: nephropathy, neuropathy.  Does patient follow with Endocrinology: No  Last optometry exam: to be discussed  Podiatry: patient denies sores or cuts on feet today      Current diabetic medications include:  Tresiba 10 units   Mounjaro 7.5 mg weekly    Clarifications to above regimen: has skipped some days of insulin if getting too low  Adverse Effects: nausea - not every dose but still pretty often- manageable with Zofran    Past diabetic medications include: sulfonylureas (anaphylaxis to sulfa), metformin (GI side effects), Januvia (not effective), Ozempic (constipation)    Glucose Readings:  Glucometer/CGM Type: Ana María CGM; does have back-up fingerstick  Patient tests BG continuously    Current home BG readings (mg/dL):      Previous home BG readings:        Any episodes of hypoglycemia? Yes, has decreased insulin in response.  None since she further reduced insulin dose, but does go down quickly- we will further reduce today. Did patient treat episode of hypoglycemia appropriately? Yes, jelly or nettie  Does the patient have a prescription for ready-to-use Glucagon? No - good candidate if able to afford    Hyperglycemia symptoms: none reported at this time    Lifestyle:  Blood pressure recently calming down more  Enjoying time with julio  Down about 14 lbs based on last visit with Cornelia    7/14:  Low " appetite with Mounjaro and nausea- no desire to eat for the most part; no vomiting, is eating more salads  Looking to move to a living facility which will work better with her shoulder limitations, going through application questionnaire with Cornelia soon  Limits caffeine to 1 cup coffee per day  Has been avoiding Ramen lately due to salt content  Meals on wheels sometimes needs added salt but tries to limit how much she eats in the day  Not eating out  Salt shaker in kitchen, not on table    June:  Keeping hydrated especially day of/after Mounjaro because of increased urination  Recently taking collagen to try to help with pain- has not seen improvement yet but has not been taking long    April:  No lifestyle changes that Mariluz recalls for Tuesday- was low most of day  Did have sensor issues around that time which caused her to miss low alarms  She did still correct with carb foods    3/24:  Has added lots of lettuce to her diet- this has helped her move her bowels better    3/3:  Does feel like she is getting plenty of fluid although she is constipated    2/10/25:  Diet:   Snacks: will eat before bed if glucose <120 (chicken fajita)  Drinks: iced tea, small can of Dr. Pepper to correct sugar some times; cannot do diet soda due to aspartame allergy  Physical Activity: likes to swim in the summer  Tobacco history: denies    Secondary Prevention:  Statin? No- previous side effects  ACE-I/ARB? Yes  Aspirin? Yes    Pertinent PMH Review:  PMH of Pancreatitis: No  PMH of Retinopathy: No  PMH of Urinary Tract Infections: to be reassessed  PMH of MTC: No  UACR/EGFR in last year?: Yes  Albumin/Creatinine Ratio   Date Value Ref Range Status   01/25/2025   Final     Comment:     One or more analytes used in this calculation is outside of the analytical measurement range. Calculation cannot be performed.     Albumin/Creatine Ratio   Date Value Ref Range Status   06/01/2022 23.7 0.0 - 30.0 ug/mg crt Final        Immunizations:  Influenza? Yes  COVID? Yes  Pneumonia? Yes  Shingles? Yes  Hepatitis B? No     HYPERTENSION ASSESSMENT  Medication Therapy  Current Medications:   Irbesartan 300 mg daily  Diltiazem 240 mg daily  Bumetanide 1 mg daily  Hydralazine 50 mg three times daily     Previous Medications: spironolactone (stopped for ROSEMARIE), hydralazine (recently stopped by nephrology)    Clarifications to above regimen: none   Adverse Effects: did have some dizziness once last week, otherwise doing well    Home BP Monitoring  BP Cuff Type: Digital arm monitor- validated in office and reads about 7 mmHg higher systolic    Current home BP readings: highest recently in 160s, better than before    Previous home BP readings:   130/67 on days when she had lower food intake  Other days has still been high, 170s mmHg, 166/77 mmHg, 168/77 mmHg, 174/80 mmHg      BP Readings from Last 3 Encounters:   07/23/25 142/66   04/30/25 150/66   04/28/25 136/71     Lifestyle  See above    Risk Assessment  Major Risk Factors Include:  Hypertension  Obesity (BMI >30)  Dyslipidemia  Diabetes mellitus  Age > 55 (men) or > 65 (women)  Sleep apnea  The 10-year ASCVD risk score (Kb HOLLINGSWORTH, et al., 2019) is: 22.7%    Values used to calculate the score:      Age: 69 years      Clincally relevant sex: Female      Is Non- : No      Diabetic: Yes      Tobacco smoker: No      Systolic Blood Pressure: 142 mmHg      Is BP treated: Yes      HDL Cholesterol: 55.6 mg/dL      Total Cholesterol: 259 mg/dL  Known target organ damage:  Chronic Heart Failure  Left ventricular hypertrophy  CKD (w/ recent ROSEMARIE)    Secondary Prevention  On Statin?: No, previous side effects  Immunizations Needed: RSV and Hepatitis B Series  Tobacco Use: non-smoker     Medication Reconciliation:  Changed: no reported changes  Does mention not taking Bumex every day, trying not to get dehydrated helena on nausea days    Drug Interactions  The following drug  interactions were noted:    Aspirin and etodolac - additive bleed risk and possible decreased efficacy; increased bleed risk of venlafaxine as well  Additive nephrotoxicity risk of NSAIDs, bumetanide, and losartan  Tizanidine may be less effective while on venlafaxine    Medication System Management  Patient's preferred pharmacy: [facility] ([facility] )  Adherence/Organization: appropriate; gabapentin is 2-3 times per day  Affordability/Accessibility: Is on low income currently, brand name medications can be expensive- has met deductible    Objective   Allergies   Allergen Reactions    Celecoxib Shortness of breath and Rash    Rofecoxib Shortness of breath and Rash    Sulfa (Sulfonamide Antibiotics) Anaphylaxis    Sulfasalazine Anaphylaxis    Sulfonylureas Anaphylaxis    Sulfur Anaphylaxis    Aspartame Other and Headache    Buprenorphine Other     dizziness    Diet Foods Other     migraine headache. Allergy to aspartame only. Able to tolerate sucralose (splenda).    Ezetimibe Other     Couldn't walk    Iodides Other    Metformin Other     Severe GI SE    Nalidixic Acid Unknown     Pt does not think she is allergic to this    Nsaids (Non-Steroidal Anti-Inflammatory Drug) Unknown     Pt denies allergy    Pyrazolones Other     Pt does not know if allergy    Rivaroxaban Hives    Salicylates Unknown     Pt states she is not allergic to    Shellfish Containing Products Nausea/vomiting     scallops    Shellfish Derived Nausea/vomiting     scallops    Statins-Hmg-Coa Reductase Inhibitors Other    Thiazides Unknown     Reaction to hydrochlorothiazide (cross-reacts with sulfa allergy)    Cyclobenzaprine Rash    Latex Swelling and Rash    Valdecoxib Swelling and Rash     Social History     Social History Narrative    Not on file      Medication Review  Current Outpatient Medications   Medication Instructions    acetaminophen (TYLENOL) 1,000 mg, Every 6 hours PRN    aspirin 81 mg, Daily    bumetanide (BUMEX) 1 mg, oral,  "Daily    cholecalciferol (Vitamin D-3) 50 mcg (2,000 unit) capsule Take by mouth.    dilTIAZem CD (CARDIZEM CD) 240 mg, oral, Daily    FreeStyle Ana María sensor system (FreeStyle Ana María 2 Sensor) kit 1 Cartridge every 14 days    gabapentin (Neurontin) 600 mg tablet TAKE 1 TABLET BY MOUTH THREE TIMES DAILY FOR 30 DAYS    hydrALAZINE (APRESOLINE) 50 mg, oral, 3 times daily    insulin degludec (TRESIBA FLEXTOUCH) 12 Units, subcutaneous, Nightly, Take as directed per insulin instructions.    insulin lispro (HumaLOG KwikPen Insulin) 100 unit/mL injection Uad per sliding scale    irbesartan (AVAPRO) 300 mg, oral, Daily    ketoconazole (NIZOral) 2 % shampoo Wash scalp once to twice a week. Leave on for 3-5 minutes then rinse    L. acidophilus/Bifid. animalis 32 billion cell capsule 1 capsule, Daily    montelukast (SINGULAIR) 10 mg, oral, Daily    Mounjaro 7.5 mg, subcutaneous, Weekly    omeprazole (PRILOSEC) 20 mg, oral, Daily, Do not crush or chew.    ondansetron (ZOFRAN) 4 mg, oral, Every 8 hours PRN    pen needle, diabetic (BD Michelle 2nd Gen Pen Needle) 32 gauge x 5/32\" needle Use with daily glucose testing    tiZANidine (ZANAFLEX) 4 mg, oral, 3 times daily PRN    True Metrix Glucose Test Strip strip use to check blood sugar TWICE DAILY    venlafaxine XR (Effexor-XR) 150 mg 24 hr capsule Take 1 capsule (150 mg total) by mouth daily with breakfast for 30 days.    venlafaxine XR (Effexor-XR) 75 mg 24 hr capsule TAKE 1 CAPSULE(75 MG) BY MOUTH DAILY WITH FOOD    Ventolin HFA 90 mcg/actuation inhaler Ventolin ade 2 puffs q4h prn    VITAMIN B COMPLEX ORAL 1 tablet, Every morning      Vitals  BP Readings from Last 2 Encounters:   07/23/25 142/66   04/30/25 150/66     BMI Readings from Last 1 Encounters:   07/23/25 41.92 kg/m²      Labs  A1C  Lab Results   Component Value Date    HGBA1C 8.0 (H) 03/04/2025    HGBA1C 7.4 (H) 10/30/2024    HGBA1C 7.3 (H) 07/29/2024     BMP  Lab Results   Component Value Date    CALCIUM 9.6 05/12/2025    "  05/12/2025    K 4.4 05/12/2025    CO2 29 05/12/2025     05/12/2025    BUN 12 05/12/2025    CREATININE 0.76 05/12/2025    EGFR 86 05/12/2025     LFTs  Lab Results   Component Value Date    ALT 11 03/04/2025    AST 10 03/04/2025    ALKPHOS 108 03/04/2025    BILITOT 0.3 03/04/2025     FLP  Lab Results   Component Value Date    TRIG 245 (H) 03/04/2025    CHOL 259 (H) 03/04/2025    LDLF 149 (H) 08/04/2023    LDLCALC 154 (H) 03/04/2025    HDL 55.6 03/04/2025     Urine Microalbumin  Lab Results   Component Value Date    MICROALBCREA  01/25/2025      Comment:      One or more analytes used in this calculation is outside of the analytical measurement range. Calculation cannot be performed.     Weight Management  Wt Readings from Last 3 Encounters:   07/23/25 104 kg (229 lb 3.2 oz)   04/30/25 110 kg (242 lb 12.8 oz)   04/28/25 109 kg (241 lb 6.4 oz)      There is no height or weight on file to calculate BMI.     Assessment/Plan   Problem List Items Addressed This Visit       Type 2 diabetes mellitus with other specified complication, unspecified whether long term insulin use (Multi)    Glucose currently well-controlled with Mounjaro but Mariluz does still have nausea to doses sometimes. Zofran usually helps with the nausea. She has been able to reduce to 10 units insulin most days. If she goes too low, has occasionally skipped insulin entirely and GMI remains at 6.7% on Ana María. Mariluz is happy with improved sugars and blood pressure and wants to remain on this dose. Will send more Zofran to help with occasional nausea. Repeat A1c ordered as well.         Relevant Medications    ondansetron (Zofran) 4 mg tablet    Other Relevant Orders    Referral to Clinical Pharmacy    Hemoglobin A1c    Hypertension - Primary    In office last visit, BP in 140s which is decent control for Mariluz. On Mounjaro she has noted improvement. Last PharmD visit still had occasional readings to 170s, now seeing at the highest 160s. More often  in 140s. Given improvement, no changes to medications today. We have discussed potentially adding in alpha blocker but Mariluz was concerned over possible side effects of dizziness/orthostatic hypotension.         Relevant Orders    Referral to Clinical Pharmacy     Other Visit Diagnoses         Medication management        Relevant Medications    ondansetron (Zofran) 4 mg tablet    Other Relevant Orders    Referral to Clinical Pharmacy          Clinical Pharmacist follow-up: 8/25 11:20AM, Telehealth visit    Continue all meds under the continuation of care with the referring provider and clinical pharmacy team.    Thank you,  Iwona Salazar, PharmD  Clinical Pharmacist  [phone]    Verbal consent to manage patient's drug therapy was obtained from the patient. They were informed they may decline to participate or withdraw from participation in pharmacy services at any time.

## 2025-08-04 NOTE — ASSESSMENT & PLAN NOTE
In office last visit, BP in 140s which is decent control for Mariluz. On Mounjaro she has noted improvement. Last PharmD visit still had occasional readings to 170s, now seeing at the highest 160s. More often in 140s. Given improvement, no changes to medications today. We have discussed potentially adding in alpha blocker but Mariluz was concerned over possible side effects of dizziness/orthostatic hypotension.

## 2025-08-04 NOTE — ASSESSMENT & PLAN NOTE
Glucose currently well-controlled with Mounjaro but Mariluz does still have nausea to doses sometimes. Zofran usually helps with the nausea. She has been able to reduce to 10 units insulin most days. If she goes too low, has occasionally skipped insulin entirely and GMI remains at 6.7% on Ana María. Mariluz is happy with improved sugars and blood pressure and wants to remain on this dose. Will send more Zofran to help with occasional nausea. Repeat A1c ordered as well.

## 2025-08-05 DIAGNOSIS — F39 MOOD DISORDER: ICD-10-CM

## 2025-08-05 NOTE — TELEPHONE ENCOUNTER
"Refill Request     Requested Prescriptions     Pending Prescriptions Disp Refills    venlafaxine XR (Effexor-XR) 75 mg 24 hr capsule [Pharmacy Med Name: VENLAFAXINE ER 75MG CAPSULES] 90 capsule 3     Sig: TAKE 1 CAPSULE(75 MG) BY MOUTH DAILY WITH FOOD          Last OV with PCP 7/23/2025     Future Appointments       Date / Time Provider Department Dept Phone    8/25/2025 11:20 AM (Arrive by 11:05 AM) Iwona Salazar, PharmD [facility]  Arrive at: [facility] Home [phone]    9/11/2025 3:00 PM [address] NEURO, RESEARCH COORDINATOR [facility]     11/3/2025 1:00 PM (Arrive by 12:45 PM) Radha Holt MD  [facility] [phone]          Lab Results   Component Value Date    HGBA1C 8.0 (H) 03/04/2025     Lab Results   Component Value Date    CHOL 259 (H) 03/04/2025    CHOL 270 (H) 10/30/2024    CHOL 297 (H) 11/08/2023     Lab Results   Component Value Date    HDL 55.6 03/04/2025    HDL 54.4 10/30/2024    HDL 53.8 11/08/2023     Lab Results   Component Value Date    LDLCALC 154 (H) 03/04/2025    LDLCALC 156 (H) 10/30/2024    LDLCALC 201 (H) 11/08/2023     Lab Results   Component Value Date    TRIG 245 (H) 03/04/2025    TRIG 299 (H) 10/30/2024    TRIG 212 (H) 11/08/2023     No components found for: \"CHOLHDL\"  Lab Results   Component Value Date    TSH 1.33 10/30/2024     Lab Results   Component Value Date    GLUCOSE 206 (H) 05/12/2025    CALCIUM 9.6 05/12/2025     05/12/2025    K 4.4 05/12/2025    CO2 29 05/12/2025     05/12/2025    BUN 12 05/12/2025    CREATININE 0.76 05/12/2025     VITD25   Date/Time Value Ref Range Status   10/30/2024 11:01 AM 46 30 - 100 ng/mL Final         "

## 2025-08-06 ENCOUNTER — TELEPHONE (OUTPATIENT)
Dept: PRIMARY CARE | Facility: CLINIC | Age: 70
End: 2025-08-06
Payer: COMMERCIAL

## 2025-08-06 RX ORDER — VENLAFAXINE HYDROCHLORIDE 75 MG/1
CAPSULE, EXTENDED RELEASE ORAL
Qty: 90 CAPSULE | Refills: 3 | Status: SHIPPED | OUTPATIENT
Start: 2025-08-06

## 2025-08-06 NOTE — TELEPHONE ENCOUNTER
Patient calling to report she feels incredibly nauseated from mounmarci Dumontan not helping   has had not even 12 ounces of her diabetic iced tea all day  Has not ate anything since no appetite   last time she ate was last night around 6  (lazaro gregg)  has been on the 7.5 mg for at least a month   when she was on 5 mg she had minimal nausea but not as bad as she is now   Please advise

## 2025-08-07 DIAGNOSIS — E11.69 TYPE 2 DIABETES MELLITUS WITH OTHER SPECIFIED COMPLICATION, UNSPECIFIED WHETHER LONG TERM INSULIN USE (MULTI): Primary | ICD-10-CM

## 2025-08-07 RX ORDER — TIRZEPATIDE 5 MG/.5ML
5 INJECTION, SOLUTION SUBCUTANEOUS WEEKLY
Qty: 2 ML | Refills: 1 | Status: SHIPPED | OUTPATIENT
Start: 2025-08-07

## 2025-08-07 NOTE — PROGRESS NOTES
Patient is having excessive nausea despite trying her Mounjaro dose this week with Zofran. Given this is the most common side effect, will decrease to 5 mg weekly moving forward and follow-up as scheduled.    Iwona Salazar, PharmD

## 2025-08-15 DIAGNOSIS — F39 MOOD DISORDER: ICD-10-CM

## 2025-08-15 DIAGNOSIS — M54.42 CHRONIC BILATERAL LOW BACK PAIN WITH BILATERAL SCIATICA: ICD-10-CM

## 2025-08-15 DIAGNOSIS — M54.41 CHRONIC BILATERAL LOW BACK PAIN WITH BILATERAL SCIATICA: ICD-10-CM

## 2025-08-15 DIAGNOSIS — G89.29 CHRONIC BILATERAL LOW BACK PAIN WITH BILATERAL SCIATICA: ICD-10-CM

## 2025-08-15 RX ORDER — TIZANIDINE HYDROCHLORIDE 4 MG/1
4 CAPSULE, GELATIN COATED ORAL 3 TIMES DAILY PRN
Qty: 90 CAPSULE | Refills: 3 | Status: SHIPPED | OUTPATIENT
Start: 2025-08-15

## 2025-08-15 RX ORDER — VENLAFAXINE HYDROCHLORIDE 150 MG/1
CAPSULE, EXTENDED RELEASE ORAL
Qty: 30 CAPSULE | Refills: 6 | Status: SHIPPED | OUTPATIENT
Start: 2025-08-15

## 2025-08-16 DIAGNOSIS — M54.41 CHRONIC BILATERAL LOW BACK PAIN WITH BILATERAL SCIATICA: ICD-10-CM

## 2025-08-16 DIAGNOSIS — G89.29 CHRONIC BILATERAL LOW BACK PAIN WITH BILATERAL SCIATICA: ICD-10-CM

## 2025-08-16 DIAGNOSIS — F39 MOOD DISORDER: ICD-10-CM

## 2025-08-16 DIAGNOSIS — M54.42 CHRONIC BILATERAL LOW BACK PAIN WITH BILATERAL SCIATICA: ICD-10-CM

## 2025-08-16 RX ORDER — VENLAFAXINE HYDROCHLORIDE 150 MG/1
CAPSULE, EXTENDED RELEASE ORAL
Qty: 30 CAPSULE | Refills: 6 | OUTPATIENT
Start: 2025-08-16

## 2025-08-20 RX ORDER — TIZANIDINE 4 MG/1
4 TABLET ORAL EVERY 6 HOURS PRN
Qty: 30 TABLET | Refills: 0 | Status: SHIPPED | OUTPATIENT
Start: 2025-08-20 | End: 2025-08-30

## 2025-08-22 ENCOUNTER — TELEMEDICINE (OUTPATIENT)
Dept: PHARMACY | Facility: HOSPITAL | Age: 70
End: 2025-08-22
Payer: COMMERCIAL

## 2025-08-22 DIAGNOSIS — Z79.899 MEDICATION MANAGEMENT: ICD-10-CM

## 2025-08-22 DIAGNOSIS — I10 PRIMARY HYPERTENSION: ICD-10-CM

## 2025-08-22 DIAGNOSIS — E11.69 TYPE 2 DIABETES MELLITUS WITH OTHER SPECIFIED COMPLICATION, UNSPECIFIED WHETHER LONG TERM INSULIN USE (MULTI): ICD-10-CM

## 2025-08-25 ENCOUNTER — APPOINTMENT (OUTPATIENT)
Dept: PHARMACY | Facility: HOSPITAL | Age: 70
End: 2025-08-25
Payer: COMMERCIAL

## 2025-08-25 DIAGNOSIS — R06.09 DYSPNEA ON EXERTION: ICD-10-CM

## 2025-08-25 DIAGNOSIS — R06.02 SHORTNESS OF BREATH: ICD-10-CM

## 2025-08-25 RX ORDER — MONTELUKAST SODIUM 10 MG/1
10 TABLET ORAL DAILY
Qty: 90 TABLET | Refills: 3 | Status: SHIPPED | OUTPATIENT
Start: 2025-08-25

## 2025-09-12 ENCOUNTER — APPOINTMENT (OUTPATIENT)
Dept: PHARMACY | Facility: HOSPITAL | Age: 70
End: 2025-09-12
Payer: COMMERCIAL

## 2025-11-03 ENCOUNTER — APPOINTMENT (OUTPATIENT)
Dept: PRIMARY CARE | Facility: CLINIC | Age: 70
End: 2025-11-03
Payer: COMMERCIAL

## (undated) DEVICE — CAUTERY, PENCIL, PUSH BUTTON, SMOKE EVAC, 70MM

## (undated) DEVICE — DRAPE, TOWEL, STERI DRAPE, 17 X 11 IN, PLASTIC, STERILE

## (undated) DEVICE — NEEDLE, HYPODERMIC, MONOJECT, 27 G X 1.5 IN

## (undated) DEVICE — DRESSING, GAUZE, PETROLATUM, PATCH, XEROFORM, 1 X 8 IN, STERILE

## (undated) DEVICE — IMMOBILIZER, HAND, XLARGE, ALUMINUM

## (undated) DEVICE — GLOVES, SURG BIOGEL, SZ-7.5, PF, LF

## (undated) DEVICE — MARKER, SKIN, DUAL TIP, W/RULER AND LABEL

## (undated) DEVICE — TOWEL PACK, STERILE, 4/PACK, BLUE

## (undated) DEVICE — SOLUTION, IRRIGATION, SODIUM CHLORIDE 0.9%, 1000 ML, POUR BOTTLE

## (undated) DEVICE — SOLUTION, IRRIGATION, STERILE WATER, 1000 ML, POUR BOTTLE

## (undated) DEVICE — Device

## (undated) DEVICE — PREP, SKIN, BACTOSHEILD, 4%, 4OZ

## (undated) DEVICE — GOWN, SURGICAL, SMARTGOWN, XX-LARGE, STERILE

## (undated) DEVICE — BANDAGE, GAUZE, 6 PLY, KERLIX, 2.25 IN X 3 YD, STERILE

## (undated) DEVICE — NEEDLE, ELECTRODE, ELECTROSURGICAL, INSULATED

## (undated) DEVICE — SUTURE, ETHILON, 4-0, 18 IN, P-3, BLACK